# Patient Record
Sex: FEMALE | Race: WHITE | NOT HISPANIC OR LATINO | Employment: OTHER | URBAN - METROPOLITAN AREA
[De-identification: names, ages, dates, MRNs, and addresses within clinical notes are randomized per-mention and may not be internally consistent; named-entity substitution may affect disease eponyms.]

---

## 2017-01-16 ENCOUNTER — HOSPITAL ENCOUNTER (OUTPATIENT)
Facility: AMBULATORY SURGERY CENTER | Age: 67
Discharge: HOME | End: 2017-01-16
Attending: SURGERY | Admitting: SURGERY
Payer: COMMERCIAL

## 2017-01-16 VITALS
TEMPERATURE: 98.7 F | RESPIRATION RATE: 16 BRPM | SYSTOLIC BLOOD PRESSURE: 129 MMHG | HEIGHT: 66 IN | DIASTOLIC BLOOD PRESSURE: 74 MMHG | BODY MASS INDEX: 25.71 KG/M2 | WEIGHT: 160 LBS | OXYGEN SATURATION: 96 %

## 2017-01-16 DIAGNOSIS — M25.562 LEFT KNEE PAIN, UNSPECIFIED CHRONICITY: Primary | ICD-10-CM

## 2017-01-16 LAB — COLONOSCOPY: NORMAL

## 2017-01-16 PROCEDURE — G8907 PT DOC NO EVENTS ON DISCHARG: HCPCS

## 2017-01-16 PROCEDURE — G0121 COLON CA SCRN NOT HI RSK IND: HCPCS | Performed by: SURGERY

## 2017-01-16 PROCEDURE — 45378 DIAGNOSTIC COLONOSCOPY: CPT

## 2017-01-16 PROCEDURE — G8918 PT W/O PREOP ORDER IV AB PRO: HCPCS

## 2017-01-16 RX ORDER — LIDOCAINE 40 MG/G
CREAM TOPICAL
Status: DISCONTINUED | OUTPATIENT
Start: 2017-01-16 | End: 2017-01-16 | Stop reason: HOSPADM

## 2017-01-16 RX ORDER — FENTANYL CITRATE 50 UG/ML
INJECTION, SOLUTION INTRAMUSCULAR; INTRAVENOUS PRN
Status: DISCONTINUED | OUTPATIENT
Start: 2017-01-16 | End: 2017-01-16 | Stop reason: HOSPADM

## 2017-01-16 RX ORDER — DIPHENHYDRAMINE HYDROCHLORIDE 50 MG/ML
INJECTION INTRAMUSCULAR; INTRAVENOUS PRN
Status: DISCONTINUED | OUTPATIENT
Start: 2017-01-16 | End: 2017-01-16 | Stop reason: HOSPADM

## 2017-02-10 ENCOUNTER — RADIANT APPOINTMENT (OUTPATIENT)
Dept: MRI IMAGING | Facility: CLINIC | Age: 67
End: 2017-02-10
Attending: ORTHOPAEDIC SURGERY
Payer: COMMERCIAL

## 2017-02-10 DIAGNOSIS — M25.562 LEFT KNEE PAIN, UNSPECIFIED CHRONICITY: ICD-10-CM

## 2017-02-10 PROCEDURE — 73721 MRI JNT OF LWR EXTRE W/O DYE: CPT | Mod: LT | Performed by: RADIOLOGY

## 2017-02-20 ENCOUNTER — OFFICE VISIT (OUTPATIENT)
Dept: ORTHOPEDICS | Facility: CLINIC | Age: 67
End: 2017-02-20
Payer: COMMERCIAL

## 2017-02-20 ENCOUNTER — RADIANT APPOINTMENT (OUTPATIENT)
Dept: GENERAL RADIOLOGY | Facility: CLINIC | Age: 67
End: 2017-02-20
Attending: ORTHOPAEDIC SURGERY
Payer: COMMERCIAL

## 2017-02-20 VITALS — RESPIRATION RATE: 14 BRPM

## 2017-02-20 DIAGNOSIS — S83.232D COMPLEX TEAR OF MEDIAL MENISCUS OF LEFT KNEE AS CURRENT INJURY, SUBSEQUENT ENCOUNTER: ICD-10-CM

## 2017-02-20 DIAGNOSIS — M17.12 PRIMARY OSTEOARTHRITIS OF LEFT KNEE: ICD-10-CM

## 2017-02-20 DIAGNOSIS — M25.562 LEFT KNEE PAIN, UNSPECIFIED CHRONICITY: Primary | ICD-10-CM

## 2017-02-20 PROCEDURE — 73562 X-RAY EXAM OF KNEE 3: CPT | Mod: LT

## 2017-02-20 PROCEDURE — 20610 DRAIN/INJ JOINT/BURSA W/O US: CPT | Mod: LT | Performed by: ORTHOPAEDIC SURGERY

## 2017-02-20 RX ORDER — METHYLPREDNISOLONE ACETATE 80 MG/ML
80 INJECTION, SUSPENSION INTRA-ARTICULAR; INTRALESIONAL; INTRAMUSCULAR; SOFT TISSUE ONCE
Qty: 1 ML | Refills: 0 | OUTPATIENT
Start: 2017-02-20 | End: 2017-02-20

## 2017-02-20 NOTE — PROGRESS NOTES
HISTORY OF PRESENT ILLNESS:  Tiffany Stovall is a 66-year-old female seen for evaluation of left knee pain.  She has had problems with the knee since 1978 with an open arthrotomy and medial meniscectomy at that time.  She has had progressive wear and tear on the knee since then.  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  She now has sharp, dull, aching, shooting pain rated 5/10, primarily in the anteromedial aspect of the knee.  She does walking and swimming and both seem to bother it.  Pushing off the wall in swimming has hurt.  She has sharp pain when sitting with the knees very bent and with stairs.  Going down stairs has caused troubles.  She had steroid injection in 11/2000 and this did seem to help.  She has had periodic problems.    Repeat injection on 11/14/16 gave great relief until a vacation when she walked extensively.  MRI was done 2/10/17 showing severe Grade 4 chondromalacia of medial joint and complex medial meniscus tear.  MRI images were independently visualized with the patient.  X-ray today shows severe medial joint line narrowing with bone-on-bone.     PHYSICAL EXAMINATION:  Good mobility of the hips without pain.  She has tenderness at the anteromedial aspect of the left knee, negative on the right.  There is a well-healed fairly small arthrotomy incision on the anterior medial aspect of the left knee.  She has mild varus alignment of the left knee.  She has some tenderness at the mid medial joint and mild pain with varus stress.  No pain with valgus stress.  With medial and lateral Rocky both produce some anteromedial pain.  Her main pain with flexion of the knee is she is limited in the amount of bend at 120 degrees.  Her other knee goes to at least 130.  She has mild effusion just a trace of fluid.  No evidence of a popliteal cyst.  No increased warmth or erythema.  Sensation and circulation are intact.      IMPRESSION:  Left knee osteoarthritis.    Left medial meniscus  tear.  The osteoarthritis is primary problem.    Plan:    We discussed options and will inject the left knee with 80 mg Depo-Medrol and lidocaine today at anteromedial site.  We also discussed  brace and possible total knee arthroplasty.  She cannot take NSAID due to allergies.        YAMEL MONCADA MD

## 2017-02-20 NOTE — NURSING NOTE
"Chief Complaint   Patient presents with     RECHECK     Follow-up for Left knee MRI 2/10/17.       Initial Resp 14 Estimated body mass index is 25.82 kg/(m^2) as calculated from the following:    Height as of 1/12/17: 1.676 m (5' 6\").    Weight as of 1/12/17: 72.6 kg (160 lb).  Medication Reconciliation: complete     GIULIA GrayC  Supervising physician: Tom Thomas MD  Dept. of Orthopedics  [facility]          "

## 2017-02-20 NOTE — PROGRESS NOTES
The patient's left knee was prepped with betadine solution after verification of allergies. Area approximately 10 cm x 10 cm prepped in a sterile fashion. After injection, betadine removed with soap and water and band-aids applied.    1ml depo-medrol with 1% lidocaine plain injected into patient's left knee by Dr. Tom Thomas  LOT# [id number]  Exp. 06/2019

## 2017-02-20 NOTE — MR AVS SNAPSHOT
After Visit Summary   2/20/2017    Tiffany Stovall    MRN: [id number]           Patient Information     Date Of Birth          1950        Visit Information        Provider Department      2/20/2017 8:00 AM Tom Thomas MD [facility]        Today's Diagnoses     Left knee pain, unspecified chronicity    -  1      Care Instructions    You have had a steroid injection today.  For the first 2 hours there will likely be some numbing in the joint from the lidocaine.  This is a good sign, indicating that the injection is in the right place.  In 2 hours the lidocaine will wear off, and the joint will hurt like you had a shot.  Each day the cortisone makes it feel better.  It reaches peak effect in 2 weeks.  We expect it to last for 3 months.  You may resume regular activity when you feel ready.  If you are diabetic, your glucoses will be quite high for several days.    Consider  brace.  Consider total knee arthroplasty.        Follow-ups after your visit        Who to contact     If you have questions or need follow up information about today's clinic visit or your schedule please contact [facility] directly at [phone].  Normal or non-critical lab and imaging results will be communicated to you by MyChart, letter or phone within 4 business days after the clinic has received the results. If you do not hear from us within 7 days, please contact the clinic through Reebonz[employer] or phone. If you have a critical or abnormal lab result, we will notify you by phone as soon as possible.  Submit refill requests through payever or call your pharmacy and they will forward the refill request to us. Please allow 3 business days for your refill to be completed.          Additional Information About Your Visit        MyChart Information     payever gives you secure access to your electronic health record. If you see a primary care provider, you can also send messages to  your care team and make appointments. If you have questions, please call your primary care clinic.  If you do not have a primary care provider, please call [phone] and they will assist you.        Care EveryWhere ID     This is your Care EveryWhere ID. This could be used by other organizations to access your [facility] medical records  [id number]        Your Vitals Were     Respirations                   14            Blood Pressure from Last 3 Encounters:   01/16/17 129/74   12/13/16 142/83   03/02/16 122/75    Weight from Last 3 Encounters:   01/12/17 72.6 kg (160 lb)   03/02/16 74.4 kg (164 lb 1.6 oz)   02/16/15 75.4 kg (166 lb 3.2 oz)              We Performed the Following     XR Knee Left 3 Views        Primary Care Provider Office Phone # Fax #    Darby Williamson [phone]       [facility] [address] [place] [address] [address]  [facility] [address]        Thank you!     Thank you for choosing [facility]  for your care. Our goal is always to provide you with excellent care. Hearing back from our patients is one way we can continue to improve our services. Please take a few minutes to complete the written survey that you may receive in the mail after your visit with us. Thank you!             Your Updated Medication List - Protect others around you: Learn how to safely use, store and throw away your medicines at www.disposemymeds.org.          This list is accurate as of: 2/20/17  8:32 AM.  Always use your most recent med list.                   Brand Name Dispense Instructions for use    acetaminophen-codeine 300-30 MG per tablet    TYLENOL #3    18 tablet    Take 1-2 tablets by mouth every 4 hours as needed for pain maximum 8 tablet(s) per day       * albuterol 108 (90 BASE) MCG/ACT Inhaler    albuterol    1 Inhaler    Inhale 1-2 puffs into the lungs every 4 hours as needed for shortness of breath / dyspnea       * albuterol (2.5 MG/3ML) 0.083% neb solution     25 vial    Take 1  vial (2.5 mg) by nebulization every 6 hours as needed for shortness of breath / dyspnea       atorvastatin 40 MG tablet    LIPITOR    90 tablet    Take 1 tablet (40 mg) by mouth daily       EPINEPHrine 0.3 MG/0.3ML injection     2 each    Inject 0.3 mLs (0.3 mg) into the muscle once as needed       fluticasone-salmeterol 250-50 MCG/DOSE diskus inhaler    ADVAIR DISKUS    3 Inhaler    Inhale 1 puff into the lungs 2 times daily       levothyroxine 112 MCG tablet    SYNTHROID/LEVOTHROID    90 tablet    Take 1 tablet (112 mcg) by mouth every morning Patient needs to be seen prior to further refills.       loratadine 10 MG tablet    CLARITIN     Take 10 mg by mouth daily       losartan-hydrochlorothiazide 100-25 MG per tablet    HYZAAR    90 tablet    Take 1 tablet by mouth every morning       Multi-vitamin Tabs tablet   Generic drug:  multivitamin, therapeutic with minerals      ONE TABLET DAILY       NEXIUM PO      Take 40 mg by mouth daily       order for DME     1 each    Equipment being ordered: post-op /hard soled shoe       potassium chloride SA 20 MEQ CR tablet    K-DUR/KLOR-CON M    90 tablet    TAKE ONE TABLET BY MOUTH EVERY DAY       TYLENOL EXTRA STRENGTH PO          ZANTAC PO          * Notice:  This list has 2 medication(s) that are the same as other medications prescribed for you. Read the directions carefully, and ask your doctor or other care provider to review them with you.

## 2017-02-20 NOTE — PATIENT INSTRUCTIONS
You have had a steroid injection today.  For the first 2 hours there will likely be some numbing in the joint from the lidocaine.  This is a good sign, indicating that the injection is in the right place.  In 2 hours the lidocaine will wear off, and the joint will hurt like you had a shot.  Each day the cortisone makes it feel better.  It reaches peak effect in 2 weeks.  We expect it to last for 3 months.  You may resume regular activity when you feel ready.  If you are diabetic, your glucoses will be quite high for several days.    Consider  brace.  Consider total knee arthroplasty.

## 2017-02-23 ENCOUNTER — MYC MEDICAL ADVICE (OUTPATIENT)
Dept: FAMILY MEDICINE | Facility: CLINIC | Age: 67
End: 2017-02-23

## 2017-02-23 DIAGNOSIS — I10 HYPERTENSION GOAL BP (BLOOD PRESSURE) < 140/90: ICD-10-CM

## 2017-02-23 RX ORDER — LOSARTAN POTASSIUM AND HYDROCHLOROTHIAZIDE 25; 100 MG/1; MG/1
TABLET ORAL
Qty: 90 TABLET | Refills: 3 | Status: CANCELLED | OUTPATIENT
Start: 2017-02-23

## 2017-02-24 RX ORDER — LOSARTAN POTASSIUM AND HYDROCHLOROTHIAZIDE 25; 100 MG/1; MG/1
1 TABLET ORAL EVERY MORNING
Qty: 90 TABLET | Refills: 0 | Status: SHIPPED | OUTPATIENT
Start: 2017-02-24 | End: 2017-03-21

## 2017-03-17 ENCOUNTER — OFFICE VISIT (OUTPATIENT)
Dept: FAMILY MEDICINE | Facility: CLINIC | Age: 67
End: 2017-03-17
Payer: COMMERCIAL

## 2017-03-17 DIAGNOSIS — Z80.8 FAMILY HISTORY OF NONMELANOMA SKIN CANCER: ICD-10-CM

## 2017-03-17 DIAGNOSIS — Z85.828 HISTORY OF BASAL CELL CARCINOMA: ICD-10-CM

## 2017-03-17 DIAGNOSIS — L81.4 SOLAR LENTIGO: ICD-10-CM

## 2017-03-17 DIAGNOSIS — D22.9 MULTIPLE BENIGN MELANOCYTIC NEVI: ICD-10-CM

## 2017-03-17 DIAGNOSIS — L82.1 SEBORRHEIC KERATOSES: ICD-10-CM

## 2017-03-17 DIAGNOSIS — Z12.83 SKIN CANCER SCREENING: Primary | ICD-10-CM

## 2017-03-17 DIAGNOSIS — Z86.007 HISTORY OF SQUAMOUS CELL CARCINOMA IN SITU OF SKIN: ICD-10-CM

## 2017-03-17 PROCEDURE — 99213 OFFICE O/P EST LOW 20 MIN: CPT | Performed by: FAMILY MEDICINE

## 2017-03-17 NOTE — PROGRESS NOTES
[facility] - PRIMARY CARE SKIN    CC : skin cancer screening (full-body)  SUBJECTIVE:                                                    Tiffany Stovall is a 66 year old female who presents to clinic today for a full-body skin exam because of her history of basal cell carcinoma on the dorsum of the right foot.    Bothersome lesions noticed by the patient or other skin concerns :  Issue One : Site of previous Mohs micrographic surgery on the right foot has been slow to heal. She has noticed serous drainage from the site. This is aggravated by footwear, but she tries to wear loose-fitting shoes. She reports reaction to Vicryl sutures. Tenderness noted at times, but it is most aggravated by friction.  Issue Two : She has also felt gritty sensations on the scalp.    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  Family history of skin cancer : YES - non-melanoma.    Sun Exposure History  Sunscreen Use : YES, frequency : daily, SPF : 30.  UV-protective clothing use : YES  Previous history of significant sun exposure:  Blistering sunburns : YES - as a teenager  Tanning beds : NO.    Occupation : nurse (indoor).    Refer to electronic medical record (EMR) for past medical history and medications.    INTEGUMENTARY/SKIN: POSITIVE for changing and non-healing lesions  ROS : 14 point review of systems was negative except the symptoms listed above in the HPI.    This document serves as a record of the services and decisions personally performed and made by Joy Bailon  "MD. It was created on her behalf by James Dukes, a trained medical scribe.  The creation of this document is based on the scribe's personal observations and the provider's statements to the medical scribe.  James Dukes, March 17, 2017 4:13 PM      OBJECTIVE:                                                    GENERAL: healthy, alert and no distress  SKIN: Rodriguez Skin Type - I.  This patient was examined from the top of the head to the bottom of the feet  including scalp, face, neck, back, chest, breasts, buttocks, both arms, both legs, both hands, both feet, all 10 fingers and all 10 toes. The dermatoscope was used to help evaluate pigmented lesions.  Skin Pertinent Findings:  Scalp : 4 mm in size, \"stuck on\" appearing papules, raised, brown, coarse-textured, round lesion(s) most consistent with seborrheic keratoses on the mid-parietal scalp    Arms : Multiple, scattered, brown, macule(s) most consistent with benign solar lentigo. Scattered, 2 mm - 3 mm in size, brown macules most consistent with benign nevi (melanocytic nevi).    Legs : Scattered, brown, macule(s) most consistent with benign solar lentigo. Scattered, multiple, 2 mm - 3 mm in size, brown macules most consistent with benign nevi (melanocytic nevi).    Back :  Scattered, brown macules most consistent with benign nevi (melanocytic nevi).    Significant Findings:  Right mid-dorsal forearm : Well-healed scar    Right dorsal foot, beetween second and third toe : 6 mm in size area of superficial erosion. No suspicious characteristics.    Diagnostic Test Results:  none     Consent for digital photograhy  The patient was advised that digital photos will be taken today of Tiffany Stovall. The patient verbally consented to having these photos taken for purposes of documenting her condition. The patient understands that the images will be stored in her medical record.    MDM : full-body skin cancer screening in 4 months, if still clear then space out " "skin exams.      ASSESSMENT:                                                      Encounter Diagnoses   Name Primary?     Skin cancer screening Yes     History of basal cell carcinoma      History of squamous cell carcinoma in situ of skin      Family history of nonmelanoma skin cancer          PLAN:                                                    Patient Instructions   FUTURE APPOINTMENTS  Follow up in 4 month(s) for a full-body skin cancer screening.    Return to clinic if increasing tenderness, size, bleeding, discharge, infection.    TIPS FOR AVOIDING SKIN CANCER AND PREMATURE SKIN AGING  DOs    Wear a wide-brimmed hat and sunglasses.     Wear sun-protective clothing.    [employer] and other [employer] make sun protective clothing that is stylish, comfortable and cool.    [employer] and other [employer] make UV arm sleeves suitable for golfing, gardening and other activities.      Wear sunscreen on your face every day, even in the winter. (UVA \"aging rays\" penetrates window glass and is just as strong in the winter as in the summer) Sunscreen with SPF > 30 is recommended.    Wear sunscreen on your body and re-apply every 2 hours when exposed to sun. Sunscreen with SPF > 50 is recommended.    You should use about 3 tablespoons of sunscreen to protect your whole body. Thus a typical eight ounce bottle of sunscreen should last 4 applications. Remember, that the SPF rating is compromised if you don t apply enough. Most people only apply 1/2 - 1/3 of the amount they need. Also don t forget areas such as your ears, feet, upper back and harder to reach places. Keep in mind that these amounts should be increased for larger body sizes.    Note that spray sunscreens are only for touch-up application, not as a base layer. Also, use spray sunscreens with caution around small children due to inhalation risk.    Product Recommendations:    Look for broad spectrum sunscreen (blocks both UVA and UVB).    Look for " "titanium dioxide and/or zinc oxide in the active ingredients, which are physical blockers as opposed to chemical blockers. Chemical-free sunscreens should not irritate the skin.    Good examples include: Blue Lizard, EltaMD, Vanicream, Solbar, CeraVe.     For sensitive skin, consider : SkinMedica Essential Defense Mineral Shield Broad Spectrum SPF 35      Avoid combination products that include both sunscreen and insect repellant, as sunscreen should be applied every 2 hours, but insect repellant should not be applied as frequently.    Avoid products that include oxybenzone or retinyl palmitate.    For more information:  http://www.skincancer.org/prevention/sun-protection/sunscreen/sunscreens-safe-and-effective    DON'Ts    All tanning damages the skin. Aim for ivory skin year round and you will have less trouble with your skin in years to come. There is no merit in getting \"a base tan\" before a warm weather vacation, as any tanning indicates your body's response to sun damage.    Never use tanning beds. Tanning beds are associated with much higher risks of skin cancer.    Avoid mid-day sunshine (10 AM to 3 PM), if possible.    Stop smoking. Smokers have higher rates of skin cancer and also have premature skin wrinkling.    SKIN CANCER SELF-EXAM INSTRUCTIONS  Check every month in the mirror or with a household member. Be aware of any changes, especially bleeding or tenderness. Also, make sure to check your nails for color changes after removal of nail polish.    For melanoma, check for:  A - Asymmetry. One half unlike the other half.  B - Border. Irregular, scalloped, ragged, notched, blurred or poorly defined borders.  C - Color. Color variations from one area to another, with shades of tan, brown and/or black present. Sometimes white, red or blue.  D - Diameter. Greater than 6 mm (about the size of a pencil eraser). Any new growth of a mole should be concerning and be evaluated.  E - Evolving. A mole or skin " lesion that looks different from the rest or is changing in size, shape or color.    For basal cell carcinoma and squamous cell carcinoma, check for:    Sores, shiny bumps, nodules, scaly lesions, or wart-like growths that are itchy, tender, crusting, scabbing, eroding, oozing or bleeding.    Open sores/wounds or reddish/irritated areas that do not heal within 2-3 weeks.    Scar-like areas that are white, yellow or waxy in color.    The patient was counseled about sunscreens and sun avoidance. The patient was counseled to check the skin regularly and report any lesion that is new, changing, itching, scabbing, bleeding or otherwise bothersome. The patient was discharged ambulatory and in stable condition.    Recommendations : q4 month skin exams.      PROCEDURES:                                                    None.    TT: 25 minutes.  CT: 15 minutes.      The information in this document, created by the medical scribe for me, accurately reflects the services I personally performed and the decisions made by me. I have reviewed and approved this document for accuracy prior to leaving the patient care area.  Joy Bailon MD March 17, 2017 4:13 PM  [facility] - PRIMARY CARE SKIN

## 2017-03-17 NOTE — PATIENT INSTRUCTIONS
"FUTURE APPOINTMENTS  Follow up in 4 month(s) for a full-body skin cancer screening.    Return to clinic if increasing tenderness, size, bleeding, discharge, infection.    TIPS FOR AVOIDING SKIN CANCER AND PREMATURE SKIN AGING  DOs    Wear a wide-brimmed hat and sunglasses.     Wear sun-protective clothing.    [employer] and other [employer] make sun protective clothing that is stylish, comfortable and cool.    [employer] and other [employer] make UV arm sleeves suitable for golfing, gardening and other activities.      Wear sunscreen on your face every day, even in the winter. (UVA \"aging rays\" penetrates window glass and is just as strong in the winter as in the summer) Sunscreen with SPF > 30 is recommended.    Wear sunscreen on your body and re-apply every 2 hours when exposed to sun. Sunscreen with SPF > 50 is recommended.    You should use about 3 tablespoons of sunscreen to protect your whole body. Thus a typical eight ounce bottle of sunscreen should last 4 applications. Remember, that the SPF rating is compromised if you don t apply enough. Most people only apply 1/2 - 1/3 of the amount they need. Also don t forget areas such as your ears, feet, upper back and harder to reach places. Keep in mind that these amounts should be increased for larger body sizes.    Note that spray sunscreens are only for touch-up application, not as a base layer. Also, use spray sunscreens with caution around small children due to inhalation risk.    Product Recommendations:    Look for broad spectrum sunscreen (blocks both UVA and UVB).    Look for titanium dioxide and/or zinc oxide in the active ingredients, which are physical blockers as opposed to chemical blockers. Chemical-free sunscreens should not irritate the skin.    Good examples include: Blue Lizard, EltaMD, Vanicream, Solbar, CeraVe.     For sensitive skin, consider : SkinMedica Essential Defense Mineral Shield Broad Spectrum SPF 35      Avoid combination " "products that include both sunscreen and insect repellant, as sunscreen should be applied every 2 hours, but insect repellant should not be applied as frequently.    Avoid products that include oxybenzone or retinyl palmitate.    For more information:  http://www.skincancer.org/prevention/sun-protection/sunscreen/sunscreens-safe-and-effective    DON'Ts    All tanning damages the skin. Aim for ivory skin year round and you will have less trouble with your skin in years to come. There is no merit in getting \"a base tan\" before a warm weather vacation, as any tanning indicates your body's response to sun damage.    Never use tanning beds. Tanning beds are associated with much higher risks of skin cancer.    Avoid mid-day sunshine (10 AM to 3 PM), if possible.    Stop smoking. Smokers have higher rates of skin cancer and also have premature skin wrinkling.    SKIN CANCER SELF-EXAM INSTRUCTIONS  Check every month in the mirror or with a household member. Be aware of any changes, especially bleeding or tenderness. Also, make sure to check your nails for color changes after removal of nail polish.    For melanoma, check for:  A - Asymmetry. One half unlike the other half.  B - Border. Irregular, scalloped, ragged, notched, blurred or poorly defined borders.  C - Color. Color variations from one area to another, with shades of tan, brown and/or black present. Sometimes white, red or blue.  D - Diameter. Greater than 6 mm (about the size of a pencil eraser). Any new growth of a mole should be concerning and be evaluated.  E - Evolving. A mole or skin lesion that looks different from the rest or is changing in size, shape or color.    For basal cell carcinoma and squamous cell carcinoma, check for:    Sores, shiny bumps, nodules, scaly lesions, or wart-like growths that are itchy, tender, crusting, scabbing, eroding, oozing or bleeding.    Open sores/wounds or reddish/irritated areas that do not heal within 2-3 " weeks.    Scar-like areas that are white, yellow or waxy in color.

## 2017-03-17 NOTE — MR AVS SNAPSHOT
"              After Visit Summary   3/17/2017    Tiffany Stovall    MRN: [id number]           Patient Information     Date Of Birth          1950        Visit Information        Provider Department      3/17/2017 4:00 PM Nataliya Bailon MD [facility] - Primary Care Skin        Today's Diagnoses     Skin cancer screening    -  1    History of basal cell carcinoma        History of squamous cell carcinoma in situ of skin        Family history of nonmelanoma skin cancer        Multiple benign melanocytic nevi        Solar lentigo        Seborrheic keratoses          Care Instructions    FUTURE APPOINTMENTS  Follow up in 4 month(s) for a full-body skin cancer screening.    Return to clinic if increasing tenderness, size, bleeding, discharge, infection.    TIPS FOR AVOIDING SKIN CANCER AND PREMATURE SKIN AGING  DOs    Wear a wide-brimmed hat and sunglasses.     Wear sun-protective clothing.    sambaash and other [employer] make sun protective clothing that is stylish, comfortable and cool.    [email or web address] and other [employer] make UV arm sleeves suitable for golfing, gardening and other activities.      Wear sunscreen on your face every day, even in the winter. (UVA \"aging rays\" penetrates window glass and is just as strong in the winter as in the summer) Sunscreen with SPF > 30 is recommended.    Wear sunscreen on your body and re-apply every 2 hours when exposed to sun. Sunscreen with SPF > 50 is recommended.    You should use about 3 tablespoons of sunscreen to protect your whole body. Thus a typical eight ounce bottle of sunscreen should last 4 applications. Remember, that the SPF rating is compromised if you don t apply enough. Most people only apply 1/2 - 1/3 of the amount they need. Also don t forget areas such as your ears, feet, upper back and harder to reach places. Keep in mind that these amounts should be increased for larger body sizes.    Note that spray sunscreens are " "only for touch-up application, not as a base layer. Also, use spray sunscreens with caution around small children due to inhalation risk.    Product Recommendations:    Look for broad spectrum sunscreen (blocks both UVA and UVB).    Look for titanium dioxide and/or zinc oxide in the active ingredients, which are physical blockers as opposed to chemical blockers. Chemical-free sunscreens should not irritate the skin.    Good examples include: Blue Lizard, EltaMD, Vanicream, Solbar, CeraVe.     For sensitive skin, consider : SkinMedica Essential Defense Mineral Shield Broad Spectrum SPF 35      Avoid combination products that include both sunscreen and insect repellant, as sunscreen should be applied every 2 hours, but insect repellant should not be applied as frequently.    Avoid products that include oxybenzone or retinyl palmitate.    For more information:  http://www.skincancer.org/prevention/sun-protection/sunscreen/sunscreens-safe-and-effective    DON'Ts    All tanning damages the skin. Aim for ivory skin year round and you will have less trouble with your skin in years to come. There is no merit in getting \"a base tan\" before a warm weather vacation, as any tanning indicates your body's response to sun damage.    Never use tanning beds. Tanning beds are associated with much higher risks of skin cancer.    Avoid mid-day sunshine (10 AM to 3 PM), if possible.    Stop smoking. Smokers have higher rates of skin cancer and also have premature skin wrinkling.    SKIN CANCER SELF-EXAM INSTRUCTIONS  Check every month in the mirror or with a household member. Be aware of any changes, especially bleeding or tenderness. Also, make sure to check your nails for color changes after removal of nail polish.    For melanoma, check for:  A - Asymmetry. One half unlike the other half.  B - Border. Irregular, scalloped, ragged, notched, blurred or poorly defined borders.  C - Color. Color variations from one area to another, with " shades of tan, brown and/or black present. Sometimes white, red or blue.  D - Diameter. Greater than 6 mm (about the size of a pencil eraser). Any new growth of a mole should be concerning and be evaluated.  E - Evolving. A mole or skin lesion that looks different from the rest or is changing in size, shape or color.    For basal cell carcinoma and squamous cell carcinoma, check for:    Sores, shiny bumps, nodules, scaly lesions, or wart-like growths that are itchy, tender, crusting, scabbing, eroding, oozing or bleeding.    Open sores/wounds or reddish/irritated areas that do not heal within 2-3 weeks.    Scar-like areas that are white, yellow or waxy in color.        Follow-ups after your visit        Follow-up notes from your care team     Return in about 4 months (around 7/17/2017).      Your next 10 appointments already scheduled     Mar 21, 2017  7:40 AM CDT   PHYSICAL with Darby Williamson MD   [facility]    [address] [place] [id number] [phone]              Who to contact     If you have questions or need follow up information about today's clinic visit or your schedule please contact [facility] - PRIMARY CARE SKIN directly at [phone].  Normal or non-critical lab and imaging results will be communicated to you by [employer], letter or phone within 4 business days after the clinic has received the results. If you do not hear from us within 7 days, please contact the clinic through MyChart or phone. If you have a critical or abnormal lab result, we will notify you by phone as soon as possible.  Submit refill requests through [employer] or call your pharmacy and they will forward the refill request to us. Please allow 3 business days for your refill to be completed.          Additional Information About Your Visit        [employer] Information     [employer] gives you secure access to your electronic health record. If you see a primary  care provider, you can also send messages to your care team and make appointments. If you have questions, please call your primary care clinic.  If you do not have a primary care provider, please call [phone] and they will assist you.        Care EveryWhere ID     This is your Care EveryWhere ID. This could be used by other organizations to access your [place] medical records  [id number]         Blood Pressure from Last 3 Encounters:   01/16/17 129/74   12/13/16 142/83   03/02/16 122/75    Weight from Last 3 Encounters:   01/12/17 160 lb (72.6 kg)   03/02/16 164 lb 1.6 oz (74.4 kg)   02/16/15 166 lb 3.2 oz (75.4 kg)              Today, you had the following     No orders found for display       Primary Care Provider Office Phone # Fax #    Darby Williamson [phone]       [facility] [address] [place] [address] [place]  [facility] [address]        Thank you!     Thank you for choosing [facility] - PRIMARY CARE SKIN  for your care. Our goal is always to provide you with excellent care. Hearing back from our patients is one way we can continue to improve our services. Please take a few minutes to complete the written survey that you may receive in the mail after your visit with us. Thank you!             Your Updated Medication List - Protect others around you: Learn how to safely use, store and throw away your medicines at www.disposemymeds.org.          This list is accurate as of: 3/17/17  4:16 PM.  Always use your most recent med list.                   Brand Name Dispense Instructions for use    acetaminophen-codeine 300-30 MG per tablet    TYLENOL #3    18 tablet    Take 1-2 tablets by mouth every 4 hours as needed for pain maximum 8 tablet(s) per day       * albuterol 108 (90 BASE) MCG/ACT Inhaler    albuterol    1 Inhaler    Inhale 1-2 puffs into the lungs every 4 hours as needed for shortness of breath / dyspnea       * albuterol (2.5 MG/3ML) 0.083% neb solution     25 vial    Take 1  vial (2.5 mg) by nebulization every 6 hours as needed for shortness of breath / dyspnea       atorvastatin 40 MG tablet    LIPITOR    90 tablet    Take 1 tablet (40 mg) by mouth daily       EPINEPHrine 0.3 MG/0.3ML injection     2 each    Inject 0.3 mLs (0.3 mg) into the muscle once as needed       fluticasone-salmeterol 250-50 MCG/DOSE diskus inhaler    ADVAIR DISKUS    3 Inhaler    Inhale 1 puff into the lungs 2 times daily       levothyroxine 112 MCG tablet    SYNTHROID/LEVOTHROID    90 tablet    Take 1 tablet (112 mcg) by mouth every morning Patient needs to be seen prior to further refills.       loratadine 10 MG tablet    CLARITIN     Take 10 mg by mouth daily       losartan-hydrochlorothiazide 100-25 MG per tablet    HYZAAR    90 tablet    Take 1 tablet by mouth every morning       Multi-vitamin Tabs tablet   Generic drug:  multivitamin, therapeutic with minerals      ONE TABLET DAILY       NEXIUM PO      Take 40 mg by mouth daily       order for DME     1 each    Equipment being ordered: post-op /hard soled shoe       OSTEO BI-FLEX ADV DOUBLE ST PO          potassium chloride SA 20 MEQ CR tablet    K-DUR/KLOR-CON M    90 tablet    TAKE ONE TABLET BY MOUTH EVERY DAY       TYLENOL EXTRA STRENGTH PO          ZANTAC PO          * Notice:  This list has 2 medication(s) that are the same as other medications prescribed for you. Read the directions carefully, and ask your doctor or other care provider to review them with you.

## 2017-03-21 ENCOUNTER — OFFICE VISIT (OUTPATIENT)
Dept: FAMILY MEDICINE | Facility: CLINIC | Age: 67
End: 2017-03-21
Payer: COMMERCIAL

## 2017-03-21 VITALS
SYSTOLIC BLOOD PRESSURE: 134 MMHG | DIASTOLIC BLOOD PRESSURE: 78 MMHG | TEMPERATURE: 98.2 F | WEIGHT: 170.9 LBS | OXYGEN SATURATION: 98 % | HEART RATE: 83 BPM | BODY MASS INDEX: 27.47 KG/M2 | HEIGHT: 66 IN

## 2017-03-21 DIAGNOSIS — Z23 NEED FOR VACCINATION WITH 13-POLYVALENT PNEUMOCOCCAL CONJUGATE VACCINE: ICD-10-CM

## 2017-03-21 DIAGNOSIS — J45.30 MILD PERSISTENT ASTHMA WITHOUT COMPLICATION: ICD-10-CM

## 2017-03-21 DIAGNOSIS — Z00.00 ROUTINE GENERAL MEDICAL EXAMINATION AT A HEALTH CARE FACILITY: Primary | ICD-10-CM

## 2017-03-21 DIAGNOSIS — I10 HYPERTENSION GOAL BP (BLOOD PRESSURE) < 140/90: ICD-10-CM

## 2017-03-21 DIAGNOSIS — M17.12 PRIMARY OSTEOARTHRITIS OF LEFT KNEE: ICD-10-CM

## 2017-03-21 DIAGNOSIS — E78.5 HYPERLIPIDEMIA LDL GOAL <130: ICD-10-CM

## 2017-03-21 DIAGNOSIS — E03.4 HYPOTHYROIDISM DUE TO ACQUIRED ATROPHY OF THYROID: ICD-10-CM

## 2017-03-21 DIAGNOSIS — E78.6 FAMILIAL LIPOPROTEIN DEFICIENCY: ICD-10-CM

## 2017-03-21 DIAGNOSIS — K21.9 GASTROESOPHAGEAL REFLUX DISEASE WITHOUT ESOPHAGITIS: ICD-10-CM

## 2017-03-21 DIAGNOSIS — Z11.59 NEED FOR HEPATITIS C SCREENING TEST: ICD-10-CM

## 2017-03-21 LAB
ALBUMIN SERPL-MCNC: 4.2 G/DL (ref 3.4–5)
ALP SERPL-CCNC: 64 U/L (ref 40–150)
ALT SERPL W P-5'-P-CCNC: 49 U/L (ref 0–50)
ANION GAP SERPL CALCULATED.3IONS-SCNC: 9 MMOL/L (ref 3–14)
AST SERPL W P-5'-P-CCNC: 27 U/L (ref 0–45)
BILIRUB SERPL-MCNC: 0.4 MG/DL (ref 0.2–1.3)
BUN SERPL-MCNC: 14 MG/DL (ref 7–30)
CALCIUM SERPL-MCNC: 9.5 MG/DL (ref 8.5–10.1)
CHLORIDE SERPL-SCNC: 100 MMOL/L (ref 94–109)
CHOLEST SERPL-MCNC: 123 MG/DL
CO2 SERPL-SCNC: 28 MMOL/L (ref 20–32)
CREAT SERPL-MCNC: 0.65 MG/DL (ref 0.52–1.04)
CREAT UR-MCNC: 124 MG/DL
GFR SERPL CREATININE-BSD FRML MDRD: ABNORMAL ML/MIN/1.7M2
GLUCOSE SERPL-MCNC: 111 MG/DL (ref 70–99)
HDLC SERPL-MCNC: 54 MG/DL
HGB BLD-MCNC: 13.3 G/DL (ref 11.7–15.7)
LDLC SERPL CALC-MCNC: 57 MG/DL
MICROALBUMIN UR-MCNC: 5 MG/L
MICROALBUMIN/CREAT UR: 4.38 MG/G CR (ref 0–25)
NONHDLC SERPL-MCNC: 69 MG/DL
POTASSIUM SERPL-SCNC: 3.7 MMOL/L (ref 3.4–5.3)
PROT SERPL-MCNC: 7.2 G/DL (ref 6.8–8.8)
SODIUM SERPL-SCNC: 137 MMOL/L (ref 133–144)
TRIGL SERPL-MCNC: 62 MG/DL
TSH SERPL DL<=0.005 MIU/L-ACNC: 0.63 MU/L (ref 0.4–4)

## 2017-03-21 PROCEDURE — 36415 COLL VENOUS BLD VENIPUNCTURE: CPT | Performed by: FAMILY MEDICINE

## 2017-03-21 PROCEDURE — 84443 ASSAY THYROID STIM HORMONE: CPT | Performed by: FAMILY MEDICINE

## 2017-03-21 PROCEDURE — 86803 HEPATITIS C AB TEST: CPT | Performed by: FAMILY MEDICINE

## 2017-03-21 PROCEDURE — 80053 COMPREHEN METABOLIC PANEL: CPT | Performed by: FAMILY MEDICINE

## 2017-03-21 PROCEDURE — 99396 PREV VISIT EST AGE 40-64: CPT | Mod: 25 | Performed by: FAMILY MEDICINE

## 2017-03-21 PROCEDURE — 82043 UR ALBUMIN QUANTITATIVE: CPT | Performed by: FAMILY MEDICINE

## 2017-03-21 PROCEDURE — 85018 HEMOGLOBIN: CPT | Performed by: FAMILY MEDICINE

## 2017-03-21 PROCEDURE — 90670 PCV13 VACCINE IM: CPT | Performed by: FAMILY MEDICINE

## 2017-03-21 PROCEDURE — 90471 IMMUNIZATION ADMIN: CPT | Performed by: FAMILY MEDICINE

## 2017-03-21 PROCEDURE — 80061 LIPID PANEL: CPT | Performed by: FAMILY MEDICINE

## 2017-03-21 NOTE — MR AVS SNAPSHOT
After Visit Summary   3/21/2017    Tiffany Stovall    MRN: [id number]           Patient Information     Date Of Birth          1950        Visit Information        Provider Department      3/21/2017 7:40 AM Darby Williamson MD [facility]        Today's Diagnoses     Routine general medical examination at a health care facility    -  1    Hyperlipidemia LDL goal <130        Hypertension goal BP (blood pressure) < 140/90        Familial lipoprotein deficiency        Hypothyroidism due to acquired atrophy of thyroid        Mild persistent asthma without complication        Gastroesophageal reflux disease without esophagitis        Primary osteoarthritis of left knee        Need for hepatitis C screening test        Need for vaccination with 13-polyvalent pneumococcal conjugate vaccine          Care Instructions    Fasting labs today.  Refills will be sent when results return.    Prevnar vaccine today.      Preventive Health Recommendations  Female Ages 50 - 64    Yearly exam: See your health care provider every year in order to  o Review health changes.   o Discuss preventive care.    o Review your medicines if your doctor has prescribed any.      Get a Pap test every three years (unless you have an abnormal result and your provider advises testing more often).    If you get Pap tests with HPV test, you only need to test every 5 years, unless you have an abnormal result.     You do not need a Pap test if your uterus was removed (hysterectomy) and you have not had cancer.    You should be tested each year for STDs (sexually transmitted diseases) if you're at risk.     Have a mammogram every 1 to 2 years.    Have a colonoscopy at age 50, or have a yearly FIT test (stool test). These exams screen for colon cancer.      Have a cholesterol test every 5 years, or more often if advised.    Have a diabetes test (fasting glucose) every three years. If you are at risk for diabetes, you  should have this test more often.     If you are at risk for osteoporosis (brittle bone disease), think about having a bone density scan (DEXA).    Shots: Get a flu shot each year. Get a tetanus shot every 10 years.    Nutrition:     Eat at least 5 servings of fruits and vegetables each day.    Eat whole-grain bread, whole-wheat pasta and brown rice instead of white grains and rice.    Talk to your provider about Calcium and Vitamin D.     Lifestyle    Exercise at least 150 minutes a week (30 minutes a day, 5 days a week). This will help you control your weight and prevent disease.    Limit alcohol to one drink per day.    No smoking.     Wear sunscreen to prevent skin cancer.     See your dentist every six months for an exam and cleaning.    See your eye doctor every 1 to 2 years.          Follow-ups after your visit        Who to contact     If you have questions or need follow up information about today's clinic visit or your schedule please contact [facility] directly at [phone].  Normal or non-critical lab and imaging results will be communicated to you by [employer], letter or phone within 4 business days after the clinic has received the results. If you do not hear from us within 7 days, please contact the clinic through [employer] or phone. If you have a critical or abnormal lab result, we will notify you by phone as soon as possible.  Submit refill requests through [employer] or call your pharmacy and they will forward the refill request to us. Please allow 3 business days for your refill to be completed.          Additional Information About Your Visit        [employer] Information     [employer] gives you secure access to your electronic health record. If you see a primary care provider, you can also send messages to your care team and make appointments. If you have questions, please call your primary care clinic.  If you do not have a primary care provider, please call [phone] and they will  "assist you.        Care EveryWhere ID     This is your Care EveryWhere ID. This could be used by other organizations to access your [place] medical records  [id number]        Your Vitals Were     Pulse Temperature Height Pulse Oximetry BMI (Body Mass Index)       83 98.2  F (36.8  C) (Oral) 1.676 m (5' 6\") 98% 27.58 kg/m2        Blood Pressure from Last 3 Encounters:   03/21/17 134/78   01/16/17 129/74   12/13/16 142/83    Weight from Last 3 Encounters:   03/21/17 77.5 kg (170 lb 14.4 oz)   01/12/17 72.6 kg (160 lb)   03/02/16 74.4 kg (164 lb 1.6 oz)              We Performed the Following     Albumin Random Urine Quantitative     Asthma Action Plan (AAP)     Comprehensive metabolic panel (BMP + Alb, Alk Phos, ALT, AST, Total. Bili, TP)     Hemoglobin     Hepatitis C Screen Reflex to HCV RNA Quant and Genotype     LIPID REFLEX TO DIRECT LDL PANEL     PNEUMOCOCCAL CONJ VACCINE 13 VALENT IM (PREVNAR 13)     TSH WITH FREE T4 REFLEX        Primary Care Provider Office Phone # Fax #    Darby Williamson [phone]       [facility] [address] [place] [address] N  [facility] [address]        Thank you!     Thank you for choosing [facility]  for your care. Our goal is always to provide you with excellent care. Hearing back from our patients is one way we can continue to improve our services. Please take a few minutes to complete the written survey that you may receive in the mail after your visit with us. Thank you!             Your Updated Medication List - Protect others around you: Learn how to safely use, store and throw away your medicines at www.disposemymeds.org.          This list is accurate as of: 3/21/17  8:16 AM.  Always use your most recent med list.                   Brand Name Dispense Instructions for use    * albuterol 108 (90 BASE) MCG/ACT Inhaler    albuterol    1 Inhaler    Inhale 1-2 puffs into the lungs every 4 hours as needed for shortness of breath / dyspnea       * " albuterol (2.5 MG/3ML) 0.083% neb solution     25 vial    Take 1 vial (2.5 mg) by nebulization every 6 hours as needed for shortness of breath / dyspnea       atorvastatin 40 MG tablet    LIPITOR    90 tablet    Take 1 tablet (40 mg) by mouth daily       EPINEPHrine 0.3 MG/0.3ML injection     2 each    Inject 0.3 mLs (0.3 mg) into the muscle once as needed       fluticasone-salmeterol 250-50 MCG/DOSE diskus inhaler    ADVAIR DISKUS    3 Inhaler    Inhale 1 puff into the lungs 2 times daily       levothyroxine 112 MCG tablet    SYNTHROID/LEVOTHROID    90 tablet    Take 1 tablet (112 mcg) by mouth every morning Patient needs to be seen prior to further refills.       loratadine 10 MG tablet    CLARITIN     Take 10 mg by mouth daily       losartan-hydrochlorothiazide 100-25 MG per tablet    HYZAAR    90 tablet    Take 1 tablet by mouth every morning       Multi-vitamin Tabs tablet   Generic drug:  multivitamin, therapeutic with minerals      ONE TABLET DAILY       NEXIUM PO      Take 40 mg by mouth daily       OSTEO BI-FLEX ADV DOUBLE ST PO          potassium chloride SA 20 MEQ CR tablet    K-DUR/KLOR-CON M    90 tablet    TAKE ONE TABLET BY MOUTH EVERY DAY       TYLENOL EXTRA STRENGTH PO          ZANTAC PO          * Notice:  This list has 2 medication(s) that are the same as other medications prescribed for you. Read the directions carefully, and ask your doctor or other care provider to review them with you.

## 2017-03-21 NOTE — PATIENT INSTRUCTIONS
Fasting labs today.  Refills will be sent when results return.    Prevnar vaccine today.      Preventive Health Recommendations  Female Ages 50 - 64    Yearly exam: See your health care provider every year in order to  o Review health changes.   o Discuss preventive care.    o Review your medicines if your doctor has prescribed any.      Get a Pap test every three years (unless you have an abnormal result and your provider advises testing more often).    If you get Pap tests with HPV test, you only need to test every 5 years, unless you have an abnormal result.     You do not need a Pap test if your uterus was removed (hysterectomy) and you have not had cancer.    You should be tested each year for STDs (sexually transmitted diseases) if you're at risk.     Have a mammogram every 1 to 2 years.    Have a colonoscopy at age 50, or have a yearly FIT test (stool test). These exams screen for colon cancer.      Have a cholesterol test every 5 years, or more often if advised.    Have a diabetes test (fasting glucose) every three years. If you are at risk for diabetes, you should have this test more often.     If you are at risk for osteoporosis (brittle bone disease), think about having a bone density scan (DEXA).    Shots: Get a flu shot each year. Get a tetanus shot every 10 years.    Nutrition:     Eat at least 5 servings of fruits and vegetables each day.    Eat whole-grain bread, whole-wheat pasta and brown rice instead of white grains and rice.    Talk to your provider about Calcium and Vitamin D.     Lifestyle    Exercise at least 150 minutes a week (30 minutes a day, 5 days a week). This will help you control your weight and prevent disease.    Limit alcohol to one drink per day.    No smoking.     Wear sunscreen to prevent skin cancer.     See your dentist every six months for an exam and cleaning.    See your eye doctor every 1 to 2 years.

## 2017-03-21 NOTE — LETTER
My Asthma Action Plan  Name: Tiffany Stovall   YOB: 1950  Date: 3/21/2017   My doctor: Darby Wliliamson MD   My clinic: [facility]        My Control Medicine: Fluticasone + salmeterol (Advair) -  Diskus 250/50 mcg 1 puff daily to twice daily.  My Rescue Medicine: Albuterol (Proair/Ventolin/Proventil) inhaler 2 puffs every 4-6 hours as needed.   My Asthma Severity: mild persistent  Avoid your asthma triggers: upper respiratory infections               GREEN ZONE     Good Control    I feel good    No cough or wheeze    Can work, sleep and play without asthma symptoms       Take your asthma control medicine every day.     1. If exercise triggers your asthma, take your rescue medication    15 minutes before exercise or sports, and    During exercise if you have asthma symptoms  2. Spacer to use with inhaler: If you have a spacer, make sure to use it with your inhaler             YELLOW ZONE     Getting Worse  I have ANY of these:    I do not feel good    Cough or wheeze    Chest feels tight    Wake up at night   1. Keep taking your Green Zone medications  2. Start taking your rescue medicine:    every 20 minutes for up to 1 hour. Then every 4 hours for 24-48 hours.  3. If you stay in the Yellow Zone for more than 12-24 hours, contact your doctor.  4. If you do not return to the Green Zone in 12-24 hours or you get worse, start taking your oral steroid medicine if prescribed by your provider.           RED ZONE     Medical Alert - Get Help  I have ANY of these:    I feel awful    Medicine is not helping    Breathing getting harder    Trouble walking or talking    Nose opens wide to breathe       1. Take your rescue medicine NOW  2. If your provider has prescribed an oral steroid medicine, start taking it NOW  3. Call your doctor NOW  4. If you are still in the Red Zone after 20 minutes and you have not reached your doctor:    Take your rescue medicine again and    Call 911 or go to  the emergency room right away    See your regular doctor within 2 weeks of an Emergency Room or Urgent Care visit for follow-up treatment.        Electronically signed by: Darby Williamson, March 21, 2017    Annual Reminders:  Meet with Asthma Educator,  Flu Shot in the Fall, consider Pneumonia Vaccination for patients with asthma (aged 19 and older).    Pharmacy:    [facility] - [place], [place] - [address], [id number]  [facility] - [place], [place] - [address] [facility]  [id number] - [place], [place] - [address] AT [address] [facility] [address] [facility]                    Asthma Triggers  How To Control Things That Make Your Asthma Worse    Triggers are things that make your asthma worse.  Look at the list below to help you find your triggers and what you can do about them.  You can help prevent asthma flare-ups by staying away from your triggers.      Trigger                                                          What you can do   Cigarette Smoke  Tobacco smoke can make asthma worse. Do not allow smoking in your home, car or around you.  Be sure no one smokes at a child s day care or school.  If you smoke, ask your health care provider for ways to help you quit.  Ask family members to quit too.  Ask your health care provider for a referral to Quit Plan to help you quit smoking, or call 6-229-099-PLAN.     Colds, Flu, Bronchitis  These are common triggers of asthma. Wash your hands often.  Don t touch your eyes, nose or mouth.  Get a flu shot every year.     Dust Mites  These are tiny bugs that live in cloth or carpet. They are too small to see. Wash sheets and blankets in hot water every week.   Encase pillows and mattress in dust mite proof covers.  Avoid having carpet if you can. If you have carpet, vacuum weekly.   Use a dust mask and HEPA vacuum.   Pollen and Outdoor Mold  Some people are allergic to trees, grass,  or weed pollen, or molds. Try to keep your windows closed.  Limit time out doors when pollen count is high.   Ask you health care provider about taking medicine during allergy season.     Animal Dander  Some people are allergic to skin flakes, urine or saliva from pets with fur or feathers. Keep pets with fur or feathers out of your home.    If you can t keep the pet outdoors, then keep the pet out of your bedroom.  Keep the bedroom door closed.  Keep pets off cloth furniture and away from stuffed toys.     Mice, Rats, and Cockroaches  Some people are allergic to the waste from these pests.   Cover food and garbage.  Clean up spills and food crumbs.  Store grease in the refrigerator.   Keep food out of the bedroom.   Indoor Mold  This can be a trigger if your home has high moisture. Fix leaking faucets, pipes, or other sources of water.   Clean moldy surfaces.  Dehumidify basement if it is damp and smelly.   Smoke, Strong Odors, and Sprays  These can reduce air quality. Stay away from strong odors and sprays, such as perfume, powder, hair spray, paints, smoke incense, paint, cleaning products, candles and new carpet.   Exercise or Sports  Some people with asthma have this trigger. Be active!  Ask your doctor about taking medicine before sports or exercise to prevent symptoms.    Warm up for 5-10 minutes before and after sports or exercise.     Other Triggers of Asthma  Cold air:  Cover your nose and mouth with a scarf.  Sometimes laughing or crying can be a trigger.  Some medicines and food can trigger asthma.

## 2017-03-21 NOTE — PROGRESS NOTES
SUBJECTIVE:     CC: Tiffany Stovall is an 66 year old woman who presents for preventive health visit.     Healthy Habits:  Answers for HPI/ROS submitted by the patient on 3/18/2017   Annual Exam:  Getting at least 3 servings of Calcium per day:: Yes  Bi-annual eye exam:: Yes  Dental care twice a year:: Yes  Sleep apnea or symptoms of sleep apnea:: None  Diet:: Regular (no restrictions)  Frequency of exercise:: 2-3 days/week  Taking medications regularly:: Yes  Medication side effects:: None  Additional concerns today:: No  Q1: Little interest or pleasure in doing things: 0=Not at all  Q2: Feeling down, depressed or hopeless: 0=Not at all  PHQ-2 Score: 0  Duration of exercise:: 45-60 minutes      Concerns: None    Hyperlipidemia Follow-Up      Rate your low fat/cholesterol diet?: good    Taking statin?  Yes, no muscle aches from statin    Other lipid medications/supplements?:  none     Hypertension Follow-up      Outpatient blood pressures are not being checked.    Low Salt Diet: no added salt     Asthma Follow-Up    Was ACT completed today?    Yes    ACT Total Scores 3/21/2017   ACT TOTAL SCORE -   ASTHMA ER VISITS -   ASTHMA HOSPITALIZATIONS -   ACT TOTAL SCORE (Goal Greater than or Equal to 20) 25   In the past 12 months, how many times did you visit the emergency room for your asthma without being admitted to the hospital? 0   In the past 12 months, how many times were you hospitalized overnight because of your asthma? 0       Recent asthma triggers that patient is dealing with: None      Hypothyroidism Follow-up      Since last visit, patient describes the following symptoms: Weight stable, no hair loss, no skin changes, no constipation, no loose stools       Today's PHQ-2 Score:   PHQ-2 ( 1999 Pf[employer]) 3/18/2017 3/2/2016   Q1: Little interest or pleasure in doing things - 0   Q2: Feeling down, depressed or hopeless - 0   PHQ-2 Score - 0   Little interest or pleasure in doing things Not at all -   Feeling  down, depressed or hopeless Not at all -   PHQ-2 Score 0 -       Abuse: Current or Past(Physical, Sexual or Emotional)- No  Do you feel safe in your environment - Yes    Social History   Substance Use Topics     Smoking status: Never Smoker     Smokeless tobacco: Never Used     Alcohol use Yes      Comment: socially     The patient does not drink >3 drinks per day nor >7 drinks per week.    Recent Labs   Lab Test  03/02/16   0807  02/16/15   1124   12/13/13   1039   CHOL  156  160   < >  170   HDL  44*  60   < >  43*   LDL  85  85   < >  108   TRIG  134  74   < >  95   CHOLHDLRATIO   --   2.7   --   3.9   NHDL  112   --    --    --     < > = values in this interval not displayed.       Reviewed orders with patient.  Reviewed health maintenance and updated orders accordingly - Yes    Mammo Decision Support:  Patient over age 50, mutual decision to screen reflected in health maintenance.    Pertinent mammograms are reviewed under the imaging tab.  History of abnormal Pap smear: Status post benign hysterectomy. Health Maintenance and Surgical History updated.    Reviewed and updated as needed this visit by clinical staff  Tobacco  Allergies  Meds  Med Hx  Surg Hx  Fam Hx  Soc Hx        Reviewed and updated as needed this visit by Provider        Past Medical History:   Diagnosis Date     Bronchial spasm      Cancer (H)     basal cell, squamous cell skin ca     Compression fracture of T12 vertebra (H) 03/17/2014     GERD (gastroesophageal reflux disease)      HTN - hypertension      Hyperlipaemia      Postmenopausal 1999     Uncomplicated asthma      Unspecified hypothyroidism     Hypothyroidism      Past Surgical History:   Procedure Laterality Date     ARTHROSCOPY KNEE RT/LT  1978     BIOPSY OF BREAST, NEEDLE CORE  1995    Papilloma     C REMV CATARACT INTRACAP,INSERT LENS  2010     CARPAL TUNNEL RELEASE RT/LT  2010    bilateral     CHOLECYSTECTOMY, LAPOROSCOPIC  2006    Cholecystectomy, Laparoscopic      COLONOSCOPY WITH CO2 INSUFFLATION N/A 2017    Procedure: COLONOSCOPY WITH CO2 INSUFFLATION;  Surgeon: Elkin Lemus MD;  Location: [facility] OR     ENDOSCOPIC STRIPPING VEIN(S)  2013    stab phlebectomies     [facility] COLONOSCOPY THRU STOMA, DIAGNOSTIC  2006    diverticulosis, no polyps found, next one due in      HYSTERECTOMY, PAP NO LONGER INDICATED      bleeding, atypical cells on endobx, endometriosis     HYSTERECTOMY, CAMILLE      fibroids, endometrial hyperplasia     MOHS MICROGRAPHIC PROCEDURE      BCC lip     MOHS MICROGRAPHIC PROCEDURE  2016    squamus lt neck,basal top rt foot/3rd toe     SALPINGO OOPHORECTOMY,R/L/TERI      Salpingo Oophorectomy, RT/LT/TERI     TONSILLECTOMY & ADENOIDECTOMY       TUBAL/ECTOPIC PREGNANCY  ,     Obstetric History       T0      TAB0   SAB0   E2   M0   L1       # Outcome Date GA Lbr Mahin/2nd Weight Sex Delivery Anes PTL Lv   3 Ectopic         ND   2 Ectopic         ND   1      F    Y          ROS:  10 point ROS of systems including Constitutional, Eyes, Respiratory, Cardiovascular, Gastroenterology, Genitourinary, Integumentary, Muscularskeletal, Psychiatric were all negative except for pertinent positives noted in my HPI.      Problem list, Medication list, Allergies, and Medical/Social/Surgical histories reviewed in EPIC and updated as appropriate.  OBJECTIVE:     There were no vitals taken for this visit.  EXAM:  GENERAL APPEARANCE: healthy, alert and no distress  EYES: Eyes grossly normal to inspection, PERRL and conjunctivae and sclerae normal  HENT: ear canals and TM's normal, nose and mouth without ulcers or lesions, oropharynx clear and oral mucous membranes moist  NECK: no adenopathy, no asymmetry, masses, or scars and thyroid normal to palpation  RESP: lungs clear to auscultation - no rales, rhonchi or wheezes  BREAST: normal without masses, tenderness or nipple discharge and no palpable axillary masses  or adenopathy  CV: regular rate and rhythm, normal S1 S2, no S3 or S4, no murmur, click or rub, no peripheral edema and peripheral pulses strong  ABDOMEN: soft, nontender, no hepatosplenomegaly, no masses and bowel sounds normal   (female): normal female external genitalia, normal urethral meatus, vaginal mucosal atrophy noted, normal cervix, adnexae, and uterus without masses or abnormal discharge  MS: no musculoskeletal defects are noted, gait is age appropriate without ataxia, LLE exam reveals limited flexion and extension at knee, no effusion.  Mild valgus deformity  SKIN: right foot with scar and mild erythema between the second and third toes with superficial erosion.  NEURO: Normal strength and tone, sensory exam grossly normal, mentation intact and speech normal  PSYCH: mentation appears normal and affect normal/bright    ASSESSMENT/PLAN:     1. Routine general medical examination at a health care facility  Fasting.  Colonoscopy UTD.  Mammogram planned this month.    - Hemoglobin    2. Hyperlipidemia LDL goal <130  Continue current treatment.  - LIPID REFLEX TO DIRECT LDL PANEL  - atorvastatin (LIPITOR) 40 MG tablet; Take 1 tablet (40 mg) by mouth daily  Dispense: 90 tablet; Refill: 3  - Comprehensive metabolic panel (BMP + Alb, Alk Phos, ALT, AST, Total. Bili, TP)    3. Hypertension goal BP (blood pressure) < 140/90  continue  - losartan-hydrochlorothiazide (HYZAAR) 100-25 MG per tablet; Take 1 tablet by mouth every morning  Dispense: 90 tablet; Refill: 3  - Albumin Random Urine Quantitative  - Comprehensive metabolic panel (BMP + Alb, Alk Phos, ALT, AST, Total. Bili, TP)    4. Familial lipoprotein deficiency  continue  - potassium chloride SA (K-DUR/KLOR-CON M) 20 MEQ CR tablet; Take 1 tablet (20 mEq) by mouth daily  Dispense: 90 tablet; Refill: 3    5. Hypothyroidism due to acquired atrophy of thyroid  continue  - TSH WITH FREE T4 REFLEX  - levothyroxine (SYNTHROID/LEVOTHROID) 112 MCG tablet; Take 1  "tablet (112 mcg) by mouth every morning Patient needs to be seen prior to further refills.  Dispense: 90 tablet; Refill: 3    6. Mild persistent asthma without complication  Will call for refills at pharmacy when needed.    - Asthma Action Plan (AAP)  - fluticasone-salmeterol (ADVAIR DISKUS) 250-50 MCG/DOSE diskus inhaler; Inhale 1 puff into the lungs 2 times daily  Dispense: 3 Inhaler; Refill: 3  - albuterol (ALBUTEROL) 108 (90 BASE) MCG/ACT Inhaler; Inhale 1-2 puffs into the lungs every 4 hours as needed for shortness of breath / dyspnea  Dispense: 1 Inhaler; Refill: 2    7. Gastroesophageal reflux disease without esophagitis  OTC nexium  Has tried to wean off, not successful.      8. Primary osteoarthritis of left knee  Care with Dr. Thomas    9. Need for hepatitis C screening test  pending  - Hepatitis C Screen Reflex to HCV RNA Quant and Genotype    10. Need for vaccination with 13-polyvalent pneumococcal conjugate vaccine  vaccine  - PNEUMOCOCCAL CONJ VACCINE 13 VALENT IM (PREVNAR 13)    COUNSELING:   Reviewed preventive health counseling, as reflected in patient instructions         reports that she has never smoked. She has never used smokeless tobacco.    Estimated body mass index is 27.58 kg/(m^2) as calculated from the following:    Height as of this encounter: 1.676 m (5' 6\").    Weight as of this encounter: 77.5 kg (170 lb 14.4 oz).   Weight management plan: Discussed healthy diet and exercise guidelines and patient will follow up in 6 months in clinic to re-evaluate. knee pain prevents activity.  beginning swimming.      Counseling Resources:  ATP IV Guidelines  Pooled Cohorts Equation Calculator  Breast Cancer Risk Calculator  FRAX Risk Assessment  ICSI Preventive Guidelines  Dietary Guidelines for Americans, 2010  USDA's MyPlate  ASA Prophylaxis  Lung CA Screening    Darby Williamson MD  [facility]      Patient Instructions   Fasting labs today.  Refills will be sent when results " return.    Prevnar vaccine today.

## 2017-03-21 NOTE — NURSING NOTE
"Chief Complaint   Patient presents with     Physical     Pt is fasting       Initial /78 (BP Location: Right arm, Cuff Size: Adult Regular)  Pulse 83  Temp 98.2  F (36.8  C) (Oral)  Ht 1.676 m (5' 6\")  Wt 77.5 kg (170 lb 14.4 oz)  SpO2 98%  BMI 27.58 kg/m2 Estimated body mass index is 27.58 kg/(m^2) as calculated from the following:    Height as of this encounter: 1.676 m (5' 6\").    Weight as of this encounter: 77.5 kg (170 lb 14.4 oz).  Medication Reconciliation: complete       Michelle Ahmadi CMA      "

## 2017-03-22 LAB — HCV AB SERPL QL IA: NORMAL

## 2017-03-22 RX ORDER — LOSARTAN POTASSIUM AND HYDROCHLOROTHIAZIDE 25; 100 MG/1; MG/1
1 TABLET ORAL EVERY MORNING
Qty: 90 TABLET | Refills: 3 | Status: SHIPPED | OUTPATIENT
Start: 2017-03-22 | End: 2018-04-17

## 2017-03-22 RX ORDER — LEVOTHYROXINE SODIUM 112 UG/1
112 TABLET ORAL EVERY MORNING
Qty: 90 TABLET | Refills: 3 | Status: SHIPPED | OUTPATIENT
Start: 2017-03-22 | End: 2018-04-17

## 2017-03-22 RX ORDER — POTASSIUM CHLORIDE 1500 MG/1
20 TABLET, EXTENDED RELEASE ORAL DAILY
Qty: 90 TABLET | Refills: 3 | Status: SHIPPED | OUTPATIENT
Start: 2017-03-22 | End: 2018-04-17

## 2017-03-22 RX ORDER — ATORVASTATIN CALCIUM 40 MG/1
40 TABLET, FILM COATED ORAL DAILY
Qty: 90 TABLET | Refills: 3 | Status: SHIPPED | OUTPATIENT
Start: 2017-03-22 | End: 2018-04-17

## 2017-03-22 RX ORDER — ALBUTEROL SULFATE 90 UG/1
1-2 AEROSOL, METERED RESPIRATORY (INHALATION) EVERY 4 HOURS PRN
Qty: 1 INHALER | Refills: 2 | Status: SHIPPED | OUTPATIENT
Start: 2017-03-22 | End: 2017-06-07

## 2017-03-22 ASSESSMENT — ASTHMA QUESTIONNAIRES: ACT_TOTALSCORE: 25

## 2017-03-22 NOTE — PROGRESS NOTES
"Your urine testing is normal.  There is not elevated protein present.  Your cholesterol is under very good control.  The HDL, good cholesterol,  was previously low and is now normal.    Thyroid testing indicates you are on the correct dosage of medication.  Kidney and liver testing are normal.    Your blood sugar is borderline elevated.  This is in the \"prediabetic\" range.  Exercise and limiting carbohydrate and sugars in diet can be helpful at preventing progression to diabetes.  Your hemoglobin is normal.  Please call or MyChart message me if you have any questions.  Refills are being sent now.   PSK"

## 2017-03-24 NOTE — PROGRESS NOTES
The hepatitis C screening is negative as expected.  It was a pleasure to see you this week.  Enjoy your spring and summer.  Please call or MyChart message me if you have any questions.   [email or web address]

## 2017-04-06 DIAGNOSIS — M17.12 PRIMARY OSTEOARTHRITIS OF LEFT KNEE: Primary | ICD-10-CM

## 2017-06-07 ENCOUNTER — MYC MEDICAL ADVICE (OUTPATIENT)
Dept: FAMILY MEDICINE | Facility: CLINIC | Age: 67
End: 2017-06-07

## 2017-06-07 DIAGNOSIS — J45.30 MILD PERSISTENT ASTHMA WITHOUT COMPLICATION: ICD-10-CM

## 2017-06-09 RX ORDER — ALBUTEROL SULFATE 90 UG/1
1-2 AEROSOL, METERED RESPIRATORY (INHALATION) EVERY 4 HOURS PRN
Qty: 1 INHALER | Refills: 2 | Status: SHIPPED | OUTPATIENT
Start: 2017-06-09 | End: 2020-11-11

## 2017-06-09 NOTE — TELEPHONE ENCOUNTER
albuterol (ALBUTEROL) 108 (90 BASE) MCG/ACT Inhaler       Last Written Prescription Date: 3/22/17  Last Fill Quantity: 1 inhaler, # refills: 2    Last Office Visit with G, P or [facility] prescribing provider:  3/21/17 Dr. Williamson     Future Office Visit:    Next 5 appointments (look out 90 days)     Jun 12, 2017  4:00 PM CDT   Return Visit with Tom Thomas MD   [facility] ([address] [id number] [phone]                   Date of Last Asthma Action Plan Letter:   Asthma Action Plan Q1 Year    Topic Date Due     Asthma Action Plan - yearly  03/21/2018      Asthma Control Test:   ACT Total Scores 3/21/2017   ACT TOTAL SCORE -   ASTHMA ER VISITS -   ASTHMA HOSPITALIZATIONS -   ACT TOTAL SCORE (Goal Greater than or Equal to 20) 25   In the past 12 months, how many times did you visit the emergency room for your asthma without being admitted to the hospital? 0   In the past 12 months, how many times were you hospitalized overnight because of your asthma? 0       Date of Last Spirometry Test:   No results found for this or any previous visit.

## 2017-06-09 NOTE — TELEPHONE ENCOUNTER
Routing refill request to provider for review/approval because:  Frequency of use  Ambar Daugherty RN

## 2017-06-12 ENCOUNTER — OFFICE VISIT (OUTPATIENT)
Dept: ORTHOPEDICS | Facility: CLINIC | Age: 67
End: 2017-06-12
Payer: COMMERCIAL

## 2017-06-12 VITALS — HEIGHT: 66 IN | RESPIRATION RATE: 18 BRPM | TEMPERATURE: 98.3 F | WEIGHT: 170 LBS | BODY MASS INDEX: 27.32 KG/M2

## 2017-06-12 DIAGNOSIS — M17.12 PRIMARY OSTEOARTHRITIS OF LEFT KNEE: Primary | ICD-10-CM

## 2017-06-12 PROCEDURE — 20610 DRAIN/INJ JOINT/BURSA W/O US: CPT | Mod: LT | Performed by: ORTHOPAEDIC SURGERY

## 2017-06-12 RX ORDER — METHYLPREDNISOLONE ACETATE 80 MG/ML
80 INJECTION, SUSPENSION INTRA-ARTICULAR; INTRALESIONAL; INTRAMUSCULAR; SOFT TISSUE ONCE
Qty: 1 ML | Refills: 0 | OUTPATIENT
Start: 2017-06-12 | End: 2017-06-12

## 2017-06-12 NOTE — PROGRESS NOTES
Follow up left knee primary osteoarthritis.  Last injection 2/20/17.  Range of motion 0-120.    She  desires injection today of left knee(s).  Risks, benefits, potential complications and alternatives were discussed.   With the patient's consent, sterile prep was performed of left knee(s).  Left knee was injected with Depo Medrol 80 mg and lidocaine at anteromedial site.  Return to clinic as needed.

## 2017-06-12 NOTE — NURSING NOTE
"Chief Complaint   Patient presents with     RECHECK     Left knee OA last injection 2/20/17.       Initial Temp 98.3  F (36.8  C)  Resp 18  Ht 1.676 m (5' 6\")  Wt 77.1 kg (170 lb)  BMI 27.44 kg/m2 Estimated body mass index is 27.44 kg/(m^2) as calculated from the following:    Height as of this encounter: 1.676 m (5' 6\").    Weight as of this encounter: 77.1 kg (170 lb).  Medication Reconciliation: complete   Sonia Martin MA      "

## 2017-06-12 NOTE — PROGRESS NOTES
The patient's left knee was prepped with betadine solution after verification of allergies. Area approximately 10 cm x 10 cm prepped in a sterile fashion. After injection, betadine removed with soap and water and band-aids applied.    1ml depo medrol with 1% lidocaine plain injected into patient's left knee by Dr. Tom Thomas  LOT# [id number]  Exp. 8/19

## 2017-06-12 NOTE — MR AVS SNAPSHOT
After Visit Summary   6/12/2017    [place] Stovall    MRN: [id number]           Patient Information     Date Of Birth          1950        Visit Information        Provider Department      6/12/2017 4:00 PM Tom Thomas MD [facility]        Today's Diagnoses     Primary osteoarthritis of left knee    -  1      Care Instructions    You have had a steroid injection today.  For the first 2 hours there will likely be some numbing in the joint from the lidocaine.  This is a good sign, indicating that the injection is in the right place.  In 2 hours the lidocaine will wear off, and the joint will hurt like you had a shot.  Each day the cortisone makes it feel better.  It reaches peak effect in 2 weeks.  We expect it to last for 3 months.  You may resume regular activity when you feel ready.  If you are diabetic, your glucoses will be quite high for several days.            Follow-ups after your visit        Who to contact     If you have questions or need follow up information about today's clinic visit or your schedule please contact [facility] directly at [phone].  Normal or non-critical lab and imaging results will be communicated to you by FightMehart, letter or phone within 4 business days after the clinic has received the results. If you do not hear from us within 7 days, please contact the clinic through OneSunt or phone. If you have a critical or abnormal lab result, we will notify you by phone as soon as possible.  Submit refill requests through Sqord or call your pharmacy and they will forward the refill request to us. Please allow 3 business days for your refill to be completed.          Additional Information About Your Visit        MyChart Information     Sqord gives you secure access to your electronic health record. If you see a primary care provider, you can also send messages to your care team and make appointments. If you have questions,  "please call your primary care clinic.  If you do not have a primary care provider, please call [phone] and they will assist you.        Care EveryWhere ID     This is your Care EveryWhere ID. This could be used by other organizations to access your [facility] medical records  [id number]        Your Vitals Were     Temperature Respirations Height BMI (Body Mass Index)          98.3  F (36.8  C) 18 1.676 m (5' 6\") 27.44 kg/m2         Blood Pressure from Last 3 Encounters:   03/21/17 134/78   01/16/17 129/74   12/13/16 142/83    Weight from Last 3 Encounters:   06/12/17 77.1 kg (170 lb)   03/21/17 77.5 kg (170 lb 14.4 oz)   01/12/17 72.6 kg (160 lb)              Today, you had the following     No orders found for display       Primary Care Provider Office Phone # Fax #    Darby Williamson [phone]       [facility] [address] [place] [address] [address]  [facility] [address]        Thank you!     Thank you for choosing [facility] [place]  for your care. Our goal is always to provide you with excellent care. Hearing back from our patients is one way we can continue to improve our services. Please take a few minutes to complete the written survey that you may receive in the mail after your visit with us. Thank you!             Your Updated Medication List - Protect others around you: Learn how to safely use, store and throw away your medicines at www.disposemymeds.org.          This list is accurate as of: 6/12/17  4:12 PM.  Always use your most recent med list.                   Brand Name Dispense Instructions for use    * albuterol (2.5 MG/3ML) 0.083% neb solution     25 vial    Take 1 vial (2.5 mg) by nebulization every 6 hours as needed for shortness of breath / dyspnea       * albuterol 108 (90 BASE) MCG/ACT Inhaler    albuterol    1 Inhaler    Inhale 1-2 puffs into the lungs every 4 hours as needed for shortness of breath / dyspnea       atorvastatin 40 MG tablet    LIPITOR    90 tablet    Take " 1 tablet (40 mg) by mouth daily       EPINEPHrine 0.3 MG/0.3ML injection     2 each    Inject 0.3 mLs (0.3 mg) into the muscle once as needed       fluticasone-salmeterol 250-50 MCG/DOSE diskus inhaler    ADVAIR DISKUS    3 Inhaler    Inhale 1 puff into the lungs 2 times daily       levothyroxine 112 MCG tablet    SYNTHROID/LEVOTHROID    90 tablet    Take 1 tablet (112 mcg) by mouth every morning Patient needs to be seen prior to further refills.       loratadine 10 MG tablet    CLARITIN     Take 10 mg by mouth daily       losartan-hydrochlorothiazide 100-25 MG per tablet    HYZAAR    90 tablet    Take 1 tablet by mouth every morning       Multi-vitamin Tabs tablet   Generic drug:  multivitamin, therapeutic with minerals      ONE TABLET DAILY       NEXIUM PO      Take 40 mg by mouth daily       OSTEO BI-FLEX ADV DOUBLE ST PO          potassium chloride SA 20 MEQ CR tablet    K-DUR/KLOR-CON M    90 tablet    Take 1 tablet (20 mEq) by mouth daily       TYLENOL EXTRA STRENGTH PO          ZANTAC PO          * Notice:  This list has 2 medication(s) that are the same as other medications prescribed for you. Read the directions carefully, and ask your doctor or other care provider to review them with you.

## 2017-06-12 NOTE — LETTER
6/12/2017       RE: Tiffany Stovall  [address]  [place] [address]           Dear Colleague,    Thank you for referring your patient, [place]iffany Stovall, to the [facility]. Please see a copy of my visit note below.    Follow up left knee primary osteoarthritis.  Last injection 2/20/17.  Range of motion 0-120.    She  desires injection today of left knee(s).  Risks, benefits, potential complications and alternatives were discussed.   With the patient's consent, sterile prep was performed of left knee(s).  Left knee was injected with Depo Medrol 80 mg and lidocaine at anteromedial site.  Return to clinic as needed.       The patient's left knee was prepped with betadine solution after verification of allergies. Area approximately 10 cm x 10 cm prepped in a sterile fashion. After injection, betadine removed with soap and water and band-aids applied.    1ml depo medrol with 1% lidocaine plain injected into patient's left knee by Dr. Tom Thomas  LOT# [id number]  Exp. 8/19      Again, thank you for allowing me to participate in the care of your patient.        Sincerely,              Tom Thomas MD

## 2017-07-26 ENCOUNTER — MYC REFILL (OUTPATIENT)
Dept: FAMILY MEDICINE | Facility: CLINIC | Age: 67
End: 2017-07-26

## 2017-07-26 DIAGNOSIS — E78.5 HYPERLIPIDEMIA LDL GOAL <130: ICD-10-CM

## 2017-07-26 DIAGNOSIS — E03.4 HYPOTHYROIDISM DUE TO ACQUIRED ATROPHY OF THYROID: ICD-10-CM

## 2017-07-27 NOTE — TELEPHONE ENCOUNTER
Message from MyChart:  Original authorizing provider: MD Tiffnay Rivera would like a refill of the following medications:  atorvastatin (LIPITOR) 40 MG tablet [Darby Williamson MD]  levothyroxine (SYNTHROID/LEVOTHROID) 112 MCG tablet [Darby Williamson MD]    Preferred pharmacy: [facility] - [place] [address]    Comment:

## 2017-08-01 RX ORDER — ATORVASTATIN CALCIUM 40 MG/1
40 TABLET, FILM COATED ORAL DAILY
Qty: 90 TABLET | Refills: 3 | OUTPATIENT
Start: 2017-08-01

## 2017-08-01 RX ORDER — LEVOTHYROXINE SODIUM 112 UG/1
112 TABLET ORAL EVERY MORNING
Qty: 90 TABLET | Refills: 3 | OUTPATIENT
Start: 2017-08-01

## 2017-08-01 NOTE — TELEPHONE ENCOUNTER
Routing refill request to provider for review/approval because:  filled 3/22/17 #90 refills x 3  Ambar Elias RN.

## 2017-09-08 ENCOUNTER — OFFICE VISIT (OUTPATIENT)
Dept: FAMILY MEDICINE | Facility: CLINIC | Age: 67
End: 2017-09-08
Payer: COMMERCIAL

## 2017-09-08 DIAGNOSIS — D17.1 LIPOMA OF BACK: ICD-10-CM

## 2017-09-08 DIAGNOSIS — D22.9 MULTIPLE BENIGN MELANOCYTIC NEVI: ICD-10-CM

## 2017-09-08 DIAGNOSIS — Z85.828 HISTORY OF BASAL CELL CARCINOMA: ICD-10-CM

## 2017-09-08 DIAGNOSIS — L57.0 AK (ACTINIC KERATOSIS): Primary | ICD-10-CM

## 2017-09-08 DIAGNOSIS — Z86.007 HISTORY OF SQUAMOUS CELL CARCINOMA IN SITU OF SKIN: ICD-10-CM

## 2017-09-08 DIAGNOSIS — Z80.8 FAMILY HISTORY OF NONMELANOMA SKIN CANCER: ICD-10-CM

## 2017-09-08 DIAGNOSIS — L81.4 SOLAR LENTIGO: ICD-10-CM

## 2017-09-08 PROCEDURE — 17000 DESTRUCT PREMALG LESION: CPT | Performed by: FAMILY MEDICINE

## 2017-09-08 PROCEDURE — 99213 OFFICE O/P EST LOW 20 MIN: CPT | Mod: 25 | Performed by: FAMILY MEDICINE

## 2017-09-08 NOTE — MR AVS SNAPSHOT
"              After Visit Summary   9/8/2017    Tiffany Stovall    MRN: [id number]           Patient Information     Date Of Birth          1950        Visit Information        Provider Department      9/8/2017 4:20 PM Nataliya Bailon MD [facility] - Primary Care Skin        Today's Diagnoses     AK (actinic keratosis)    -  1    Lipoma of back        Multiple benign melanocytic nevi        Solar lentigo        History of basal cell carcinoma        History of squamous cell carcinoma in situ of skin        Family history of nonmelanoma skin cancer          Care Instructions    FUTURE APPOINTMENTS  Follow up in 6 months for a full-body skin cancer screening.    Apply a corn pad or moleskin over the site of the Mohs site on the right third toe as a protective measure.    SUN PROTECTION INSTRUCTIONS  Sun damage can lead to skin cancer and premature aging of the skin.      The best way to protect from sun damage to your skin is to avoid the sun during peak hours (10 am - 2 pm) even on overcast days.      Use UPF sun-protective clothing, which while more expensive initially provides longer lasting coverage without having to worry about remembering to re-apply.  1. Wear a wide-brimmed hat and sunglasses.   2. Wear sun-protective clothing.  [employer] and other [employer] make sun protective clothing that are stylish, comfortable and cool. [employer] and other [employer] make UV arm sleeves suitable for golfing, gardening and other activities.      Sunscreen instructions:  1. Use sunscreens with Zinc Oxide, Titanium Dioxide or Avobenzone to protect from UVA rays.  2. Use SPF 30-50+ to protect from UVB rays.  3. Re-apply every 2 hours even if water resistant.  4. Apply on your face every day even when cloudy and even in the winter. UVA \"aging rays\" penetrate window glass and are just as strong in the winter as in the summer.    Product Recommendations:    Good examples include: Blue " "Char Ring, Ayesha    Good daily moisturizers with SPF: Vanicream, CeraVe.    For sensitive skin, consider : SkinMedica Essential Defense Mineral Shield Broad Spectrum SPF 35      Never use tanning beds. Tanning beds are associated with much higher risks of skin cancer.    All tanning damages the skin. Aim for ivory skin year round and you will have less trouble with your skin in years to come. There is no merit in getting \"a base tan\" before a warm weather vacation, as any tanning indicates your body's response to sun damage.    Stop smoking. Smokers have higher rates of skin cancer and also have premature skin wrinkling.    FYI  You should use about 3 tablespoons of sunscreen to protect your whole body. Thus a typical eight ounce bottle of sunscreen should last 4 applications. Remember, that the SPF rating is compromised if you don t apply enough. Most people only apply 1/2 - 1/3 of the amount they need. Also don t forget areas such as your ears, feet, upper back and harder to reach places. Keep in mind that these amounts should be increased for larger body sizes.    Sunscreens with titanium dioxide and/or zinc oxide in the active ingredients are physical blockers as opposed to chemical blockers. Chemical-free sunscreens should not irritate the skin.    Spray-on sunscreens may be used for touch-up application only, not as a base layer. Also, use with caution around small children due to inhalation risk.    Avoid retinyl palmitate products.    Avoid combination products that include both sunscreen and insect repellant, as sunscreen should be applied every 2 hours, but insect repellant should not be applied as frequently.    SPF means sun protection factor, which is just the degree to which the sunscreen can protect against UVB rays. There is no rating system for UVA rays. SPF is calculated as the time skin will burn when sunscreen is applied vs. skin without sunscreen.    Water resistant sunscreens should be " re-applied every 1-2 hours.    For more information:  http://www.skincancer.org/prevention/sun-protection/sunscreen/sunscreens-safe-and-effective    SKIN CANCER SELF-EXAM INSTRUCTIONS  Check every month in the mirror or with a household member. Be aware of any changes, especially bleeding or tenderness. Also, make sure to check your nails for color changes after removal of nail polish.    For melanoma, check for:  A - Asymmetry. One half unlike the other half.  B - Border. Irregular, scalloped, ragged, notched, blurred or poorly defined borders.  C - Color. Color variations from one area to another, with shades of tan, brown and/or black present. Sometimes white, red or blue.  D - Diameter. Greater than 6 mm (about the size of a pencil eraser). Any new growth of a mole should be concerning and be evaluated.  E - Evolving. A mole or skin lesion that looks different from the rest or is changing in size, shape or color.    For basal cell carcinoma and squamous cell carcinoma, check for:    Sores, shiny bumps, nodules, scaly lesions, or wart-like growths that are itchy, tender, crusting, scabbing, eroding, oozing or bleeding.    Open sores/wounds or reddish/irritated areas that do not heal within 2-3 weeks.    Scar-like areas that are white, yellow or waxy in color.    CRYOTHERAPY FOR ACTINIC KERATOSES POST-TREATMENT CARE INSTRUCTIONS  Actinic keratoses are benign, scaly or gritty lesions that appear in sun-exposed areas and may progress to skin cancers. For this reason, it is important to treat them before they become cancerous.  Liquid nitrogen is mildly uncomfortable when applied to the skin, but the discomfort rapidly subsides.    Post-Treatment:  You may experience burning and/or stinging immediately following the procedure. The discomfort from the procedure may persist over the next 12-24 hours. The area treated will look pinker and slightly swollen before the healing process begins. You may also notice redness,  swelling, tenderness, weeping and crusts or scabs. Healing time is approximately 10-14 days.    Blister - You may or may notice blistering from the freezing. If you develop an uncomfortable blister from today's treatment, you may gently puncture this with a needle that has been cleaned with alcohol. However, do not remove the protective skin layer of the blister.    Scab - After a few days, you may notice scaliness or scab formation. Do not pick at the scabs because this may cause slower healing and a permanent scar.    The skin may appear temporarily darker at the treatment site, but this usually fades over a period of months, provided that the area is protected from the sun.    Care of the areas treated:    Wash the area with a mild cleanser.    Gently pat dry.    Do not rub.     Keep protected from the sun during the healing process and for a full year following treatment as the skin continues to remodel during this time.    Apply Vaseline or Aquaphor ointment sparingly to the site for the first 7 days after treatment.    Do not use Neosporin, as many people eventually develop a medication allergy, that can easily be confused with an infection, to Neomycin.    Return if:  There should not be any residual scaling. If there is any concern that the lesion has persisted after 4-6 weeks, make an appointment for a re-check. Healing time does vary depending on your individual healing process and the area of the body treated. Most patients will be healed in one month.    Signs of Infection:  Thankfully this is rare. However if you notice persistent colored drainage, increasing pain, fever or other signs of infection, please call us at: [phone]          Follow-ups after your visit        Who to contact     If you have questions or need follow up information about today's clinic visit or your schedule please contact [facility] - PRIMARY CARE SKIN directly at [phone].  Normal or non-critical lab and imaging  results will be communicated to you by MyChart, letter or phone within 4 business days after the clinic has received the results. If you do not hear from us within 7 days, please contact the clinic through UXFLIP or phone. If you have a critical or abnormal lab result, we will notify you by phone as soon as possible.  Submit refill requests through UXFLIP or call your pharmacy and they will forward the refill request to us. Please allow 3 business days for your refill to be completed.          Additional Information About Your Visit        UXFLIP Information     UXFLIP gives you secure access to your electronic health record. If you see a primary care provider, you can also send messages to your care team and make appointments. If you have questions, please call your primary care clinic.  If you do not have a primary care provider, please call [phone] and they will assist you.        Care EveryWhere ID     This is your Care EveryWhere ID. This could be used by other organizations to access your [facility] medical records  [id number]         Blood Pressure from Last 3 Encounters:   03/21/17 134/78   01/16/17 129/74   12/13/16 142/83    Weight from Last 3 Encounters:   06/12/17 170 lb (77.1 kg)   03/21/17 170 lb 14.4 oz (77.5 kg)   01/12/17 160 lb (72.6 kg)              Today, you had the following     No orders found for display       Primary Care Provider Office Phone # Fax #    Darby Williamson [phone] [address] [facility] [address]        Equal Access to Services     [facility] Medicine[facility]WAIN : Hadii aad ku hadasho Soomaali, waaxda luqadaha, qaybta kaalmada adeegyada, april nunez . So [facility] [phone].    ATENCIÓN: Si habla español, tiene a catalan disposición servicios gratuitos de asistencia lingüística. Llame al [phone].    We comply with applicable federal civil rights laws and [place] laws. We do not discriminate on the basis of race, color,  national origin, age, disability sex, sexual orientation or gender identity.            Thank you!     Thank you for choosing [facility] - PRIMARY CARE [facility]  for your care. Our goal is always to provide you with excellent care. Hearing back from our patients is one way we can continue to improve our services. Please take a few minutes to complete the written survey that you may receive in the mail after your visit with us. Thank you!             Your Updated Medication List - Protect others around you: Learn how to safely use, store and throw away your medicines at www.disposemymeds.org.          This list is accurate as of: 9/8/17  4:50 PM.  Always use your most recent med list.                   Brand Name Dispense Instructions for use Diagnosis    * albuterol (2.5 MG/3ML) 0.083% neb solution     25 vial    Take 1 vial (2.5 mg) by nebulization every 6 hours as needed for shortness of breath / dyspnea    Mild persistent asthma without complication, Mild persistent asthma with exacerbation       * albuterol 108 (90 BASE) MCG/ACT Inhaler    PROAIR HFA    1 Inhaler    Inhale 1-2 puffs into the lungs every 4 hours as needed for shortness of breath / dyspnea    Mild persistent asthma without complication       atorvastatin 40 MG tablet    LIPITOR    90 tablet    Take 1 tablet (40 mg) by mouth daily    Hyperlipidemia LDL goal <130       EPINEPHrine 0.3 MG/0.3ML injection 2-pack    EPIPEN/ADRENACLICK/or ANY BX GENERIC EQUIV    2 each    Inject 0.3 mLs (0.3 mg) into the muscle once as needed    Allergic reaction, initial encounter       fluticasone-salmeterol 250-50 MCG/DOSE diskus inhaler    ADVAIR DISKUS    3 Inhaler    Inhale 1 puff into the lungs 2 times daily    Mild persistent asthma without complication       levothyroxine 112 MCG tablet    SYNTHROID/LEVOTHROID    90 tablet    Take 1 tablet (112 mcg) by mouth every morning Patient needs to be seen prior to further refills.    Hypothyroidism due to acquired  atrophy of thyroid       loratadine 10 MG tablet    CLARITIN     Take 10 mg by mouth daily        losartan-hydrochlorothiazide 100-25 MG per tablet    HYZAAR    90 tablet    Take 1 tablet by mouth every morning    Hypertension goal BP (blood pressure) < 140/90       Multi-vitamin Tabs tablet   Generic drug:  multivitamin, therapeutic with minerals      ONE TABLET DAILY        NEXIUM PO      Take 40 mg by mouth daily        OSTEO BI-FLEX ADV DOUBLE ST PO           potassium chloride SA 20 MEQ CR tablet    K-DUR/KLOR-CON M    90 tablet    Take 1 tablet (20 mEq) by mouth daily    Familial lipoprotein deficiency       TYLENOL EXTRA STRENGTH PO           ZANTAC PO           * Notice:  This list has 2 medication(s) that are the same as other medications prescribed for you. Read the directions carefully, and ask your doctor or other care provider to review them with you.

## 2017-09-08 NOTE — PATIENT INSTRUCTIONS
"FUTURE APPOINTMENTS  Follow up in 6 months for a full-body skin cancer screening.    Apply a corn pad or moleskin over the site of the Mohs site on the right third toe as a protective measure.    SUN PROTECTION INSTRUCTIONS  Sun damage can lead to skin cancer and premature aging of the skin.      The best way to protect from sun damage to your skin is to avoid the sun during peak hours (10 am - 2 pm) even on overcast days.      Use UPF sun-protective clothing, which while more expensive initially provides longer lasting coverage without having to worry about remembering to re-apply.  1. Wear a wide-brimmed hat and sunglasses.   2. Wear sun-protective clothing.  [employer] and other [employer] make sun protective clothing that are stylish, comfortable and cool. [employer] and other [employer] make UV arm sleeves suitable for golfing, gardening and other activities.      Sunscreen instructions:  1. Use sunscreens with Zinc Oxide, Titanium Dioxide or Avobenzone to protect from UVA rays.  2. Use SPF 30-50+ to protect from UVB rays.  3. Re-apply every 2 hours even if water resistant.  4. Apply on your face every day even when cloudy and even in the winter. UVA \"aging rays\" penetrate window glass and are just as strong in the winter as in the summer.    Product Recommendations:    Good examples include: Reinaldo Ring, EltaMD, Solbar    Good daily moisturizers with SPF: Vanicream, CeraVe.    For sensitive skin, consider : SkinMedica Essential Defense Mineral Shield Broad Spectrum SPF 35      Never use tanning beds. Tanning beds are associated with much higher risks of skin cancer.    All tanning damages the skin. Aim for ivory skin year round and you will have less trouble with your skin in years to come. There is no merit in getting \"a base tan\" before a warm weather vacation, as any tanning indicates your body's response to sun damage.    Stop smoking. Smokers have higher rates of skin cancer and also have " premature skin wrinkling.    FYI  You should use about 3 tablespoons of sunscreen to protect your whole body. Thus a typical eight ounce bottle of sunscreen should last 4 applications. Remember, that the SPF rating is compromised if you don t apply enough. Most people only apply 1/2 - 1/3 of the amount they need. Also don t forget areas such as your ears, feet, upper back and harder to reach places. Keep in mind that these amounts should be increased for larger body sizes.    Sunscreens with titanium dioxide and/or zinc oxide in the active ingredients are physical blockers as opposed to chemical blockers. Chemical-free sunscreens should not irritate the skin.    Spray-on sunscreens may be used for touch-up application only, not as a base layer. Also, use with caution around small children due to inhalation risk.    Avoid retinyl palmitate products.    Avoid combination products that include both sunscreen and insect repellant, as sunscreen should be applied every 2 hours, but insect repellant should not be applied as frequently.    SPF means sun protection factor, which is just the degree to which the sunscreen can protect against UVB rays. There is no rating system for UVA rays. SPF is calculated as the time skin will burn when sunscreen is applied vs. skin without sunscreen.    Water resistant sunscreens should be re-applied every 1-2 hours.    For more information:  http://www.skincancer.org/prevention/sun-protection/sunscreen/sunscreens-safe-and-effective    SKIN CANCER SELF-EXAM INSTRUCTIONS  Check every month in the mirror or with a household member. Be aware of any changes, especially bleeding or tenderness. Also, make sure to check your nails for color changes after removal of nail polish.    For melanoma, check for:  A - Asymmetry. One half unlike the other half.  B - Border. Irregular, scalloped, ragged, notched, blurred or poorly defined borders.  C - Color. Color variations from one area to another, with  shades of tan, brown and/or black present. Sometimes white, red or blue.  D - Diameter. Greater than 6 mm (about the size of a pencil eraser). Any new growth of a mole should be concerning and be evaluated.  E - Evolving. A mole or skin lesion that looks different from the rest or is changing in size, shape or color.    For basal cell carcinoma and squamous cell carcinoma, check for:    Sores, shiny bumps, nodules, scaly lesions, or wart-like growths that are itchy, tender, crusting, scabbing, eroding, oozing or bleeding.    Open sores/wounds or reddish/irritated areas that do not heal within 2-3 weeks.    Scar-like areas that are white, yellow or waxy in color.    CRYOTHERAPY FOR ACTINIC KERATOSES POST-TREATMENT CARE INSTRUCTIONS  Actinic keratoses are benign, scaly or gritty lesions that appear in sun-exposed areas and may progress to skin cancers. For this reason, it is important to treat them before they become cancerous.  Liquid nitrogen is mildly uncomfortable when applied to the skin, but the discomfort rapidly subsides.    Post-Treatment:  You may experience burning and/or stinging immediately following the procedure. The discomfort from the procedure may persist over the next 12-24 hours. The area treated will look pinker and slightly swollen before the healing process begins. You may also notice redness, swelling, tenderness, weeping and crusts or scabs. Healing time is approximately 10-14 days.    Blister - You may or may notice blistering from the freezing. If you develop an uncomfortable blister from today's treatment, you may gently puncture this with a needle that has been cleaned with alcohol. However, do not remove the protective skin layer of the blister.    Scab - After a few days, you may notice scaliness or scab formation. Do not pick at the scabs because this may cause slower healing and a permanent scar.    The skin may appear temporarily darker at the treatment site, but this usually fades  over a period of months, provided that the area is protected from the sun.    Care of the areas treated:    Wash the area with a mild cleanser.    Gently pat dry.    Do not rub.     Keep protected from the sun during the healing process and for a full year following treatment as the skin continues to remodel during this time.    Apply Vaseline or Aquaphor ointment sparingly to the site for the first 7 days after treatment.    Do not use Neosporin, as many people eventually develop a medication allergy, that can easily be confused with an infection, to Neomycin.    Return if:  There should not be any residual scaling. If there is any concern that the lesion has persisted after 4-6 weeks, make an appointment for a re-check. Healing time does vary depending on your individual healing process and the area of the body treated. Most patients will be healed in one month.    Signs of Infection:  Thankfully this is rare. However if you notice persistent colored drainage, increasing pain, fever or other signs of infection, please call us at: [phone]

## 2017-09-08 NOTE — PROGRESS NOTES
[facility] - PRIMARY CARE SKIN    CC : skin cancer screening (full-body)  SUBJECTIVE:                                                    Tiffany Stovall is a 67 year old female who presents to clinic today for a full-body skin exam because of her history of multiple basal cell carcinomas.    Bothersome lesions noticed by the patient or other skin concerns :  Issue One : A new spot has been noticed on the chest.  Issue Two : A tingling, burning sensation on the scalp has been noticed in the last 1 month.  Issue Three : Mohs micrographic surgery site on the right foot has continued to be slow to heal. She has been wearing covered toed shoes as little as possible.    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  Family history of skin cancer : YES - non-melanoma.    Sun Exposure History  Sunscreen Use : YES, frequency : daily, SPF : 30.  UV-protective clothing use : YES  Previous history of significant sun exposure:  Blistering sunburns : YES - as a teenager  Tanning beds : NO.    Occupation : nurse (indoor).    Refer to electronic medical record (EMR) for past medical history and medications.    INTEGUMENTARY/SKIN: POSITIVE for changing and non-healing lesion  ROS : 14 point review of systems was negative except the symptoms listed above in the HPI.    This document serves as a record of the services and decisions personally performed and made by Joy Bailon MD. It was created on her behalf by James Dukes, a trained medical scribe.  The creation of this  document is based on the scribe's personal observations and the provider's statements to the medical scribe.  James Dukes, September 8, 2017 4:41 PM      OBJECTIVE:                                                    GENERAL: healthy, alert and no distress  SKIN: Rodriguez Skin Type - I.  This patient was examined from the top of the head to the bottom of the feet  including scalp, face, neck, back, chest, breasts, buttocks, both arms, both legs, both hands, both feet, all 10 fingers and all 10 toes. The dermatoscope was used to help evaluate pigmented lesions.  Skin Pertinent Findings:  Arms : Scattered, brown, macule(s) most consistent with benign solar lentigo. Scattered, 2 mm - 3 mm in size, brown macules most consistent with benign nevi (melanocytic nevi).    Legs : Scattered, multiple, brown, macule(s) most consistent with benign solar lentigo. Scattered, brown macules most consistent with benign nevi (melanocytic nevi)    Back, midline : 2 cm in size, soft subepidermal mass most consistent with lipoma.     Right forearm : Well-healed scar  Right superior shoulder : Well-healed scar    Right dorsal foot, base of third toe : Healed surgical scar    Significant Findings:  Chest, 3 cm right of midline, at T3 : 5 mm in size erythematous lesion. Monitor this lesion.    Mid-parietal scalp, midline : 5 mm x 2 mm in size scaly erythematous lesion.  Name : Liquid Nitrogen Cry-Ac Cryotherapy.  Indication : Pre-malignant lesion.  Location(s) : scalp - x1.  Completed by : Joy Bailon MD  Note : Discussed natural history of lesion and treatment options. Prior to treatment, we discussed inflammation, tenderness post-procedure, the healing process, and the risks of pain, infection, scarring, blistering, and hypo-/hyperpigmentation after healing. Explained that these lesions may grow back and may need additional treatment or re-treatment. The patient expressed a desire to proceed with cryotherapy.    The lesion(s) was  treated with liquid nitrogen Cry-Ac, five second freeze repeated twice with a pause to allow for the area to thaw. If this lesion should recur, then it needs to be re-evaluated.    Patient tolerated the procedure well and left in good condition.  Total number of lesions treated : 1.    Diagnostic Test Results:  none     Consent for digital photograhy  The patient was advised that digital photos will be taken today of Tiffany Nancy Wilman. The patient verbally consented to having these photos taken for purposes of documenting her condition. The patient understands that the images will be stored in her medical record.    MDM : Recheck spot on scalp, right chest and left anterior lower leg at followup      ASSESSMENT:                                                      Encounter Diagnoses   Name Primary?     AK (actinic keratosis) Yes     Lipoma of back      Multiple benign melanocytic nevi      Solar lentigo      History of basal cell carcinoma      History of squamous cell carcinoma in situ of skin      Family history of nonmelanoma skin cancer          PLAN:                                                    Patient Instructions   FUTURE APPOINTMENTS  Follow up in 6 months for a full-body skin cancer screening.    Apply a corn pad or moleskin over the site of the Mohs site on the right third toe as a protective measure.    SUN PROTECTION INSTRUCTIONS  Sun damage can lead to skin cancer and premature aging of the skin.      The best way to protect from sun damage to your skin is to avoid the sun during peak hours (10 am - 2 pm) even on overcast days.      Use UPF sun-protective clothing, which while more expensive initially provides longer lasting coverage without having to worry about remembering to re-apply.  1. Wear a wide-brimmed hat and sunglasses.   2. Wear sun-protective clothing.  [employer] and other companies make sun protective clothing that are stylish, comfortable and cool. [employer] and  "other companies make UV arm sleeves suitable for golfing, gardening and other activities.      Sunscreen instructions:  1. Use sunscreens with Zinc Oxide, Titanium Dioxide or Avobenzone to protect from UVA rays.  2. Use SPF 30-50+ to protect from UVB rays.  3. Re-apply every 2 hours even if water resistant.  4. Apply on your face every day even when cloudy and even in the winter. UVA \"aging rays\" penetrate window glass and are just as strong in the winter as in the summer.    Product Recommendations:    Good examples include: Blue Lizard, EltaMD, Solbar    Good daily moisturizers with SPF: Vanicream, CeraVe.    For sensitive skin, consider : SkinMedica Essential Defense Mineral Shield Broad Spectrum SPF 35      Never use tanning beds. Tanning beds are associated with much higher risks of skin cancer.    All tanning damages the skin. Aim for ivory skin year round and you will have less trouble with your skin in years to come. There is no merit in getting \"a base tan\" before a warm weather vacation, as any tanning indicates your body's response to sun damage.    Stop smoking. Smokers have higher rates of skin cancer and also have premature skin wrinkling.    FYI  You should use about 3 tablespoons of sunscreen to protect your whole body. Thus a typical eight ounce bottle of sunscreen should last 4 applications. Remember, that the SPF rating is compromised if you don t apply enough. Most people only apply 1/2 - 1/3 of the amount they need. Also don t forget areas such as your ears, feet, upper back and harder to reach places. Keep in mind that these amounts should be increased for larger body sizes.    Sunscreens with titanium dioxide and/or zinc oxide in the active ingredients are physical blockers as opposed to chemical blockers. Chemical-free sunscreens should not irritate the skin.    Spray-on sunscreens may be used for touch-up application only, not as a base layer. Also, use with caution around small children due " to inhalation risk.    Avoid retinyl palmitate products.    Avoid combination products that include both sunscreen and insect repellant, as sunscreen should be applied every 2 hours, but insect repellant should not be applied as frequently.    SPF means sun protection factor, which is just the degree to which the sunscreen can protect against UVB rays. There is no rating system for UVA rays. SPF is calculated as the time skin will burn when sunscreen is applied vs. skin without sunscreen.    Water resistant sunscreens should be re-applied every 1-2 hours.    For more information:  http://www.skincancer.org/prevention/sun-protection/sunscreen/sunscreens-safe-and-effective    SKIN CANCER SELF-EXAM INSTRUCTIONS  Check every month in the mirror or with a household member. Be aware of any changes, especially bleeding or tenderness. Also, make sure to check your nails for color changes after removal of nail polish.    For melanoma, check for:  A - Asymmetry. One half unlike the other half.  B - Border. Irregular, scalloped, ragged, notched, blurred or poorly defined borders.  C - Color. Color variations from one area to another, with shades of tan, brown and/or black present. Sometimes white, red or blue.  D - Diameter. Greater than 6 mm (about the size of a pencil eraser). Any new growth of a mole should be concerning and be evaluated.  E - Evolving. A mole or skin lesion that looks different from the rest or is changing in size, shape or color.    For basal cell carcinoma and squamous cell carcinoma, check for:    Sores, shiny bumps, nodules, scaly lesions, or wart-like growths that are itchy, tender, crusting, scabbing, eroding, oozing or bleeding.    Open sores/wounds or reddish/irritated areas that do not heal within 2-3 weeks.    Scar-like areas that are white, yellow or waxy in color.    CRYOTHERAPY FOR ACTINIC KERATOSES POST-TREATMENT CARE INSTRUCTIONS  Actinic keratoses are benign, scaly or gritty lesions that  appear in sun-exposed areas and may progress to skin cancers. For this reason, it is important to treat them before they become cancerous.  Liquid nitrogen is mildly uncomfortable when applied to the skin, but the discomfort rapidly subsides.    Post-Treatment:  You may experience burning and/or stinging immediately following the procedure. The discomfort from the procedure may persist over the next 12-24 hours. The area treated will look pinker and slightly swollen before the healing process begins. You may also notice redness, swelling, tenderness, weeping and crusts or scabs. Healing time is approximately 10-14 days.    Blister - You may or may notice blistering from the freezing. If you develop an uncomfortable blister from today's treatment, you may gently puncture this with a needle that has been cleaned with alcohol. However, do not remove the protective skin layer of the blister.    Scab - After a few days, you may notice scaliness or scab formation. Do not pick at the scabs because this may cause slower healing and a permanent scar.    The skin may appear temporarily darker at the treatment site, but this usually fades over a period of months, provided that the area is protected from the sun.    Care of the areas treated:    Wash the area with a mild cleanser.    Gently pat dry.    Do not rub.     Keep protected from the sun during the healing process and for a full year following treatment as the skin continues to remodel during this time.    Apply Vaseline or Aquaphor ointment sparingly to the site for the first 7 days after treatment.    Do not use Neosporin, as many people eventually develop a medication allergy, that can easily be confused with an infection, to Neomycin.    Return if:  There should not be any residual scaling. If there is any concern that the lesion has persisted after 4-6 weeks, make an appointment for a re-check. Healing time does vary depending on your individual healing process and  the area of the body treated. Most patients will be healed in one month.    Signs of Infection:  Thankfully this is rare. However if you notice persistent colored drainage, increasing pain, fever or other signs of infection, please call us at: [phone]    The patient was counseled about sunscreens and sun avoidance. The patient was counseled to check the skin regularly and report any lesion that is new, changing, itching, scabbing, bleeding or otherwise bothersome. The patient was discharged ambulatory and in stable condition.    Recommendations : q6 month skin exams.      PROCEDURES:                                                    None.    TT: 25 minutes.  CT: 15 minutes.      The information in this document, created by the medical scribe for me, accurately reflects the services I personally performed and the decisions made by me. I have reviewed and approved this document for accuracy prior to leaving the patient care area.  Joy Bailon MD September 8, 2017 4:39 PM  [facility] - PRIMARY CARE SKIN

## 2017-11-29 ENCOUNTER — HOSPITAL ENCOUNTER (OUTPATIENT)
Dept: MAMMOGRAPHY | Facility: CLINIC | Age: 67
Discharge: HOME | End: 2017-11-29
Attending: FAMILY MEDICINE | Admitting: FAMILY MEDICINE
Payer: COMMERCIAL

## 2017-11-29 DIAGNOSIS — Z12.31 VISIT FOR SCREENING MAMMOGRAM: ICD-10-CM

## 2017-11-29 PROCEDURE — 77063 BREAST TOMOSYNTHESIS BI: CPT

## 2017-11-29 PROCEDURE — G0202 SCR MAMMO BI INCL CAD: HCPCS

## 2017-12-11 ENCOUNTER — OFFICE VISIT (OUTPATIENT)
Dept: ORTHOPEDICS | Facility: CLINIC | Age: 67
End: 2017-12-11
Payer: COMMERCIAL

## 2017-12-11 VITALS — BODY MASS INDEX: 27.32 KG/M2 | HEIGHT: 66 IN | WEIGHT: 170 LBS | RESPIRATION RATE: 18 BRPM | TEMPERATURE: 98.7 F

## 2017-12-11 DIAGNOSIS — M17.12 PRIMARY OSTEOARTHRITIS OF LEFT KNEE: Primary | ICD-10-CM

## 2017-12-11 PROCEDURE — 20610 DRAIN/INJ JOINT/BURSA W/O US: CPT | Mod: LT | Performed by: ORTHOPAEDIC SURGERY

## 2017-12-11 RX ORDER — METHYLPREDNISOLONE ACETATE 80 MG/ML
80 INJECTION, SUSPENSION INTRA-ARTICULAR; INTRALESIONAL; INTRAMUSCULAR; SOFT TISSUE ONCE
Qty: 1 ML | Refills: 0 | OUTPATIENT
Start: 2017-12-11 | End: 2017-12-11

## 2017-12-11 NOTE — NURSING NOTE
"Chief Complaint   Patient presents with     RECHECK     Left knee OA last injection 6/12/17.       Initial Temp 98.7  F (37.1  C)  Resp 18  Ht 1.676 m (5' 6\")  Wt 77.1 kg (170 lb)  BMI 27.44 kg/m2 Estimated body mass index is 27.44 kg/(m^2) as calculated from the following:    Height as of this encounter: 1.676 m (5' 6\").    Weight as of this encounter: 77.1 kg (170 lb).  Medication Reconciliation: complete   Sonia Martin MA      "

## 2017-12-11 NOTE — PROGRESS NOTES
The patient's left knee was prepped with betadine solution after verification of allergies. Area approximately 10 cm x 10 cm prepped in a sterile fashion. After injection, betadine removed with soap and water and band-aids applied.    1ml depo medrol with 1% lidocaine plain injected into patient's left knee by Dr. Tom Thomas  LOT# [id number]  Exp. 3/20

## 2017-12-11 NOTE — LETTER
12/11/2017         RE: Tiffany Stovall  [id number]  [place] [facility] [place] [facility] [id number]        Dear Colleague,    Thank you for referring your patient, Tiffany Stovall, to the [facility]. Please see a copy of my visit note below.    Follow up left knee primary osteoarthritis.  Last injection 6/12/17.  Range of motion 0-120.    She  desires injection today of left knee(s).  Risks, benefits, potential complications and alternatives were discussed.   With the patient's consent, sterile prep was performed of left knee(s).  Left knee was injected with Depo Medrol 80 mg and lidocaine at anteromedial site.  She did not get full relief, so I repeated this at anterolateral site.  Return to clinic as needed.       The patient's left knee was prepped with betadine solution after verification of allergies. Area approximately 10 cm x 10 cm prepped in a sterile fashion. After injection, betadine removed with soap and water and band-aids applied.    1ml depo medrol with 1% lidocaine plain injected into patient's left knee by Dr. Tom Thomas  LOT# [id number]  Exp. 3/20    Again, thank you for allowing me to participate in the care of your patient.        Sincerely,        Tom Thomas MD

## 2017-12-11 NOTE — PROGRESS NOTES
Follow up left knee primary osteoarthritis.  Last injection 6/12/17.  Range of motion 0-120.    She  desires injection today of left knee(s).  Risks, benefits, potential complications and alternatives were discussed.   With the patient's consent, sterile prep was performed of left knee(s).  Left knee was injected with Depo Medrol 80 mg and lidocaine at anteromedial site.  She did not get full relief, so I repeated this at anterolateral site.  Return to clinic as needed.

## 2017-12-11 NOTE — MR AVS SNAPSHOT
After Visit Summary   12/11/2017    Tiffany Stovall    MRN: [id number]           Patient Information     Date Of Birth          1950        Visit Information        Provider Department      12/11/2017 4:00 PM Tom Thomas MD [facility]        Today's Diagnoses     Primary osteoarthritis of left knee    -  1      Care Instructions    You have had a steroid injection today.  For the first 2 hours there will likely be some numbing in the joint from the lidocaine.  This is a good sign, indicating that the injection is in the right place.  In 2 hours the lidocaine will wear off, and the joint will hurt like you had a shot.  Each day the cortisone makes it feel better.  It reaches peak effect in 2 weeks.  We expect it to last for 3 months.  You may resume regular activity when you feel ready.  If you are diabetic, your glucoses will be quite high for several days.            Follow-ups after your visit        Your next 10 appointments already scheduled     Dec 11, 2017  4:00 PM CST   Return Visit with Tom Thomas MD   [facility] [id number] [phone]              Who to contact     If you have questions or need follow up information about today's clinic visit or your schedule please contact [facility] directly at [phone].  Normal or non-critical lab and imaging results will be communicated to you by MyChart, letter or phone within 4 business days after the clinic has received the results. If you do not hear from us within 7 days, please contact the clinic through MyChart or phone. If you have a critical or abnormal lab result, we will notify you by phone as soon as possible.  Submit refill requests through [employer] or call your pharmacy and they will forward the refill request to us. Please allow 3 business days for your refill to be completed.          Additional  "Information About Your Visit        MyChart Information     [employer] gives you secure access to your electronic health record. If you see a primary care provider, you can also send messages to your care team and make appointments. If you have questions, please call your primary care clinic.  If you do not have a primary care provider, please call [phone] and they will assist you.        Care EveryWhere ID     This is your Care EveryWhere ID. This could be used by other organizations to access your [facility] medical records  [id number]        Your Vitals Were     Temperature Respirations Height BMI (Body Mass Index)          98.7  F (37.1  C) 18 1.676 m (5' 6\") 27.44 kg/m2         Blood Pressure from Last 3 Encounters:   03/21/17 134/78   01/16/17 129/74   12/13/16 142/83    Weight from Last 3 Encounters:   12/11/17 77.1 kg (170 lb)   06/12/17 77.1 kg (170 lb)   03/21/17 77.5 kg (170 lb 14.4 oz)              Today, you had the following     No orders found for display       Primary Care Provider Office Phone # Fax #    Darby Williamson [phone] [address]  [facility] [address]        Equal Access to Services     RONAN PECK : Hadii aad ku hadasho Soomaali, waaxda luqadaha, qaybta kaalmada adeegyada, waxay idiin hayaan stoney kharaangel la'stormy ah. So [facility] [phone].    ATENCIÓN: Si habla español, tiene a catalan disposición servicios gratuitos de asistencia lingüística. Llame al [phone].    We comply with applicable federal civil rights laws and [place] laws. We do not discriminate on the basis of race, color, national origin, age, disability, sex, sexual orientation, or gender identity.            Thank you!     Thank you for choosing [facility]  for your care. Our goal is always to provide you with excellent care. Hearing back from our patients is one way we can continue to improve our services. Please take a few minutes to complete the written survey that you " may receive in the mail after your visit with us. Thank you!             Your Updated Medication List - Protect others around you: Learn how to safely use, store and throw away your medicines at www.disposemymeds.org.          This list is accurate as of: 12/11/17  3:55 PM.  Always use your most recent med list.                   Brand Name Dispense Instructions for use Diagnosis    * albuterol (2.5 MG/3ML) 0.083% neb solution     25 vial    Take 1 vial (2.5 mg) by nebulization every 6 hours as needed for shortness of breath / dyspnea    Mild persistent asthma without complication, Mild persistent asthma with exacerbation       * albuterol 108 (90 BASE) MCG/ACT Inhaler    PROAIR HFA    1 Inhaler    Inhale 1-2 puffs into the lungs every 4 hours as needed for shortness of breath / dyspnea    Mild persistent asthma without complication       atorvastatin 40 MG tablet    LIPITOR    90 tablet    Take 1 tablet (40 mg) by mouth daily    Hyperlipidemia LDL goal <130       EPINEPHrine 0.3 MG/0.3ML injection 2-pack    EPIPEN/ADRENACLICK/or ANY BX GENERIC EQUIV    0.6 mL    Inject 0.3 mLs (0.3 mg) into the muscle once as needed    Allergic reaction, initial encounter       fluticasone-salmeterol 250-50 MCG/DOSE diskus inhaler    ADVAIR DISKUS    3 Inhaler    Inhale 1 puff into the lungs 2 times daily    Mild persistent asthma without complication       levothyroxine 112 MCG tablet    SYNTHROID/LEVOTHROID    90 tablet    Take 1 tablet (112 mcg) by mouth every morning Patient needs to be seen prior to further refills.    Hypothyroidism due to acquired atrophy of thyroid       loratadine 10 MG tablet    CLARITIN     Take 10 mg by mouth daily        losartan-hydrochlorothiazide 100-25 MG per tablet    HYZAAR    90 tablet    Take 1 tablet by mouth every morning    Hypertension goal BP (blood pressure) < 140/90       Multi-vitamin Tabs tablet   Generic drug:  multivitamin, therapeutic with minerals      ONE TABLET DAILY         NEXIUM PO      Take 40 mg by mouth daily        OSTEO BI-FLEX ADV DOUBLE ST PO           potassium chloride SA 20 MEQ CR tablet    K-DUR/KLOR-CON M    90 tablet    Take 1 tablet (20 mEq) by mouth daily    Familial lipoprotein deficiency       TYLENOL EXTRA STRENGTH PO           ZANTAC PO           * Notice:  This list has 2 medication(s) that are the same as other medications prescribed for you. Read the directions carefully, and ask your doctor or other care provider to review them with you.

## 2017-12-14 ENCOUNTER — MYC MEDICAL ADVICE (OUTPATIENT)
Dept: FAMILY MEDICINE | Facility: CLINIC | Age: 67
End: 2017-12-14

## 2017-12-14 DIAGNOSIS — L28.0 LICHEN SIMPLEX: ICD-10-CM

## 2017-12-15 RX ORDER — TRIAMCINOLONE ACETONIDE 1 MG/G
CREAM TOPICAL 2 TIMES DAILY
Qty: 15 G | Refills: 0 | Status: SHIPPED | OUTPATIENT
Start: 2017-12-15 | End: 2018-10-29

## 2018-02-19 ENCOUNTER — OFFICE VISIT (OUTPATIENT)
Dept: FAMILY MEDICINE | Facility: CLINIC | Age: 68
End: 2018-02-19
Payer: COMMERCIAL

## 2018-02-19 DIAGNOSIS — Z80.8 FAMILY HISTORY OF NONMELANOMA SKIN CANCER: ICD-10-CM

## 2018-02-19 DIAGNOSIS — L81.4 SOLAR LENTIGO: ICD-10-CM

## 2018-02-19 DIAGNOSIS — Z86.007 HISTORY OF SQUAMOUS CELL CARCINOMA IN SITU OF SKIN: ICD-10-CM

## 2018-02-19 DIAGNOSIS — Z85.828 HISTORY OF BASAL CELL CARCINOMA: ICD-10-CM

## 2018-02-19 DIAGNOSIS — D22.9 MULTIPLE BENIGN MELANOCYTIC NEVI: ICD-10-CM

## 2018-02-19 DIAGNOSIS — D48.5 NEOPLASM OF UNCERTAIN BEHAVIOR OF SKIN: Primary | ICD-10-CM

## 2018-02-19 DIAGNOSIS — D18.01 CAPILLARY HEMANGIOMA OF SKIN: ICD-10-CM

## 2018-02-19 DIAGNOSIS — L57.0 AK (ACTINIC KERATOSIS): ICD-10-CM

## 2018-02-19 PROCEDURE — 88305 TISSUE EXAM BY PATHOLOGIST: CPT | Performed by: FAMILY MEDICINE

## 2018-02-19 PROCEDURE — 11301 SHAVE SKIN LESION 0.6-1.0 CM: CPT | Mod: 59 | Performed by: FAMILY MEDICINE

## 2018-02-19 PROCEDURE — 11300 SHAVE SKIN LESION 0.5 CM/<: CPT | Mod: 59 | Performed by: FAMILY MEDICINE

## 2018-02-19 PROCEDURE — 99000 SPECIMEN HANDLING OFFICE-LAB: CPT | Performed by: FAMILY MEDICINE

## 2018-02-19 PROCEDURE — 99213 OFFICE O/P EST LOW 20 MIN: CPT | Mod: 25 | Performed by: FAMILY MEDICINE

## 2018-02-19 PROCEDURE — 17000 DESTRUCT PREMALG LESION: CPT | Mod: 51 | Performed by: FAMILY MEDICINE

## 2018-02-19 NOTE — PATIENT INSTRUCTIONS
"FUTURE APPOINTMENTS  Follow up in 6 month(s) for a full-body skin cancer screening.  Follow up per pathology report.    WOUND CARE INSTRUCTIONS  1. Wash hands before every dressing change.  2. After 24 hours, change dressing daily.  3. Wash the wound area with a mild soap, then rinse.  4. Gently pat dry with a sterile gauze or Q-tip.  5. Using a Q-tip, apply Vaseline or Aquaphor only over entire wound. Do NOT use Neosporin - as many people react to neomycin.  6. Finally, cover with a bandage or sterile non-stick gauze with micropore paper tape.  7. Repeat once daily until wound has healed.    Do not let the wound dry out.  The wound will heal faster and with better cosmetic results if routinely kept moist. It is a false belief that wounds heal better when exposed to air and allowed to dry out.      Soap, water and shampoo will not hurt this area.    Do not go swimming or take baths, but showering is encouraged.    Limit use of the area where the procedure was done for a few days to allow for optimal healing.    If you experience bleeding:  Wash hands and hold firm pressure on the area for 10 minutes without checking to see if the bleeding has stopped. \"Checking\" pulls off the protective wound clot and restarts the bleeding all over again. Re-apply pressure for 10 minutes if necessary to stop bleeding.  Use additional sterile gauze and tape to maintain pressure once bleeding has stopped.  If bleeding continues, then call back to clinic at [phone].    Signs of Infection:  Infection can occur in any area where skin has been disrupted.  If you notice persistent redness, swelling, colored drainage, increasing pain, fever or other signs of infection, please call us at: [phone] and ask to have me or my colleague paged. We will call you back to discuss.    Pathology Results:  You will be notified, generally via letter or MyChart, in approximately 10 days. If there is anything we need to discuss or further " treatment needed, I will call you to discuss it.    Skin tissue can be some of the most challenging tissue for pathologists to examine, therefore we may use a specialist outside the "[employer] system to read certain submitted tissue samples. When submitted to these outside specialists, [employer] will notify you that your tissue sample result has returned. However, this only means that the tissue sample has been sent to the specialist. These results are not available in [employer], so I will contact you when I receive the final report.    PATIENT INFORMATION : WOUNDS  During the healing process you will notice a number of changes. All wounds develop a small halo of redness surrounding the wound.  This means healing is occurring. Severe itching with extensive redness usually indicates sensitivity to the ointment or bandage tape used to dress the wound.  You should call our office if this develops.      Swelling  and/or discoloration around your surgical site is common, particularly when performed around the eye.    All wounds normally drain.  The larger the wound the more drainage there will be.  After 7-10 days, you will notice the wound beginning to shrink and new skin will begin to grow.  The wound is healed when you can see skin has formed over the entire area.  A healed wound has a healthy, shiny look to the surface and is red to dark pink in color to normalize.  Wounds may take approximately 4-6 weeks to heal.  Larger wounds may take 6-8 weeks. After the wound is healed you may discontinue dressing changes.    You may experience a sensation of tightness as your wound heals. This is normal and will gradually subside.    Your healed wound may be sensitive to temperature changes. This sensitivity improves with time, but if you re having a lot of discomfort, try to avoid temperature extremes.    Patients frequently experience itching after their wound appears to have healed because of the continue healing under the skin.   "Plain Vaseline will help relieve the itching.    SUN PROTECTION INSTRUCTIONS  Sun damage can lead to skin cancer and premature aging of the skin.      The best way to protect from sun damage to your skin is to avoid the sun during peak hours (10 am - 2 pm) even on overcast days.      Use UPF sun-protective clothing, which while more expensive initially provides longer lasting coverage without having to worry about remembering to re-apply.  1. Wear a wide-brimmed hat and sunglasses.   2. Wear sun-protective clothing.  [employer] and other [employer] make sun protective clothing that are stylish, comfortable and cool. [employer] and other [employer] make UV arm sleeves suitable for golfing, gardening and other activities.      Sunscreen instructions:  1. Use sunscreens with Zinc Oxide, Titanium Dioxide or Avobenzone to protect from UVA rays.  2. Use SPF 30-50+ to protect from UVB rays.  3. Re-apply every 2 hours even if water resistant.  4. Apply on your face every day even when cloudy and even in the winter. UVA \"aging rays\" penetrate window glass and are just as strong in the winter as in the summer.    Product Recommendations:    Good examples include: Blue Lizard, EltaMD, Solbar    Good daily moisturizers with SPF: Vanicream, CeraVe.    For sensitive skin, consider : SkinMedica Essential Defense Mineral Shield Broad Spectrum SPF 35      Never use tanning beds. Tanning beds are associated with much higher risks of skin cancer.    All tanning damages the skin. Aim for ivory skin year round and you will have less trouble with your skin in years to come. There is no merit in getting \"a base tan\" before a warm weather vacation, as any tanning indicates your body's response to sun damage.    Stop smoking. Smokers have higher rates of skin cancer and also have premature skin wrinkling.    FYI  You should use about 3 tablespoons of sunscreen to protect your whole body. Thus a typical eight ounce bottle of " sunscreen should last 4 applications. Remember, that the SPF rating is compromised if you don t apply enough. Most people only apply 1/2 - 1/3 of the amount they need. Also don t forget areas such as your ears, feet, upper back and harder to reach places. Keep in mind that these amounts should be increased for larger body sizes.    Sunscreens with titanium dioxide and/or zinc oxide in the active ingredients are physical blockers as opposed to chemical blockers. Chemical-free sunscreens should not irritate the skin.    Spray-on sunscreens may be used for touch-up application only, not as a base layer. Also, use with caution around small children due to inhalation risk.    Avoid retinyl palmitate products.    Avoid combination products that include both sunscreen and insect repellant, as sunscreen should be applied every 2 hours, but insect repellant should not be applied as frequently.    SPF means sun protection factor, which is just the degree to which the sunscreen can protect against UVB rays. There is no rating system for UVA rays. SPF is calculated as the time skin will burn when sunscreen is applied vs. skin without sunscreen.    Water resistant sunscreens should be re-applied every 1-2 hours.    For more information:  http://www.skincancer.org/prevention/sun-protection/sunscreen/sunscreens-safe-and-effective    SKIN CANCER SELF-EXAM INSTRUCTIONS  Check every month in the mirror or with a household member. Be aware of any changes, especially bleeding or tenderness. Also, make sure to check your nails for color changes after removal of nail polish.    For melanoma, check for:  A - Asymmetry. One half unlike the other half.  B - Border. Irregular, scalloped, ragged, notched, blurred or poorly defined borders.  C - Color. Color variations from one area to another, with shades of tan, brown and/or black present. Sometimes white, red or blue.  D - Diameter. Greater than 6 mm (about the size of a pencil eraser). Any  new growth of a mole should be concerning and be evaluated.  E - Evolving. A mole or skin lesion that looks different from the rest or is changing in size, shape or color.    For basal cell carcinoma and squamous cell carcinoma, check for:    Sores, shiny bumps, nodules, scaly lesions, or wart-like growths that are itchy, tender, crusting, scabbing, eroding, oozing or bleeding.    Open sores/wounds or reddish/irritated areas that do not heal within 2-3 weeks.    Scar-like areas that are white, yellow or waxy in color.    ACTINIC KERATOSES POST-TREATMENT CARE INSTRUCTIONS  Actinic keratoses are benign, scaly or gritty lesions that appear in sun-exposed areas and may progress to skin cancers. For this reason, it is important to treat them before they become cancerous. Liquid nitrogen is the most commonly used and most effective treatment for actinic keratoses; it is mildly uncomfortable when applied to the skin, but the discomfort rapidly subsides.    Post-Treatment:  You may experience burning and/or stinging immediately following the procedure. The discomfort from the procedure may persist over the next 12-24 hours. The area treated will look pinker and slightly swollen before the healing process begins. You may also notice redness, swelling, tenderness, weeping and crusts or scabs. Healing time is approximately 10-14 days.    Blister - You may or may notice blistering from the freezing. If you develop an uncomfortable blister from today's treatment, you may gently puncture this with a needle that has been cleaned with alcohol. However, do not remove the protective skin layer of the blister.    Scab - After a few days, you may notice scaliness or scab formation. Do not pick at the scabs because this may cause slower healing and a permanent scar.    The skin may appear temporarily darker at the treatment site, but this usually fades over a period of months, provided that the area is protected from the sun.    Care of  the areas treated:    Wash the area with a mild cleanser.    Gently pat dry.    Do not rub.     Keep protected from the sun during the healing process and for a full year following treatment as the skin continues to remodel during this time.    Apply Vaseline or Aquaphor ointment sparingly to the site for the first 7 days after treatment.    Do not use Neosporin, as many people eventually develop a medication allergy, that can easily be confused with an infection, to Neomycin.    Return if:  There should not be any residual scaling. If there is any concern that the lesion has persisted after 4-6 weeks, make an appointment for a re-check. Healing time does vary depending on your individual healing process and the area of the body treated. Most patients will be healed in one month.    Signs of Infection:  Thankfully this is rare. However if you notice persistent colored drainage, increasing pain, fever or other signs of infection, please call us at: [phone]

## 2018-02-19 NOTE — PROGRESS NOTES
[facility] - PRIMARY CARE SKIN    CC : skin cancer screening (full-body)  SUBJECTIVE:                                                    Tiffany Stovall is a 67 year old female who presents to clinic today for a full-body skin exam because of her history of skin cancer.    Bothersome lesions noticed by the patient or other skin concerns :  Issue One : A red spot on chest has waxed and waned.  Onset : 2 months.  Enlarging : NO.  Bleeding : YES  Itchy or irritating : NO.  Pain or tenderness : NO.  Changing color : NO.  Issue Two : Non-healing lesion on left lower leg.  Issue Three : Scaling increased on scalp, more prominent on the right.    She has had three episodes of swelling on the right forearm. She also reports increased warmth with swelling. A previous Mohs site will also become red and inflamed at the same time.    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  Family history of skin cancer : YES - non-melanoma.     Sun Exposure History  Sunscreen Use : YES, frequency : daily, SPF : 30.  UV-protective clothing use : YES  Previous history of significant sun exposure:  Blistering sunburns : YES - as a teenager  Tanning beds : NO.     Occupation : nurse (indoor).    Refer to electronic medical record (EMR) for past medical history and medications.    INTEGUMENTARY/SKIN: POSITIVE for changing lesions  ROS : 14 point review of systems was negative except the symptoms listed above in the HPI.    This document serves as a record of the services and decisions  personally performed and made by Joy Bailon MD. It was created on her behalf by James Dukes, a trained medical scribe.  The creation of this document is based on the scribe's personal observations and the provider's statements to the medical scribe.  James Dukes, February 19, 2018 10:44 AM      OBJECTIVE:                                                    GENERAL: healthy, alert and no distress  SKIN: Rodriguez Skin Type - I.  This patient was examined from the top of the head to the bottom of the feet  including scalp, face, neck, back, chest, breasts, buttocks, both arms, both legs, both hands, both feet, all 10 fingers and all 10 toes. The dermatoscope was used to help evaluate pigmented lesions.  Skin Pertinent Findings:  Right arm(s) and left arm(s) : Multiple brown, macule(s) most consistent with benign solar lentigo. Scattered 2-3 mm in size, brown macules most consistent with (benign) melanocytic nevi.    Back : brown, macule(s) most consistent with benign solar lentigo slightly raised, red lesion(s) consistent with capillary hemangioma.    Posterior legs : Multiple brown, macule(s) most consistent with benign solar lentigo.    Significant Findings:  Chest, 2 cm right of midline, at T4 : 6 x 5 mm in size erythematous scaly indurated lesion ? Basal cell carcinoma ? Squamous cell carcinoma ? Other      Left lower leg, 5 cm inferior to patella : 5 x 4 mm in size erythematous indurated flat lesion ? Basal cell carcinoma  ? Squamous cell carcinoma ? Other       Crown of scalp : clear    Right forearm : Well-healed scar    Chest, midline, at T3 : 5 mm in size, erythematous, scaly, non-indurated lesion(s) most consistent with actinic keratoses.  Name : Liquid Nitrogen Cry-Ac Cryotherapy.  Indication : Pre-malignant lesion.  Completed by : Joy Bailon MD  Note : Discussed natural history of lesion and treatment options. Prior to treatment, we discussed inflammation, tenderness post-procedure, the healing  process, and the risks of pain, infection, scarring, blistering, and hypo-/hyperpigmentation after healing. Explained that these lesions may grow back and may need additional treatment or re-treatment. The patient expressed a desire to proceed with cryotherapy.    The lesion(s) was treated with liquid nitrogen Cry-Ac, five second freeze repeated twice with a pause to allow for the area to thaw. If this lesion should recur, then it needs to be re-evaluated.    Patient tolerated the procedure well and left in good condition.  Total number of lesions treated : 1.    Diagnostic Test Results:  none     MDM : Call cell, and she will call back. Would prefer not to return to [facility]      ASSESSMENT:                                                      Encounter Diagnoses   Name Primary?     Neoplasm of uncertain behavior of skin Yes     History of basal cell carcinoma      History of squamous cell carcinoma in situ of skin      Family history of nonmelanoma skin cancer      Solar lentigo      Multiple benign melanocytic nevi      Capillary hemangioma of skin      AK (actinic keratosis)          PLAN:                                                      Patient Instructions   FUTURE APPOINTMENTS  Follow up in 6 month(s) for a full-body skin cancer screening.  Follow up per pathology report.    WOUND CARE INSTRUCTIONS  1. Wash hands before every dressing change.  2. After 24 hours, change dressing daily.  3. Wash the wound area with a mild soap, then rinse.  4. Gently pat dry with a sterile gauze or Q-tip.  5. Using a Q-tip, apply Vaseline or Aquaphor only over entire wound. Do NOT use Neosporin - as many people react to neomycin.  6. Finally, cover with a bandage or sterile non-stick gauze with micropore paper tape.  7. Repeat once daily until wound has healed.    Do not let the wound dry out.  The wound will heal faster and with better cosmetic results if routinely kept moist. It is a false belief that  "wounds heal better when exposed to air and allowed to dry out.      Soap, water and shampoo will not hurt this area.    Do not go swimming or take baths, but showering is encouraged.    Limit use of the area where the procedure was done for a few days to allow for optimal healing.    If you experience bleeding:  Wash hands and hold firm pressure on the area for 10 minutes without checking to see if the bleeding has stopped. \"Checking\" pulls off the protective wound clot and restarts the bleeding all over again. Re-apply pressure for 10 minutes if necessary to stop bleeding.  Use additional sterile gauze and tape to maintain pressure once bleeding has stopped.  If bleeding continues, then call back to clinic at [phone].    Signs of Infection:  Infection can occur in any area where skin has been disrupted.  If you notice persistent redness, swelling, colored drainage, increasing pain, fever or other signs of infection, please call us at: [phone] and ask to have me or my colleague paged. We will call you back to discuss.    Pathology Results:  You will be notified, generally via letter or MyChart, in approximately 10 days. If there is anything we need to discuss or further treatment needed, I will call you to discuss it.    Skin tissue can be some of the most challenging tissue for pathologists to examine, therefore we may use a specialist outside the [facility] system to read certain submitted tissue samples. When submitted to these outside specialists, [employer] will notify you that your tissue sample result has returned. However, this only means that the tissue sample has been sent to the specialist. These results are not available in [facility], so I will contact you when I receive the final report.    PATIENT INFORMATION : WOUNDS  During the healing process you will notice a number of changes. All wounds develop a small halo of redness surrounding the wound.  This means healing is occurring. Severe itching " with extensive redness usually indicates sensitivity to the ointment or bandage tape used to dress the wound.  You should call our office if this develops.      Swelling  and/or discoloration around your surgical site is common, particularly when performed around the eye.    All wounds normally drain.  The larger the wound the more drainage there will be.  After 7-10 days, you will notice the wound beginning to shrink and new skin will begin to grow.  The wound is healed when you can see skin has formed over the entire area.  A healed wound has a healthy, shiny look to the surface and is red to dark pink in color to normalize.  Wounds may take approximately 4-6 weeks to heal.  Larger wounds may take 6-8 weeks. After the wound is healed you may discontinue dressing changes.    You may experience a sensation of tightness as your wound heals. This is normal and will gradually subside.    Your healed wound may be sensitive to temperature changes. This sensitivity improves with time, but if you re having a lot of discomfort, try to avoid temperature extremes.    Patients frequently experience itching after their wound appears to have healed because of the continue healing under the skin.  Plain Vaseline will help relieve the itching.    SUN PROTECTION INSTRUCTIONS  Sun damage can lead to skin cancer and premature aging of the skin.      The best way to protect from sun damage to your skin is to avoid the sun during peak hours (10 am - 2 pm) even on overcast days.      Use UPF sun-protective clothing, which while more expensive initially provides longer lasting coverage without having to worry about remembering to re-apply.  1. Wear a wide-brimmed hat and sunglasses.   2. Wear sun-protective clothing.  [employer] and other [employer] make sun protective clothing that are stylish, comfortable and cool. [employer] and other [employer] make UV arm sleeves suitable for golfing, gardening and other  "activities.      Sunscreen instructions:  1. Use sunscreens with Zinc Oxide, Titanium Dioxide or Avobenzone to protect from UVA rays.  2. Use SPF 30-50+ to protect from UVB rays.  3. Re-apply every 2 hours even if water resistant.  4. Apply on your face every day even when cloudy and even in the winter. UVA \"aging rays\" penetrate window glass and are just as strong in the winter as in the summer.    Product Recommendations:    Good examples include: Blue Lizard, EltaMD, Solbar    Good daily moisturizers with SPF: Vanicream, CeraVe.    For sensitive skin, consider : SkinMedica Essential Defense Mineral Shield Broad Spectrum SPF 35      Never use tanning beds. Tanning beds are associated with much higher risks of skin cancer.    All tanning damages the skin. Aim for ivory skin year round and you will have less trouble with your skin in years to come. There is no merit in getting \"a base tan\" before a warm weather vacation, as any tanning indicates your body's response to sun damage.    Stop smoking. Smokers have higher rates of skin cancer and also have premature skin wrinkling.    FYI  You should use about 3 tablespoons of sunscreen to protect your whole body. Thus a typical eight ounce bottle of sunscreen should last 4 applications. Remember, that the SPF rating is compromised if you don t apply enough. Most people only apply 1/2 - 1/3 of the amount they need. Also don t forget areas such as your ears, feet, upper back and harder to reach places. Keep in mind that these amounts should be increased for larger body sizes.    Sunscreens with titanium dioxide and/or zinc oxide in the active ingredients are physical blockers as opposed to chemical blockers. Chemical-free sunscreens should not irritate the skin.    Spray-on sunscreens may be used for touch-up application only, not as a base layer. Also, use with caution around small children due to inhalation risk.    Avoid retinyl palmitate products.    Avoid combination " products that include both sunscreen and insect repellant, as sunscreen should be applied every 2 hours, but insect repellant should not be applied as frequently.    SPF means sun protection factor, which is just the degree to which the sunscreen can protect against UVB rays. There is no rating system for UVA rays. SPF is calculated as the time skin will burn when sunscreen is applied vs. skin without sunscreen.    Water resistant sunscreens should be re-applied every 1-2 hours.    For more information:  http://www.skincancer.org/prevention/sun-protection/sunscreen/sunscreens-safe-and-effective    SKIN CANCER SELF-EXAM INSTRUCTIONS  Check every month in the mirror or with a household member. Be aware of any changes, especially bleeding or tenderness. Also, make sure to check your nails for color changes after removal of nail polish.    For melanoma, check for:  A - Asymmetry. One half unlike the other half.  B - Border. Irregular, scalloped, ragged, notched, blurred or poorly defined borders.  C - Color. Color variations from one area to another, with shades of tan, brown and/or black present. Sometimes white, red or blue.  D - Diameter. Greater than 6 mm (about the size of a pencil eraser). Any new growth of a mole should be concerning and be evaluated.  E - Evolving. A mole or skin lesion that looks different from the rest or is changing in size, shape or color.    For basal cell carcinoma and squamous cell carcinoma, check for:    Sores, shiny bumps, nodules, scaly lesions, or wart-like growths that are itchy, tender, crusting, scabbing, eroding, oozing or bleeding.    Open sores/wounds or reddish/irritated areas that do not heal within 2-3 weeks.    Scar-like areas that are white, yellow or waxy in color.    ACTINIC KERATOSES POST-TREATMENT CARE INSTRUCTIONS  Actinic keratoses are benign, scaly or gritty lesions that appear in sun-exposed areas and may progress to skin cancers. For this reason, it is important  to treat them before they become cancerous. Liquid nitrogen is the most commonly used and most effective treatment for actinic keratoses; it is mildly uncomfortable when applied to the skin, but the discomfort rapidly subsides.    Post-Treatment:  You may experience burning and/or stinging immediately following the procedure. The discomfort from the procedure may persist over the next 12-24 hours. The area treated will look pinker and slightly swollen before the healing process begins. You may also notice redness, swelling, tenderness, weeping and crusts or scabs. Healing time is approximately 10-14 days.    Blister - You may or may notice blistering from the freezing. If you develop an uncomfortable blister from today's treatment, you may gently puncture this with a needle that has been cleaned with alcohol. However, do not remove the protective skin layer of the blister.    Scab - After a few days, you may notice scaliness or scab formation. Do not pick at the scabs because this may cause slower healing and a permanent scar.    The skin may appear temporarily darker at the treatment site, but this usually fades over a period of months, provided that the area is protected from the sun.    Care of the areas treated:    Wash the area with a mild cleanser.    Gently pat dry.    Do not rub.     Keep protected from the sun during the healing process and for a full year following treatment as the skin continues to remodel during this time.    Apply Vaseline or Aquaphor ointment sparingly to the site for the first 7 days after treatment.    Do not use Neosporin, as many people eventually develop a medication allergy, that can easily be confused with an infection, to Neomycin.    Return if:  There should not be any residual scaling. If there is any concern that the lesion has persisted after 4-6 weeks, make an appointment for a re-check. Healing time does vary depending on your individual healing process and the area of  the body treated. Most patients will be healed in one month.    Signs of Infection:  Thankfully this is rare. However if you notice persistent colored drainage, increasing pain, fever or other signs of infection, please call us at: [phone]    The patient was counseled about sunscreens and sun avoidance. The patient was counseled to check the skin regularly and report any lesion that is new, changing, itching, scabbing, bleeding or otherwise bothersome. The patient was discharged ambulatory and in stable condition.      PROCEDURES:                                                    Name : Shave Excision  Indication : Excision of tissue for pathology evaluation.  Location(s) : Chest, 2 cm right of midline, at T4 : 6 x 5 mm in size erythematous scaly indurated lesion ? Basal cell carcinoma ? Squamous cell carcinoma ? Other.  Completed by : Joy Bailon MD  Photo Taken : yes.  nesthesia : Patient was anesthetized by infiltrating the area surrounding the lesion with 1% lidocaine.   epinephrine [id number] : Yes.  Buffered with bicarbonate : Yes.  Note : Discussed the risk of pain, infection, scarring, hypo- or hyperpigmentation and recurrence or need for re-treatment. The benefits of treatment and alternative treatments were also discussed.    During this procedure, the universal protocol was utilized. The patient's identity was confirmed by no less than two patient identifiers, correct procedure was verified, correct site was verified and marked as applicable and a final pause was completed.    Sterile technique was used throughout the procedure. The skin was cleaned and prepped with surgical cleanser. Once adequate anesthesia was obtained, the lesion was removed with a deep scallop shave procedure. The specimen was sent to pathology.    Direct pressure and aluminum chloride and monopolar cautery was applied for hemostasis. No bleeding was present upon the completion of the procedure. The wound was coated with  antibacterial ointment. A dry sterile dressing was applied. Patient tolerated the procedure well and left in satisfactory condition.    Primary provider and referring provider will be informed regarding the tissue report when it returns.    Name : Shave Excision  Indication : Excision of tissue for pathology evaluation.  Location(s) : Left lower leg, 5 cm inferior to patella : 5 x 4 mm in size erythematous indurated flat lesion ? Basal cell carcinoma  ? Squamous cell carcinoma ? Other .  Completed by : Joy Bailon MD  Photo Taken : yes.  Anesthesia : Patient was anesthetized by infiltrating the area surrounding the lesion with 1% lidocaine.   epinephrine [id number] : Yes.  Buffered with bicarbonate : Yes.  Note : Discussed the risk of pain, infection, scarring, hypo- or hyperpigmentation and recurrence or need for re-treatment. The benefits of treatment and alternative treatments were also discussed.    During this procedure, the universal protocol was utilized. The patient's identity was confirmed by no less than two patient identifiers, correct procedure was verified, correct site was verified and marked as applicable and a final pause was completed.    Sterile technique was used throughout the procedure. The skin was cleaned and prepped with surgical cleanser. Once adequate anesthesia was obtained, the lesion was removed with a deep scallop shave procedure. The specimen was sent to pathology.    Direct pressure and aluminum chloride and monopolar cautery was applied for hemostasis. No bleeding was present upon the completion of the procedure. The wound was coated with antibacterial ointment. A dry sterile dressing was applied. Patient tolerated the procedure well and left in satisfactory condition.    Primary provider and referring provider will be informed regarding the tissue report when it returns.      The information in this document, created by the medical scribe for me, accurately reflects the services I  personally performed and the decisions made by me. I have reviewed and approved this document for accuracy prior to leaving the patient care area.  Joy Bailon MD February 19, 2018 10:44 AM  [facility]

## 2018-02-19 NOTE — LETTER
2/19/2018         RE: Tiffany Stovall  [id number]  [facility] [address]        Dear Colleague,    Thank you for referring your patient, Tiffany Stovall, to the [facility]. Please see a copy of my visit note below.    [facility] - PRIMARY CARE SKIN    CC : skin cancer screening (full-body)  SUBJECTIVE:                                                    Tiffany Stovall is a 67 year old female who presents to clinic today for a full-body skin exam because of her history of skin cancer.    Bothersome lesions noticed by the patient or other skin concerns :  Issue One : A red spot on chest has waxed and waned.  Onset : 2 months.  Enlarging : NO.  Bleeding : YES  Itchy or irritating : NO.  Pain or tenderness : NO.  Changing color : NO.  Issue Two : Non-healing lesion on left lower leg.  Issue Three : Scaling increased on scalp, more prominent on the right.    She has had three episodes of swelling on the right forearm. She also reports increased warmth with swelling. A previous Mohs site will also become red and inflamed at the same time.    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  Family history of skin cancer : YES - non-melanoma.     Sun Exposure History  Sunscreen Use : YES, frequency : daily, SPF : 30.  UV-protective clothing use : YES  Previous history of significant sun exposure:  Blistering sunburns : YES - as a teenager  Tanning beds : NO.     Occupation : nurse (indoor).    Refer to electronic medical record  (EMR) for past medical history and medications.    INTEGUMENTARY/SKIN: POSITIVE for changing lesions  ROS : 14 point review of systems was negative except the symptoms listed above in the HPI.    This document serves as a record of the services and decisions personally performed and made by Joy Bailon MD. It was created on her behalf by James Dukes, a trained medical scribe.  The creation of this document is based on the scribe's personal observations and the provider's statements to the medical scribe.  James Dukes, February 19, 2018 10:44 AM      OBJECTIVE:                                                    GENERAL: healthy, alert and no distress  SKIN: Rodriguez Skin Type - I.  This patient was examined from the top of the head to the bottom of the feet  including scalp, face, neck, back, chest, breasts, buttocks, both arms, both legs, both hands, both feet, all 10 fingers and all 10 toes. The dermatoscope was used to help evaluate pigmented lesions.  Skin Pertinent Findings:  Right arm(s) and left arm(s) : Multiple brown, macule(s) most consistent with benign solar lentigo. Scattered 2-3 mm in size, brown macules most consistent with (benign) melanocytic nevi.    Back : brown, macule(s) most consistent with benign solar lentigo slightly raised, red lesion(s) consistent with capillary hemangioma.    Posterior legs : Multiple brown, macule(s) most consistent with benign solar lentigo.    Significant Findings:  Chest, 2 cm right of midline, at T4 : 6 x 5 mm in size erythematous scaly indurated lesion ? Basal cell carcinoma ? Squamous cell carcinoma ? Other      Left lower leg, 5 cm inferior to patella : 5 x 4 mm in size erythematous indurated flat lesion ? Basal cell carcinoma  ? Squamous cell carcinoma ? Other       Crown of scalp : clear    Right forearm : Well-healed scar    Chest, midline, at T3 : 5 mm in size, erythematous, scaly, non-indurated lesion(s) most consistent with actinic keratoses.  Name :  Liquid Nitrogen Cry-Ac Cryotherapy.  Indication : Pre-malignant lesion.  Completed by : Joy Bailon MD  Note : Discussed natural history of lesion and treatment options. Prior to treatment, we discussed inflammation, tenderness post-procedure, the healing process, and the risks of pain, infection, scarring, blistering, and hypo-/hyperpigmentation after healing. Explained that these lesions may grow back and may need additional treatment or re-treatment. The patient expressed a desire to proceed with cryotherapy.    The lesion(s) was treated with liquid nitrogen Cry-Ac, five second freeze repeated twice with a pause to allow for the area to thaw. If this lesion should recur, then it needs to be re-evaluated.    Patient tolerated the procedure well and left in good condition.  Total number of lesions treated : 1.    Diagnostic Test Results:  none     MDM : Call cell, and she will call back. Would prefer not to return to [facility]      ASSESSMENT:                                                      Encounter Diagnoses   Name Primary?     Neoplasm of uncertain behavior of skin Yes     History of basal cell carcinoma      History of squamous cell carcinoma in situ of skin      Family history of nonmelanoma skin cancer      Solar lentigo      Multiple benign melanocytic nevi      Capillary hemangioma of skin      AK (actinic keratosis)          PLAN:                                                      Patient Instructions   FUTURE APPOINTMENTS  Follow up in 6 month(s) for a full-body skin cancer screening.  Follow up per pathology report.    WOUND CARE INSTRUCTIONS  1. Wash hands before every dressing change.  2. After 24 hours, change dressing daily.  3. Wash the wound area with a mild soap, then rinse.  4. Gently pat dry with a sterile gauze or Q-tip.  5. Using a Q-tip, apply Vaseline or Aquaphor only over entire wound. Do NOT use Neosporin - as many people react to neomycin.  6. Finally, cover  "with a bandage or sterile non-stick gauze with micropore paper tape.  7. Repeat once daily until wound has healed.    Do not let the wound dry out.  The wound will heal faster and with better cosmetic results if routinely kept moist. It is a false belief that wounds heal better when exposed to air and allowed to dry out.      Soap, water and shampoo will not hurt this area.    Do not go swimming or take baths, but showering is encouraged.    Limit use of the area where the procedure was done for a few days to allow for optimal healing.    If you experience bleeding:  Wash hands and hold firm pressure on the area for 10 minutes without checking to see if the bleeding has stopped. \"Checking\" pulls off the protective wound clot and restarts the bleeding all over again. Re-apply pressure for 10 minutes if necessary to stop bleeding.  Use additional sterile gauze and tape to maintain pressure once bleeding has stopped.  If bleeding continues, then call back to clinic at [phone].    Signs of Infection:  Infection can occur in any area where skin has been disrupted.  If you notice persistent redness, swelling, colored drainage, increasing pain, fever or other signs of infection, please call us at: [phone] and ask to have me or my colleague paged. We will call you back to discuss.    Pathology Results:  You will be notified, generally via letter or [facility], in approximately 10 days. If there is anything we need to discuss or further treatment needed, I will call you to discuss it.    Skin tissue can be some of the most challenging tissue for pathologists to examine, therefore we may use a specialist outside the [facility] system to read certain submitted tissue samples. When submitted to these outside specialists, [email or web address] will notify you that your tissue sample result has returned. However, this only means that the tissue sample has been sent to the specialist. These results are not available in [facility], so I " will contact you when I receive the final report.    PATIENT INFORMATION : WOUNDS  During the healing process you will notice a number of changes. All wounds develop a small halo of redness surrounding the wound.  This means healing is occurring. Severe itching with extensive redness usually indicates sensitivity to the ointment or bandage tape used to dress the wound.  You should call our office if this develops.      Swelling  and/or discoloration around your surgical site is common, particularly when performed around the eye.    All wounds normally drain.  The larger the wound the more drainage there will be.  After 7-10 days, you will notice the wound beginning to shrink and new skin will begin to grow.  The wound is healed when you can see skin has formed over the entire area.  A healed wound has a healthy, shiny look to the surface and is red to dark pink in color to normalize.  Wounds may take approximately 4-6 weeks to heal.  Larger wounds may take 6-8 weeks. After the wound is healed you may discontinue dressing changes.    You may experience a sensation of tightness as your wound heals. This is normal and will gradually subside.    Your healed wound may be sensitive to temperature changes. This sensitivity improves with time, but if you re having a lot of discomfort, try to avoid temperature extremes.    Patients frequently experience itching after their wound appears to have healed because of the continue healing under the skin.  Plain Vaseline will help relieve the itching.    SUN PROTECTION INSTRUCTIONS  Sun damage can lead to skin cancer and premature aging of the skin.      The best way to protect from sun damage to your skin is to avoid the sun during peak hours (10 am - 2 pm) even on overcast days.      Use UPF sun-protective clothing, which while more expensive initially provides longer lasting coverage without having to worry about remembering to re-apply.  1. Wear a wide-brimmed hat and  "sunglasses.   2. Wear sun-protective clothing.  [employer] and other [employer] make sun protective clothing that are stylish, comfortable and cool. [employer] and other companies make UV arm sleeves suitable for golfing, gardening and other activities.      Sunscreen instructions:  1. Use sunscreens with Zinc Oxide, Titanium Dioxide or Avobenzone to protect from UVA rays.  2. Use SPF 30-50+ to protect from UVB rays.  3. Re-apply every 2 hours even if water resistant.  4. Apply on your face every day even when cloudy and even in the winter. UVA \"aging rays\" penetrate window glass and are just as strong in the winter as in the summer.    Product Recommendations:    Good examples include: Blue Lizard, [employer], Solbar    Good daily moisturizers with SPF: Vanicream, CeraVe.    For sensitive skin, consider : [employer] Essential Defense Mineral Shield Broad Spectrum SPF 35      Never use tanning beds. Tanning beds are associated with much higher risks of skin cancer.    All tanning damages the skin. Aim for ivory skin year round and you will have less trouble with your skin in years to come. There is no merit in getting \"a base tan\" before a warm weather vacation, as any tanning indicates your body's response to sun damage.    Stop smoking. Smokers have higher rates of skin cancer and also have premature skin wrinkling.    FYI  You should use about 3 tablespoons of sunscreen to protect your whole body. Thus a typical eight ounce bottle of sunscreen should last 4 applications. Remember, that the SPF rating is compromised if you don t apply enough. Most people only apply 1/2 - 1/3 of the amount they need. Also don t forget areas such as your ears, feet, upper back and harder to reach places. Keep in mind that these amounts should be increased for larger body sizes.    Sunscreens with titanium dioxide and/or zinc oxide in the active ingredients are physical blockers as opposed to chemical blockers. Chemical-free " sunscreens should not irritate the skin.    Spray-on sunscreens may be used for touch-up application only, not as a base layer. Also, use with caution around small children due to inhalation risk.    Avoid retinyl palmitate products.    Avoid combination products that include both sunscreen and insect repellant, as sunscreen should be applied every 2 hours, but insect repellant should not be applied as frequently.    SPF means sun protection factor, which is just the degree to which the sunscreen can protect against UVB rays. There is no rating system for UVA rays. SPF is calculated as the time skin will burn when sunscreen is applied vs. skin without sunscreen.    Water resistant sunscreens should be re-applied every 1-2 hours.    For more information:  http://www.skincancer.org/prevention/sun-protection/sunscreen/sunscreens-safe-and-effective    SKIN CANCER SELF-EXAM INSTRUCTIONS  Check every month in the mirror or with a household member. Be aware of any changes, especially bleeding or tenderness. Also, make sure to check your nails for color changes after removal of nail polish.    For melanoma, check for:  A - Asymmetry. One half unlike the other half.  B - Border. Irregular, scalloped, ragged, notched, blurred or poorly defined borders.  C - Color. Color variations from one area to another, with shades of tan, brown and/or black present. Sometimes white, red or blue.  D - Diameter. Greater than 6 mm (about the size of a pencil eraser). Any new growth of a mole should be concerning and be evaluated.  E - Evolving. A mole or skin lesion that looks different from the rest or is changing in size, shape or color.    For basal cell carcinoma and squamous cell carcinoma, check for:    Sores, shiny bumps, nodules, scaly lesions, or wart-like growths that are itchy, tender, crusting, scabbing, eroding, oozing or bleeding.    Open sores/wounds or reddish/irritated areas that do not heal within 2-3 weeks.    Scar-like  areas that are white, yellow or waxy in color.    ACTINIC KERATOSES POST-TREATMENT CARE INSTRUCTIONS  Actinic keratoses are benign, scaly or gritty lesions that appear in sun-exposed areas and may progress to skin cancers. For this reason, it is important to treat them before they become cancerous. Liquid nitrogen is the most commonly used and most effective treatment for actinic keratoses; it is mildly uncomfortable when applied to the skin, but the discomfort rapidly subsides.    Post-Treatment:  You may experience burning and/or stinging immediately following the procedure. The discomfort from the procedure may persist over the next 12-24 hours. The area treated will look pinker and slightly swollen before the healing process begins. You may also notice redness, swelling, tenderness, weeping and crusts or scabs. Healing time is approximately 10-14 days.    Blister - You may or may notice blistering from the freezing. If you develop an uncomfortable blister from today's treatment, you may gently puncture this with a needle that has been cleaned with alcohol. However, do not remove the protective skin layer of the blister.    Scab - After a few days, you may notice scaliness or scab formation. Do not pick at the scabs because this may cause slower healing and a permanent scar.    The skin may appear temporarily darker at the treatment site, but this usually fades over a period of months, provided that the area is protected from the sun.    Care of the areas treated:    Wash the area with a mild cleanser.    Gently pat dry.    Do not rub.     Keep protected from the sun during the healing process and for a full year following treatment as the skin continues to remodel during this time.    Apply Vaseline or Aquaphor ointment sparingly to the site for the first 7 days after treatment.    Do not use Neosporin, as many people eventually develop a medication allergy, that can easily be confused with an infection, to  Neomycin.    Return if:  There should not be any residual scaling. If there is any concern that the lesion has persisted after 4-6 weeks, make an appointment for a re-check. Healing time does vary depending on your individual healing process and the area of the body treated. Most patients will be healed in one month.    Signs of Infection:  Thankfully this is rare. However if you notice persistent colored drainage, increasing pain, fever or other signs of infection, please call us at: [phone]    The patient was counseled about sunscreens and sun avoidance. The patient was counseled to check the skin regularly and report any lesion that is new, changing, itching, scabbing, bleeding or otherwise bothersome. The patient was discharged ambulatory and in stable condition.      PROCEDURES:                                                    Name : Shave Excision  Indication : Excision of tissue for pathology evaluation.  Location(s) : Chest, 2 cm right of midline, at T4 : 6 x 5 mm in size erythematous scaly indurated lesion ? Basal cell carcinoma ? Squamous cell carcinoma ? Other.  Completed by : Joy Bailon MD  Photo Taken : yes.  nesthesia : Patient was anesthetized by infiltrating the area surrounding the lesion with 1% lidocaine.   epinephrine [id number] : Yes.  Buffered with bicarbonate : Yes.  Note : Discussed the risk of pain, infection, scarring, hypo- or hyperpigmentation and recurrence or need for re-treatment. The benefits of treatment and alternative treatments were also discussed.    During this procedure, the universal protocol was utilized. The patient's identity was confirmed by no less than two patient identifiers, correct procedure was verified, correct site was verified and marked as applicable and a final pause was completed.    Sterile technique was used throughout the procedure. The skin was cleaned and prepped with surgical cleanser. Once adequate anesthesia was obtained, the lesion was  removed with a deep scallop shave procedure. The specimen was sent to pathology.    Direct pressure and aluminum chloride and monopolar cautery was applied for hemostasis. No bleeding was present upon the completion of the procedure. The wound was coated with antibacterial ointment. A dry sterile dressing was applied. Patient tolerated the procedure well and left in satisfactory condition.    Primary provider and referring provider will be informed regarding the tissue report when it returns.    Name : Shave Excision  Indication : Excision of tissue for pathology evaluation.  Location(s) : Left lower leg, 5 cm inferior to patella : 5 x 4 mm in size erythematous indurated flat lesion ? Basal cell carcinoma  ? Squamous cell carcinoma ? Other .  Completed by : Joy Bailon MD  Photo Taken : yes.  Anesthesia : Patient was anesthetized by infiltrating the area surrounding the lesion with 1% lidocaine.   epinephrine [id number] : Yes.  Buffered with bicarbonate : Yes.  Note : Discussed the risk of pain, infection, scarring, hypo- or hyperpigmentation and recurrence or need for re-treatment. The benefits of treatment and alternative treatments were also discussed.    During this procedure, the universal protocol was utilized. The patient's identity was confirmed by no less than two patient identifiers, correct procedure was verified, correct site was verified and marked as applicable and a final pause was completed.    Sterile technique was used throughout the procedure. The skin was cleaned and prepped with surgical cleanser. Once adequate anesthesia was obtained, the lesion was removed with a deep scallop shave procedure. The specimen was sent to pathology.    Direct pressure and aluminum chloride and monopolar cautery was applied for hemostasis. No bleeding was present upon the completion of the procedure. The wound was coated with antibacterial ointment. A dry sterile dressing was applied. Patient tolerated the procedure  well and left in satisfactory condition.    Primary provider and referring provider will be informed regarding the tissue report when it returns.      The information in this document, created by the medical scribe for me, accurately reflects the services I personally performed and the decisions made by me. I have reviewed and approved this document for accuracy prior to leaving the patient care area.  Joy Bailon MD February 19, 2018 10:44 AM  [facility]    Again, thank you for allowing me to participate in the care of your patient.        Sincerely,        Nataliya Bailon MD

## 2018-02-19 NOTE — MR AVS SNAPSHOT
"              After Visit Summary   2/19/2018    Tiffany Stovall    MRN: [id number]           Patient Information     Date Of Birth          1950        Visit Information        Provider Department      2/19/2018 11:00 AM Nataliya Bailon MD [facility] [place]        Today's Diagnoses     Neoplasm of uncertain behavior of skin    -  1    History of basal cell carcinoma        History of squamous cell carcinoma in situ of skin        Family history of nonmelanoma skin cancer        Solar lentigo        Multiple benign melanocytic nevi        Capillary hemangioma of skin        AK (actinic keratosis)          Care Instructions    FUTURE APPOINTMENTS  Follow up in 6 month(s) for a full-body skin cancer screening.  Follow up per pathology report.    WOUND CARE INSTRUCTIONS  1. Wash hands before every dressing change.  2. After 24 hours, change dressing daily.  3. Wash the wound area with a mild soap, then rinse.  4. Gently pat dry with a sterile gauze or Q-tip.  5. Using a Q-tip, apply Vaseline or Aquaphor only over entire wound. Do NOT use Neosporin - as many people react to neomycin.  6. Finally, cover with a bandage or sterile non-stick gauze with micropore paper tape.  7. Repeat once daily until wound has healed.    Do not let the wound dry out.  The wound will heal faster and with better cosmetic results if routinely kept moist. It is a false belief that wounds heal better when exposed to air and allowed to dry out.      Soap, water and shampoo will not hurt this area.    Do not go swimming or take baths, but showering is encouraged.    Limit use of the area where the procedure was done for a few days to allow for optimal healing.    If you experience bleeding:  Wash hands and hold firm pressure on the area for 10 minutes without checking to see if the bleeding has stopped. \"Checking\" pulls off the protective wound clot and restarts the bleeding all over again. Re-apply pressure for " 10 minutes if necessary to stop bleeding.  Use additional sterile gauze and tape to maintain pressure once bleeding has stopped.  If bleeding continues, then call back to clinic at [phone].    Signs of Infection:  Infection can occur in any area where skin has been disrupted.  If you notice persistent redness, swelling, colored drainage, increasing pain, fever or other signs of infection, please call us at: [phone] and ask to have me or my colleague paged. We will call you back to discuss.    Pathology Results:  You will be notified, generally via letter or MyChart, in approximately 10 days. If there is anything we need to discuss or further treatment needed, I will call you to discuss it.    Skin tissue can be some of the most challenging tissue for pathologists to examine, therefore we may use a specialist outside the [facility] system to read certain submitted tissue samples. When submitted to these outside specialists, [facility] will notify you that your tissue sample result has returned. However, this only means that the tissue sample has been sent to the specialist. These results are not available in [facility], so I will contact you when I receive the final report.    PATIENT INFORMATION : WOUNDS  During the healing process you will notice a number of changes. All wounds develop a small halo of redness surrounding the wound.  This means healing is occurring. Severe itching with extensive redness usually indicates sensitivity to the ointment or bandage tape used to dress the wound.  You should call our office if this develops.      Swelling  and/or discoloration around your surgical site is common, particularly when performed around the eye.    All wounds normally drain.  The larger the wound the more drainage there will be.  After 7-10 days, you will notice the wound beginning to shrink and new skin will begin to grow.  The wound is healed when you can see skin has formed over the entire area.  A  "healed wound has a healthy, shiny look to the surface and is red to dark pink in color to normalize.  Wounds may take approximately 4-6 weeks to heal.  Larger wounds may take 6-8 weeks. After the wound is healed you may discontinue dressing changes.    You may experience a sensation of tightness as your wound heals. This is normal and will gradually subside.    Your healed wound may be sensitive to temperature changes. This sensitivity improves with time, but if you re having a lot of discomfort, try to avoid temperature extremes.    Patients frequently experience itching after their wound appears to have healed because of the continue healing under the skin.  Plain Vaseline will help relieve the itching.    SUN PROTECTION INSTRUCTIONS  Sun damage can lead to skin cancer and premature aging of the skin.      The best way to protect from sun damage to your skin is to avoid the sun during peak hours (10 am - 2 pm) even on overcast days.      Use UPF sun-protective clothing, which while more expensive initially provides longer lasting coverage without having to worry about remembering to re-apply.  1. Wear a wide-brimmed hat and sunglasses.   2. Wear sun-protective clothing.  [employer] and other [employer] make sun protective clothing that are stylish, comfortable and cool. [employer]lesight and other [employer] make UV arm sleeves suitable for golfing, gardening and other activities.      Sunscreen instructions:  1. Use sunscreens with Zinc Oxide, Titanium Dioxide or Avobenzone to protect from UVA rays.  2. Use SPF 30-50+ to protect from UVB rays.  3. Re-apply every 2 hours even if water resistant.  4. Apply on your face every day even when cloudy and even in the winter. UVA \"aging rays\" penetrate window glass and are just as strong in the winter as in the summer.    Product Recommendations:    Good examples include: Blue Lizard, EltaMD, Solbar    Good daily moisturizers with SPF: Vanicream, CeraVe.    For " "sensitive skin, consider : SkinMedica Essential Defense Mineral Shield Broad Spectrum SPF 35      Never use tanning beds. Tanning beds are associated with much higher risks of skin cancer.    All tanning damages the skin. Aim for ivory skin year round and you will have less trouble with your skin in years to come. There is no merit in getting \"a base tan\" before a warm weather vacation, as any tanning indicates your body's response to sun damage.    Stop smoking. Smokers have higher rates of skin cancer and also have premature skin wrinkling.    FYI  You should use about 3 tablespoons of sunscreen to protect your whole body. Thus a typical eight ounce bottle of sunscreen should last 4 applications. Remember, that the SPF rating is compromised if you don t apply enough. Most people only apply 1/2 - 1/3 of the amount they need. Also don t forget areas such as your ears, feet, upper back and harder to reach places. Keep in mind that these amounts should be increased for larger body sizes.    Sunscreens with titanium dioxide and/or zinc oxide in the active ingredients are physical blockers as opposed to chemical blockers. Chemical-free sunscreens should not irritate the skin.    Spray-on sunscreens may be used for touch-up application only, not as a base layer. Also, use with caution around small children due to inhalation risk.    Avoid retinyl palmitate products.    Avoid combination products that include both sunscreen and insect repellant, as sunscreen should be applied every 2 hours, but insect repellant should not be applied as frequently.    SPF means sun protection factor, which is just the degree to which the sunscreen can protect against UVB rays. There is no rating system for UVA rays. SPF is calculated as the time skin will burn when sunscreen is applied vs. skin without sunscreen.    Water resistant sunscreens should be re-applied every 1-2 hours.    For more " information:  http://www.skincancer.org/prevention/sun-protection/sunscreen/sunscreens-safe-and-effective    SKIN CANCER SELF-EXAM INSTRUCTIONS  Check every month in the mirror or with a household member. Be aware of any changes, especially bleeding or tenderness. Also, make sure to check your nails for color changes after removal of nail polish.    For melanoma, check for:  A - Asymmetry. One half unlike the other half.  B - Border. Irregular, scalloped, ragged, notched, blurred or poorly defined borders.  C - Color. Color variations from one area to another, with shades of tan, brown and/or black present. Sometimes white, red or blue.  D - Diameter. Greater than 6 mm (about the size of a pencil eraser). Any new growth of a mole should be concerning and be evaluated.  E - Evolving. A mole or skin lesion that looks different from the rest or is changing in size, shape or color.    For basal cell carcinoma and squamous cell carcinoma, check for:    Sores, shiny bumps, nodules, scaly lesions, or wart-like growths that are itchy, tender, crusting, scabbing, eroding, oozing or bleeding.    Open sores/wounds or reddish/irritated areas that do not heal within 2-3 weeks.    Scar-like areas that are white, yellow or waxy in color.    ACTINIC KERATOSES POST-TREATMENT CARE INSTRUCTIONS  Actinic keratoses are benign, scaly or gritty lesions that appear in sun-exposed areas and may progress to skin cancers. For this reason, it is important to treat them before they become cancerous. Liquid nitrogen is the most commonly used and most effective treatment for actinic keratoses; it is mildly uncomfortable when applied to the skin, but the discomfort rapidly subsides.    Post-Treatment:  You may experience burning and/or stinging immediately following the procedure. The discomfort from the procedure may persist over the next 12-24 hours. The area treated will look pinker and slightly swollen before the healing process begins. You  may also notice redness, swelling, tenderness, weeping and crusts or scabs. Healing time is approximately 10-14 days.    Blister - You may or may notice blistering from the freezing. If you develop an uncomfortable blister from today's treatment, you may gently puncture this with a needle that has been cleaned with alcohol. However, do not remove the protective skin layer of the blister.    Scab - After a few days, you may notice scaliness or scab formation. Do not pick at the scabs because this may cause slower healing and a permanent scar.    The skin may appear temporarily darker at the treatment site, but this usually fades over a period of months, provided that the area is protected from the sun.    Care of the areas treated:    Wash the area with a mild cleanser.    Gently pat dry.    Do not rub.     Keep protected from the sun during the healing process and for a full year following treatment as the skin continues to remodel during this time.    Apply Vaseline or Aquaphor ointment sparingly to the site for the first 7 days after treatment.    Do not use Neosporin, as many people eventually develop a medication allergy, that can easily be confused with an infection, to Neomycin.    Return if:  There should not be any residual scaling. If there is any concern that the lesion has persisted after 4-6 weeks, make an appointment for a re-check. Healing time does vary depending on your individual healing process and the area of the body treated. Most patients will be healed in one month.    Signs of Infection:  Thankfully this is rare. However if you notice persistent colored drainage, increasing pain, fever or other signs of infection, please call us at: [phone]          Follow-ups after your visit        Who to contact     If you have questions or need follow up information about today's clinic visit or your schedule please contact [facility] directly at [phone].  Normal or  non-critical lab and imaging results will be communicated to you by MyChart, letter or phone within 4 business days after the clinic has received the results. If you do not hear from us within 7 days, please contact the clinic through [employer] or phone. If you have a critical or abnormal lab result, we will notify you by phone as soon as possible.  Submit refill requests through [email or web address] or call your pharmacy and they will forward the refill request to us. Please allow 3 business days for your refill to be completed.          Additional Information About Your Visit        [email or web address] Information     [email or web address] gives you secure access to your electronic health record. If you see a primary care provider, you can also send messages to your care team and make appointments. If you have questions, please call your primary care clinic.  If you do not have a primary care provider, please call [phone] and they will assist you.        Care EveryWhere ID     This is your Care EveryWhere ID. This could be used by other organizations to access your [facility] medical records  [id number]         Blood Pressure from Last 3 Encounters:   03/21/17 134/78   01/16/17 129/74   12/13/16 142/83    Weight from Last 3 Encounters:   12/11/17 170 lb (77.1 kg)   06/12/17 170 lb (77.1 kg)   03/21/17 170 lb 14.4 oz (77.5 kg)              We Performed the Following     CL SURG PATH [facility] DERM     DESTRUCT PREMALIGNANT LESION, FIRST     SHAV SKIN LESION TRUNK/ARM/LEG 0.6-1.0 CM     SHAV SKIN LESION TRUNK/ARM/LEG <=0.5 CM        Primary Care Provider Office Phone # Fax #    Darby Williamson [phone] [address]  [facility] [address]        Equal Access to Services     [facility]: Hadii nick Lynn, waaxda luqadaha, qaybta kaalmada stoneyyada, april palomares. So [facility] [phone].    ATENCIÓN: Si habla español, tiene a catalan disposición servicios gratuitos de asistencia  lingüísticaYasmani Camarillo al [phone].    We comply with applicable federal civil rights laws and [place] laws. We do not discriminate on the basis of race, color, national origin, age, disability, sex, sexual orientation, or gender identity.            Thank you!     Thank you for choosing [facility]  for your care. Our goal is always to provide you with excellent care. Hearing back from our patients is one way we can continue to improve our services. Please take a few minutes to complete the written survey that you may receive in the mail after your visit with us. Thank you!             Your Updated Medication List - Protect others around you: Learn how to safely use, store and throw away your medicines at www.disposemymeds.org.          This list is accurate as of 2/19/18 11:11 AM.  Always use your most recent med list.                   Brand Name Dispense Instructions for use Diagnosis    * albuterol (2.5 MG/3ML) 0.083% neb solution     25 vial    Take 1 vial (2.5 mg) by nebulization every 6 hours as needed for shortness of breath / dyspnea    Mild persistent asthma without complication, Mild persistent asthma with exacerbation       * albuterol 108 (90 BASE) MCG/ACT Inhaler    PROAIR HFA    1 Inhaler    Inhale 1-2 puffs into the lungs every 4 hours as needed for shortness of breath / dyspnea    Mild persistent asthma without complication       atorvastatin 40 MG tablet    LIPITOR    90 tablet    Take 1 tablet (40 mg) by mouth daily    Hyperlipidemia LDL goal <130       EPINEPHrine 0.3 MG/0.3ML injection 2-pack    EPIPEN/ADRENACLICK/or ANY BX GENERIC EQUIV    0.6 mL    Inject 0.3 mLs (0.3 mg) into the muscle once as needed    Allergic reaction, initial encounter       fluticasone-salmeterol 250-50 MCG/DOSE diskus inhaler    ADVAIR DISKUS    3 Inhaler    Inhale 1 puff into the lungs 2 times daily    Mild persistent asthma without complication       levothyroxine 112 MCG tablet     SYNTHROID/LEVOTHROID    90 tablet    Take 1 tablet (112 mcg) by mouth every morning Patient needs to be seen prior to further refills.    Hypothyroidism due to acquired atrophy of thyroid       loratadine 10 MG tablet    CLARITIN     Take 10 mg by mouth daily        losartan-hydrochlorothiazide 100-25 MG per tablet    HYZAAR    90 tablet    Take 1 tablet by mouth every morning    Hypertension goal BP (blood pressure) < 140/90       Multi-vitamin Tabs tablet   Generic drug:  multivitamin, therapeutic with minerals      ONE TABLET DAILY        NEXIUM PO      Take 40 mg by mouth daily        OSTEO BI-FLEX ADV DOUBLE ST PO           potassium chloride SA 20 MEQ CR tablet    K-DUR/KLOR-CON M    90 tablet    Take 1 tablet (20 mEq) by mouth daily    Familial lipoprotein deficiency       triamcinolone 0.1 % cream    KENALOG    15 g    Apply topically 2 times daily Apply to affected area on left lower leg bid for 10-14 consecutive days, not to be used on face or groin    Lichen simplex       TYLENOL EXTRA STRENGTH PO           ZANTAC PO           * Notice:  This list has 2 medication(s) that are the same as other medications prescribed for you. Read the directions carefully, and ask your doctor or other care provider to review them with you.

## 2018-02-19 NOTE — NURSING NOTE
"Chief Complaint   Patient presents with     Derm Problem     Pt would like [facility]       Initial There were no vitals taken for this visit. Estimated body mass index is 27.44 kg/(m^2) as calculated from the following:    Height as of 12/11/17: 5' 6\" (1.676 m).    Weight as of 12/11/17: 170 lb (77.1 kg).  Medication Reconciliation: complete     Kourtney Baldwin MA     "

## 2018-02-26 ENCOUNTER — TELEPHONE (OUTPATIENT)
Dept: FAMILY MEDICINE | Facility: CLINIC | Age: 68
End: 2018-02-26

## 2018-02-26 NOTE — LETTER
[facility]  [address] [facility] [address]  [phone]  February 26, 2018    Dr. Melva Corley  [facility]  [address] [place] [address], [address]  [place] [address]        Dear Dr. Corley,     I am referring Tiffany Stovall, 1950 , for a nodular basal cell carcinoma  on the left anterior shin, 5 cm inferior to the patella . Because of the location I am referring her for a  consult regarding Mohs procedure.      I have enclosed a photo and copy of the pathology report.    The contact information is work number [phone] , it is ok to leave a voicemail.    I appreciate the excellent care you provide for the patient.       Regards,      Nataliya Bailon M.D.

## 2018-02-26 NOTE — TELEPHONE ENCOUNTER
Encounter : phonecall  Discussed lab results :       Pathology report :         Nodular basal cell carcinoma on the left anterior lower leg, because of location recommended Mohs ( she will check with her insurance to see who is covered ) and get back to me.        Superficial basal cell carcinoma on the chest, right side- schedule for removal with myself.

## 2018-02-26 NOTE — TELEPHONE ENCOUNTER
Faxed letter to Dr. Corley's office- mailed Dr. Mendez to patients Home address.    Kendra CARTER RN  [facility]  [phone]  [facility]   [phone]

## 2018-02-26 NOTE — TELEPHONE ENCOUNTER
Please fax over face letter, photo, copy of office visit, copy of the pathology report.   Thanks ,   Nataliya Bailon M.D.

## 2018-03-12 ENCOUNTER — APPOINTMENT (OUTPATIENT)
Age: 68
Setting detail: DERMATOLOGY
End: 2018-03-25

## 2018-03-12 PROBLEM — C44.719 BASAL CELL CARCINOMA OF SKIN OF LEFT LOWER LIMB, INCLUDING HIP: Status: ACTIVE | Noted: 2018-03-12

## 2018-03-12 PROCEDURE — OTHER MOHS SURGERY PHONE CONSULTATION: OTHER

## 2018-03-12 NOTE — PROCEDURE: MOHS SURGERY PHONE CONSULTATION
Does The Patient Take Blood Thinners?: No
Date Of Mohs Surgery: 03/22/2018
Detail Level: Detailed
Has The Pathology Report Been Received?: Yes
Patient Reported Location: L shin
Date Of Surgical Consultation If Needed: 03/12/2018
Office Location Of Mohs Surgery: [facility]
Referring Provider: Rachell Ramirez MD

## 2018-03-21 ENCOUNTER — OFFICE VISIT (OUTPATIENT)
Dept: FAMILY MEDICINE | Facility: CLINIC | Age: 68
End: 2018-03-21
Payer: COMMERCIAL

## 2018-03-21 DIAGNOSIS — Z80.8 FAMILY HISTORY OF NONMELANOMA SKIN CANCER: ICD-10-CM

## 2018-03-21 DIAGNOSIS — Z86.007 HISTORY OF SQUAMOUS CELL CARCINOMA IN SITU OF SKIN: ICD-10-CM

## 2018-03-21 DIAGNOSIS — Z85.828 HISTORY OF BASAL CELL CARCINOMA: ICD-10-CM

## 2018-03-21 DIAGNOSIS — C44.519 BASAL CELL CARCINOMA OF CHEST: Primary | ICD-10-CM

## 2018-03-21 PROCEDURE — 12032 INTMD RPR S/A/T/EXT 2.6-7.5: CPT | Performed by: FAMILY MEDICINE

## 2018-03-21 PROCEDURE — 88305 TISSUE EXAM BY PATHOLOGIST: CPT | Performed by: FAMILY MEDICINE

## 2018-03-21 PROCEDURE — 11603 EXC TR-EXT MAL+MARG 2.1-3 CM: CPT | Mod: 51 | Performed by: FAMILY MEDICINE

## 2018-03-21 NOTE — PROGRESS NOTES
[facility] - PRIMARY CARE SKIN    CC : Wide excision   SUBJECTIVE:                                                    Tiffany Stovall is a 67 year old female who presents to clinic today for follow-up wide excision of a basal cell carcinoma on the right chest.    Tissue Pathology Report from 2/19/18      Personal history of skin cancer : YES  Superficial basal cell carcinoma on right dorsal foot (s/p MMS 12/13/16)  Squamous cell carcinoma in situ on left lateral neck (s/p MMS 12/13/16)  Superficial, multifocal basal cell carcinoma on right mid-dorsal forearm (s/p excision 12/20/16)  Basal cell carcinoma on right dorsal third toe (excised 12/2015)  Basal cell carcinoma on left upper cutaneous lip (s/p Mohs 10/20/2011 and [facility])  Basal cell carcinoma on clavicle  Basal cell carcinoma on right arm.  Family history of skin cancer : YES - non-melanoma.      Sun Exposure History  Sunscreen Use : YES, frequency : daily, SPF : 30.  UV-protective clothing use : YES  Previous history of significant sun exposure:  Blistering sunburns : YES - as a teenager  Tanning beds : NO.      Occupation : nurse (indoor).    Refer to electronic medical record (EMR) for past medical history and medications.    ROS : 14 point review of systems was negative except the symptoms listed above in the HPI.    This document serves as a record of the services and decisions personally performed and made by Joy Bailon MD. It was created on her behalf by James Dukes, a trained medical scribe.  The creation of this document is based on the scribe's personal observations and the provider's statements to the medical scribe.  Jaems Dukes, March 21, 2018 2:56 PM      OBJECTIVE:                                                    GENERAL: healthy, alert and no distress  SKIN: Rodriguez Skin Type - I.  Face and Trunk were examined. The dermatoscope was used to help evaluate pigmented lesions.  Skin Pertinent Findings:  Right chest, 2 cm right of  "midline, at T4 : 5 mm in size healing biopsy site. Previous pathology indicated superficial basal cell carcinoma.      ASSESSMENT:                                                      Encounter Diagnoses   Name Primary?     Basal cell carcinoma of chest Yes     History of basal cell carcinoma      Family history of nonmelanoma skin cancer      History of squamous cell carcinoma in situ of skin          PLAN:                                                    Patient Instructions   FUTURE APPOINTMENTS  Follow up per pathology report.   Follow up for Mohs micrographic surgery with Dr. Corley for basal cell carcinoma on the left lower leg.  Follow up in 3 month(s) for re-evaluation of basal cell carcinoma excision site.  Follow up in 6 months for a full-body skin cancer screening.    WOUND CARE INSTRUCTIONS  1. Wash hands before every dressing change.  2. After 24 hours, change dressing daily.  3. Wash the wound area with a mild soap, then rinse.  4. Gently pat dry with a sterile gauze or Q-tip.  5. Using a Q-tip, apply Vaseline or Aquaphor only over entire wound. Do NOT use Neosporin - as many people react to neomycin.  6. Finally, cover with a bandage or sterile non-stick gauze with micropore paper tape.  7. Repeat once daily until wound has healed.      Soap, water and shampoo will not hurt this area.    Do not go swimming or take baths, but showering is encouraged.    Limit use of the area where the procedure was done for a few days to allow for optimal healing.    If you experience bleeding:  Wash hands and hold firm pressure on the area for 10 minutes without checking to see if the bleeding has stopped. \"Checking\" pulls off the protective wound clot and restarts the bleeding all over again. Re-apply pressure for 10 minutes if necessary to stop bleeding.  Use additional sterile gauze and tape to maintain pressure once bleeding has stopped.  If bleeding continues, then call back to clinic at [phone].    Signs " of Infection:  Infection can occur in any area where skin has been disrupted.  If you notice persistent redness, swelling, colored drainage, increasing pain, fever or other signs of infection, please call us at: [phone] and ask to have me or my colleague paged. We will call you back to discuss.    Pathology Results:  You will be notified, generally via letter or MyChart, in approximately 10 days. If there is anything we need to discuss or further treatment needed, I will call you to discuss it.    PATIENT INFORMATION : WOUNDS  During the healing process you will notice a number of changes. All wounds develop a small halo of redness surrounding the wound.  This means healing is occurring. Severe itching with extensive redness usually indicates sensitivity to the ointment or bandage tape used to dress the wound.  You should call our office if this develops.      Swelling  and/or discoloration around your surgical site is common, particularly when performed around the eye.    All wounds normally drain.  The larger the wound the more drainage there will be.  After 7-10 days, you will notice the wound beginning to shrink and new skin will begin to grow.  The wound is healed when you can see skin has formed over the entire area.  A healed wound has a healthy, shiny look to the surface and is red to dark pink in color to normalize.  Wounds may take approximately 4-6 weeks to heal.  Larger wounds may take 6-8 weeks. After the wound is healed you may discontinue dressing changes.    You may experience a sensation of tightness as your wound heals. This is normal and will gradually subside.    Your healed wound may be sensitive to temperature changes. This sensitivity improves with time, but if you re having a lot of discomfort, try to avoid temperature extremes.    Patients frequently experience itching after their wound appears to have healed because of the continue healing under the skin.  Plain Vaseline will help  relieve the itching.          PROCEDURES:                                                    Name : Wide Excision  Indication : Wide excision of tissue for pathology evaluation of malignancy.  Location(s) : Right chest, 2 cm right of midline, at T4.  Completed by : Joy Bailon MD  Photo Taken : No.  Anesthesia : Patient was anesthetized by infiltrating the area surrounding the lesion with 1% lidocaine.   epinephrine [id number] : Yes.  Buffered with bicarbonate : Yes.  Note : Prior to procedure we discussed expectations for healing, risk of infection, and scar formation. Discussed other treatment options available. Discussed the risk of pain, infection, scarring, hypo- or hyperpigmentation and recurrence or need for re-treatment. The benefits of treatment and alternative treatments were also discussed.    During this procedure, the universal protocol was utilized. The patient's identity was confirmed by no less than two patient identifiers, correct procedure was verified, correct site was verified and marked as applicable and a final pause was completed.    Sterile technique was used throughout the procedure. The skin was cleaned and prepped with Chloroprep. A 4 mm margin was marked out on each side of the lesion. Sterile drapes were laid out. Once adequate anesthesia was obtained, an elliptical incision was made with a #15 blade through the epidermis and dermis. The tissue specimen was placed in formalin and sent to pathology.    Direct pressure and monopolar cautery was applied for hemostasis. Edges were undermined with a Metzenbaum. Defect was approximated with 3-0 Vicryl and edges were approximated with 3-0 Prolene interrupted simple stitch. Minimal bleeding occurred. Dressing was applied and patient left in satisfactory condition.    Widest diameter : 1.2 cm.  Length : 3 cm.    Total number of stitches in closure of epidermis : 7    Primary provider and referring provider will be informed regarding the wound  culture and tissue sample report when it returns.    Suture removal : 10-14 days.      The information in this document, created by the medical scribe for me, accurately reflects the services I personally performed and the decisions made by me. I have reviewed and approved this document for accuracy prior to leaving the patient care area.  Joy Bailon MD March 21, 2018 2:56 PM  [facility] [place]

## 2018-03-21 NOTE — PATIENT INSTRUCTIONS
"FUTURE APPOINTMENTS  Follow up per pathology report.   Follow up for Mohs micrographic surgery with Dr. Corley for basal cell carcinoma on the left lower leg.  Follow up in 3 month(s) for re-evaluation of basal cell carcinoma excision site.  Follow up in 6 months for a full-body skin cancer screening.    WOUND CARE INSTRUCTIONS  1. Wash hands before every dressing change.  2. After 24 hours, change dressing daily.  3. Wash the wound area with a mild soap, then rinse.  4. Gently pat dry with a sterile gauze or Q-tip.  5. Using a Q-tip, apply Vaseline or Aquaphor only over entire wound. Do NOT use Neosporin - as many people react to neomycin.  6. Finally, cover with a bandage or sterile non-stick gauze with micropore paper tape.  7. Repeat once daily until wound has healed.      Soap, water and shampoo will not hurt this area.    Do not go swimming or take baths, but showering is encouraged.    Limit use of the area where the procedure was done for a few days to allow for optimal healing.    If you experience bleeding:  Wash hands and hold firm pressure on the area for 10 minutes without checking to see if the bleeding has stopped. \"Checking\" pulls off the protective wound clot and restarts the bleeding all over again. Re-apply pressure for 10 minutes if necessary to stop bleeding.  Use additional sterile gauze and tape to maintain pressure once bleeding has stopped.  If bleeding continues, then call back to clinic at [phone].    Signs of Infection:  Infection can occur in any area where skin has been disrupted.  If you notice persistent redness, swelling, colored drainage, increasing pain, fever or other signs of infection, please call us at: [phone] and ask to have me or my colleague paged. We will call you back to discuss.    Pathology Results:  You will be notified, generally via letter or MyChart, in approximately 10 days. If there is anything we need to discuss or further treatment needed, I will " call you to discuss it.    PATIENT INFORMATION : WOUNDS  During the healing process you will notice a number of changes. All wounds develop a small halo of redness surrounding the wound.  This means healing is occurring. Severe itching with extensive redness usually indicates sensitivity to the ointment or bandage tape used to dress the wound.  You should call our office if this develops.      Swelling  and/or discoloration around your surgical site is common, particularly when performed around the eye.    All wounds normally drain.  The larger the wound the more drainage there will be.  After 7-10 days, you will notice the wound beginning to shrink and new skin will begin to grow.  The wound is healed when you can see skin has formed over the entire area.  A healed wound has a healthy, shiny look to the surface and is red to dark pink in color to normalize.  Wounds may take approximately 4-6 weeks to heal.  Larger wounds may take 6-8 weeks. After the wound is healed you may discontinue dressing changes.    You may experience a sensation of tightness as your wound heals. This is normal and will gradually subside.    Your healed wound may be sensitive to temperature changes. This sensitivity improves with time, but if you re having a lot of discomfort, try to avoid temperature extremes.    Patients frequently experience itching after their wound appears to have healed because of the continue healing under the skin.  Plain Vaseline will help relieve the itching.

## 2018-03-21 NOTE — MR AVS SNAPSHOT
"              After Visit Summary   3/21/2018    Tiffany Stovall    MRN: [id number]           Patient Information     Date Of Birth          1950        Visit Information        Provider Department      3/21/2018 3:40 PM Nataliya Bailon [place] [facility] [place]'s Diagnoses     Basal cell carcinoma of chest    -  1    History of basal cell carcinoma        Family history of nonmelanoma skin cancer        History of squamous cell carcinoma in situ of skin          Care Instructions    FUTURE APPOINTMENTS  Follow up per pathology report.   Follow up for Mohs micrographic surgery with Dr. Corley for basal cell carcinoma on the left lower leg.  Follow up in 3 month(s) for re-evaluation of basal cell carcinoma excision site.  Follow up in 6 months for a full-body skin cancer screening.    WOUND CARE INSTRUCTIONS  1. Wash hands before every dressing change.  2. After 24 hours, change dressing daily.  3. Wash the wound area with a mild soap, then rinse.  4. Gently pat dry with a sterile gauze or Q-tip.  5. Using a Q-tip, apply Vaseline or Aquaphor only over entire wound. Do NOT use Neosporin - as many people react to neomycin.  6. Finally, cover with a bandage or sterile non-stick gauze with micropore paper tape.  7. Repeat once daily until wound has healed.      Soap, water and shampoo will not hurt this area.    Do not go swimming or take baths, but showering is encouraged.    Limit use of the area where the procedure was done for a few days to allow for optimal healing.    If you experience bleeding:  Wash hands and hold firm pressure on the area for 10 minutes without checking to see if the bleeding has stopped. \"Checking\" pulls off the protective wound clot and restarts the bleeding all over again. Re-apply pressure for 10 minutes if necessary to stop bleeding.  Use additional sterile gauze and tape to maintain pressure once bleeding has stopped.  If bleeding continues, then call " back to clinic at [phone].    Signs of Infection:  Infection can occur in any area where skin has been disrupted.  If you notice persistent redness, swelling, colored drainage, increasing pain, fever or other signs of infection, please call us at: [phone] and ask to have me or my colleague paged. We will call you back to discuss.    Pathology Results:  You will be notified, generally via letter or MyChart, in approximately 10 days. If there is anything we need to discuss or further treatment needed, I will call you to discuss it.    PATIENT INFORMATION : WOUNDS  During the healing process you will notice a number of changes. All wounds develop a small halo of redness surrounding the wound.  This means healing is occurring. Severe itching with extensive redness usually indicates sensitivity to the ointment or bandage tape used to dress the wound.  You should call our office if this develops.      Swelling  and/or discoloration around your surgical site is common, particularly when performed around the eye.    All wounds normally drain.  The larger the wound the more drainage there will be.  After 7-10 days, you will notice the wound beginning to shrink and new skin will begin to grow.  The wound is healed when you can see skin has formed over the entire area.  A healed wound has a healthy, shiny look to the surface and is red to dark pink in color to normalize.  Wounds may take approximately 4-6 weeks to heal.  Larger wounds may take 6-8 weeks. After the wound is healed you may discontinue dressing changes.    You may experience a sensation of tightness as your wound heals. This is normal and will gradually subside.    Your healed wound may be sensitive to temperature changes. This sensitivity improves with time, but if you re having a lot of discomfort, try to avoid temperature extremes.    Patients frequently experience itching after their wound appears to have healed because of the continue healing  under the skin.  Plain Vaseline will help relieve the itching.            Follow-ups after your visit        Who to contact     If you have questions or need follow up information about today's clinic visit or your schedule please contact [facility] directly at [phone].  Normal or non-critical lab and imaging results will be communicated to you by MyChart, letter or phone within 4 business days after the clinic has received the results. If you do not hear from us within 7 days, please contact the clinic through MyChart or phone. If you have a critical or abnormal lab result, we will notify you by phone as soon as possible.  Submit refill requests through [employer] or call your pharmacy and they will forward the refill request to us. Please allow 3 business days for your refill to be completed.          Additional Information About Your Visit        MyChart Information     [employer] gives you secure access to your electronic health record. If you see a primary care provider, you can also send messages to your care team and make appointments. If you have questions, please call your primary care clinic.  If you do not have a primary care provider, please call [phone] and they will assist you.        Care EveryWhere ID     This is your Care EveryWhere ID. This could be used by other organizations to access your [facility] medical records  [id number]         Blood Pressure from Last 3 Encounters:   03/21/17 134/78   01/16/17 129/74   12/13/16 142/83    Weight from Last 3 Encounters:   12/11/17 170 lb (77.1 kg)   06/12/17 170 lb (77.1 kg)   03/21/17 170 lb 14.4 oz (77.5 kg)              Today, you had the following     No orders found for display       Primary Care Provider Office Phone # Fax #    Darby Williamson [phone]  [facility] [id number]        Equal Access to Services     LAUREN PECK : Shaheed Lynn, tara vela, suzanna  april millsluís nunez ah. [facility] [phone].    ATENCIÓN: Si denise medrano, tiene a catalan disposición servicios gratuitos de asistencia lingüística. Marquise al [phone].    We comply with applicable federal civil rights laws and [place] laws. We do not discriminate on the basis of race, color, national origin, age, disability, sex, sexual orientation, or gender identity.            Thank you!     [facility]. Our goal is always to provide you with excellent care. Hearing back from our patients is one way we can continue to improve our services. Please take a few minutes to complete the written survey that you may receive in the mail after your visit with us. Thank you!             Your Updated Medication List - Protect others around you: Learn how to safely use, store and throw away your medicines at www.disposemymeds.org.          This list is accurate as of 3/21/18  4:03 PM.  Always use your most recent med list.                   Brand Name Dispense Instructions for use Diagnosis    * albuterol (2.5 MG/3ML) 0.083% neb solution     25 vial    Take 1 vial (2.5 mg) by nebulization every 6 hours as needed for shortness of breath / dyspnea    Mild persistent asthma without complication, Mild persistent asthma with exacerbation       * albuterol 108 (90 BASE) MCG/ACT Inhaler    PROAIR HFA    1 Inhaler    Inhale 1-2 puffs into the lungs every 4 hours as needed for shortness of breath / dyspnea    Mild persistent asthma without complication       atorvastatin 40 MG tablet    LIPITOR    90 tablet    Take 1 tablet (40 mg) by mouth daily    Hyperlipidemia LDL goal <130       EPINEPHrine 0.3 MG/0.3ML injection 2-pack    EPIPEN/ADRENACLICK/or ANY BX GENERIC EQUIV    0.6 mL    Inject 0.3 mLs (0.3 mg) into the muscle once as needed    Allergic reaction, initial encounter       fluticasone-salmeterol 250-50 MCG/DOSE diskus inhaler     ADVAIR DISKUS    3 Inhaler    Inhale 1 puff into the lungs 2 times daily    Mild persistent asthma without complication       levothyroxine 112 MCG tablet    SYNTHROID/LEVOTHROID    90 tablet    Take 1 tablet (112 mcg) by mouth every morning Patient needs to be seen prior to further refills.    Hypothyroidism due to acquired atrophy of thyroid       loratadine 10 MG tablet    CLARITIN     Take 10 mg by mouth daily        losartan-hydrochlorothiazide 100-25 MG per tablet    HYZAAR    90 tablet    Take 1 tablet by mouth every morning    Hypertension goal BP (blood pressure) < 140/90       Multi-vitamin Tabs tablet   Generic drug:  multivitamin, therapeutic with minerals      ONE TABLET DAILY        NEXIUM PO      Take 40 mg by mouth daily        OSTEO BI-FLEX ADV DOUBLE ST PO           potassium chloride SA 20 MEQ CR tablet    K-DUR/KLOR-CON M    90 tablet    Take 1 tablet (20 mEq) by mouth daily    Familial lipoprotein deficiency       triamcinolone 0.1 % cream    KENALOG    15 g    Apply topically 2 times daily Apply to affected area on left lower leg bid for 10-14 consecutive days, not to be used on face or groin    Lichen simplex       TYLENOL EXTRA STRENGTH PO           ZANTAC PO           * Notice:  This list has 2 medication(s) that are the same as other medications prescribed for you. Read the directions carefully, and ask your doctor or other care provider to review them with you.

## 2018-03-21 NOTE — LETTER
3/21/2018         RE: Tiffany Stovall  [id number]  [facility] [address]        Dear Colleague,    Thank you for referring your patient, Tiffany Stovall, to the [facility] Please see a copy of my visit note below.    [facility] - PRIMARY CARE SKIN    CC : Wide excision   SUBJECTIVE:                                                    Tiffany Stovall is a 67 year old female who presents to clinic today for follow-up wide excision of a basal cell carcinoma on the right chest.    Tissue Pathology Report from 2/19/18      Personal history of skin cancer : YES  Superficial basal cell carcinoma on right dorsal foot (s/p MMS 12/13/16)  Squamous cell carcinoma in situ on left lateral neck (s/p MMS 12/13/16)  Superficial, multifocal basal cell carcinoma on right mid-dorsal forearm (s/p excision 12/20/16)  Basal cell carcinoma on right dorsal third toe (excised 12/2015)  Basal cell carcinoma on left upper cutaneous lip (s/p Mohs 10/20/2011 and [facility])  Basal cell carcinoma on clavicle  Basal cell carcinoma on right arm.  Family history of skin cancer : YES - non-melanoma.      Sun Exposure History  Sunscreen Use : YES, frequency : daily, SPF : 30.  UV-protective clothing use : YES  Previous history of significant sun exposure:  Blistering sunburns : YES - as a teenager  Tanning beds : NO.      Occupation : nurse (indoor).    Refer to electronic medical record (EMR) for past medical history and medications.    ROS : 14 point review of systems was negative except the symptoms listed above in the HPI.    This document serves as a record of the services and decisions personally performed and made by Joy Bailon MD. It was created on her behalf by James Dukes, a trained medical scribe.  The creation of this document is based on the scribe's personal observations and the provider's statements to the medical scribe.  James Dukes, March 21, 2018 2:56 PM      OBJECTIVE:                   "                                  GENERAL: healthy, alert and no distress  SKIN: Rodriguez Skin Type - I.  Face and Trunk were examined. The dermatoscope was used to help evaluate pigmented lesions.  Skin Pertinent Findings:  Right chest, 2 cm right of midline, at T4 : 5 mm in size healing biopsy site. Previous pathology indicated superficial basal cell carcinoma.      ASSESSMENT:                                                      Encounter Diagnoses   Name Primary?     Basal cell carcinoma of chest Yes     History of basal cell carcinoma      Family history of nonmelanoma skin cancer      History of squamous cell carcinoma in situ of skin          PLAN:                                                    Patient Instructions   FUTURE APPOINTMENTS  Follow up per pathology report.   Follow up for Mohs micrographic surgery with Dr. Corley for basal cell carcinoma on the left lower leg.  Follow up in 3 month(s) for re-evaluation of basal cell carcinoma excision site.  Follow up in 6 months for a full-body skin cancer screening.    WOUND CARE INSTRUCTIONS  1. Wash hands before every dressing change.  2. After 24 hours, change dressing daily.  3. Wash the wound area with a mild soap, then rinse.  4. Gently pat dry with a sterile gauze or Q-tip.  5. Using a Q-tip, apply Vaseline or Aquaphor only over entire wound. Do NOT use Neosporin - as many people react to neomycin.  6. Finally, cover with a bandage or sterile non-stick gauze with micropore paper tape.  7. Repeat once daily until wound has healed.      Soap, water and shampoo will not hurt this area.    Do not go swimming or take baths, but showering is encouraged.    Limit use of the area where the procedure was done for a few days to allow for optimal healing.    If you experience bleeding:  Wash hands and hold firm pressure on the area for 10 minutes without checking to see if the bleeding has stopped. \"Checking\" pulls off the protective wound clot and restarts " the bleeding all over again. Re-apply pressure for 10 minutes if necessary to stop bleeding.  Use additional sterile gauze and tape to maintain pressure once bleeding has stopped.  If bleeding continues, then call back to clinic at [phone].    Signs of Infection:  Infection can occur in any area where skin has been disrupted.  If you notice persistent redness, swelling, colored drainage, increasing pain, fever or other signs of infection, please call us at: [phone] and ask to have me or my colleague paged. We will call you back to discuss.    Pathology Results:  You will be notified, generally via letter or MyChart, in approximately 10 days. If there is anything we need to discuss or further treatment needed, I will call you to discuss it.    PATIENT INFORMATION : WOUNDS  During the healing process you will notice a number of changes. All wounds develop a small halo of redness surrounding the wound.  This means healing is occurring. Severe itching with extensive redness usually indicates sensitivity to the ointment or bandage tape used to dress the wound.  You should call our office if this develops.      Swelling  and/or discoloration around your surgical site is common, particularly when performed around the eye.    All wounds normally drain.  The larger the wound the more drainage there will be.  After 7-10 days, you will notice the wound beginning to shrink and new skin will begin to grow.  The wound is healed when you can see skin has formed over the entire area.  A healed wound has a healthy, shiny look to the surface and is red to dark pink in color to normalize.  Wounds may take approximately 4-6 weeks to heal.  Larger wounds may take 6-8 weeks. After the wound is healed you may discontinue dressing changes.    You may experience a sensation of tightness as your wound heals. This is normal and will gradually subside.    Your healed wound may be sensitive to temperature changes. This sensitivity  improves with time, but if you re having a lot of discomfort, try to avoid temperature extremes.    Patients frequently experience itching after their wound appears to have healed because of the continue healing under the skin.  Plain Vaseline will help relieve the itching.          PROCEDURES:                                                    Name : Wide Excision  Indication : Wide excision of tissue for pathology evaluation of malignancy.  Location(s) : Right chest, 2 cm right of midline, at T4.  Completed by : Joy Bailon MD  Photo Taken : No.  Anesthesia : Patient was anesthetized by infiltrating the area surrounding the lesion with 1% lidocaine.   epinephrine [id number] : Yes.  Buffered with bicarbonate : Yes.  Note : Prior to procedure we discussed expectations for healing, risk of infection, and scar formation. Discussed other treatment options available. Discussed the risk of pain, infection, scarring, hypo- or hyperpigmentation and recurrence or need for re-treatment. The benefits of treatment and alternative treatments were also discussed.    During this procedure, the universal protocol was utilized. The patient's identity was confirmed by no less than two patient identifiers, correct procedure was verified, correct site was verified and marked as applicable and a final pause was completed.    Sterile technique was used throughout the procedure. The skin was cleaned and prepped with Chloroprep. A 4 mm margin was marked out on each side of the lesion. Sterile drapes were laid out. Once adequate anesthesia was obtained, an elliptical incision was made with a #15 blade through the epidermis and dermis. The tissue specimen was placed in formalin and sent to pathology.    Direct pressure and monopolar cautery was applied for hemostasis. Edges were undermined with a Metzenbaum. Defect was approximated with 3-0 Vicryl and edges were approximated with 3-0 Prolene interrupted simple stitch. Minimal bleeding  occurred. Dressing was applied and patient left in satisfactory condition.    Widest diameter : 1.2 cm.  Length : 3 cm.    Total number of stitches in closure of epidermis : 7    Primary provider and referring provider will be informed regarding the wound culture and tissue sample report when it returns.    Suture removal : 10-14 days.      The information in this document, created by the medical scribe for me, accurately reflects the services I personally performed and the decisions made by me. I have reviewed and approved this document for accuracy prior to leaving the patient care area.  Joy Bailon MD March 21, 2018 2:56 PM  [facility]    Again, thank you for allowing me to participate in the care of your patient.        Sincerely,        Nataliya Bailon MD

## 2018-03-22 ENCOUNTER — APPOINTMENT (OUTPATIENT)
Age: 68
Setting detail: DERMATOLOGY
End: 2018-03-25

## 2018-03-22 DIAGNOSIS — Z71.89 OTHER SPECIFIED COUNSELING: ICD-10-CM

## 2018-03-22 PROBLEM — C44.719 BASAL CELL CARCINOMA OF SKIN OF LEFT LOWER LIMB, INCLUDING HIP: Status: ACTIVE | Noted: 2018-03-22

## 2018-03-22 PROCEDURE — OTHER MIPS QUALITY: OTHER

## 2018-03-22 PROCEDURE — 17313 MOHS 1 STAGE T/A/L: CPT

## 2018-03-22 PROCEDURE — OTHER COUNSELING: OTHER

## 2018-03-22 PROCEDURE — 13121 CMPLX RPR S/A/L 2.6-7.5 CM: CPT

## 2018-03-22 PROCEDURE — OTHER CONSULTATION FOR MOHS SURGERY: OTHER

## 2018-03-22 PROCEDURE — OTHER DIAGNOSIS COMMENT: OTHER

## 2018-03-22 PROCEDURE — OTHER MOHS SURGERY: OTHER

## 2018-03-22 NOTE — PROCEDURE: MIPS QUALITY
Detail Level: Detailed
Quality 130: Documentation Of Current Medications In The Medical Record: Current Medications Documented
Quality 431: Preventive Care And Screening: Unhealthy Alcohol Use - Screening: Patient screened for unhealthy alcohol use using a single question and scores less than 2 times per year
Quality 143: Oncology: Medical And Radiation- Pain Intensity Quantified: Pain severity quantified, no pain present
Quality 226: Preventive Care And Screening: Tobacco Use: Screening And Cessation Intervention: Patient screened for tobacco and never smoked
Quality 110: Preventive Care And Screening: Influenza Immunization: Influenza Immunization previously received during influenza season

## 2018-03-22 NOTE — PROCEDURE: MOHS SURGERY
Post-Care Instructions: The patient was provided with detailed verbal and written instructions for daily wound care, including use of H2O2 cleansing, followed by application of plain Vaseline and a bandage.  These instructions including Dr. Yung's contact information should there be any questions or concerns.  The patient is not to engage in any heavy lifting, exercise, or swimming for the next week.  Should the patient develop any fevers, chills, bleeding, or pain not controlled by OTC analgesics, s/he should contact the office immediately.

## 2018-03-22 NOTE — PROCEDURE: CONSULTATION FOR MOHS SURGERY
Name Of The Referring Provider For Procedure: Rachell Ramirez MD
X Size Of Lesion In Cm (Optional): 0.8
Detail Level: Detailed
Size Of Lesion: 0.9
Incorporate Mauc In Note: No
Body Location Override (Optional - Billing Will Still Be Based On Selected Body Map Location If Applicable): Left Proximal Shin

## 2018-03-22 NOTE — PROCEDURE: MOHS SURGERY
Body Location Override (Optional - Billing Will Still Be Based On Selected Body Map Location If Applicable): Left Proximal Shin

## 2018-03-26 ENCOUNTER — TELEPHONE (OUTPATIENT)
Dept: FAMILY MEDICINE | Facility: CLINIC | Age: 68
End: 2018-03-26

## 2018-04-17 ENCOUNTER — OFFICE VISIT (OUTPATIENT)
Dept: FAMILY MEDICINE | Facility: CLINIC | Age: 68
End: 2018-04-17
Payer: COMMERCIAL

## 2018-04-17 VITALS
HEART RATE: 70 BPM | BODY MASS INDEX: 27.97 KG/M2 | TEMPERATURE: 97.9 F | RESPIRATION RATE: 18 BRPM | DIASTOLIC BLOOD PRESSURE: 84 MMHG | HEIGHT: 66 IN | SYSTOLIC BLOOD PRESSURE: 134 MMHG | WEIGHT: 174 LBS | OXYGEN SATURATION: 99 %

## 2018-04-17 DIAGNOSIS — J45.30 MILD PERSISTENT ASTHMA WITHOUT COMPLICATION: ICD-10-CM

## 2018-04-17 DIAGNOSIS — E78.5 HYPERLIPIDEMIA LDL GOAL <130: ICD-10-CM

## 2018-04-17 DIAGNOSIS — Z00.00 ROUTINE GENERAL MEDICAL EXAMINATION AT A HEALTH CARE FACILITY: Primary | ICD-10-CM

## 2018-04-17 DIAGNOSIS — I10 HYPERTENSION GOAL BP (BLOOD PRESSURE) < 140/90: ICD-10-CM

## 2018-04-17 DIAGNOSIS — M17.12 PRIMARY OSTEOARTHRITIS OF LEFT KNEE: ICD-10-CM

## 2018-04-17 DIAGNOSIS — E03.4 HYPOTHYROIDISM DUE TO ACQUIRED ATROPHY OF THYROID: ICD-10-CM

## 2018-04-17 DIAGNOSIS — K21.9 GASTROESOPHAGEAL REFLUX DISEASE WITHOUT ESOPHAGITIS: ICD-10-CM

## 2018-04-17 LAB
ALBUMIN SERPL-MCNC: 4.5 G/DL (ref 3.4–5)
ALP SERPL-CCNC: 53 U/L (ref 40–150)
ALT SERPL W P-5'-P-CCNC: 36 U/L (ref 0–50)
ANION GAP SERPL CALCULATED.3IONS-SCNC: 10 MMOL/L (ref 3–14)
AST SERPL W P-5'-P-CCNC: 28 U/L (ref 0–45)
BILIRUB SERPL-MCNC: 0.4 MG/DL (ref 0.2–1.3)
BUN SERPL-MCNC: 14 MG/DL (ref 7–30)
CALCIUM SERPL-MCNC: 9.8 MG/DL (ref 8.5–10.1)
CHLORIDE SERPL-SCNC: 99 MMOL/L (ref 94–109)
CHOLEST SERPL-MCNC: 141 MG/DL
CO2 SERPL-SCNC: 26 MMOL/L (ref 20–32)
CREAT SERPL-MCNC: 0.7 MG/DL (ref 0.52–1.04)
CREAT UR-MCNC: 106 MG/DL
ERYTHROCYTE [DISTWIDTH] IN BLOOD BY AUTOMATED COUNT: 12 % (ref 10–15)
GFR SERPL CREATININE-BSD FRML MDRD: 84 ML/MIN/1.7M2
GLUCOSE SERPL-MCNC: 104 MG/DL (ref 70–99)
HCT VFR BLD AUTO: 39.9 % (ref 35–47)
HDLC SERPL-MCNC: 56 MG/DL
HGB BLD-MCNC: 13.5 G/DL (ref 11.7–15.7)
LDLC SERPL CALC-MCNC: 66 MG/DL
MCH RBC QN AUTO: 31.5 PG (ref 26.5–33)
MCHC RBC AUTO-ENTMCNC: 33.8 G/DL (ref 31.5–36.5)
MCV RBC AUTO: 93 FL (ref 78–100)
MICROALBUMIN UR-MCNC: 6 MG/L
MICROALBUMIN/CREAT UR: 6.03 MG/G CR (ref 0–25)
NONHDLC SERPL-MCNC: 85 MG/DL
PLATELET # BLD AUTO: 300 10E9/L (ref 150–450)
POTASSIUM SERPL-SCNC: 3.6 MMOL/L (ref 3.4–5.3)
PROT SERPL-MCNC: 7.4 G/DL (ref 6.8–8.8)
RBC # BLD AUTO: 4.28 10E12/L (ref 3.8–5.2)
SODIUM SERPL-SCNC: 135 MMOL/L (ref 133–144)
TRIGL SERPL-MCNC: 94 MG/DL
TSH SERPL DL<=0.005 MIU/L-ACNC: 0.69 MU/L (ref 0.4–4)
WBC # BLD AUTO: 6.6 10E9/L (ref 4–11)

## 2018-04-17 PROCEDURE — 80061 LIPID PANEL: CPT | Performed by: FAMILY MEDICINE

## 2018-04-17 PROCEDURE — 99397 PER PM REEVAL EST PAT 65+ YR: CPT | Performed by: FAMILY MEDICINE

## 2018-04-17 PROCEDURE — 80053 COMPREHEN METABOLIC PANEL: CPT | Performed by: FAMILY MEDICINE

## 2018-04-17 PROCEDURE — 84443 ASSAY THYROID STIM HORMONE: CPT | Performed by: FAMILY MEDICINE

## 2018-04-17 PROCEDURE — 82043 UR ALBUMIN QUANTITATIVE: CPT | Performed by: FAMILY MEDICINE

## 2018-04-17 PROCEDURE — 85027 COMPLETE CBC AUTOMATED: CPT | Performed by: FAMILY MEDICINE

## 2018-04-17 PROCEDURE — 36415 COLL VENOUS BLD VENIPUNCTURE: CPT | Performed by: FAMILY MEDICINE

## 2018-04-17 RX ORDER — LEVOTHYROXINE SODIUM 112 UG/1
112 TABLET ORAL EVERY MORNING
Qty: 90 TABLET | Refills: 3 | Status: SHIPPED | OUTPATIENT
Start: 2018-04-17 | End: 2018-11-20

## 2018-04-17 RX ORDER — LOSARTAN POTASSIUM AND HYDROCHLOROTHIAZIDE 25; 100 MG/1; MG/1
1 TABLET ORAL EVERY MORNING
Qty: 90 TABLET | Refills: 3 | Status: SHIPPED | OUTPATIENT
Start: 2018-04-17 | End: 2019-01-28

## 2018-04-17 RX ORDER — ATORVASTATIN CALCIUM 40 MG/1
40 TABLET, FILM COATED ORAL DAILY
Qty: 90 TABLET | Refills: 3 | Status: SHIPPED | OUTPATIENT
Start: 2018-04-17 | End: 2019-04-09

## 2018-04-17 RX ORDER — POTASSIUM CHLORIDE 1500 MG/1
20 TABLET, EXTENDED RELEASE ORAL DAILY
Qty: 90 TABLET | Refills: 3 | Status: SHIPPED | OUTPATIENT
Start: 2018-04-17 | End: 2019-03-27

## 2018-04-17 ASSESSMENT — PAIN SCALES - GENERAL: PAINLEVEL: NO PAIN (0)

## 2018-04-17 NOTE — PROGRESS NOTES
SUBJECTIVE:   Tiffany Stovall is a 67 year old female who presents for Preventive Visit.  Are you in the first 12 months of your Medicare Part B coverage?  No          Healthy Habits:    Do you get at least three servings of calcium containing foods daily (dairy, green leafy vegetables, etc.)? yes    Amount of exercise or daily activities, outside of work: 3 day(s) per week    Problems taking medications regularly No    Medication side effects: No    Have you had an eye exam in the past two years? yes    Do you see a dentist twice per year? yes    Do you have sleep apnea, excessive snoring or daytime drowsiness?no      Ability to successfully perform activities of daily living: Yes, no assistance needed    Home safety:  none identified     Hearing impairment: No    Fall risk:  Fallen 2 or more times in the past year?: No  Any fall with injury in the past year?: No      COGNITIVE SCREEN  1) Repeat 3 items (Banana, Sunrise, Chair)    2) Clock draw: NORMAL  3) 3 item recall: Recalls 3 objects  Results: 3 items recalled: COGNITIVE IMPAIRMENT LESS LIKELY    Mini-CogTM Copyright S Román. Licensed by the author for use in [facility] [employer]; reprinted with permission (magda@[email or web address]). All rights reserved.      Bone Density   After falling, patient completed an MRI after compression fracture that detected no signs of osteoporosis in 2014. Thus patient has not done Bone Density scan in the past.       GERD  Patient takes nexium in the morning, and occasional zantac in the evening to control GERD. Patient is careful of what she eats. She tried to discontinue taking Nexium, however symptoms of GERD became present after 4 days. Denies bloating or abdominal pain.     Left Knee and Hands  Patient has osteoarthritis in the left knee, and follows with orthopedics for steroid injections.  Patient noted that constant pain in the left knee has hindered her from riding her bike, and walking. Swelling in the knee, is also  noted to come with activity. Denies hindrance of rotation at the hip.Patient is also unable to use the pool for exercise  due to excisions on the skin done. Patient noted that she needs knee surgery but would need to quit working to have time to do rehab . Denies SOB, or waking up with SOB, or breathing issues.     OF NOTE She takes Arthritis Tylenol arthritis three times daily for pain.     Varicose Veins of the leg  Occasional aching at times after Vein treatment on left leg. She noted completing three ablations, and  sclerosis to be re flaring.      Hyperlipidemia Follow-Up      Rate your low fat/cholesterol diet?: good    Taking statin?  Yes, no muscle aches from statin    Other lipid medications/supplements?:  none    Hypertension Follow-up      Outpatient blood pressures are not being checked.    Low Salt Diet: no added salt    Asthma Follow-Up    Was ACT completed today?    Yes    ACT Total Scores 4/17/2018   ACT TOTAL SCORE -   ASTHMA ER VISITS -   ASTHMA HOSPITALIZATIONS -   ACT TOTAL SCORE (Goal Greater than or Equal to 20) 25   In the past 12 months, how many times did you visit the emergency room for your asthma without being admitted to the hospital? 0   In the past 12 months, how many times were you hospitalized overnight because of your asthma? 0       Recent asthma triggers that patient is dealing with: None      Hypothyroidism Follow-up      Since last visit, patient describes the following symptoms: Weight stable, no hair loss, no skin changes, no constipation, no loose stools                    Reviewed and updated as needed this visit by clinical staff  Tobacco  Allergies  Meds  Med Hx  Surg Hx  Fam Hx  Soc Hx        Reviewed and updated as needed this visit by Provider        Social History   Substance Use Topics     Smoking status: Never Smoker     Smokeless tobacco: Never Used     Alcohol use Yes      Comment: socially       If you drink alcohol do you typically have >3 drinks per day or  >7 drinks per week? No                        Today's PHQ-2 Score:   PHQ-2 ( 1999 [employer]) 4/17/2018 3/18/2017   Q1: Little interest or pleasure in doing things 0 -   Q2: Feeling down, depressed or hopeless 0 -   PHQ-2 Score 0 -   Q1: Little interest or pleasure in doing things - Not at all   Q2: Feeling down, depressed or hopeless - Not at all   PHQ-2 Score - 0       Do you feel safe in your environment - Yes    Do you have a Health Care Directive?: Yes: Advance Directive has been received and scanned.    Current providers sharing in care for this patient include:   Patient Care Team:  Darby Williamson MD as PCP - General (Family Practice)    The following health maintenance items are reviewed in Epic and correct as of today:  Health Maintenance   Topic Date Due     ADVANCE DIRECTIVE PLANNING Q5 YRS  08/30/2016     FALL RISK ASSESSMENT  08/28/2017     DEXA SCAN SCREENING (SYSTEM ASSIGNED)  08/28/2017     ASTHMA CONTROL TEST Q6 MOS  09/21/2017     TSH Q1 YEAR  03/21/2018     ASTHMA ACTION PLAN Q1 YR  03/21/2018     INFLUENZA VACCINE (SYSTEM ASSIGNED)  09/01/2018     TETANUS IMMUNIZATION (SYSTEM ASSIGNED)  05/29/2019     MAMMO SCREEN Q2 YR (SYSTEM ASSIGNED)  11/29/2019     PNEUMO 5YR BOOST DUE AFTER AGE 65 (NO IB MSG) (2) 03/02/2021     PNEUMOCOCCAL (2 of 2 - PPSV23) 03/02/2021     LIPID SCREEN Q5 YR FEMALE (SYSTEM ASSIGNED)  03/21/2022     COLON CANCER SCREEN (SYSTEM ASSIGNED)  01/16/2027     HEPATITIS C SCREENING  Completed     BP Readings from Last 3 Encounters:   04/17/18 134/84   03/21/17 134/78   01/16/17 129/74    Wt Readings from Last 3 Encounters:   04/17/18 78.9 kg (174 lb)   12/11/17 77.1 kg (170 lb)   06/12/17 77.1 kg (170 lb)                    Pneumonia Vaccine: series completed.   Mammogram Screening: Patient over age 50, mutual decision to screen reflected in health maintenance.  History of abnormal Pap smear: NO - age 67 - see link Cervical Cytology Screening Guidelines    ROS:  Constitutional,  "HEENT, cardiovascular, pulmonary, GI, , musculoskeletal, neuro, skin, endocrine and psych systems are negative, except as otherwise noted.    POS: She wakes occasionally to urinate   POS: Vaginal dryness. But manges it.   POS: Occasional tinitus attributed to all year round allergies.   POS:Lipoma located at midline lower thoracic area - chronic and unchanged  POS:irregular heartbeat- \"PVC's\" symptoms comes with stress.   POS: follows with derm regularly due to multiple skin cancers   Most recently with BCC on the chest and the left proximal leg.        This document serves as a record of the services and decisions personally performed and made by Darby Williamson MD. It was created on her behalf by Jayesh Vidales, a trained medical scribe. The creation of this document is based on the provider's statements to the medical scribe.  Jayesh Vidales 8:08 AM April 17, 2018      OBJECTIVE:   /84 (BP Location: Right arm, Patient Position: Chair, Cuff Size: Adult Large)  Pulse 70  Temp 97.9  F (36.6  C) (Oral)  Resp 18  Ht 1.67 m (5' 5.75\")  Wt 78.9 kg (174 lb)  SpO2 99%  Breastfeeding? No  BMI 28.3 kg/m2 Estimated body mass index is 28.3 kg/(m^2) as calculated from the following:    Height as of this encounter: 1.67 m (5' 5.75\").    Weight as of this encounter: 78.9 kg (174 lb).  EXAM:   GENERAL APPEARANCE: healthy, alert and no distress  EYES: Eyes grossly normal to inspection, PERRL and conjunctivae and sclerae normal  HENT: ear canals and TM's normal, nose and mouth without ulcers or lesions, oropharynx clear and oral mucous membranes moist  NECK: no adenopathy, no asymmetry, masses, or scars and thyroid normal to palpation  RESP: lungs clear to auscultation - no rales, rhonchi or wheezes  BREAST: normal without masses, tenderness or nipple discharge and no palpable axillary masses or adenopathy  CV: regular rate and rhythm, normal S1 S2, no S3 or S4, no murmur, click or rub, no peripheral edema and " peripheral pulses strong  ABDOMEN: soft, nontender, no hepatosplenomegaly, no masses and bowel sounds normal  MS: no musculoskeletal defects are noted, gait is age appropriate without ataxia.  Crepitus with flexion and extension of the left knee with pain.  No current erythema or effusion noted.    SKIN: no suspicious lesions or rashes and excision site on right upper chest wall - well healed and left proximal pre-tibia area with open area noted - good granulation tissue in base.  Lipoma 4 cm located at midline mid thoracic area- non-tender, no overlying skin change.    NEURO: Normal strength and tone, sensory exam grossly normal, mentation intact and speech normal  PSYCH: mentation appears normal and affect normal/bright    ASSESSMENT / PLAN:   1. Routine general medical examination at a health care facility  Labs today. I will notify results to patient when I receive them.   Screenings up to date.  Discussed bone density. Patient may be agreeable in future.    - Comprehensive metabolic panel  - CBC with platelets    2. Hyperlipidemia LDL goal <130  Patient will continue taking as directed. Labs today. I will notify results to patient when I receive them.   - atorvastatin (LIPITOR) 40 MG tablet; Take 1 tablet (40 mg) by mouth daily  Dispense: 90 tablet; Refill: 3  - Lipid panel reflex to direct LDL Fasting  - Comprehensive metabolic panel    3. Hypertension goal BP (blood pressure) < 140/90  - controlled.  Patient will continue taking as directed. Labs today. I will notify results to patient when I receive them.   - losartan-hydrochlorothiazide (HYZAAR) 100-25 MG per tablet; Take 1 tablet by mouth every morning  Dispense: 90 tablet; Refill: 3  - Comprehensive metabolic panel  - Albumin Random Urine Quantitative with Creat Ratio  - potassium chloride SA (K-DUR/KLOR-CON M) 20 MEQ CR tablet; Take 1 tablet (20 mEq) by mouth daily  Dispense: 90 tablet; Refill: 3    5. Hypothyroidism due to acquired atrophy of  "thyroid  Patient will continue taking as directed. Labs today. I will notify results to patient when I receive them.   - TSH with free T4 reflex  - levothyroxine (SYNTHROID/LEVOTHROID) 112 MCG tablet; Take 1 tablet (112 mcg) by mouth every morning Patient needs to be seen prior to further refills.  Dispense: 90 tablet; Refill: 3    6. Gastroesophageal reflux disease without esophagitis  Controlled only with medications. Patient will continue taking as directed. Continue OTC meds.    7. Mild persistent asthma without complication  Controlled.   Continue current management.    8. Primary osteoarthritis of left knee  Constant pain, and hindering her activities. Pool exercise when wound are healed for derm surgery.  Other exercise as tolerated.  Follow up with Ortho if needed for additional injection.        End of Life Planning:  Patient currently has an advanced directive: Yes.  Practitioner is supportive of decision.    COUNSELING:  Reviewed preventive health counseling, as reflected in patient instructions       Regular exercise       Healthy diet/nutrition       Vision screening       Hearing screening       Immunizations reviewed and up to date.              Osteoporosis Prevention/Bone Health       Colon cancer screening        Estimated body mass index is 28.3 kg/(m^2) as calculated from the following:    Height as of this encounter: 1.67 m (5' 5.75\").    Weight as of this encounter: 78.9 kg (174 lb).       reports that she has never smoked. She has never used smokeless tobacco.      Appropriate preventive services were discussed with this patient, including applicable screening as appropriate for cardiovascular disease, diabetes, osteopenia/osteoporosis, and glaucoma.  As appropriate for age/gender, discussed screening for colorectal cancer, prostate cancer, breast cancer, and cervical cancer. Checklist reviewing preventive services available has been given to the patient.    Reviewed patients plan of care and " provided an AVS. The Basic Care Plan (routine screening as documented in Health Maintenance) for Tiffany meets the Care Plan requirement. This Care Plan has been established and reviewed with the Patient.    Counseling Resources:  ATP IV Guidelines  Pooled Cohorts Equation Calculator  Breast Cancer Risk Calculator  FRAX Risk Assessment  ICSI Preventive Guidelines  Dietary Guidelines for Americans, 2010  USDA's MyPlate  ASA Prophylaxis  Lung CA Screening    The information in this document, created by the medical scribe for me, accurately reflects the services I personally performed and the decisions made by me. I have reviewed and approved this document for accuracy prior to leaving the patient care area.  April 17, 2018 1:24 PM      Darby Williamson MD  [facility]      Patient Instructions     New Shingles Vaccine is recommended. Please visit the pharmacy.     Labs today. I will notify you of results when I receive them and send refills to medications.

## 2018-04-17 NOTE — NURSING NOTE
"Chief Complaint   Patient presents with     Physical       Initial /84 (BP Location: Right arm, Patient Position: Chair, Cuff Size: Adult Large)  Pulse 70  Temp 97.9  F (36.6  C) (Oral)  Resp 18  Ht 1.67 m (5' 5.75\")  Wt 78.9 kg (174 lb)  SpO2 99%  Breastfeeding? No  BMI 28.3 kg/m2 Estimated body mass index is 28.3 kg/(m^2) as calculated from the following:    Height as of this encounter: 1.67 m (5' 5.75\").    Weight as of this encounter: 78.9 kg (174 lb).  Medication Reconciliation: complete     Ester Kang MA       "

## 2018-04-17 NOTE — MR AVS SNAPSHOT
After Visit Summary   4/17/2018    Tiffany Stovall    MRN: [id number]           Patient Information     Date Of Birth          1950        Visit Information        Provider Department      4/17/2018 7:40 AM Darby Williamson MD [facility]        Today's Diagnoses     Routine general medical examination at a health care facility    -  1    Hyperlipidemia LDL goal <130        Hypertension goal BP (blood pressure) < 140/90        Familial lipoprotein deficiency        Hypothyroidism due to acquired atrophy of thyroid        Gastroesophageal reflux disease without esophagitis        Mild persistent asthma without complication        Primary osteoarthritis of left knee          Care Instructions    New Shingles Vaccine is recommended. Please visit the pharmacy.     Labs today. I will notify you of results when I receive them and send refills to medications.     At [facility], we strive to deliver an exceptional experience to you, every time we see you.  If you receive a survey in the mail, please send us back your thoughts. We really do value your feedback.    Suggested websites for health information:  Www.[employer] [place] : Up to date and easily searchable information on multiple topics.  Www.medlineplus.gov : medication info, interactive tutorials, watch real surgeries online  Www.familydoctor.org : good info from the Academy of Family Physicians  Www.cdc.gov : public health info, travel advisories, epidemics (H1N1)  Www.aap.org : children's health info, normal development, vaccinations  Www.health.[facility].[place].us : [place] dept of health, public health issues in [place], N1N1    Your care team:     Family Medicine   PAVITHRA Barnes MD Emily Bunt, APRN [facility]   S. MD Darby Johnston MD Angela Wermerskirchen, MD         Clinic hours: Monday - Wednesday 7 am-7 pm   Thursdays and Fridays 7 am-5 pm.     [facility]:  Monday - Friday 11 am-9 pm,   Saturday and Sunday 9 am-5 pm.    [facility]: Monday -Thursday 8 am-8 pm; Friday 8 am-6 pm; Saturday and Sunday 9 am-5 pm.     [facility]: Monday - Thursday 8 am - 7 pm; Friday 8 am - 6 pm    Clinic: [phone]   [facility]: [phone]   [facility]: [phone]            Preventive Health Recommendations  Female Ages 65 +    Yearly exam:     See your health care provider every year in order to  o Review health changes.   o Discuss preventive care.    o Review your medicines if your doctor has prescribed any.      You no longer need a yearly Pap test unless you've had an abnormal Pap test in the past 10 years. If you have vaginal symptoms, such as bleeding or discharge, be sure to talk with your provider about a Pap test.      Every 1 to 2 years, have a mammogram.  If you are over 69, talk with your health care provider about whether or not you want to continue having screening mammograms.      Every 10 years, have a colonoscopy. Or, have a yearly FIT test (stool test). These exams will check for colon cancer.       Have a cholesterol test every 5 years, or more often if your doctor advises it.       Have a diabetes test (fasting glucose) every three years. If you are at risk for diabetes, you should have this test more often.       At age 65, have a bone density scan (DEXA) to check for osteoporosis (brittle bone disease).    Shots:    Get a flu shot each year.    Get a tetanus shot every 10 years.    Talk to your doctor about your pneumonia vaccines. There are now two you should receive - Pneumovax (PPSV 23) and Prevnar (PCV 13).    Talk to your doctor about the shingles vaccine.    Talk to your doctor about the hepatitis B vaccine.    Nutrition:     Eat at least 5 servings of fruits and vegetables each day.      Eat whole-grain bread, whole-wheat pasta and brown rice instead of white grains and rice.      Talk  to your provider about Calcium and Vitamin D.     Lifestyle    Exercise at least 150 minutes a week (30 minutes a day, 5 days a week). This will help you control your weight and prevent disease.      Limit alcohol to one drink per day.      No smoking.       Wear sunscreen to prevent skin cancer.       See your dentist twice a year for an exam and cleaning.      See your eye doctor every 1 to 2 years to screen for conditions such as glaucoma, macular degeneration, cataracts, etc           Follow-ups after your visit        Who to contact     If you have questions or need follow up information about today's clinic visit or your schedule please contact [facility] directly at [phone].  Normal or non-critical lab and imaging results will be communicated to you by [employer], letter or phone within 4 business days after the clinic has received the results. If you do not hear from us within 7 days, please contact the clinic through [employer] or phone. If you have a critical or abnormal lab result, we will notify you by phone as soon as possible.  Submit refill requests through [employer] or call your pharmacy and they will forward the refill request to us. Please allow 3 business days for your refill to be completed.          Additional Information About Your Visit        [employer] Information     [employer] gives you secure access to your electronic health record. If you see a primary care provider, you can also send messages to your care team and make appointments. If you have questions, please call your primary care clinic.  If you do not have a primary care provider, please call [phone] and they will assist you.        Care EveryWhere ID     This is your Care EveryWhere ID. This could be used by other organizations to access your [facility] medical records  [id number]        Your Vitals Were     Pulse Temperature Respirations Height Pulse Oximetry Breastfeeding?    70 97.9  F (36.6  C) (Oral) 18 1.67 m  "(5' 5.75\") 99% No    BMI (Body Mass Index)                   28.3 kg/m2            Blood Pressure from Last 3 Encounters:   04/17/18 134/84   03/21/17 134/78   01/16/17 129/74    Weight from Last 3 Encounters:   04/17/18 78.9 kg (174 lb)   12/11/17 77.1 kg (170 lb)   06/12/17 77.1 kg (170 lb)              We Performed the Following     Albumin Random Urine Quantitative with Creat Ratio     CBC with platelets     Comprehensive metabolic panel     Lipid panel reflex to direct LDL Fasting     TSH with free T4 reflex        Primary Care Provider Office Phone # Fax #    Darby Williamson [phone] [address]  [facility] [address]        Equal Access to Services     LAUREN PECK : Hadii nick bush hadasho Soomaali, waaxda luqadaha, qaybta kaalmada adeegyada, april albrechtin jarrett nunez . So [facility] [phone].    ATENCIÓN: Si habla español, tiene a catalan disposición servicios gratuitos de asistencia lingüística. [facility] [phone].    We comply with applicable federal civil rights laws and [place] laws. We do not discriminate on the basis of race, color, national origin, age, disability, sex, sexual orientation, or gender identity.            Thank you!     Thank you for choosing [facility]  for your care. Our goal is always to provide you with excellent care. Hearing back from our patients is one way we can continue to improve our services. Please take a few minutes to complete the written survey that you may receive in the mail after your visit with us. Thank you!             Your Updated Medication List - Protect others around you: Learn how to safely use, store and throw away your medicines at www.disposemymeds.org.          This list is accurate as of 4/17/18  8:30 AM.  Always use your most recent med list.                   Brand Name Dispense Instructions for use Diagnosis    * albuterol (2.5 MG/3ML) 0.083% neb solution     25 vial    Take 1 vial (2.5 mg) by " nebulization every 6 hours as needed for shortness of breath / dyspnea    Mild persistent asthma without complication, Mild persistent asthma with exacerbation       * albuterol 108 (90 Base) MCG/ACT Inhaler    PROAIR HFA    1 Inhaler    Inhale 1-2 puffs into the lungs every 4 hours as needed for shortness of breath / dyspnea    Mild persistent asthma without complication       atorvastatin 40 MG tablet    LIPITOR    90 tablet    Take 1 tablet (40 mg) by mouth daily    Hyperlipidemia LDL goal <130       EPINEPHrine 0.3 MG/0.3ML injection 2-pack    EPIPEN/ADRENACLICK/or ANY BX GENERIC EQUIV    0.6 mL    Inject 0.3 mLs (0.3 mg) into the muscle once as needed    Allergic reaction, initial encounter       fluticasone-salmeterol 250-50 MCG/DOSE diskus inhaler    ADVAIR DISKUS    3 Inhaler    Inhale 1 puff into the lungs 2 times daily    Mild persistent asthma without complication       levothyroxine 112 MCG tablet    SYNTHROID/LEVOTHROID    90 tablet    Take 1 tablet (112 mcg) by mouth every morning Patient needs to be seen prior to further refills.    Hypothyroidism due to acquired atrophy of thyroid       loratadine 10 MG tablet    CLARITIN     Take 10 mg by mouth daily        losartan-hydrochlorothiazide 100-25 MG per tablet    HYZAAR    90 tablet    Take 1 tablet by mouth every morning    Hypertension goal BP (blood pressure) < 140/90       Multi-vitamin Tabs tablet   Generic drug:  multivitamin, therapeutic with minerals      ONE TABLET DAILY        NEXIUM PO      Take 40 mg by mouth daily        OSTEO BI-FLEX ADV DOUBLE ST PO           potassium chloride SA 20 MEQ CR tablet    K-DUR/KLOR-CON M    90 tablet    Take 1 tablet (20 mEq) by mouth daily    Familial lipoprotein deficiency       triamcinolone 0.1 % cream    KENALOG    15 g    Apply topically 2 times daily Apply to affected area on left lower leg bid for 10-14 consecutive days, not to be used on face or groin    Lichen simplex       TYLENOL EXTRA STRENGTH PO            ZANTAC PO           * Notice:  This list has 2 medication(s) that are the same as other medications prescribed for you. Read the directions carefully, and ask your doctor or other care provider to review them with you.

## 2018-04-17 NOTE — PATIENT INSTRUCTIONS
New Shingles Vaccine is recommended. Please visit the pharmacy.     Labs today. I will notify you of results when I receive them and send refills to medications.     At [facility], we strive to deliver an exceptional experience to you, every time we see you.  If you receive a survey in the mail, please send us back your thoughts. We really do value your feedback.    Suggested websites for health information:  Www.[place].org : Up to date and easily searchable information on multiple topics.  Www.medlineplus.gov : medication info, interactive tutorials, watch real surgeries online  Www.familydoctor.org : good info from the Academy of Family Physicians  Www.cdc.gov : public health info, travel advisories, epidemics (H1N1)  Www.aap.org : children's health info, normal development, vaccinations  Www.health.[facility].[place].us : [place] dept of health, public health issues in [place], N1N1    Your care team:     Family Medicine   PAVITHRA Barnes MD Emily Bunt, APRN [facility]   S. MD Darby Johnston MD Angela Wermerskirchen, MD         Clinic hours: Monday - Wednesday 7 am-7 pm   Thursdays and Fridays 7 am-5 pm.     [facility]: Monday - Friday 11 am-9 pm,   Saturday and Sunday 9 am-5 pm.    [facility]: Monday -Thursday 8 am-8 pm; Friday 8 am-6 pm; Saturday and Sunday 9 am-5 pm.     [facility]: Monday - Thursday 8 am - 7 pm; Friday 8 am - 6 pm    Clinic: [phone]   [facility]: [phone]   [facility]: [phone]            Preventive Health Recommendations  Female Ages 65 +    Yearly exam:     See your health care provider every year in order to  o Review health changes.   o Discuss preventive care.    o Review your medicines if your doctor has prescribed any.      You no longer need a yearly Pap test unless you've had an abnormal Pap test in the past 10 years. If you have vaginal  symptoms, such as bleeding or discharge, be sure to talk with your provider about a Pap test.      Every 1 to 2 years, have a mammogram.  If you are over 69, talk with your health care provider about whether or not you want to continue having screening mammograms.      Every 10 years, have a colonoscopy. Or, have a yearly FIT test (stool test). These exams will check for colon cancer.       Have a cholesterol test every 5 years, or more often if your doctor advises it.       Have a diabetes test (fasting glucose) every three years. If you are at risk for diabetes, you should have this test more often.       At age 65, have a bone density scan (DEXA) to check for osteoporosis (brittle bone disease).    Shots:    Get a flu shot each year.    Get a tetanus shot every 10 years.    Talk to your doctor about your pneumonia vaccines. There are now two you should receive - Pneumovax (PPSV 23) and Prevnar (PCV 13).    Talk to your doctor about the shingles vaccine.    Talk to your doctor about the hepatitis B vaccine.    Nutrition:     Eat at least 5 servings of fruits and vegetables each day.      Eat whole-grain bread, whole-wheat pasta and brown rice instead of white grains and rice.      Talk to your provider about Calcium and Vitamin D.     Lifestyle    Exercise at least 150 minutes a week (30 minutes a day, 5 days a week). This will help you control your weight and prevent disease.      Limit alcohol to one drink per day.      No smoking.       Wear sunscreen to prevent skin cancer.       See your dentist twice a year for an exam and cleaning.      See your eye doctor every 1 to 2 years to screen for conditions such as glaucoma, macular degeneration, cataracts, etc

## 2018-04-17 NOTE — PROGRESS NOTES
"Your urine testing is normal.  There is not elevated protein present.  Your thyroid testing is normal indicating you are on the correct dosage of medication.  Your cholesterol is under good control and similar to previous.  Your blood sugar is borderline elevated but slightly lower than last year.  This is in the \"prediabetic\" range.  Exercise and limiting carbohydrate and sugars in diet can be helpful at preventing progression to diabetes.  Your liver and kidney testing is normal.  Blood cell counts are normal.  Always a pleasure to see you!   Please call or MyChart message me if you have any questions.    PSK  "

## 2018-04-18 ASSESSMENT — ASTHMA QUESTIONNAIRES: ACT_TOTALSCORE: 25

## 2018-06-11 ENCOUNTER — OFFICE VISIT (OUTPATIENT)
Dept: ORTHOPEDICS | Facility: CLINIC | Age: 68
End: 2018-06-11
Payer: COMMERCIAL

## 2018-06-11 VITALS
WEIGHT: 170 LBS | BODY MASS INDEX: 27.32 KG/M2 | SYSTOLIC BLOOD PRESSURE: 160 MMHG | RESPIRATION RATE: 18 BRPM | HEIGHT: 66 IN | DIASTOLIC BLOOD PRESSURE: 90 MMHG

## 2018-06-11 DIAGNOSIS — M17.12 PRIMARY OSTEOARTHRITIS OF LEFT KNEE: Primary | ICD-10-CM

## 2018-06-11 PROCEDURE — 20610 DRAIN/INJ JOINT/BURSA W/O US: CPT | Mod: LT | Performed by: ORTHOPAEDIC SURGERY

## 2018-06-11 RX ORDER — METHYLPREDNISOLONE ACETATE 80 MG/ML
80 INJECTION, SUSPENSION INTRA-ARTICULAR; INTRALESIONAL; INTRAMUSCULAR; SOFT TISSUE ONCE
Qty: 1 ML | Refills: 0 | OUTPATIENT
Start: 2018-06-11 | End: 2018-06-11

## 2018-06-11 NOTE — PROGRESS NOTES
The patient's left knee was prepped with betadine solution after verification of allergies. Area approximately 10 cm x 10 cm prepped in a sterile fashion. After injection, betadine removed with soap and water and band-aids applied.    1ml depo medrol with 1% lidocaine plain injected into patient's left knee by Dr. Tom Thomas  LOT# [id number]  Exp. 6/20

## 2018-06-11 NOTE — MR AVS SNAPSHOT
After Visit Summary   6/11/2018    Tiffany Stovall    MRN: [id number]           Patient Information     Date Of Birth          1950        Visit Information        Provider Department      6/11/2018 4:00 PM Tom Thomas MD [facility]        Today's Diagnoses     Primary osteoarthritis of left knee    -  1      Care Instructions    You have had a steroid injection today.  For the first 2 hours there will likely be some numbing in the joint from the lidocaine.  This is a good sign, indicating that the injection is in the right place.  In 2 hours the lidocaine will wear off, and the joint will hurt like you had a shot.  Each day the cortisone makes it feel better.  It reaches peak effect in 2 weeks.  We expect it to last for 3 months.  You may resume regular activity when you feel ready.  If you are diabetic, your glucoses will be quite high for several days.            Follow-ups after your visit        Who to contact     If you have questions or need follow up information about today's clinic visit or your schedule please contact [facility] directly at [phone].  Normal or non-critical lab and imaging results will be communicated to you by [employer]hart, letter or phone within 4 business days after the clinic has received the results. If you do not hear from us within 7 days, please contact the clinic through [employer] or phone. If you have a critical or abnormal lab result, we will notify you by phone as soon as possible.  Submit refill requests through [email or web address] or call your pharmacy and they will forward the refill request to us. Please allow 3 business days for your refill to be completed.          Additional Information About Your Visit        MyChart Information     [email or web address] gives you secure access to your electronic health record. If you see a primary care provider, you can also send messages to your care team and make appointments. If you have questions,  please call your primary care clinic.  If you do not have a primary care provider, please call [phone] and they will assist you.        Care EveryWhere ID     This is your Care EveryWhere ID. This could be used by other organizations to access your [facility] medical records  [id number]        Your Vitals Were     Respirations                   18            Blood Pressure from Last 3 Encounters:   04/17/18 134/84   03/21/17 134/78   01/16/17 129/74    Weight from Last 3 Encounters:   04/17/18 78.9 kg (174 lb)   12/11/17 77.1 kg (170 lb)   06/12/17 77.1 kg (170 lb)              Today, you had the following     No orders found for display       Primary Care Provider Office Phone # Fax #    Darby Williamson [phone] [address]  [facility] [address]        Equal Access to Services     [facility]: Hadii aad ku hadasho Soomaali, waaxda luqadaha, qaybta kaalmada adeegyada, waxay trenain hayaan stoney nunez . So [facility] [phone].    ATENCIÓN: Si habla español, tiene a catalan disposición servicios gratuitos de asistencia lingüística. Llame al [phone].    We comply with applicable federal civil rights laws and [place] laws. We do not discriminate on the basis of race, color, national origin, age, disability, sex, sexual orientation, or gender identity.            Thank you!     Thank you for choosing [facility]  for your care. Our goal is always to provide you with excellent care. Hearing back from our patients is one way we can continue to improve our services. Please take a few minutes to complete the written survey that you may receive in the mail after your visit with us. Thank you!             Your Updated Medication List - Protect others around you: Learn how to safely use, store and throw away your medicines at www.disposemymeds.org.          This list is accurate as of 6/11/18  4:06 PM.  Always use your most recent med list.                   Brand  Name Dispense Instructions for use Diagnosis    * albuterol (2.5 MG/3ML) 0.083% neb solution     25 vial    Take 1 vial (2.5 mg) by nebulization every 6 hours as needed for shortness of breath / dyspnea    Mild persistent asthma without complication, Mild persistent asthma with exacerbation       * albuterol 108 (90 Base) MCG/ACT Inhaler    PROAIR HFA    1 Inhaler    Inhale 1-2 puffs into the lungs every 4 hours as needed for shortness of breath / dyspnea    Mild persistent asthma without complication       atorvastatin 40 MG tablet    LIPITOR    90 tablet    Take 1 tablet (40 mg) by mouth daily    Hyperlipidemia LDL goal <130       EPINEPHrine 0.3 MG/0.3ML injection 2-pack    EPIPEN/ADRENACLICK/or ANY BX GENERIC EQUIV    0.6 mL    Inject 0.3 mLs (0.3 mg) into the muscle once as needed    Allergic reaction, initial encounter       fluticasone-salmeterol 250-50 MCG/DOSE diskus inhaler    ADVAIR DISKUS    3 Inhaler    Inhale 1 puff into the lungs 2 times daily    Mild persistent asthma without complication       levothyroxine 112 MCG tablet    SYNTHROID/LEVOTHROID    90 tablet    Take 1 tablet (112 mcg) by mouth every morning Patient needs to be seen prior to further refills.    Hypothyroidism due to acquired atrophy of thyroid       loratadine 10 MG tablet    CLARITIN     Take 10 mg by mouth daily        losartan-hydrochlorothiazide 100-25 MG per tablet    HYZAAR    90 tablet    Take 1 tablet by mouth every morning    Hypertension goal BP (blood pressure) < 140/90       Multi-vitamin Tabs tablet   Generic drug:  multivitamin, therapeutic with minerals      ONE TABLET DAILY        NEXIUM PO      Take 40 mg by mouth daily        OSTEO BI-FLEX ADV DOUBLE ST PO           potassium chloride SA 20 MEQ CR tablet    K-DUR/KLOR-CON M    90 tablet    Take 1 tablet (20 mEq) by mouth daily    Hypertension goal BP (blood pressure) < 140/90       triamcinolone 0.1 % cream    KENALOG    15 g    Apply topically 2 times daily Apply  to affected area on left lower leg bid for 10-14 consecutive days, not to be used on face or groin    Lichen simplex       TYLENOL EXTRA STRENGTH PO           ZANTAC PO           * Notice:  This list has 2 medication(s) that are the same as other medications prescribed for you. Read the directions carefully, and ask your doctor or other care provider to review them with you.

## 2018-06-11 NOTE — LETTER
6/11/2018         RE: Tiffany Stovall  [address]  [place] [address]        Dear Colleague,    Thank you for referring your patient, Tiffany Stovall, to the [facility]. Please see a copy of my visit note below.    The patient's left knee was prepped with betadine solution after verification of allergies. Area approximately 10 cm x 10 cm prepped in a sterile fashion. After injection, betadine removed with soap and water and band-aids applied.    1ml depo medrol with 1% lidocaine plain injected into patient's left knee by Dr. Tom Thomas  LOT# [id number]  Exp. 6/20      Follow up left knee primary osteoarthritis.  Last injection 12/11/17.  Range of motion 0-120.    She  desires injection today of left knee(s).  Risks, benefits, potential complications and alternatives were discussed.   With the patient's consent, sterile prep was performed of left knee(s).  Left knee was injected with Depo Medrol 80 mg and lidocaine at anteromedial site.  Return to clinic as needed.         Again, thank you for allowing me to participate in the care of your patient.        Sincerely,        Tom Thomas MD

## 2018-06-11 NOTE — PROGRESS NOTES
Follow up left knee primary osteoarthritis.  Last injection 12/11/17.  Range of motion 0-120.    She  desires injection today of left knee(s).  Risks, benefits, potential complications and alternatives were discussed.   With the patient's consent, sterile prep was performed of left knee(s).  Left knee was injected with Depo Medrol 80 mg and lidocaine at anteromedial site.  Return to clinic as needed.

## 2018-07-24 DIAGNOSIS — J45.30 MILD PERSISTENT ASTHMA WITHOUT COMPLICATION: ICD-10-CM

## 2018-07-24 NOTE — TELEPHONE ENCOUNTER
"Requested Prescriptions   Pending Prescriptions Disp Refills     ADVAIR DISKUS 250-50 MCG/DOSE diskus inhaler [Pharmacy Med Name: ADVAIR DISKUS 250-50MCG/DOSE AEPB]  Last Written Prescription Date:  3/22/17  Last Fill Quantity: 3 inhaler,  # refills: 3   Last office visit: 4/17/2018 with prescribing provider:  Dr. Williamson   Future Office Visit:    3     Sig: INHALE ONE PUFF BY MOUTH TWICE A DAY    Inhaled Steroids Protocol Passed    7/24/2018  8:05 AM       Passed - Patient is age 12 or older       Passed - Asthma control assessment score within normal limits in last 6 months    Please review ACT score.   ACT Total Scores 3/2/2016 3/21/2017 4/17/2018   ACT TOTAL SCORE - - -   ASTHMA ER VISITS - - -   ASTHMA HOSPITALIZATIONS - - -   ACT TOTAL SCORE (Goal Greater than or Equal to 20) 14 25 25   In the past 12 months, how many times did you visit the emergency room for your asthma without being admitted to the hospital? 0 0 0   In the past 12 months, how many times were you hospitalized overnight because of your asthma? 0 0 0          Passed - Recent (6 mo) or future (30 days) visit within the authorizing provider's specialty    Patient had office visit in the last 6 months or has a visit in the next 30 days with authorizing provider or within the authorizing provider's specialty.  See \"Patient Info\" tab in inbasket, or \"Choose Columns\" in Meds & Orders section of the refill encounter.              "

## 2018-07-25 DIAGNOSIS — J45.30 MILD PERSISTENT ASTHMA WITHOUT COMPLICATION: ICD-10-CM

## 2018-07-25 NOTE — TELEPHONE ENCOUNTER
Routing refill request to provider for review/approval because:  No spirometry testing on file.     Melva Manuel RN

## 2018-07-25 NOTE — TELEPHONE ENCOUNTER
Duplicate request  Medication Detail      Disp Refills Start End MILAD   fluticasone-salmeterol (ADVAIR DISKUS) 250-50 MCG/DOSE diskus inhaler 3 Inhaler 3 7/25/2018  No   Sig - Route: Inhale 1 puff into the lungs 2 times daily - Inhalation   Class: E-Prescribe   Order: [id number]   E-Prescribing Status: Receipt confirmed by pharmacy (7/25/2018  3:39 PM CDT)

## 2018-08-09 ENCOUNTER — TRANSFERRED RECORDS (OUTPATIENT)
Dept: HEALTH INFORMATION MANAGEMENT | Facility: CLINIC | Age: 68
End: 2018-08-09

## 2018-09-11 ENCOUNTER — OFFICE VISIT (OUTPATIENT)
Dept: FAMILY MEDICINE | Facility: CLINIC | Age: 68
End: 2018-09-11
Payer: COMMERCIAL

## 2018-09-11 VITALS — HEART RATE: 82 BPM | SYSTOLIC BLOOD PRESSURE: 140 MMHG | DIASTOLIC BLOOD PRESSURE: 77 MMHG

## 2018-09-11 DIAGNOSIS — L81.4 SOLAR LENTIGO: ICD-10-CM

## 2018-09-11 DIAGNOSIS — Z85.828 HISTORY OF BASAL CELL CARCINOMA: ICD-10-CM

## 2018-09-11 DIAGNOSIS — D48.5 NEOPLASM OF UNCERTAIN BEHAVIOR OF SKIN: Primary | ICD-10-CM

## 2018-09-11 DIAGNOSIS — L57.0 AK (ACTINIC KERATOSIS): ICD-10-CM

## 2018-09-11 DIAGNOSIS — D22.9 MULTIPLE BENIGN MELANOCYTIC NEVI: ICD-10-CM

## 2018-09-11 DIAGNOSIS — Z86.007 HISTORY OF SQUAMOUS CELL CARCINOMA IN SITU OF SKIN: ICD-10-CM

## 2018-09-11 DIAGNOSIS — Z80.8 FAMILY HISTORY OF NONMELANOMA SKIN CANCER: ICD-10-CM

## 2018-09-11 DIAGNOSIS — L72.3 SEBACEOUS CYST: ICD-10-CM

## 2018-09-11 PROCEDURE — 11310 SHAVE SKIN LESION 0.5 CM/<: CPT | Mod: 59 | Performed by: FAMILY MEDICINE

## 2018-09-11 PROCEDURE — 17003 DESTRUCT PREMALG LES 2-14: CPT | Performed by: FAMILY MEDICINE

## 2018-09-11 PROCEDURE — 17000 DESTRUCT PREMALG LESION: CPT | Mod: 51 | Performed by: FAMILY MEDICINE

## 2018-09-11 PROCEDURE — 11401 EXC TR-EXT B9+MARG 0.6-1 CM: CPT | Mod: 59 | Performed by: FAMILY MEDICINE

## 2018-09-11 PROCEDURE — 88305 TISSUE EXAM BY PATHOLOGIST: CPT | Mod: TC | Performed by: FAMILY MEDICINE

## 2018-09-11 PROCEDURE — 99213 OFFICE O/P EST LOW 20 MIN: CPT | Mod: 25 | Performed by: FAMILY MEDICINE

## 2018-09-11 NOTE — MR AVS SNAPSHOT
"              After Visit Summary   9/11/2018    Tiffany Stovall    MRN: [id number]           Patient Information     Date Of Birth          1950        Visit Information        Provider Department      9/11/2018 4:00 PM [place] [facility] [place]'s Diagnoses     Neoplasm of uncertain behavior of skin    -  1    History of basal cell carcinoma        History of squamous cell carcinoma in situ of skin        Family history of nonmelanoma skin cancer        AK (actinic keratosis)        Solar lentigo        Multiple benign melanocytic nevi          Care Instructions    FUTURE APPOINTMENTS    Follow up in 6 months for a full-body skin cancer screening.    Follow up per pathology report.    WOUND CARE INSTRUCTIONS  1. Wash hands before every dressing change.  2. After 24 hours, change dressing daily.  3. Wash the wound area with a mild soap, then rinse.  4. Gently pat dry with a sterile gauze or Q-tip.  5. Using a Q-tip, apply Vaseline or Aquaphor only over entire wound. Do NOT use Neosporin - as many people react to neomycin.  6. Finally, cover with a bandage or sterile non-stick gauze with micropore paper tape.  7. Repeat once daily until wound has healed.      Soap, water and shampoo will not hurt this area.    Do not go swimming or take baths, but showering is encouraged.    Limit use of the area where the procedure was done for a few days to allow for optimal healing.    If you experience bleeding:  Wash hands and hold firm pressure on the area for 10 minutes without checking to see if the bleeding has stopped. \"Checking\" pulls off the protective wound clot and restarts the bleeding all over again. Re-apply pressure for 10 minutes if necessary to stop bleeding.  Use additional sterile gauze and tape to maintain pressure once bleeding has stopped.  If bleeding continues, then call back to clinic at [phone].    Signs of Infection:  Infection can occur in " any area where skin has been disrupted.  If you notice persistent redness, swelling, colored drainage, increasing pain, fever or other signs of infection, please call us at: [phone] and ask to have me or my colleague paged. We will call you back to discuss.    Pathology Results:  You will be notified, generally via letter or MyChart, in approximately 10 days. If there is anything we need to discuss or further treatment needed, I will call you to discuss it.    PATIENT INFORMATION : WOUNDS  During the healing process you will notice a number of changes. All wounds develop a small halo of redness surrounding the wound.  This means healing is occurring. Severe itching with extensive redness usually indicates sensitivity to the ointment or bandage tape used to dress the wound.  You should call our office if this develops.      Swelling  and/or discoloration around your surgical site is common, particularly when performed around the eye.    All wounds normally drain.  The larger the wound the more drainage there will be.  After 7-10 days, you will notice the wound beginning to shrink and new skin will begin to grow.  The wound is healed when you can see skin has formed over the entire area.  A healed wound has a healthy, shiny look to the surface and is red to dark pink in color to normalize.  Wounds may take approximately 4-6 weeks to heal.  Larger wounds may take 6-8 weeks. After the wound is healed you may discontinue dressing changes.    You may experience a sensation of tightness as your wound heals. This is normal and will gradually subside.    Your healed wound may be sensitive to temperature changes. This sensitivity improves with time, but if you re having a lot of discomfort, try to avoid temperature extremes.    Patients frequently experience itching after their wound appears to have healed because of the continue healing under the skin.  Plain Vaseline will help relieve the itching.    SUN PROTECTION  "INSTRUCTIONS  Sun damage can lead to skin cancer and premature aging of the skin.      The best way to protect from sun damage to your skin is to avoid the sun during peak hours (10 am - 2 pm) even on overcast days.      Use UPF sun-protective clothing, which while more expensive initially provides longer lasting coverage without having to worry about remembering to re-apply.  1. Wear a wide-brimmed hat and sunglasses.   2. Wear sun-protective clothing.  [employer] and other [employer] make sun protective clothing that are stylish, comfortable and cool. [employer] and other [employer] make UV arm sleeves suitable for golfing, gardening and other activities.      Sunscreen instructions:  1. Use sunscreens with Zinc Oxide, Titanium Dioxide or Avobenzone to protect from UVA rays.  2. Use SPF 30-50+ to protect from UVB rays.  3. Re-apply every 2 hours even if water resistant.  4. Apply on your face every day even when cloudy and even in the winter. UVA \"aging rays\" penetrate window glass and are just as strong in the winter as in the summer.    Product Recommendations:    Good examples include: Blue Lizard, EltaMD, Solbar    Good daily moisturizers with SPF: Vanicream, CeraVe.    For sensitive skin, consider : SkinMedica Essential Defense Mineral Shield Broad Spectrum SPF 35      Never use tanning beds. Tanning beds are associated with much higher risks of skin cancer.    All tanning damages the skin. Aim for ivory skin year round and you will have less trouble with your skin in years to come. There is no merit in getting \"a base tan\" before a warm weather vacation, as any tanning indicates your body's response to sun damage.    Stop smoking. Smokers have higher rates of skin cancer and also have premature skin wrinkling.    FYI  You should use about 3 tablespoons of sunscreen to protect your whole body. Thus a typical eight ounce bottle of sunscreen should last 4 applications. Remember, that the SPF rating is " compromised if you don t apply enough. Most people only apply 1/2 - 1/3 of the amount they need. Also don t forget areas such as your ears, feet, upper back and harder to reach places. Keep in mind that these amounts should be increased for larger body sizes.    Sunscreens with titanium dioxide and/or zinc oxide in the active ingredients are physical blockers as opposed to chemical blockers. Chemical-free sunscreens should not irritate the skin.    Spray-on sunscreens may be used for touch-up application only, not as a base layer. Also, use with caution around small children due to inhalation risk.    Avoid retinyl palmitate products.    Avoid combination products that include both sunscreen and insect repellant, as sunscreen should be applied every 2 hours, but insect repellant should not be applied as frequently.    SPF means sun protection factor, which is just the degree to which the sunscreen can protect against UVB rays. There is no rating system for UVA rays. SPF is calculated as the time skin will burn when sunscreen is applied vs. skin without sunscreen.    Water resistant sunscreens should be re-applied every 1-2 hours.    For more information:  http://www.skincancer.org/prevention/sun-protection/sunscreen/sunscreens-safe-and-effective    SKIN CANCER SELF-EXAM INSTRUCTIONS  Check every month in the mirror or with a household member. Be aware of any changes, especially bleeding or tenderness. Also, make sure to check your nails for color changes after removal of nail polish.    For melanoma, check for:  A - Asymmetry. One half unlike the other half.  B - Border. Irregular, scalloped, ragged, notched, blurred or poorly defined borders.  C - Color. Color variations from one area to another, with shades of tan, brown and/or black present. Sometimes white, red or blue.  D - Diameter. Greater than 6 mm (about the size of a pencil eraser). Any new growth of a mole should be concerning and be evaluated.  E -  Evolving. A mole or skin lesion that looks different from the rest or is changing in size, shape or color.    For basal cell carcinoma and squamous cell carcinoma, check for:    Sores, shiny bumps, nodules, scaly lesions, or wart-like growths that are itchy, tender, crusting, scabbing, eroding, oozing or bleeding.    Open sores/wounds or reddish/irritated areas that do not heal within 2-3 weeks.    Scar-like areas that are white, yellow or waxy in color.          Follow-ups after your visit        Your next 10 appointments already scheduled     Oct 29, 2018  6:20 PM CDT   [facility]    [address] [place] [id number] [phone]            Dec 20, 2018  8:40 AM CST   [facility]    [address] [place] [id number] [phone]              Who to contact     If you have questions or need follow up information about today's clinic visit or your schedule please contact [facility] directly at [phone].  Normal or non-critical lab and imaging results will be communicated to you by Sensorinhart, letter or phone within 4 business days after the clinic has received the results. If you do not hear from us within 7 days, please contact the clinic through Sensorinhart or phone. If you have a critical or abnormal lab result, we will notify you by phone as soon as possible.  Submit refill requests through [employer] or call your pharmacy and they will forward the refill request to us. Please allow 3 business days for your refill to be completed.          Additional Information About Your Visit        [employer] Information     [employer] gives you secure access to your electronic health record. If you see a primary care provider, you can also send messages to your care team and make appointments. If you have  questions, please call your primary care clinic.  If you do not have a primary care provider, please call [phone] and they will assist you.        Care EveryWhere ID     This is your Care EveryWhere ID. This could be used by other organizations to access your [facility] medical records  [id number]        Your Vitals Were     Pulse                   82            Blood Pressure from Last 3 Encounters:   09/11/18 144/77   06/11/18 160/90   04/17/18 134/84    Weight from Last 3 Encounters:   06/11/18 170 lb (77.1 kg)   04/17/18 174 lb (78.9 kg)   12/11/17 170 lb (77.1 kg)              Today, you had the following     No orders found for display       Primary Care Provider Office Phone # Fax #    Darby Williamson [phone] [address]  [facility] [address]        Equal Access to Services     [facility]: Hadii aad ku hadasho Soomaali, waaxda luqadaha, qaybta kaalmada adeegyada, waxay trenain hayaan stoney nunez . So [facility] [phone].    ATENCIÓN: Si habla español, tiene a catalan disposición servicios gratuitos de asistencia lingüística. Marquise al [phone].    We comply with applicable federal civil rights laws and [place] laws. We do not discriminate on the basis of race, color, national origin, age, disability, sex, sexual orientation, or gender identity.            Thank you!     Thank you for choosing [facility]  for your care. Our goal is always to provide you with excellent care. Hearing back from our patients is one way we can continue to improve our services. Please take a few minutes to complete the written survey that you may receive in the mail after your visit with us. Thank you!             Your Updated Medication List - Protect others around you: Learn how to safely use, store and throw away your medicines at www.disposemymeds.org.          This list is accurate as of 9/11/18  4:18 PM.  Always use your most recent med list.                    Brand Name Dispense Instructions for use Diagnosis    * albuterol (2.5 MG/3ML) 0.083% neb solution     25 vial    Take 1 vial (2.5 mg) by nebulization every 6 hours as needed for shortness of breath / dyspnea    Mild persistent asthma without complication, Mild persistent asthma with exacerbation       * albuterol 108 (90 Base) MCG/ACT inhaler    PROAIR HFA    1 Inhaler    Inhale 1-2 puffs into the lungs every 4 hours as needed for shortness of breath / dyspnea    Mild persistent asthma without complication       atorvastatin 40 MG tablet    LIPITOR    90 tablet    Take 1 tablet (40 mg) by mouth daily    Hyperlipidemia LDL goal <130       EPINEPHrine 0.3 MG/0.3ML injection 2-pack    EPIPEN/ADRENACLICK/or ANY BX GENERIC EQUIV    0.6 mL    Inject 0.3 mLs (0.3 mg) into the muscle once as needed    Allergic reaction, initial encounter       fluticasone-salmeterol 250-50 MCG/DOSE diskus inhaler    ADVAIR DISKUS    3 Inhaler    Inhale 1 puff into the lungs 2 times daily    Mild persistent asthma without complication       levothyroxine 112 MCG tablet    SYNTHROID/LEVOTHROID    90 tablet    Take 1 tablet (112 mcg) by mouth every morning Patient needs to be seen prior to further refills.    Hypothyroidism due to acquired atrophy of thyroid       loratadine 10 MG tablet    CLARITIN     Take 10 mg by mouth daily        losartan-hydrochlorothiazide 100-25 MG per tablet    HYZAAR    90 tablet    Take 1 tablet by mouth every morning    Hypertension goal BP (blood pressure) < 140/90       Multi-vitamin Tabs tablet   Generic drug:  multivitamin, therapeutic with minerals      ONE TABLET DAILY        NEXIUM PO      Take 40 mg by mouth daily        potassium chloride SA 20 MEQ CR tablet    K-DUR/KLOR-CON M    90 tablet    Take 1 tablet (20 mEq) by mouth daily    Hypertension goal BP (blood pressure) < 140/90       triamcinolone 0.1 % cream    KENALOG    15 g    Apply topically 2 times daily Apply to affected area on left lower leg  bid for 10-14 consecutive days, not to be used on face or groin    Lichen simplex       TYLENOL EXTRA STRENGTH PO           ZANTAC PO           * Notice:  This list has 2 medication(s) that are the same as other medications prescribed for you. Read the directions carefully, and ask your doctor or other care provider to review them with you.

## 2018-09-11 NOTE — PROGRESS NOTES
[facility] - PRIMARY CARE SKIN    CC : skin cancer screening (full-body)  SUBJECTIVE:                                                    Tiffany Stovall is a 68 year old female who presents to clinic today for a full-body skin exam because of her history of skin cancer.    She has an upcoming hip replacement surgery.    No bothersome lesions noticed by the patient or other skin concerns :  Issue One : A scaly patch on the right cheek is still present.  Issue Two : A persistent area of chapped skin on the lip has not been resolving with use of Aquaphor. This first began last winter.  Issue Three : A suture has recently extruded from a site of basal cell carcinoma excision on the right forearm from Dec 2016.  Issue Four : A lesion on the left extensor forearm appeared like a mosquito bite 1 month ago. However, it has developed a subepidermal fulness. Her  who is a physician has diagnosed it as a cyst. It is tender only when repetitively bumped.  Issue Five : An excision of basal cell carcinoma on the left lower leg has dehisced.    Personal history of skin cancer : YES  Nodular basal cell carcinoma on the left lower leg (s/p MMS with Barney)  Superficial basal cell carcinoma on right dorsal foot (s/p MMS 12/13/16)  Squamous cell carcinoma in situ on left lateral neck (s/p MMS 12/13/16)  Superficial, multifocal basal cell carcinoma on right mid-dorsal forearm (s/p excision 12/20/16)  Basal cell carcinoma on right dorsal third toe (excised 12/2015)  Basal cell carcinoma on left upper cutaneous lip (s/p Mohs 10/20/2011 and Aldara)  Basal cell carcinoma on clavicle  Basal cell carcinoma on right arm.  Family history of skin cancer : YES - non-melanoma.      Sun Exposure History  Sunscreen Use : YES, frequency : daily, SPF : 30.  UV-protective clothing use : YES  Previous history of significant sun exposure:  Blistering sunburns : YES - as a teenager  Tanning beds : NO.      Occupation : nurse  (indoor).    Refer to electronic medical record (EMR) for past medical history and medications.    INTEGUMENTARY/SKIN: POSITIVE for non-healing lesions  ROS : 14 point review of systems was negative except the symptoms listed above in the HPI.    This document serves as a record of the services and decisions personally performed and made by Joy Bailon MD. It was created on her behalf by James Dukes, a trained medical scribe.  The creation of this document is based on the scribe's personal observations and the provider's statements to the medical scribe.  James Dukes, September 11, 2018 3:45 PM      OBJECTIVE:                                                    GENERAL: healthy, alert and no distress  SKIN: Rodriguez Skin Type - I.  This patient was examined from the top of the head to the bottom of the feet  including scalp, face, neck, back, chest, breasts, buttocks, both arms, both legs, both hands, both feet, all 10 fingers and all 10 toes. The dermatoscope was used to help evaluate pigmented lesions.  Skin Pertinent Findings:  Right arm(s) and left arm(s) : brown, macule(s) most consistent with benign solar lentigo. Scattered brown macules of various sizes and shapes most consistent with (benign) melanocytic nevi.    Abdomen : slightly raised, red lesion(s) consistent with capillary hemangioma.    Anterior legs : Multiple brown, macule(s) most consistent with benign solar lentigo.    Back : slightly raised, red lesion(s) consistent with capillary hemangioma. brown, macule(s) most consistent with benign solar lentigo.    Significant Findings:  Right forearm : well-healed scar.  Right upper chest : well-healed scar.  Left knee : well-healed scar inferior to knee.    Right cheek : 2 mm in size scaly erythematous lesion. ? Actinic keratosis ? Squamous cell carcinoma ? Basal cell carcinoma ? Other      Left forearm, 9 cm distal to olecranon : 6 mm in size smooth freely mobile subepidermal mass most consistent with  cyst.    Upper lip midline vermilion border, right forearm : 2 -  mm in size, erythematous, scaly, non-indurated lesion(s) most consistent with actinic keratoses.  Name : Liquid Nitrogen Cry-Ac Cryotherapy.  Indication : Pre-malignant lesion.  Location(s) : upper lip - x1; right radial mid-forearm - x1 .  Completed by : Joy Bailon MD  Note : Discussed natural history of lesion and treatment options. Prior to treatment, we discussed inflammation, tenderness post-procedure, the healing process, and the risks of pain, infection, scarring, blistering, and hypo-/hyperpigmentation after healing. Explained that these lesions may grow back and may need additional treatment or re-treatment. The patient expressed a desire to proceed with cryotherapy.    The lesion(s) was treated with liquid nitrogen Cry-Ac, five second freeze repeated twice with a pause to allow for the area to thaw. If this lesion should recur, then it needs to be re-evaluated.    Patient tolerated the procedure well and left in good condition.  Total number of lesions treated : 2.    Diagnostic Test Results:  none     MDM : . Previous Mohs with Dr. Corley.      ASSESSMENT:                                                      Encounter Diagnoses   Name Primary?     Neoplasm of uncertain behavior of skin Yes     History of basal cell carcinoma      History of squamous cell carcinoma in situ of skin      Family history of nonmelanoma skin cancer      AK (actinic keratosis)      Solar lentigo      Multiple benign melanocytic nevi      Sebaceous cyst          PLAN:                                                    Patient Instructions   FUTURE APPOINTMENTS    Follow up in 6 months for a full-body skin cancer screening.    Follow up per pathology report.    WOUND CARE INSTRUCTIONS  1. Wash hands before every dressing change.  2. After 24 hours, change dressing daily.  3. Wash the wound area with a mild soap, then rinse.  4. Gently pat dry with a sterile gauze  "or Q-tip.  5. Using a Q-tip, apply Vaseline or Aquaphor only over entire wound. Do NOT use Neosporin - as many people react to neomycin.  6. Finally, cover with a bandage or sterile non-stick gauze with micropore paper tape.  7. Repeat once daily until wound has healed.      Soap, water and shampoo will not hurt this area.    Do not go swimming or take baths, but showering is encouraged.    Limit use of the area where the procedure was done for a few days to allow for optimal healing.    If you experience bleeding:  Wash hands and hold firm pressure on the area for 10 minutes without checking to see if the bleeding has stopped. \"Checking\" pulls off the protective wound clot and restarts the bleeding all over again. Re-apply pressure for 10 minutes if necessary to stop bleeding.  Use additional sterile gauze and tape to maintain pressure once bleeding has stopped.  If bleeding continues, then call back to clinic at [phone].    Signs of Infection:  Infection can occur in any area where skin has been disrupted.  If you notice persistent redness, swelling, colored drainage, increasing pain, fever or other signs of infection, please call us at: [phone] and ask to have me or my colleague paged. We will call you back to discuss.    Pathology Results:  You will be notified, generally via letter or MyChart, in approximately 10 days. If there is anything we need to discuss or further treatment needed, I will call you to discuss it.    PATIENT INFORMATION : WOUNDS  During the healing process you will notice a number of changes. All wounds develop a small halo of redness surrounding the wound.  This means healing is occurring. Severe itching with extensive redness usually indicates sensitivity to the ointment or bandage tape used to dress the wound.  You should call our office if this develops.      Swelling  and/or discoloration around your surgical site is common, particularly when performed around the " eye.    All wounds normally drain.  The larger the wound the more drainage there will be.  After 7-10 days, you will notice the wound beginning to shrink and new skin will begin to grow.  The wound is healed when you can see skin has formed over the entire area.  A healed wound has a healthy, shiny look to the surface and is red to dark pink in color to normalize.  Wounds may take approximately 4-6 weeks to heal.  Larger wounds may take 6-8 weeks. After the wound is healed you may discontinue dressing changes.    You may experience a sensation of tightness as your wound heals. This is normal and will gradually subside.    Your healed wound may be sensitive to temperature changes. This sensitivity improves with time, but if you re having a lot of discomfort, try to avoid temperature extremes.    Patients frequently experience itching after their wound appears to have healed because of the continue healing under the skin.  Plain Vaseline will help relieve the itching.    SUN PROTECTION INSTRUCTIONS  Sun damage can lead to skin cancer and premature aging of the skin.      The best way to protect from sun damage to your skin is to avoid the sun during peak hours (10 am - 2 pm) even on overcast days.      Use UPF sun-protective clothing, which while more expensive initially provides longer lasting coverage without having to worry about remembering to re-apply.  1. Wear a wide-brimmed hat and sunglasses.   2. Wear sun-protective clothing.  [employer] and other [employer] make sun protective clothing that are stylish, comfortable and cool. [employer] and other [employer] make UV arm sleeves suitable for golfing, gardening and other activities.      Sunscreen instructions:  1. Use sunscreens with Zinc Oxide, Titanium Dioxide or Avobenzone to protect from UVA rays.  2. Use SPF 30-50+ to protect from UVB rays.  3. Re-apply every 2 hours even if water resistant.  4. Apply on your face every day even when cloudy and  "even in the winter. UVA \"aging rays\" penetrate window glass and are just as strong in the winter as in the summer.    Product Recommendations:    Good examples include: Blue Jhonathan, EltaMD, Solbar    Good daily moisturizers with SPF: Vanicream, CeraVe.    For sensitive skin, consider : SkinMedica Essential Defense Mineral Shield Broad Spectrum SPF 35      Never use tanning beds. Tanning beds are associated with much higher risks of skin cancer.    All tanning damages the skin. Aim for ivory skin year round and you will have less trouble with your skin in years to come. There is no merit in getting \"a base tan\" before a warm weather vacation, as any tanning indicates your body's response to sun damage.    Stop smoking. Smokers have higher rates of skin cancer and also have premature skin wrinkling.    FYI  You should use about 3 tablespoons of sunscreen to protect your whole body. Thus a typical eight ounce bottle of sunscreen should last 4 applications. Remember, that the SPF rating is compromised if you don t apply enough. Most people only apply 1/2 - 1/3 of the amount they need. Also don t forget areas such as your ears, feet, upper back and harder to reach places. Keep in mind that these amounts should be increased for larger body sizes.    Sunscreens with titanium dioxide and/or zinc oxide in the active ingredients are physical blockers as opposed to chemical blockers. Chemical-free sunscreens should not irritate the skin.    Spray-on sunscreens may be used for touch-up application only, not as a base layer. Also, use with caution around small children due to inhalation risk.    Avoid retinyl palmitate products.    Avoid combination products that include both sunscreen and insect repellant, as sunscreen should be applied every 2 hours, but insect repellant should not be applied as frequently.    SPF means sun protection factor, which is just the degree to which the sunscreen can protect against UVB rays. There " is no rating system for UVA rays. SPF is calculated as the time skin will burn when sunscreen is applied vs. skin without sunscreen.    Water resistant sunscreens should be re-applied every 1-2 hours.    For more information:  http://www.skincancer.org/prevention/sun-protection/sunscreen/sunscreens-safe-and-effective    SKIN CANCER SELF-EXAM INSTRUCTIONS  Check every month in the mirror or with a household member. Be aware of any changes, especially bleeding or tenderness. Also, make sure to check your nails for color changes after removal of nail polish.    For melanoma, check for:  A - Asymmetry. One half unlike the other half.  B - Border. Irregular, scalloped, ragged, notched, blurred or poorly defined borders.  C - Color. Color variations from one area to another, with shades of tan, brown and/or black present. Sometimes white, red or blue.  D - Diameter. Greater than 6 mm (about the size of a pencil eraser). Any new growth of a mole should be concerning and be evaluated.  E - Evolving. A mole or skin lesion that looks different from the rest or is changing in size, shape or color.    For basal cell carcinoma and squamous cell carcinoma, check for:    Sores, shiny bumps, nodules, scaly lesions, or wart-like growths that are itchy, tender, crusting, scabbing, eroding, oozing or bleeding.    Open sores/wounds or reddish/irritated areas that do not heal within 2-3 weeks.    Scar-like areas that are white, yellow or waxy in color.    The patient was counseled about sunscreens and sun avoidance. The patient was counseled to check the skin regularly and report any lesion that is new, changing, itching, scabbing, bleeding or otherwise bothersome. The patient was discharged ambulatory and in stable condition.    BP Readings from Last 3 Encounters:   09/11/18 140/77   06/11/18 160/90   04/17/18 134/84     Tiffany Stovall was evaluated in a specialty clinic today at which time her blood pressure was noted to be  elevated. She has been advised to follow up with her primary provider or with a nurse only visit. We are also routing this message to her primary provider as an FYI.    Patient to follow up with Primary Care provider regarding elevated blood pressure.        PROCEDURES:                                                    Name : Shave Excision  Indication : Excision of tissue for pathology evaluation.  Location(s) : Right cheek : 2 mm in size scaly erythematous lesion. ? Actinic keratosis ? Squamous cell carcinoma ? Basal cell carcinoma ? other.  Completed by : Joy Bailon MD  Photo Taken : yes.  Anesthesia : Patient was anesthetized by infiltrating the area surrounding the lesion with 1% lidocaine.   epinephrine [id number] : Yes.  Note : Discussed the risk of pain, infection, scarring, hypo- or hyperpigmentation and recurrence or need for re-treatment. The benefits of treatment and alternative treatments were also discussed.    During this procedure, the universal protocol was utilized. The patient's identity was confirmed by no less than two patient identifiers, correct procedure was verified, correct site was verified and marked as applicable and a final pause was completed.    Sterile technique was used throughout the procedure. The skin was cleaned and prepped with surgical cleanser. Once adequate anesthesia was obtained, the lesion was removed with a deep scallop shave procedure. The specimen was sent to pathology.    Direct pressure and aluminum chloride and monopolar cautery was applied for hemostasis. No bleeding was present upon the completion of the procedure. The wound was coated with antibacterial ointment. A dry sterile dressing was applied. Patient tolerated the procedure well and left in satisfactory condition.    Primary provider and referring provider will be informed regarding the tissue report when it returns.    Name : Excision  Indication : Excision of irritated/enlarging cyst.  Location(s) :  Left forearm, 9 cm distal to olecranon : 6 mm in size smooth freely mobile subepidermal mass most consistent with cyst.  Completed by : Joy Bailon MD  Photo Taken : no.  Anesthesia : Patient was anesthetized by infiltrating the area surrounding the lesion with 1% lidocaine.   epinephrine [id number] : Yes.  Note : Discussed risks of the excision procedure, including pain, infection, scarring, hypopigmentation, hyperpigmentation and potential for recurrence or need for retreatment. Benefits of treatment and alternative treatments were also discussed.    During this procedure, the universal protocol was utilized. The patient's identity was confirmed by no less than two patient identifiers, correct procedure was verified, correct site was verified and marked as applicable and a final pause was completed.    Sterile technique was used throughout the procedure. The skin was cleaned and prepped with surgical cleanser. Once adequate anesthesia was obtained, lesion excision was performed using a #15 blade. Keratin material was expressed. The cyst lining was identified, then dissected out with a Metzenbaum and a curved Kiara. The lining was removed in total with a #2 curette.    Culture was not obtained.    Tissue samples submitted : NO.    Irrigated with sterile saline: No.    Direct pressure was applied for hemostasis.     No bleeding was present upon the completion of the procedure. A dry sterile dressing was applied. Patient tolerated the procedure well and was discharged in satisfactory condition.  Total number of stitches in closure of epidermis : 0      The information in this document, created by the medical scribe for me, accurately reflects the services I personally performed and the decisions made by me. I have reviewed and approved this document for accuracy prior to leaving the patient care area.  Joy Bailon MD September 11, 2018 3:45 PM  [facility] – [place]

## 2018-09-11 NOTE — PATIENT INSTRUCTIONS
"FUTURE APPOINTMENTS    Follow up in 6 months for a full-body skin cancer screening.    Follow up per pathology report.    WOUND CARE INSTRUCTIONS  1. Wash hands before every dressing change.  2. After 24 hours, change dressing daily.  3. Wash the wound area with a mild soap, then rinse.  4. Gently pat dry with a sterile gauze or Q-tip.  5. Using a Q-tip, apply Vaseline or Aquaphor only over entire wound. Do NOT use Neosporin - as many people react to neomycin.  6. Finally, cover with a bandage or sterile non-stick gauze with micropore paper tape.  7. Repeat once daily until wound has healed.      Soap, water and shampoo will not hurt this area.    Do not go swimming or take baths, but showering is encouraged.    Limit use of the area where the procedure was done for a few days to allow for optimal healing.    If you experience bleeding:  Wash hands and hold firm pressure on the area for 10 minutes without checking to see if the bleeding has stopped. \"Checking\" pulls off the protective wound clot and restarts the bleeding all over again. Re-apply pressure for 10 minutes if necessary to stop bleeding.  Use additional sterile gauze and tape to maintain pressure once bleeding has stopped.  If bleeding continues, then call back to clinic at [phone].    Signs of Infection:  Infection can occur in any area where skin has been disrupted.  If you notice persistent redness, swelling, colored drainage, increasing pain, fever or other signs of infection, please call us at: [phone] and ask to have me or my colleague paged. We will call you back to discuss.    Pathology Results:  You will be notified, generally via letter or MyChart, in approximately 10 days. If there is anything we need to discuss or further treatment needed, I will call you to discuss it.    PATIENT INFORMATION : WOUNDS  During the healing process you will notice a number of changes. All wounds develop a small halo of redness surrounding the " wound.  This means healing is occurring. Severe itching with extensive redness usually indicates sensitivity to the ointment or bandage tape used to dress the wound.  You should call our office if this develops.      Swelling  and/or discoloration around your surgical site is common, particularly when performed around the eye.    All wounds normally drain.  The larger the wound the more drainage there will be.  After 7-10 days, you will notice the wound beginning to shrink and new skin will begin to grow.  The wound is healed when you can see skin has formed over the entire area.  A healed wound has a healthy, shiny look to the surface and is red to dark pink in color to normalize.  Wounds may take approximately 4-6 weeks to heal.  Larger wounds may take 6-8 weeks. After the wound is healed you may discontinue dressing changes.    You may experience a sensation of tightness as your wound heals. This is normal and will gradually subside.    Your healed wound may be sensitive to temperature changes. This sensitivity improves with time, but if you re having a lot of discomfort, try to avoid temperature extremes.    Patients frequently experience itching after their wound appears to have healed because of the continue healing under the skin.  Plain Vaseline will help relieve the itching.    SUN PROTECTION INSTRUCTIONS  Sun damage can lead to skin cancer and premature aging of the skin.      The best way to protect from sun damage to your skin is to avoid the sun during peak hours (10 am - 2 pm) even on overcast days.      Use UPF sun-protective clothing, which while more expensive initially provides longer lasting coverage without having to worry about remembering to re-apply.  1. Wear a wide-brimmed hat and sunglasses.   2. Wear sun-protective clothing.  [email or web address] and other [employer] make sun protective clothing that are stylish, comfortable and cool. [employer] and other [employer] make UV arm sleeves  "suitable for golfing, gardening and other activities.      Sunscreen instructions:  1. Use sunscreens with Zinc Oxide, Titanium Dioxide or Avobenzone to protect from UVA rays.  2. Use SPF 30-50+ to protect from UVB rays.  3. Re-apply every 2 hours even if water resistant.  4. Apply on your face every day even when cloudy and even in the winter. UVA \"aging rays\" penetrate window glass and are just as strong in the winter as in the summer.    Product Recommendations:    Good examples include: Blue Lizard, EltaMD, Solbar    Good daily moisturizers with SPF: Vanicream, CeraVe.    For sensitive skin, consider : [employer] Essential Defense Mineral Shield Broad Spectrum SPF 35      Never use tanning beds. Tanning beds are associated with much higher risks of skin cancer.    All tanning damages the skin. Aim for ivory skin year round and you will have less trouble with your skin in years to come. There is no merit in getting \"a base tan\" before a warm weather vacation, as any tanning indicates your body's response to sun damage.    Stop smoking. Smokers have higher rates of skin cancer and also have premature skin wrinkling.    FYI  You should use about 3 tablespoons of sunscreen to protect your whole body. Thus a typical eight ounce bottle of sunscreen should last 4 applications. Remember, that the SPF rating is compromised if you don t apply enough. Most people only apply 1/2 - 1/3 of the amount they need. Also don t forget areas such as your ears, feet, upper back and harder to reach places. Keep in mind that these amounts should be increased for larger body sizes.    Sunscreens with titanium dioxide and/or zinc oxide in the active ingredients are physical blockers as opposed to chemical blockers. Chemical-free sunscreens should not irritate the skin.    Spray-on sunscreens may be used for touch-up application only, not as a base layer. Also, use with caution around small children due to inhalation risk.    Avoid " retinyl palmitate products.    Avoid combination products that include both sunscreen and insect repellant, as sunscreen should be applied every 2 hours, but insect repellant should not be applied as frequently.    SPF means sun protection factor, which is just the degree to which the sunscreen can protect against UVB rays. There is no rating system for UVA rays. SPF is calculated as the time skin will burn when sunscreen is applied vs. skin without sunscreen.    Water resistant sunscreens should be re-applied every 1-2 hours.    For more information:  http://www.skincancer.org/prevention/sun-protection/sunscreen/sunscreens-safe-and-effective    SKIN CANCER SELF-EXAM INSTRUCTIONS  Check every month in the mirror or with a household member. Be aware of any changes, especially bleeding or tenderness. Also, make sure to check your nails for color changes after removal of nail polish.    For melanoma, check for:  A - Asymmetry. One half unlike the other half.  B - Border. Irregular, scalloped, ragged, notched, blurred or poorly defined borders.  C - Color. Color variations from one area to another, with shades of tan, brown and/or black present. Sometimes white, red or blue.  D - Diameter. Greater than 6 mm (about the size of a pencil eraser). Any new growth of a mole should be concerning and be evaluated.  E - Evolving. A mole or skin lesion that looks different from the rest or is changing in size, shape or color.    For basal cell carcinoma and squamous cell carcinoma, check for:    Sores, shiny bumps, nodules, scaly lesions, or wart-like growths that are itchy, tender, crusting, scabbing, eroding, oozing or bleeding.    Open sores/wounds or reddish/irritated areas that do not heal within 2-3 weeks.    Scar-like areas that are white, yellow or waxy in color.

## 2018-09-11 NOTE — LETTER
9/11/2018         RE: Tiffany Stovall  [address]  [facility] [address]        Dear Colleague,    Thank you for referring your patient, Tiffany Stovall, to the [facility]. Please see a copy of my visit note below.    [facility] (formerly [facility]) - PRIMARY CARE SKIN    CC : skin cancer screening (full-body)  SUBJECTIVE:                                                    Tiffany Stovall is a 68 year old female who presents to clinic today for a full-body skin exam because of her history of skin cancer.    She has an upcoming hip replacement surgery.    No bothersome lesions noticed by the patient or other skin concerns :  Issue One : A scaly patch on the right cheek is still present.  Issue Two : A persistent area of chapped skin on the lip has not been resolving with use of Aquaphor. This first began last winter.  Issue Three : A suture has recently extruded from a site of basal cell carcinoma excision on the right forearm from Dec 2016.  Issue Four : A lesion on the left extensor forearm appeared like a mosquito bite 1 month ago. However, it has developed a subepidermal fulness. Her  who is a physician has diagnosed it as a cyst. It is tender only when repetitively bumped.  Issue Five : An excision of basal cell carcinoma on the left lower leg has dehisced.    Personal history of skin cancer : YES  Nodular basal cell carcinoma on the left lower leg (s/p MMS with [facility])  Superficial basal cell carcinoma on right dorsal foot (s/p MMS 12/13/16)  Squamous cell carcinoma in situ on left lateral neck (s/p MMS 12/13/16)  Superficial, multifocal basal cell carcinoma on right mid-dorsal forearm (s/p excision 12/20/16)  Basal cell carcinoma on right dorsal third toe (excised 12/2015)  Basal cell carcinoma on left upper cutaneous lip (s/p Mohs 10/20/2011 and [facility])  Basal cell carcinoma on clavicle  Basal cell carcinoma on right arm.  Family history of skin cancer : YES -  non-melanoma.      Sun Exposure History  Sunscreen Use : YES, frequency : daily, SPF : 30.  UV-protective clothing use : YES  Previous history of significant sun exposure:  Blistering sunburns : YES - as a teenager  Tanning beds : NO.      Occupation : nurse (indoor).    Refer to electronic medical record (EMR) for past medical history and medications.    INTEGUMENTARY/SKIN: POSITIVE for non-healing lesions  ROS : 14 point review of systems was negative except the symptoms listed above in the HPI.    This document serves as a record of the services and decisions personally performed and made by Joy Bailon MD. It was created on her behalf by James Dukes, a trained medical scribe.  The creation of this document is based on the scribe's personal observations and the provider's statements to the medical scribe.  James Dukes, September 11, 2018 3:45 PM      OBJECTIVE:                                                    GENERAL: healthy, alert and no distress  SKIN: Rodriguez Skin Type - I.  This patient was examined from the top of the head to the bottom of the feet  including scalp, face, neck, back, chest, breasts, buttocks, both arms, both legs, both hands, both feet, all 10 fingers and all 10 toes. The dermatoscope was used to help evaluate pigmented lesions.  Skin Pertinent Findings:  Right arm(s) and left arm(s) : brown, macule(s) most consistent with benign solar lentigo. Scattered brown macules of various sizes and shapes most consistent with (benign) melanocytic nevi.    Abdomen : slightly raised, red lesion(s) consistent with capillary hemangioma.    Anterior legs : Multiple brown, macule(s) most consistent with benign solar lentigo.    Back : slightly raised, red lesion(s) consistent with capillary hemangioma. brown, macule(s) most consistent with benign solar lentigo.    Significant Findings:  Right forearm : well-healed scar.  Right upper chest : well-healed scar.  Left knee : well-healed scar inferior to  knee.    Right cheek : 2 mm in size scaly erythematous lesion. ? Actinic keratosis ? Squamous cell carcinoma ? Basal cell carcinoma ? Other      Left forearm, 9 cm distal to olecranon : 6 mm in size smooth freely mobile subepidermal mass most consistent with cyst.    Upper lip midline vermilion border, right forearm : 2 -  mm in size, erythematous, scaly, non-indurated lesion(s) most consistent with actinic keratoses.  Name : Liquid Nitrogen Cry-Ac Cryotherapy.  Indication : Pre-malignant lesion.  Location(s) : upper lip - x1; right radial mid-forearm - x1 .  Completed by : Joy Bailon MD  Note : Discussed natural history of lesion and treatment options. Prior to treatment, we discussed inflammation, tenderness post-procedure, the healing process, and the risks of pain, infection, scarring, blistering, and hypo-/hyperpigmentation after healing. Explained that these lesions may grow back and may need additional treatment or re-treatment. The patient expressed a desire to proceed with cryotherapy.    The lesion(s) was treated with liquid nitrogen Cry-Ac, five second freeze repeated twice with a pause to allow for the area to thaw. If this lesion should recur, then it needs to be re-evaluated.    Patient tolerated the procedure well and left in good condition.  Total number of lesions treated : 2.    Diagnostic Test Results:  none     MDM : . Previous Mohs with Dr. Corley.      ASSESSMENT:                                                      Encounter Diagnoses   Name Primary?     Neoplasm of uncertain behavior of skin Yes     History of basal cell carcinoma      History of squamous cell carcinoma in situ of skin      Family history of nonmelanoma skin cancer      AK (actinic keratosis)      Solar lentigo      Multiple benign melanocytic nevi      Sebaceous cyst          PLAN:                                                    Patient Instructions   FUTURE APPOINTMENTS    Follow up in 6 months for a full-body skin  "cancer screening.    Follow up per pathology report.    WOUND CARE INSTRUCTIONS  1. Wash hands before every dressing change.  2. After 24 hours, change dressing daily.  3. Wash the wound area with a mild soap, then rinse.  4. Gently pat dry with a sterile gauze or Q-tip.  5. Using a Q-tip, apply Vaseline or Aquaphor only over entire wound. Do NOT use Neosporin - as many people react to neomycin.  6. Finally, cover with a bandage or sterile non-stick gauze with micropore paper tape.  7. Repeat once daily until wound has healed.      Soap, water and shampoo will not hurt this area.    Do not go swimming or take baths, but showering is encouraged.    Limit use of the area where the procedure was done for a few days to allow for optimal healing.    If you experience bleeding:  Wash hands and hold firm pressure on the area for 10 minutes without checking to see if the bleeding has stopped. \"Checking\" pulls off the protective wound clot and restarts the bleeding all over again. Re-apply pressure for 10 minutes if necessary to stop bleeding.  Use additional sterile gauze and tape to maintain pressure once bleeding has stopped.  If bleeding continues, then call back to clinic at [phone].    Signs of Infection:  Infection can occur in any area where skin has been disrupted.  If you notice persistent redness, swelling, colored drainage, increasing pain, fever or other signs of infection, please call us at: [phone] and ask to have me or my colleague paged. We will call you back to discuss.    Pathology Results:  You will be notified, generally via letter or MyChart, in approximately 10 days. If there is anything we need to discuss or further treatment needed, I will call you to discuss it.    PATIENT INFORMATION : WOUNDS  During the healing process you will notice a number of changes. All wounds develop a small halo of redness surrounding the wound.  This means healing is occurring. Severe itching with extensive " redness usually indicates sensitivity to the ointment or bandage tape used to dress the wound.  You should call our office if this develops.      Swelling  and/or discoloration around your surgical site is common, particularly when performed around the eye.    All wounds normally drain.  The larger the wound the more drainage there will be.  After 7-10 days, you will notice the wound beginning to shrink and new skin will begin to grow.  The wound is healed when you can see skin has formed over the entire area.  A healed wound has a healthy, shiny look to the surface and is red to dark pink in color to normalize.  Wounds may take approximately 4-6 weeks to heal.  Larger wounds may take 6-8 weeks. After the wound is healed you may discontinue dressing changes.    You may experience a sensation of tightness as your wound heals. This is normal and will gradually subside.    Your healed wound may be sensitive to temperature changes. This sensitivity improves with time, but if you re having a lot of discomfort, try to avoid temperature extremes.    Patients frequently experience itching after their wound appears to have healed because of the continue healing under the skin.  Plain Vaseline will help relieve the itching.    SUN PROTECTION INSTRUCTIONS  Sun damage can lead to skin cancer and premature aging of the skin.      The best way to protect from sun damage to your skin is to avoid the sun during peak hours (10 am - 2 pm) even on overcast days.      Use UPF sun-protective clothing, which while more expensive initially provides longer lasting coverage without having to worry about remembering to re-apply.  1. Wear a wide-brimmed hat and sunglasses.   2. Wear sun-protective clothing.  [employer] and other [employer] make sun protective clothing that are stylish, comfortable and cool. [employer] and other [employer] make UV arm sleeves suitable for golfing, gardening and other activities.      Sunscreen  "instructions:  1. Use sunscreens with Zinc Oxide, Titanium Dioxide or Avobenzone to protect from UVA rays.  2. Use SPF 30-50+ to protect from UVB rays.  3. Re-apply every 2 hours even if water resistant.  4. Apply on your face every day even when cloudy and even in the winter. UVA \"aging rays\" penetrate window glass and are just as strong in the winter as in the summer.    Product Recommendations:    Good examples include: Blue Lizard, EltaMD, Solbar    Good daily moisturizers with SPF: Vanicream, CeraVe.    For sensitive skin, consider : SkinMedica Essential Defense Mineral Shield Broad Spectrum SPF 35      Never use tanning beds. Tanning beds are associated with much higher risks of skin cancer.    All tanning damages the skin. Aim for ivory skin year round and you will have less trouble with your skin in years to come. There is no merit in getting \"a base tan\" before a warm weather vacation, as any tanning indicates your body's response to sun damage.    Stop smoking. Smokers have higher rates of skin cancer and also have premature skin wrinkling.    FYI  You should use about 3 tablespoons of sunscreen to protect your whole body. Thus a typical eight ounce bottle of sunscreen should last 4 applications. Remember, that the SPF rating is compromised if you don t apply enough. Most people only apply 1/2 - 1/3 of the amount they need. Also don t forget areas such as your ears, feet, upper back and harder to reach places. Keep in mind that these amounts should be increased for larger body sizes.    Sunscreens with titanium dioxide and/or zinc oxide in the active ingredients are physical blockers as opposed to chemical blockers. Chemical-free sunscreens should not irritate the skin.    Spray-on sunscreens may be used for touch-up application only, not as a base layer. Also, use with caution around small children due to inhalation risk.    Avoid retinyl palmitate products.    Avoid combination products that include both " sunscreen and insect repellant, as sunscreen should be applied every 2 hours, but insect repellant should not be applied as frequently.    SPF means sun protection factor, which is just the degree to which the sunscreen can protect against UVB rays. There is no rating system for UVA rays. SPF is calculated as the time skin will burn when sunscreen is applied vs. skin without sunscreen.    Water resistant sunscreens should be re-applied every 1-2 hours.    For more information:  http://www.skincancer.org/prevention/sun-protection/sunscreen/sunscreens-safe-and-effective    SKIN CANCER SELF-EXAM INSTRUCTIONS  Check every month in the mirror or with a household member. Be aware of any changes, especially bleeding or tenderness. Also, make sure to check your nails for color changes after removal of nail polish.    For melanoma, check for:  A - Asymmetry. One half unlike the other half.  B - Border. Irregular, scalloped, ragged, notched, blurred or poorly defined borders.  C - Color. Color variations from one area to another, with shades of tan, brown and/or black present. Sometimes white, red or blue.  D - Diameter. Greater than 6 mm (about the size of a pencil eraser). Any new growth of a mole should be concerning and be evaluated.  E - Evolving. A mole or skin lesion that looks different from the rest or is changing in size, shape or color.    For basal cell carcinoma and squamous cell carcinoma, check for:    Sores, shiny bumps, nodules, scaly lesions, or wart-like growths that are itchy, tender, crusting, scabbing, eroding, oozing or bleeding.    Open sores/wounds or reddish/irritated areas that do not heal within 2-3 weeks.    Scar-like areas that are white, yellow or waxy in color.    The patient was counseled about sunscreens and sun avoidance. The patient was counseled to check the skin regularly and report any lesion that is new, changing, itching, scabbing, bleeding or otherwise bothersome. The patient was  discharged ambulatory and in stable condition.    BP Readings from Last 3 Encounters:   09/11/18 140/77   06/11/18 160/90   04/17/18 134/84     Tiffany Stovall was evaluated in a specialty clinic today at which time her blood pressure was noted to be elevated. She has been advised to follow up with her primary provider or with a nurse only visit. We are also routing this message to her primary provider as an FYI.    Patient to follow up with Primary Care provider regarding elevated blood pressure.        PROCEDURES:                                                    Name : Shave Excision  Indication : Excision of tissue for pathology evaluation.  Location(s) : Right cheek : 2 mm in size scaly erythematous lesion. ? Actinic keratosis ? Squamous cell carcinoma ? Basal cell carcinoma ? other.  Completed by : Joy Bailon MD  Photo Taken : yes.  Anesthesia : Patient was anesthetized by infiltrating the area surrounding the lesion with 1% lidocaine.   epinephrine [id number] : Yes.  Note : Discussed the risk of pain, infection, scarring, hypo- or hyperpigmentation and recurrence or need for re-treatment. The benefits of treatment and alternative treatments were also discussed.    During this procedure, the universal protocol was utilized. The patient's identity was confirmed by no less than two patient identifiers, correct procedure was verified, correct site was verified and marked as applicable and a final pause was completed.    Sterile technique was used throughout the procedure. The skin was cleaned and prepped with surgical cleanser. Once adequate anesthesia was obtained, the lesion was removed with a deep scallop shave procedure. The specimen was sent to pathology.    Direct pressure and aluminum chloride and monopolar cautery was applied for hemostasis. No bleeding was present upon the completion of the procedure. The wound was coated with antibacterial ointment. A dry sterile dressing was applied. Patient  tolerated the procedure well and left in satisfactory condition.    Primary provider and referring provider will be informed regarding the tissue report when it returns.    Name : Excision  Indication : Excision of irritated/enlarging cyst.  Location(s) : Left forearm, 9 cm distal to olecranon : 6 mm in size smooth freely mobile subepidermal mass most consistent with cyst.  Completed by : Joy Bailon MD  Photo Taken : no.  Anesthesia : Patient was anesthetized by infiltrating the area surrounding the lesion with 1% lidocaine.   epinephrine [id number] : Yes.  Note : Discussed risks of the excision procedure, including pain, infection, scarring, hypopigmentation, hyperpigmentation and potential for recurrence or need for retreatment. Benefits of treatment and alternative treatments were also discussed.    During this procedure, the universal protocol was utilized. The patient's identity was confirmed by no less than two patient identifiers, correct procedure was verified, correct site was verified and marked as applicable and a final pause was completed.    Sterile technique was used throughout the procedure. The skin was cleaned and prepped with surgical cleanser. Once adequate anesthesia was obtained, lesion excision was performed using a #15 blade. Keratin material was expressed. The cyst lining was identified, then dissected out with a Metzenbaum and a curved Kaira. The lining was removed in total with a #2 curette.    Culture was not obtained.    Tissue samples submitted : NO.    Irrigated with sterile saline: No.    Direct pressure was applied for hemostasis.     No bleeding was present upon the completion of the procedure. A dry sterile dressing was applied. Patient tolerated the procedure well and was discharged in satisfactory condition.  Total number of stitches in closure of epidermis : 0      The information in this document, created by the medical scribe for me, accurately reflects the services I  personally performed and the decisions made by me. I have reviewed and approved this document for accuracy prior to leaving the patient care area.  Joy Bailon MD September 11, 2018 3:45 PM  [facility] [place]    Again, thank you for allowing me to participate in the care of your patient.        Sincerely,        Nataliya Bailon MD

## 2018-09-17 ENCOUNTER — TELEPHONE (OUTPATIENT)
Dept: FAMILY MEDICINE | Facility: CLINIC | Age: 68
End: 2018-09-17

## 2018-09-17 LAB — COPATH REPORT: NORMAL

## 2018-09-17 NOTE — TELEPHONE ENCOUNTER
Encounter : voicemail  Discussed lab results :       Pathology report : Skin, right cheek:   - Actinic keratosis - (see description)     Left voicemail.

## 2018-10-29 ENCOUNTER — OFFICE VISIT (OUTPATIENT)
Dept: OPTOMETRY | Facility: CLINIC | Age: 68
End: 2018-10-29
Payer: COMMERCIAL

## 2018-10-29 DIAGNOSIS — H43.813 POSTERIOR VITREOUS DETACHMENT OF BOTH EYES: ICD-10-CM

## 2018-10-29 DIAGNOSIS — H52.221 REGULAR ASTIGMATISM OF RIGHT EYE: ICD-10-CM

## 2018-10-29 DIAGNOSIS — Z96.1 PSEUDOPHAKIA OF BOTH EYES: ICD-10-CM

## 2018-10-29 DIAGNOSIS — H52.4 PRESBYOPIA: Primary | ICD-10-CM

## 2018-10-29 DIAGNOSIS — H52.12 MYOPIA OF LEFT EYE: ICD-10-CM

## 2018-10-29 PROCEDURE — 92015 DETERMINE REFRACTIVE STATE: CPT | Performed by: OPTOMETRIST

## 2018-10-29 PROCEDURE — 92004 COMPRE OPH EXAM NEW PT 1/>: CPT | Performed by: OPTOMETRIST

## 2018-10-29 ASSESSMENT — CUP TO DISC RATIO
OS_RATIO: 0.2
OD_RATIO: 0.2

## 2018-10-29 ASSESSMENT — SLIT LAMP EXAM - LIDS
COMMENTS: NORMAL
COMMENTS: NORMAL

## 2018-10-29 ASSESSMENT — VISUAL ACUITY
METHOD: SNELLEN - LINEAR
OD_CC: 20/20-2
OS_CC: 20/20
OS_SC: 20/40
OD_SC: 20/40
CORRECTION_TYPE: GLASSES

## 2018-10-29 ASSESSMENT — REFRACTION_MANIFEST
OD_CYLINDER: +1.25
OD_ADD: +2.50
OD_SPHERE: -1.50
OS_SPHERE: -0.75
OS_CYLINDER: SPHERE
OD_AXIS: 045
OS_ADD: +2.50

## 2018-10-29 ASSESSMENT — EXTERNAL EXAM - RIGHT EYE: OD_EXAM: NORMAL

## 2018-10-29 ASSESSMENT — EXTERNAL EXAM - LEFT EYE: OS_EXAM: NORMAL

## 2018-10-29 ASSESSMENT — CONF VISUAL FIELD
OD_NORMAL: 1
OS_NORMAL: 1

## 2018-10-29 ASSESSMENT — REFRACTION_WEARINGRX
OD_SPHERE: +2.50
SPECS_TYPE: OTC READERS
OS_SPHERE: +2.50

## 2018-10-29 ASSESSMENT — TONOMETRY
OD_IOP_MMHG: 14
IOP_METHOD: TONOPEN
OS_IOP_MMHG: 17

## 2018-10-29 NOTE — PROGRESS NOTES
Chief Complaint   Patient presents with     COMPREHENSIVE EYE EXAM         Last Eye Exam: 7-2017  Dilated Previously: Yes    What are you currently using to see? otc readers       Distance Vision Acuity: Satisfied with vision,a little harder driving at night    Near Vision Acuity: Satisfied with vision while reading  with otc readers    Eye Comfort: good  Do you use eye drops? 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          Medical, surgical and family histories reviewed and updated 10/29/2018.       OBJECTIVE: See Ophthalmology exam    ASSESSMENT:    ICD-10-CM    1. Presbyopia H52.4 REFRACTION   2. Regular astigmatism of right eye H52.221 REFRACTION   3. Myopia of left eye H52.12 REFRACTION   4. Pseudophakia of both eyes Z96.1 EYE EXAM (SIMPLE-NONBILLABLE)   5. Posterior vitreous detachment of both eyes H43.813 EYE EXAM (SIMPLE-NONBILLABLE)      PLAN:     Patient Instructions   Eyeglass prescription given.  Give the glasses 1-2 weeks to adjust to the new prescription.  You may get headaches or eyestrain as your eyes get used to the new prescription.  Sometimes the symptoms get worse before it gets better.  If any problems after 1-2 weeks schedule an appointment for a recheck.      Return in 1 year for a complete eye exam or sooner if needed.    Jose Luis Foss, OD

## 2018-10-29 NOTE — LETTER
10/29/2018         RE: Tiffany Stovall  [address]  [facility] [address]        Dear Colleague,    Thank you for referring your patient, Tiffany Stovall, to the [facility]. Please see a copy of my visit note below.    Chief Complaint   Patient presents with     COMPREHENSIVE EYE EXAM         Last Eye Exam: 7-2017  Dilated Previously: Yes    What are you currently using to see? otc readers       Distance Vision Acuity: Satisfied with vision,a little harder driving at night    Near Vision Acuity: Satisfied with vision while reading  with otc readers    Eye Comfort: good  Do you use eye drops? 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          Medical, surgical and family histories reviewed and updated 10/29/2018.       OBJECTIVE: See Ophthalmology exam    ASSESSMENT:    ICD-10-CM    1. Presbyopia H52.4 REFRACTION   2. Regular astigmatism of right eye H52.221 REFRACTION   3. Myopia of left eye H52.12 REFRACTION   4. Pseudophakia of both eyes Z96.1 EYE EXAM (SIMPLE-NONBILLABLE)   5. Posterior vitreous detachment of both eyes H43.813 EYE EXAM (SIMPLE-NONBILLABLE)      PLAN:     Patient Instructions   Eyeglass prescription given.  Give the glasses 1-2 weeks to adjust to the new prescription.  You may get headaches or eyestrain as your eyes get used to the new prescription.  Sometimes the symptoms get worse before it gets better.  If any problems after 1-2 weeks schedule an appointment for a recheck.      Return in 1 year for a complete eye exam or sooner if needed.    Jose Luis Foss, DASIA           Again, thank you for allowing me to participate in the care of your patient.        Sincerely,        Jose Luis Foss, OD

## 2018-10-29 NOTE — MR AVS SNAPSHOT
After Visit Summary   10/29/2018    Tiffany Stovall    MRN: [id number]           Patient Information     Date Of Birth          1950        Visit Information        Provider Department      10/29/2018 6:00 PM Jose Luis Foss OD [facility]        Today's Diagnoses     Presbyopia    -  1    Regular astigmatism of right eye        Myopia of left eye        Pseudophakia of both eyes        Posterior vitreous detachment of both eyes          Care Instructions    Eyeglass prescription given.  Give the glasses 1-2 weeks to adjust to the new prescription.  You may get headaches or eyestrain as your eyes get used to the new prescription.  Sometimes the symptoms get worse before it gets better.  If any problems after 1-2 weeks schedule an appointment for a recheck.      Return in 1 year for a complete eye exam or sooner if needed.    Jose Luis Foss OD    The affects of the dilating drops last for 4- 6 hours.  You will be more sensitive to light and vision will be blurry up close.  Mydriatic sunglasses were given if needed.      Optometry Providers       Clinic Locations                                 Telephone Number   [facility] Jamaica [facility]ilar [facility]   [place] [phone]     [facility] Hours:                [facility] Hours:       [facility] Hours:   [address]   [address]     [address] [place] [address]   [place] [address]    [place] [address]  Phone: [phone]                    Phone: [phone]     Phone: [phone]                      Monday 8:00-7:00                          Monday 8:00-7:00                          Monday 8:00-7:00              Tuesday 8:00-6:00                          Tuesday 8:00-7:00                          Tuesday 8:00-7:00              Wednesday 8:00-6:00                   Wednesday 8:00-7:00                   Wednesday 8:00-7:00      Thursday 8:00-6:00                        Thursday 8:00-7:00                         Thursday 8:00-7:00            Friday 8:00-5:00                              Friday 8:00-5:00                              Friday 8:00-5:00    [facility] Hours:   [address] [place] [address]  [phone]    Monday 8:00-7:00 Tuesday 8:00-7:00 Wednesday 8:00-7:00 Thursday 8:00-7:00  Friday 8:00-5:00  Please log on to [email or web address] to order your contact lenses.  The link is found on the [facility] Care [facility] Vision [facility] page.  As always, Thank you for trusting us with your health care needs!              Follow-ups after your visit        Follow-up notes from your care team     Return in about 1 year (around 10/29/2019) for Annual Visit.      Your next 10 appointments already scheduled     Dec 20, 2018  8:40 AM CST   Pre-Op physical with Darby Williamson MD   [facility] ([facility]    [address] [place] [id number] [phone]              Who to contact     If you have questions or need follow up information about today's clinic visit or your schedule please contact [facility] directly at [phone].  Normal or non-critical lab and imaging results will be communicated to you by MyChart, letter or phone within 4 business days after the clinic has received the results. If you do not hear from us within 7 days, please contact the clinic through [employer] or phone. If you have a critical or abnormal lab result, we will notify you by phone as soon as possible.  Submit refill requests through [employer] or call your pharmacy and they will forward the refill request to us. Please allow 3 business days for your refill to be completed.          Additional Information About Your Visit        [employer] Information     [employer] gives you secure access to your electronic health record. If you see  a primary care provider, you can also send messages to your care team and make appointments. If you have questions, please call your primary care clinic.  If you do not have a primary care provider, please call [phone] and they will assist you.        Care EveryWhere ID     This is your Care EveryWhere ID. This could be used by other organizations to access your [facility] medical records  [id number]         Blood Pressure from Last 3 Encounters:   09/11/18 140/77   06/11/18 160/90   04/17/18 134/84    Weight from Last 3 Encounters:   06/11/18 77.1 kg (170 lb)   04/17/18 78.9 kg (174 lb)   12/11/17 77.1 kg (170 lb)              We Performed the Following     EYE EXAM (SIMPLE-NONBILLABLE)     REFRACTION        Primary Care Provider Office Phone # Fax #    Darby Williamson [phone] [address]  [facility] [address]        Equal Access to Services     RONAN [facility] HospitalDWAIN : Hadii aad ku hadasho Soomaali, waaxda luqadaha, qaybta kaalmada adeegyada, waxay idiin hayaan remyeg kharaangel latawana . So [facility] [phone].    ATENCIÓN: Si habla español, tiene a catalan disposición servicios gratuitos de asistencia lingüística. Llame al [phone].    We comply with applicable federal civil rights laws and [place] laws. We do not discriminate on the basis of race, color, national origin, age, disability, sex, sexual orientation, or gender identity.            Thank you!     Thank you for choosing [facility]  for your care. Our goal is always to provide you with excellent care. Hearing back from our patients is one way we can continue to improve our services. Please take a few minutes to complete the written survey that you may receive in the mail after your visit with us. Thank you!             Your Updated Medication List - Protect others around you: Learn how to safely use, store and throw away your medicines at www.disposemymeds.org.          This list is accurate as of 10/29/18   6:46 PM.  Always use your most recent med list.                   Brand Name Dispense Instructions for use Diagnosis    * albuterol (2.5 MG/3ML) 0.083% neb solution     25 vial    Take 1 vial (2.5 mg) by nebulization every 6 hours as needed for shortness of breath / dyspnea    Mild persistent asthma without complication, Mild persistent asthma with exacerbation       * albuterol 108 (90 Base) MCG/ACT inhaler    PROAIR HFA    1 Inhaler    Inhale 1-2 puffs into the lungs every 4 hours as needed for shortness of breath / dyspnea    Mild persistent asthma without complication       atorvastatin 40 MG tablet    LIPITOR    90 tablet    Take 1 tablet (40 mg) by mouth daily    Hyperlipidemia LDL goal <130       EPINEPHrine 0.3 MG/0.3ML injection 2-pack    EPIPEN/ADRENACLICK/or ANY BX GENERIC EQUIV    0.6 mL    Inject 0.3 mLs (0.3 mg) into the muscle once as needed    Allergic reaction, initial encounter       fluticasone-salmeterol 250-50 MCG/DOSE diskus inhaler    ADVAIR DISKUS    3 Inhaler    Inhale 1 puff into the lungs 2 times daily    Mild persistent asthma without complication       levothyroxine 112 MCG tablet    SYNTHROID/LEVOTHROID    90 tablet    Take 1 tablet (112 mcg) by mouth every morning Patient needs to be seen prior to further refills.    Hypothyroidism due to acquired atrophy of thyroid       loratadine 10 MG tablet    CLARITIN     Take 10 mg by mouth daily        losartan-hydrochlorothiazide 100-25 MG per tablet    HYZAAR    90 tablet    Take 1 tablet by mouth every morning    Hypertension goal BP (blood pressure) < 140/90       Multi-vitamin Tabs tablet   Generic drug:  multivitamin, therapeutic with minerals      ONE TABLET DAILY        NEXIUM PO      Take 40 mg by mouth daily        potassium chloride SA 20 MEQ CR tablet    K-DUR/KLOR-CON M    90 tablet    Take 1 tablet (20 mEq) by mouth daily    Hypertension goal BP (blood pressure) < 140/90       TYLENOL EXTRA STRENGTH PO           ZANTAC PO            * Notice:  This list has 2 medication(s) that are the same as other medications prescribed for you. Read the directions carefully, and ask your doctor or other care provider to review them with you.

## 2018-10-29 NOTE — PATIENT INSTRUCTIONS
Eyeglass prescription given.  Give the glasses 1-2 weeks to adjust to the new prescription.  You may get headaches or eyestrain as your eyes get used to the new prescription.  Sometimes the symptoms get worse before it gets better.  If any problems after 1-2 weeks schedule an appointment for a recheck.      Return in 1 year for a complete eye exam or sooner if needed.    Jose Luis Foss, DASIA    The affects of the dilating drops last for 4- 6 hours.  You will be more sensitive to light and vision will be blurry up close.  Mydriatic sunglasses were given if needed.      Optometry Providers       Clinic Locations                                 Telephone Number   Dr. Jamaica Stevens and [place]   [facility] [phone]     [facility] Hours:                [facility] Hours:       [facility] Hours:   [address]   [address]      [address]  [place] [address]   [place] [address]    [place] [address]  Phone: [phone]                    Phone: [phone]     Phone: [phone]                      Monday 8:00-7:00                          Monday 8:00-7:00                          Monday 8:00-7:00              Tuesday 8:00-6:00                          Tuesday 8:00-7:00                          Tuesday 8:00-7:00              Wednesday 8:00-6:00                  Wednesday 8:00-7:00                   Wednesday 8:00-7:00      Thursday 8:00-6:00                        Thursday 8:00-7:00                         Thursday 8:00-7:00            Friday 8:00-5:00                              Friday 8:00-5:00                              Friday 8:00-5:00    [facility] Hours:   [address] [place] [id number] [phone]    Monday 8:00-7:00  Tuesday 8:00-7:00  Wednesday 8:00-7:00  Thursday 8:00-7:00  Friday 8:00-5:00  Please log on to [email or web address]  to order your contact lenses.  The link is found on the [facility] Care [facility] Vision [facility] page.  As always, Thank you for trusting us with your health care needs!

## 2018-11-20 ENCOUNTER — TELEPHONE (OUTPATIENT)
Dept: FAMILY MEDICINE | Facility: CLINIC | Age: 68
End: 2018-11-20

## 2018-11-20 DIAGNOSIS — E03.4 HYPOTHYROIDISM DUE TO ACQUIRED ATROPHY OF THYROID: ICD-10-CM

## 2018-11-20 RX ORDER — LEVOTHYROXINE SODIUM 112 UG/1
112 TABLET ORAL EVERY MORNING
Qty: 30 TABLET | Refills: 0 | Status: SHIPPED | OUTPATIENT
Start: 2018-11-20 | End: 2019-01-28

## 2018-11-20 NOTE — TELEPHONE ENCOUNTER
Please see message below from patient. Spoke with patient and told her that she has refills and enough medication to last until 4/17/19. Told her she just needs to call the pharmacy she wants the medication to go to and ask them to do a transfer of one of the refills to the pharmacy where she is in [place]. She stated that she is not going to do that. She stated that she wants Dr. Williamson to make it easy for her and have Dr. Williamson call a refill into the pharmacy for her.     RN cannot refill medication since she has enough medication on file to last until 4/17/18.    Mary Crook RN

## 2018-11-20 NOTE — TELEPHONE ENCOUNTER
Reason for Call:  Other prescription    Detailed comments: patient is out of town and left her RX at home for levothyroxine (SYNTHROID/LEVOTHROID) 112 MCG tablet. Patient is requesting a 30 day supply and send to [id number] - [place], [place] - [address]  Patient is out of her Medication and would like script sent today if possible. Thanks.   Phone Number Patient can be reached at: Cell number on file:    Telephone Information:   Mobile [phone]       Best Time: anytime     Can we leave a detailed message on this number? YES    Call taken on 11/20/2018 at 8:28 AM by Chelsea Alegria

## 2018-12-17 ENCOUNTER — HOSPITAL ENCOUNTER (OUTPATIENT)
Dept: LAB | Facility: CLINIC | Age: 68
Discharge: HOME | End: 2018-12-17
Attending: ORTHOPAEDIC SURGERY | Admitting: ORTHOPAEDIC SURGERY
Payer: COMMERCIAL

## 2018-12-17 ENCOUNTER — HOSPITAL ENCOUNTER (OUTPATIENT)
Dept: MAMMOGRAPHY | Facility: CLINIC | Age: 68
End: 2018-12-17
Attending: FAMILY MEDICINE
Payer: COMMERCIAL

## 2018-12-17 DIAGNOSIS — Z12.31 VISIT FOR SCREENING MAMMOGRAM: ICD-10-CM

## 2018-12-17 DIAGNOSIS — Z01.812 PRE-OPERATIVE LABORATORY EXAMINATION: ICD-10-CM

## 2018-12-17 LAB
MRSA DNA SPEC QL NAA+PROBE: NEGATIVE
SPECIMEN SOURCE: NORMAL

## 2018-12-17 PROCEDURE — 77063 BREAST TOMOSYNTHESIS BI: CPT

## 2018-12-17 PROCEDURE — 87640 STAPH A DNA AMP PROBE: CPT | Performed by: ORTHOPAEDIC SURGERY

## 2018-12-17 PROCEDURE — 40000829 ZZHCL STATISTIC STAPH AUREUS SUSCEPT SCREEN PCR: Performed by: ORTHOPAEDIC SURGERY

## 2018-12-17 PROCEDURE — 87641 MR-STAPH DNA AMP PROBE: CPT | Performed by: ORTHOPAEDIC SURGERY

## 2018-12-17 PROCEDURE — 40000830 ZZHCL STATISTIC STAPH AUREUS METH RESIST SCREEN PCR: Performed by: ORTHOPAEDIC SURGERY

## 2018-12-20 ENCOUNTER — OFFICE VISIT (OUTPATIENT)
Dept: FAMILY MEDICINE | Facility: CLINIC | Age: 68
End: 2018-12-20
Payer: COMMERCIAL

## 2018-12-20 VITALS
TEMPERATURE: 98.3 F | BODY MASS INDEX: 25.71 KG/M2 | HEIGHT: 66 IN | OXYGEN SATURATION: 98 % | DIASTOLIC BLOOD PRESSURE: 84 MMHG | WEIGHT: 160 LBS | SYSTOLIC BLOOD PRESSURE: 124 MMHG | RESPIRATION RATE: 14 BRPM | HEART RATE: 84 BPM

## 2018-12-20 DIAGNOSIS — R35.0 URINARY FREQUENCY: ICD-10-CM

## 2018-12-20 DIAGNOSIS — J45.30 MILD PERSISTENT ASTHMA WITHOUT COMPLICATION: ICD-10-CM

## 2018-12-20 DIAGNOSIS — N30.00 ACUTE CYSTITIS WITHOUT HEMATURIA: ICD-10-CM

## 2018-12-20 DIAGNOSIS — Z78.0 ASYMPTOMATIC POSTMENOPAUSAL STATUS: ICD-10-CM

## 2018-12-20 DIAGNOSIS — K21.9 GASTROESOPHAGEAL REFLUX DISEASE WITHOUT ESOPHAGITIS: ICD-10-CM

## 2018-12-20 DIAGNOSIS — I10 HYPERTENSION GOAL BP (BLOOD PRESSURE) < 140/90: ICD-10-CM

## 2018-12-20 DIAGNOSIS — E03.4 HYPOTHYROIDISM DUE TO ACQUIRED ATROPHY OF THYROID: ICD-10-CM

## 2018-12-20 DIAGNOSIS — M17.12 PRIMARY OSTEOARTHRITIS OF LEFT KNEE: ICD-10-CM

## 2018-12-20 DIAGNOSIS — Z01.818 PREOP GENERAL PHYSICAL EXAM: Primary | ICD-10-CM

## 2018-12-20 LAB
ALBUMIN SERPL-MCNC: 4.4 G/DL (ref 3.4–5)
ALBUMIN UR-MCNC: ABNORMAL MG/DL
ALP SERPL-CCNC: 60 U/L (ref 40–150)
ALT SERPL W P-5'-P-CCNC: 42 U/L (ref 0–50)
ANION GAP SERPL CALCULATED.3IONS-SCNC: 8 MMOL/L (ref 3–14)
APPEARANCE UR: CLEAR
AST SERPL W P-5'-P-CCNC: 30 U/L (ref 0–45)
BACTERIA #/AREA URNS HPF: ABNORMAL /HPF
BILIRUB SERPL-MCNC: 0.5 MG/DL (ref 0.2–1.3)
BILIRUB UR QL STRIP: NEGATIVE
BUN SERPL-MCNC: 12 MG/DL (ref 7–30)
CALCIUM SERPL-MCNC: 9.5 MG/DL (ref 8.5–10.1)
CHLORIDE SERPL-SCNC: 103 MMOL/L (ref 94–109)
CO2 SERPL-SCNC: 25 MMOL/L (ref 20–32)
COLOR UR AUTO: YELLOW
CREAT SERPL-MCNC: 0.75 MG/DL (ref 0.52–1.04)
GFR SERPL CREATININE-BSD FRML MDRD: 83 ML/MIN/{1.73_M2}
GLUCOSE SERPL-MCNC: 110 MG/DL (ref 70–99)
GLUCOSE UR STRIP-MCNC: NEGATIVE MG/DL
HGB BLD-MCNC: 12.8 G/DL (ref 11.7–15.7)
HGB UR QL STRIP: ABNORMAL
KETONES UR STRIP-MCNC: NEGATIVE MG/DL
LEUKOCYTE ESTERASE UR QL STRIP: ABNORMAL
NITRATE UR QL: NEGATIVE
NON-SQ EPI CELLS #/AREA URNS LPF: ABNORMAL /LPF
PH UR STRIP: 7 PH (ref 5–7)
POTASSIUM SERPL-SCNC: 3.8 MMOL/L (ref 3.4–5.3)
PROT SERPL-MCNC: 7.6 G/DL (ref 6.8–8.8)
RBC #/AREA URNS AUTO: ABNORMAL /HPF
SODIUM SERPL-SCNC: 136 MMOL/L (ref 133–144)
SOURCE: ABNORMAL
SP GR UR STRIP: 1.01 (ref 1–1.03)
UROBILINOGEN UR STRIP-ACNC: 0.2 EU/DL (ref 0.2–1)
WBC #/AREA URNS AUTO: ABNORMAL /HPF

## 2018-12-20 PROCEDURE — 85018 HEMOGLOBIN: CPT | Performed by: FAMILY MEDICINE

## 2018-12-20 PROCEDURE — 87186 SC STD MICRODIL/AGAR DIL: CPT | Performed by: FAMILY MEDICINE

## 2018-12-20 PROCEDURE — 93000 ELECTROCARDIOGRAM COMPLETE: CPT | Performed by: FAMILY MEDICINE

## 2018-12-20 PROCEDURE — 80053 COMPREHEN METABOLIC PANEL: CPT | Performed by: FAMILY MEDICINE

## 2018-12-20 PROCEDURE — 87086 URINE CULTURE/COLONY COUNT: CPT | Performed by: FAMILY MEDICINE

## 2018-12-20 PROCEDURE — 36415 COLL VENOUS BLD VENIPUNCTURE: CPT | Performed by: FAMILY MEDICINE

## 2018-12-20 PROCEDURE — 99215 OFFICE O/P EST HI 40 MIN: CPT | Performed by: FAMILY MEDICINE

## 2018-12-20 PROCEDURE — 87088 URINE BACTERIA CULTURE: CPT | Performed by: FAMILY MEDICINE

## 2018-12-20 PROCEDURE — 81001 URINALYSIS AUTO W/SCOPE: CPT | Performed by: FAMILY MEDICINE

## 2018-12-20 ASSESSMENT — MIFFLIN-ST. JEOR: SCORE: 1282.51

## 2018-12-20 ASSESSMENT — PAIN SCALES - GENERAL: PAINLEVEL: NO PAIN (0)

## 2018-12-20 NOTE — LETTER
My Asthma Action Plan  Name: Tiffany Stovall   YOB: 1950  Date: 12/23/2018   My doctor: Darby Williamson MD   My clinic: [facility]        My Control Medicine: Fluticasone + salmeterol (Advair) -  Diskus 250/50 mcg 1 puff twice daily  My Rescue Medicine: Albuterol nebulizer solution 1 neb as needed for shortness of breath, wheezing  Albuterol (Proair/Ventolin/Proventil) inhaler 2 puffs every 4-6 hours as needed for shortness of breath/wheezing   My Asthma Severity: mild persistent  Avoid your asthma triggers: upper respiratory infections and pollens  None            GREEN ZONE   Good Control    I feel good    No cough or wheeze    Can work, sleep and play without asthma symptoms       Take your asthma control medicine every day.     1. If exercise triggers your asthma, take your rescue medication    15 minutes before exercise or sports, and    During exercise if you have asthma symptoms  2. Spacer to use with inhaler: If you have a spacer, make sure to use it with your inhaler             YELLOW ZONE Getting Worse  I have ANY of these:    I do not feel good    Cough or wheeze    Chest feels tight    Wake up at night   1. Keep taking your Green Zone medications  2. Start taking your rescue medicine:    every 20 minutes for up to 1 hour. Then every 4 hours for 24-48 hours.  3. If you stay in the Yellow Zone for more than 12-24 hours, contact your doctor.  4. If you do not return to the Green Zone in 12-24 hours or you get worse, start taking your oral steroid medicine if prescribed by your provider.           RED ZONE Medical Alert - Get Help  I have ANY of these:    I feel awful    Medicine is not helping    Breathing getting harder    Trouble walking or talking    Nose opens wide to breathe       1. Take your rescue medicine NOW  2. If your provider has prescribed an oral steroid medicine, start taking it NOW  3. Call your doctor NOW  4. If you are still in the Red Zone after 20  minutes and you have not reached your doctor:    Take your rescue medicine again and    Call 911 or go to the emergency room right away    See your regular doctor within 2 weeks of an Emergency Room or Urgent Care visit for follow-up treatment.          Annual Reminders:  Meet with Asthma Educator,  Flu Shot in the Fall, consider Pneumonia Vaccination for patients with asthma (aged 19 and older).    Pharmacy:    [facility]  [id number] - [place], [place] - [address] [place] [address] N AT [facility]  [facility] - [facility], [place] - [address] [place] [address]                      Asthma Triggers  How To Control Things That Make Your Asthma Worse    Triggers are things that make your asthma worse.  Look at the list below to help you find your triggers and what you can do about them.  You can help prevent asthma flare-ups by staying away from your triggers.      Trigger                                                          What you can do   Cigarette Smoke  Tobacco smoke can make asthma worse. Do not allow smoking in your home, car or around you.  Be sure no one smokes at a child s day care or school.  If you smoke, ask your health care provider for ways to help you quit.  Ask family members to quit too.  Ask your health care provider for a referral to Quit Plan to help you quit smoking, or call 7-201-449-PLAN.     Colds, Flu, Bronchitis  These are common triggers of asthma. Wash your hands often.  Don t touch your eyes, nose or mouth.  Get a flu shot every year.     Dust Mites  These are tiny bugs that live in cloth or carpet. They are too small to see. Wash sheets and blankets in hot water every week.   Encase pillows and mattress in dust mite proof covers.  Avoid having carpet if you can. If you have carpet, vacuum weekly.   Use a dust mask and HEPA vacuum.   Pollen and Outdoor Mold  Some people are allergic to trees, grass, or weed pollen, or molds. Try to keep  your windows closed.  Limit time out doors when pollen count is high.   Ask you health care provider about taking medicine during allergy season.     Animal Dander  Some people are allergic to skin flakes, urine or saliva from pets with fur or feathers. Keep pets with fur or feathers out of your home.    If you can t keep the pet outdoors, then keep the pet out of your bedroom.  Keep the bedroom door closed.  Keep pets off cloth furniture and away from stuffed toys.     Mice, Rats, and Cockroaches  Some people are allergic to the waste from these pests.   Cover food and garbage.  Clean up spills and food crumbs.  Store grease in the refrigerator.   Keep food out of the bedroom.   Indoor Mold  This can be a trigger if your home has high moisture. Fix leaking faucets, pipes, or other sources of water.   Clean moldy surfaces.  Dehumidify basement if it is damp and smelly.   Smoke, Strong Odors, and Sprays  These can reduce air quality. Stay away from strong odors and sprays, such as perfume, powder, hair spray, paints, smoke incense, paint, cleaning products, candles and new carpet.   Exercise or Sports  Some people with asthma have this trigger. Be active!  Ask your doctor about taking medicine before sports or exercise to prevent symptoms.    Warm up for 5-10 minutes before and after sports or exercise.     Other Triggers of Asthma  Cold air:  Cover your nose and mouth with a scarf.  Sometimes laughing or crying can be a trigger.  Some medicines and food can trigger asthma.

## 2018-12-20 NOTE — PROGRESS NOTES
[address] [address]  [phone]  Dept: [phone]    PRE-OP EVALUATION:  Today's date: 2018    Tiffany Stovall (: 1950) presents for pre-operative evaluation assessment as requested by Dr. Terry.  She requires evaluation and anesthesia risk assessment prior to undergoing surgery/procedure for treatment of left Knee arthritis.    Fax number for surgical facility: [phone]  Primary Physician: Darby Williamson  Type of Anesthesia Anticipated: General    Patient has a Health Care Directive or Living Will:  YES patient brought in copy.      Preop Questions 2018   Who is doing your surgery? Tom Terry   What are you having done? Total Knee Arthroplasty (Left)   Date of Surgery/Procedure: 2019   Facility or Hospital where procedure/surgery will be performed: [facility]   1.  Do you have a history of Heart attack, stroke, stent, coronary bypass surgery, or other heart surgery? No   2.  Do you ever have any pain or discomfort in your chest? No   3.  Do you have a history of  Heart Failure? No   4.   Are you troubled by shortness of breath when:  walking on a level surface, or up a slight hill, or at night? No   5.  Do you currently have a cold, bronchitis or other respiratory infection? No   6.  Do you have a cough, shortness of breath, or wheezing? No   7.  Do you sometimes get pains in the calves of your legs when you walk? No   8. Do you or anyone in your family have previous history of blood clots? No   9.  Do you or does anyone in your family have a serious bleeding problem such as prolonged bleeding following surgeries or cuts? No   10. Have you ever had problems with anemia or been told to take iron pills? No   11. Have you had any abnormal blood loss such as black, tarry or bloody stools, or abnormal vaginal bleeding? No   12. Have you ever had a blood transfusion? No   13. Have you or any of your  relatives ever had problems with anesthesia? No   14. Do you have sleep apnea, excessive snoring or daytime drowsiness? No   15. Do you have any prosthetic heart valves? No   16. Do you have prosthetic joints? No   17. Is there any chance that you may be pregnant? No         HPI:     HPI related to upcoming procedure:     Patient with history of primary osteoarthritis of left knee and complex tear of medial meniscus of left knee, is present today for procedure clearance for left total knee arthroplasty that is scheduled for 1/8/19. Endorses of knee swelling.     Patient reports that Dr. Terry recommends Lovenox for two weeks after procedure and followed by aspirin -has side effects of aspirin with bronchospasms.       See problem list for active medical problems.  Problems all longstanding and stable, except as noted/documented.  See ROS for pertinent symptoms related to these conditions.                                                                                                                                                          .    MEDICAL HISTORY:     Patient Active Problem List    Diagnosis Date Noted     History of squamous cell carcinoma in situ of skin 03/17/2017     Priority: Medium     Primary osteoarthritis of left knee 02/20/2017     Priority: Medium     Complex tear of medial meniscus of left knee as current injury, subsequent encounter 02/20/2017     Priority: Medium     Family history of nonmelanoma skin cancer 11/18/2016     Priority: Medium     History of basal cell carcinoma 12/21/2015     Priority: Medium     Allergic reaction 05/11/2012     Priority: Medium     Advanced directives, counseling/discussion 08/30/2011     Priority: Medium     Advance Directive Problem List Overview:   Name Relationship Phone    Primary Health Care Agent            Alternative Health Care Agent          Patient states has Advance Directive and will bring in a copy to clinic. 8/30/2011             Hypothyroidism      Priority: Medium     Hypothyroidism  Problem list name updated by automated process. Provider to review       Varicose veins of legs 12/10/2010     Priority: Medium     Hypertension goal BP (blood pressure) < 140/90 12/09/2010     Priority: Medium     Hyperlipidemia LDL goal <130 12/09/2010     Priority: Medium     [place] 10-year CHD Risk Score: 5% (17 Total Points)   Values used to calculate score:     Age: 61 years -- Points: 8     Total Cholesterol: 216 mg/dL -- Points: 4     HDL Cholesterol: 48 mg/dL -- Points: 1     Systolic BP (treated): 130 mmHg -- Points: 4    The patient is not a smoker. -- Points: 0    The patient has not been diagnosed with diabetes. -- Points: 0    The patient does not have a family history of CHD. -- Points:0          Mild persistent asthma 11/19/2010     Priority: Medium     Elevated liver enzymes 10/01/2010     Priority: Medium     due to simvastatin       GERD (gastroesophageal reflux disease)      Priority: Medium      Past Medical History:   Diagnosis Date     Bronchial spasm      Cancer (H)     basal cell, squamous cell skin ca     Compression fracture of T12 vertebra (H) 03/17/2014     GERD (gastroesophageal reflux disease)      HTN - hypertension      Hyperlipaemia      Postmenopausal 1999     Uncomplicated asthma      Unspecified hypothyroidism     Hypothyroidism     Past Surgical History:   Procedure Laterality Date     ARTHROSCOPY KNEE RT/LT  1978     BIOPSY OF BREAST, NEEDLE CORE  1995    Papilloma     C REMV CATARACT INTRACAP,INSERT LENS  2010     CARPAL TUNNEL RELEASE RT/LT  2010    bilateral     CATARACT IOL, RT/LT       CHOLECYSTECTOMY, LAPOROSCOPIC  2006    Cholecystectomy, Laparoscopic     COLONOSCOPY WITH CO2 INSUFFLATION N/A 1/16/2017    Procedure: COLONOSCOPY WITH CO2 INSUFFLATION;  Surgeon: Elkin Lemus MD;  Location: [facility] OR     ENDOSCOPIC STRIPPING VEIN(S)  Nov 14, 2013    stab phlebectomies     HC COLONOSCOPY THRU STOMA,  DIAGNOSTIC  2006    diverticulosis, no polyps found, next one due in 2016     HYSTERECTOMY, PAP NO LONGER INDICATED      bleeding, atypical cells on endobx, endometriosis     HYSTERECTOMY, CAMILLE      fibroids, endometrial hyperplasia     MOHS MICROGRAPHIC PROCEDURE      BCC lip     MOHS MICROGRAPHIC PROCEDURE  2016    squamus lt neck,basal top rt foot/3rd toe     MOHS MICROGRAPHIC PROCEDURE  2018    Left leg     SALPINGO OOPHORECTOMY,R/L/TERI      Salpingo Oophorectomy, RT/LT/TERI     TONSILLECTOMY & ADENOIDECTOMY       TUBAL/ECTOPIC PREGNANCY  ,     Current Outpatient Medications   Medication Sig Dispense Refill     Acetaminophen (TYLENOL EXTRA STRENGTH PO)        albuterol (2.5 MG/3ML) 0.083% nebulizer solution Take 1 vial (2.5 mg) by nebulization every 6 hours as needed for shortness of breath / dyspnea 25 vial 2     albuterol (ALBUTEROL) 108 (90 BASE) MCG/ACT Inhaler Inhale 1-2 puffs into the lungs every 4 hours as needed for shortness of breath / dyspnea 1 Inhaler 2     atorvastatin (LIPITOR) 40 MG tablet Take 1 tablet (40 mg) by mouth daily 90 tablet 3     EPINEPHrine (EPIPEN/ADRENACLICK/OR ANY BX GENERIC EQUIV) 0.3 MG/0.3ML injection 2-pack Inject 0.3 mLs (0.3 mg) into the muscle once as needed 0.6 mL 1     Esomeprazole Magnesium (NEXIUM PO) Take 40 mg by mouth daily       fluticasone-salmeterol (ADVAIR DISKUS) 250-50 MCG/DOSE diskus inhaler Inhale 1 puff into the lungs 2 times daily 3 Inhaler 3     levothyroxine (SYNTHROID/LEVOTHROID) 112 MCG tablet Take 1 tablet (112 mcg) by mouth every morning 30 tablet 0     losartan-hydrochlorothiazide (HYZAAR) 100-25 MG per tablet Take 1 tablet by mouth every morning 90 tablet 3     potassium chloride SA (K-DUR/KLOR-CON M) 20 MEQ CR tablet Take 1 tablet (20 mEq) by mouth daily 90 tablet 3     Prenatal Multivit-Min-Fe-FA (PRE- PO)        ranitidine (ZANTAC) 150 MG tablet Take 1 tablet (150 mg) by mouth At Bedtime 30 tablet 3      loratadine (CLARITIN) 10 MG tablet Take 10 mg by mouth daily       MULTI-VITAMIN OR TABS ONE TABLET DAILY       [START ON 1/1/2019] mupirocin (BACTROBAN) 2 % nasal ointment Spray 1 each into both nostrils 2 times daily       OTC products: None, except as noted above    Allergies   Allergen Reactions     Mary Shortness Of Breath     Asa [Aspirin]      bronchospasm     Ace Inhibitors Cough     Bees Anaphylaxis     Hornet Venom Anaphylaxis     Lisinopril Cough     New Medication Allergy      roses     Nsaids      bronchospasm     Other [Seasonal Allergies]      Poultry Meal      Wasp Venom Anaphylaxis      Latex Allergy: NO    Social History     Tobacco Use     Smoking status: Never Smoker     Smokeless tobacco: Never Used   Substance Use Topics     Alcohol use: Yes     Comment: socially     History   Drug Use No       REVIEW OF SYSTEMS:   10 point ROS of systems including Constitutional, Eyes, Respiratory, Cardiovascular, Gastroenterology, Genitourinary, Integumentary, Muscularskeletal, Psychiatric were all negative except for pertinent positives noted in my HPI.  She is able to use treadmill and do water aerobics with toleration.     POS: She reports labs detected positive for MSSA colonization and plans to begin Bactroban as directed.     POS: She reports of history of UTI in October with associated symptoms of fever, flank pain, and hematuria that resolved after three days of antibiotics, but would like urine labs for recheck today.   She reports of urinary frequency, endorsing that she drinks a lot of water.      POS: GERD- She takes Nexium in the morning that is usually able to control symptoms, but endorses that she may need to take a few tums during the day. She takes Zantac 150mg at bedtime. No pain after eating certain foods. Had severe GERD symptoms after surgery/hospitalization in past.      She reports of GERD after taking potassium supplement on empty stomach    POS: advair daily controls breathing  "symptoms.     POS: She endorses of allergies to roses of \"respiratory difficulty\".      POS: few episodes of PVC which is triggered by stress. Otherwise no other times with tachycardia.      NEG: glaucoma. Follows optometry     Follows Dermatology.      This document serves as a record of the services and decisions personally performed and made by Darby Williamson MD. It was created on her behalf by Jayesh Vidales, a trained medical scribe. The creation of this document is based on the provider's statements to the medical scribe.  Jayesh Vidales December 20, 2018 8:50 AM     EXAM:   /84   Pulse 84   Temp 98.3  F (36.8  C)   Resp 14   Ht 1.676 m (5' 6\")   Wt 72.6 kg (160 lb)   LMP  (LMP Unknown)   SpO2 98%   BMI 25.82 kg/m      GENERAL APPEARANCE: healthy, alert and no distress     EYES: EOMI, PERRL     HENT: ear canals and TM's normal and nose and mouth without ulcers or lesions     NECK: no adenopathy, no asymmetry, masses, or scars and thyroid normal to palpation     RESP: lungs clear to auscultation - no rales, rhonchi or wheezes     CV: regular rates and rhythm, normal S1 S2, no S3 or S4 and no murmur, click or rub     ABDOMEN:  soft, nontender, no HSM or masses and bowel sounds normal     MS: left knee with limited range of motion. FROM all other extremities.      SKIN: no suspicious lesions or rashes     NEURO: Normal strength and tone, sensory exam grossly normal, mentation intact and speech normal     PSYCH: mentation appears normal. and affect normal/bright     LYMPHATICS: No cervical adenopathy    DIAGNOSTICS:     EKG: Normal Sinus Rhythm, normal axis, normal intervals, no acute ST/T changes c/w ischemia, no LVH by voltage criteria, unchanged from previous tracings, delayed R wave progression noted.  Labs Resulted Today:   Results for orders placed or performed in visit on 12/20/18   Hemoglobin   Result Value Ref Range    Hemoglobin 12.8 11.7 - 15.7 g/dL   Comprehensive metabolic panel (BMP + " Alb, Alk Phos, ALT, AST, Total. Bili, TP)   Result Value Ref Range    Sodium 136 133 - 144 mmol/L    Potassium 3.8 3.4 - 5.3 mmol/L    Chloride 103 94 - 109 mmol/L    Carbon Dioxide 25 20 - 32 mmol/L    Anion Gap 8 3 - 14 mmol/L    Glucose 110 (H) 70 - 99 mg/dL    Urea Nitrogen 12 7 - 30 mg/dL    Creatinine 0.75 0.52 - 1.04 mg/dL    GFR Estimate 83 >60 mL/min/[1.73_m2]    GFR Estimate If Black >90 >60 mL/min/[1.73_m2]    Calcium 9.5 8.5 - 10.1 mg/dL    Bilirubin Total 0.5 0.2 - 1.3 mg/dL    Albumin 4.4 3.4 - 5.0 g/dL    Protein Total 7.6 6.8 - 8.8 g/dL    Alkaline Phosphatase 60 40 - 150 U/L    ALT 42 0 - 50 U/L    AST 30 0 - 45 U/L   *UA reflex to Microscopic   Result Value Ref Range    Color Urine Yellow     Appearance Urine Clear     Glucose Urine Negative NEG^Negative mg/dL    Bilirubin Urine Negative NEG^Negative    Ketones Urine Negative NEG^Negative mg/dL    Specific Gravity Urine 1.015 1.003 - 1.035    Blood Urine Trace (A) NEG^Negative    pH Urine 7.0 5.0 - 7.0 pH    Protein Albumin Urine Trace (A) NEG^Negative mg/dL    Urobilinogen Urine 0.2 0.2 - 1.0 EU/dL    Nitrite Urine Negative NEG^Negative    Leukocyte Esterase Urine Large (A) NEG^Negative    Source Midstream Urine    Urine Microscopic   Result Value Ref Range    WBC Urine  (A) OTO5^0 - 5 /HPF    RBC Urine 2-5 (A) OTO2^O - 2 /HPF    Squamous Epithelial /LPF Urine Few FEW^Few /LPF    Bacteria Urine Many (A) NEG^Negative /HPF   EKG 12-lead complete w/read - Clinics    Impression    Sinus  Rhythm   -Poor R-wave progression -nonspecific -  No acute ST-T wave changes.  Relatively unchanged from previous.  BORDERLINE    Darby Williamson MD   Urine Culture Aerobic Bacterial   Result Value Ref Range    Specimen Description Midstream Urine     Culture Micro >100,000 colonies/mL  Escherichia coli   (A)        Susceptibility    Escherichia coli - JESSY     AMPICILLIN >=32 Resistant ug/mL     CEFAZOLIN* <=4 Sensitive ug/mL      * Cefazolin JESSY breakpoints  are for the treatment of uncomplicated urinary tract infections.  For the treatment of systemic infections, please contact the laboratory for additional testing.     CEFOXITIN <=4 Sensitive ug/mL     CEFTAZIDIME <=1 Sensitive ug/mL     CEFTRIAXONE <=1 Sensitive ug/mL     CIPROFLOXACIN <=0.25 Sensitive ug/mL     GENTAMICIN <=1 Sensitive ug/mL     LEVOFLOXACIN <=0.12 Sensitive ug/mL     NITROFURANTOIN <=16 Sensitive ug/mL     TOBRAMYCIN <=1 Sensitive ug/mL     Trimethoprim/Sulfa >=16/304 Resistant ug/mL     AMPICILLIN/SULBACTAM >=32 Resistant ug/mL     Piperacillin/Tazo <=4 Sensitive ug/mL     CEFEPIME <=1 Sensitive ug/mL       Recent Labs   Lab Test 04/17/18  0831 03/21/17  0821 05/11/16  1755   HGB 13.5 13.3  --      --   --     137  --    POTASSIUM 3.6 3.7 3.6   CR 0.70 0.65  --    A1C  --   --  5.9        IMPRESSION:   Reason for surgery/procedure: primary osteoarthritis of left knee//  Left total knee arthroplasty.   Diagnosis/reason for consult: procedure clearance     The proposed surgical procedure is considered INTERMEDIATE risk.    REVISED CARDIAC RISK INDEX  The patient has the following serious cardiovascular risks for perioperative complications such as (MI, PE, VFib and 3  AV Block):  No serious cardiac risks  INTERPRETATION: 0 risks: Class I (very low risk - 0.4% complication rate)    The patient has the following additional risks for perioperative complications:  No identified additional risks      ICD-10-CM    1. Preop general physical exam Z01.818 EKG 12-lead complete w/read - Clinics     Hemoglobin     JUST IN CASE     *UA reflex to Microscopic     Urine Culture Aerobic Bacterial     Urine Microscopic   2. Primary osteoarthritis of left knee M17.12    3. Asymptomatic postmenopausal status Z78.0 DEXA HIP/PELVIS/SPINE - Future   4. Hypertension goal BP (blood pressure) < 140/90 I10 Comprehensive metabolic panel (BMP + Alb, Alk Phos, ALT, AST, Total. Bili, TP)   5. Hypothyroidism due to  acquired atrophy of thyroid E03.4    6. Mild persistent asthma without complication J45.30    7. Gastroesophageal reflux disease without esophagitis K21.9 ranitidine (ZANTAC) 150 MG tablet   8. Urinary frequency R35.0 *UA reflex to Microscopic     Urine Culture Aerobic Bacterial   9. Acute cystitis without hematuria N30.00 ciprofloxacin (CIPRO) 250 MG tablet       RECOMMENDATIONS:     --Consult hospital rounder / IM to assist post-op medical management    Pulmonary Risk  Albuterol perioperatively as needed.  Continue Advair perioperatively.         --Patient is to take all scheduled medications on the day of surgery EXCEPT for modifications listed below.    ACE Inhibitor or Angiotensin Receptor Blocker (ARB) Use  Ace inhibitor or Angiotensin Receptor Blocker (ARB) and should HOLD this medication for the 24 hours prior to surgery.    GERD:  Take nexium 30-60 min prior to first meal after surgery, ranitidine at night perioperatively.  As needed antacid for heartburn symptoms.    Avoid potassium orally on an empty stomach.      UTI :  Treat with Cipro - repeat urine culture after treatment recommended prior to surgery.       Anticoagulation post-operatively - allergy to aspirin, 2 weeks Lovenox planned - I would recommend Xarelto 10 mg once daily after this if longer anticoagulation needed due to allergy to aspirin.        APPROVAL GIVEN to proceed with proposed procedure, without further diagnostic evaluation     The information in this document, created by the medical scribe for me, accurately reflects the services I personally performed and the decisions made by me. I have reviewed and approved this document for accuracy prior to leaving the patient care area.  December 20, 2018 9:27 AM   Signed Electronically by: Darby Williamson MD    Copy of this evaluation report is provided to requesting physician.    [facility] Preop Guidelines    Revised Cardiac Risk Index

## 2018-12-20 NOTE — H&P (VIEW-ONLY)
[address] [address]  [phone]  Dept: [phone]    PRE-OP EVALUATION:  Today's date: 2018    Tiffany Stovall (: 1950) presents for pre-operative evaluation assessment as requested by Dr. Terry.  She requires evaluation and anesthesia risk assessment prior to undergoing surgery/procedure for treatment of left Knee arthritis.    Fax number for surgical facility: [phone]  Primary Physician: Darby Williamson  Type of Anesthesia Anticipated: General    Patient has a Health Care Directive or Living Will:  YES patient brought in copy.      Preop Questions 2018   Who is doing your surgery? Tom Terry   What are you having done? Total Knee Arthroplasty (Left)   Date of Surgery/Procedure: 2019   Facility or Hospital where procedure/surgery will be performed: [facility]   1.  Do you have a history of Heart attack, stroke, stent, coronary bypass surgery, or other heart surgery? No   2.  Do you ever have any pain or discomfort in your chest? No   3.  Do you have a history of  Heart Failure? No   4.   Are you troubled by shortness of breath when:  walking on a level surface, or up a slight hill, or at night? No   5.  Do you currently have a cold, bronchitis or other respiratory infection? No   6.  Do you have a cough, shortness of breath, or wheezing? No   7.  Do you sometimes get pains in the calves of your legs when you walk? No   8. Do you or anyone in your family have previous history of blood clots? No   9.  Do you or does anyone in your family have a serious bleeding problem such as prolonged bleeding following surgeries or cuts? No   10. Have you ever had problems with anemia or been told to take iron pills? No   11. Have you had any abnormal blood loss such as black, tarry or bloody stools, or abnormal vaginal bleeding? No   12. Have you ever had a blood transfusion? No   13. Have you or any of your  relatives ever had problems with anesthesia? No   14. Do you have sleep apnea, excessive snoring or daytime drowsiness? No   15. Do you have any prosthetic heart valves? No   16. Do you have prosthetic joints? No   17. Is there any chance that you may be pregnant? No         HPI:     HPI related to upcoming procedure:     Patient with history of primary osteoarthritis of left knee and complex tear of medial meniscus of left knee, is present today for procedure clearance for left total knee arthroplasty that is scheduled for 1/8/19. Endorses of knee swelling.     Patient reports that Dr. Terry recommends Lovenox for two weeks after procedure and followed by aspirin -has side effects of aspirin with bronchospasms.       See problem list for active medical problems.  Problems all longstanding and stable, except as noted/documented.  See ROS for pertinent symptoms related to these conditions.                                                                                                                                                          .    MEDICAL HISTORY:     Patient Active Problem List    Diagnosis Date Noted     History of squamous cell carcinoma in situ of skin 03/17/2017     Priority: Medium     Primary osteoarthritis of left knee 02/20/2017     Priority: Medium     Complex tear of medial meniscus of left knee as current injury, subsequent encounter 02/20/2017     Priority: Medium     Family history of nonmelanoma skin cancer 11/18/2016     Priority: Medium     History of basal cell carcinoma 12/21/2015     Priority: Medium     Allergic reaction 05/11/2012     Priority: Medium     Advanced directives, counseling/discussion 08/30/2011     Priority: Medium     Advance Directive Problem List Overview:   Name Relationship Phone    Primary Health Care Agent            Alternative Health Care Agent          Patient states has Advance Directive and will bring in a copy to clinic. 8/30/2011             Hypothyroidism      Priority: Medium     Hypothyroidism  Problem list name updated by automated process. Provider to review       Varicose veins of legs 12/10/2010     Priority: Medium     Hypertension goal BP (blood pressure) < 140/90 12/09/2010     Priority: Medium     Hyperlipidemia LDL goal <130 12/09/2010     Priority: Medium     [place] 10-year CHD Risk Score: 5% (17 Total Points)   Values used to calculate score:     Age: 61 years -- Points: 8     Total Cholesterol: 216 mg/dL -- Points: 4     HDL Cholesterol: 48 mg/dL -- Points: 1     Systolic BP (treated): 130 mmHg -- Points: 4    The patient is not a smoker. -- Points: 0    The patient has not been diagnosed with diabetes. -- Points: 0    The patient does not have a family history of CHD. -- Points:0          Mild persistent asthma 11/19/2010     Priority: Medium     Elevated liver enzymes 10/01/2010     Priority: Medium     due to simvastatin       GERD (gastroesophageal reflux disease)      Priority: Medium      Past Medical History:   Diagnosis Date     Bronchial spasm      Cancer (H)     basal cell, squamous cell skin ca     Compression fracture of T12 vertebra (H) 03/17/2014     GERD (gastroesophageal reflux disease)      HTN - hypertension      Hyperlipaemia      Postmenopausal 1999     Uncomplicated asthma      Unspecified hypothyroidism     Hypothyroidism     Past Surgical History:   Procedure Laterality Date     ARTHROSCOPY KNEE RT/LT  1978     BIOPSY OF BREAST, NEEDLE CORE  1995    Papilloma     C REMV CATARACT INTRACAP,INSERT LENS  2010     CARPAL TUNNEL RELEASE RT/LT  2010    bilateral     CATARACT IOL, RT/LT       CHOLECYSTECTOMY, LAPOROSCOPIC  2006    Cholecystectomy, Laparoscopic     COLONOSCOPY WITH CO2 INSUFFLATION N/A 1/16/2017    Procedure: COLONOSCOPY WITH CO2 INSUFFLATION;  Surgeon: Elkin Lemus MD;  Location: [facility] OR     ENDOSCOPIC STRIPPING VEIN(S)  Nov 14, 2013    stab phlebectomies     HC COLONOSCOPY THRU STOMA,  DIAGNOSTIC  2006    diverticulosis, no polyps found, next one due in 2016     HYSTERECTOMY, PAP NO LONGER INDICATED      bleeding, atypical cells on endobx, endometriosis     HYSTERECTOMY, CAMILEL      fibroids, endometrial hyperplasia     MOHS MICROGRAPHIC PROCEDURE      BCC lip     MOHS MICROGRAPHIC PROCEDURE  2016    squamus lt neck,basal top rt foot/3rd toe     MOHS MICROGRAPHIC PROCEDURE  2018    Left leg     SALPINGO OOPHORECTOMY,R/L/TERI      Salpingo Oophorectomy, RT/LT/TERI     TONSILLECTOMY & ADENOIDECTOMY       TUBAL/ECTOPIC PREGNANCY  ,     Current Outpatient Medications   Medication Sig Dispense Refill     Acetaminophen (TYLENOL EXTRA STRENGTH PO)        albuterol (2.5 MG/3ML) 0.083% nebulizer solution Take 1 vial (2.5 mg) by nebulization every 6 hours as needed for shortness of breath / dyspnea 25 vial 2     albuterol (ALBUTEROL) 108 (90 BASE) MCG/ACT Inhaler Inhale 1-2 puffs into the lungs every 4 hours as needed for shortness of breath / dyspnea 1 Inhaler 2     atorvastatin (LIPITOR) 40 MG tablet Take 1 tablet (40 mg) by mouth daily 90 tablet 3     EPINEPHrine (EPIPEN/ADRENACLICK/OR ANY BX GENERIC EQUIV) 0.3 MG/0.3ML injection 2-pack Inject 0.3 mLs (0.3 mg) into the muscle once as needed 0.6 mL 1     Esomeprazole Magnesium (NEXIUM PO) Take 40 mg by mouth daily       fluticasone-salmeterol (ADVAIR DISKUS) 250-50 MCG/DOSE diskus inhaler Inhale 1 puff into the lungs 2 times daily 3 Inhaler 3     levothyroxine (SYNTHROID/LEVOTHROID) 112 MCG tablet Take 1 tablet (112 mcg) by mouth every morning 30 tablet 0     losartan-hydrochlorothiazide (HYZAAR) 100-25 MG per tablet Take 1 tablet by mouth every morning 90 tablet 3     potassium chloride SA (K-DUR/KLOR-CON M) 20 MEQ CR tablet Take 1 tablet (20 mEq) by mouth daily 90 tablet 3     Prenatal Multivit-Min-Fe-FA (PRE- PO)        ranitidine (ZANTAC) 150 MG tablet Take 1 tablet (150 mg) by mouth At Bedtime 30 tablet 3      loratadine (CLARITIN) 10 MG tablet Take 10 mg by mouth daily       MULTI-VITAMIN OR TABS ONE TABLET DAILY       [START ON 1/1/2019] mupirocin (BACTROBAN) 2 % nasal ointment Spray 1 each into both nostrils 2 times daily       OTC products: None, except as noted above    Allergies   Allergen Reactions     Mary Shortness Of Breath     Asa [Aspirin]      bronchospasm     Ace Inhibitors Cough     Bees Anaphylaxis     Hornet Venom Anaphylaxis     Lisinopril Cough     New Medication Allergy      roses     Nsaids      bronchospasm     Other [Seasonal Allergies]      Poultry Meal      Wasp Venom Anaphylaxis      Latex Allergy: NO    Social History     Tobacco Use     Smoking status: Never Smoker     Smokeless tobacco: Never Used   Substance Use Topics     Alcohol use: Yes     Comment: socially     History   Drug Use No       REVIEW OF SYSTEMS:   10 point ROS of systems including Constitutional, Eyes, Respiratory, Cardiovascular, Gastroenterology, Genitourinary, Integumentary, Muscularskeletal, Psychiatric were all negative except for pertinent positives noted in my HPI.  She is able to use treadmill and do water aerobics with toleration.     POS: She reports labs detected positive for MSSA colonization and plans to begin Bactroban as directed.     POS: She reports of history of UTI in October with associated symptoms of fever, flank pain, and hematuria that resolved after three days of antibiotics, but would like urine labs for recheck today.   She reports of urinary frequency, endorsing that she drinks a lot of water.      POS: GERD- She takes Nexium in the morning that is usually able to control symptoms, but endorses that she may need to take a few tums during the day. She takes Zantac 150mg at bedtime. No pain after eating certain foods. Had severe GERD symptoms after surgery/hospitalization in past.      She reports of GERD after taking potassium supplement on empty stomach    POS: advair daily controls breathing  "symptoms.     POS: She endorses of allergies to roses of \"respiratory difficulty\".      POS: few episodes of PVC which is triggered by stress. Otherwise no other times with tachycardia.      NEG: glaucoma. Follows optometry     Follows Dermatology.      This document serves as a record of the services and decisions personally performed and made by Darby Williamson MD. It was created on her behalf by Jayesh Vidales, a trained medical scribe. The creation of this document is based on the provider's statements to the medical scribe.  aJyesh Vidales December 20, 2018 8:50 AM     EXAM:   /84   Pulse 84   Temp 98.3  F (36.8  C)   Resp 14   Ht 1.676 m (5' 6\")   Wt 72.6 kg (160 lb)   LMP  (LMP Unknown)   SpO2 98%   BMI 25.82 kg/m      GENERAL APPEARANCE: healthy, alert and no distress     EYES: EOMI, PERRL     HENT: ear canals and TM's normal and nose and mouth without ulcers or lesions     NECK: no adenopathy, no asymmetry, masses, or scars and thyroid normal to palpation     RESP: lungs clear to auscultation - no rales, rhonchi or wheezes     CV: regular rates and rhythm, normal S1 S2, no S3 or S4 and no murmur, click or rub     ABDOMEN:  soft, nontender, no HSM or masses and bowel sounds normal     MS: left knee with limited range of motion. FROM all other extremities.      SKIN: no suspicious lesions or rashes     NEURO: Normal strength and tone, sensory exam grossly normal, mentation intact and speech normal     PSYCH: mentation appears normal. and affect normal/bright     LYMPHATICS: No cervical adenopathy    DIAGNOSTICS:     EKG: Normal Sinus Rhythm, normal axis, normal intervals, no acute ST/T changes c/w ischemia, no LVH by voltage criteria, unchanged from previous tracings, delayed R wave progression noted.  Labs Resulted Today:   Results for orders placed or performed in visit on 12/20/18   Hemoglobin   Result Value Ref Range    Hemoglobin 12.8 11.7 - 15.7 g/dL   Comprehensive metabolic panel (BMP + " Alb, Alk Phos, ALT, AST, Total. Bili, TP)   Result Value Ref Range    Sodium 136 133 - 144 mmol/L    Potassium 3.8 3.4 - 5.3 mmol/L    Chloride 103 94 - 109 mmol/L    Carbon Dioxide 25 20 - 32 mmol/L    Anion Gap 8 3 - 14 mmol/L    Glucose 110 (H) 70 - 99 mg/dL    Urea Nitrogen 12 7 - 30 mg/dL    Creatinine 0.75 0.52 - 1.04 mg/dL    GFR Estimate 83 >60 mL/min/[1.73_m2]    GFR Estimate If Black >90 >60 mL/min/[1.73_m2]    Calcium 9.5 8.5 - 10.1 mg/dL    Bilirubin Total 0.5 0.2 - 1.3 mg/dL    Albumin 4.4 3.4 - 5.0 g/dL    Protein Total 7.6 6.8 - 8.8 g/dL    Alkaline Phosphatase 60 40 - 150 U/L    ALT 42 0 - 50 U/L    AST 30 0 - 45 U/L   *UA reflex to Microscopic   Result Value Ref Range    Color Urine Yellow     Appearance Urine Clear     Glucose Urine Negative NEG^Negative mg/dL    Bilirubin Urine Negative NEG^Negative    Ketones Urine Negative NEG^Negative mg/dL    Specific Gravity Urine 1.015 1.003 - 1.035    Blood Urine Trace (A) NEG^Negative    pH Urine 7.0 5.0 - 7.0 pH    Protein Albumin Urine Trace (A) NEG^Negative mg/dL    Urobilinogen Urine 0.2 0.2 - 1.0 EU/dL    Nitrite Urine Negative NEG^Negative    Leukocyte Esterase Urine Large (A) NEG^Negative    Source Midstream Urine    Urine Microscopic   Result Value Ref Range    WBC Urine  (A) OTO5^0 - 5 /HPF    RBC Urine 2-5 (A) OTO2^O - 2 /HPF    Squamous Epithelial /LPF Urine Few FEW^Few /LPF    Bacteria Urine Many (A) NEG^Negative /HPF   EKG 12-lead complete w/read - Clinics    Impression    Sinus  Rhythm   -Poor R-wave progression -nonspecific -  No acute ST-T wave changes.  Relatively unchanged from previous.  BORDERLINE    Darby Williamson MD   Urine Culture Aerobic Bacterial   Result Value Ref Range    Specimen Description Midstream Urine     Culture Micro >100,000 colonies/mL  Escherichia coli   (A)        Susceptibility    Escherichia coli - JESSY     AMPICILLIN >=32 Resistant ug/mL     CEFAZOLIN* <=4 Sensitive ug/mL      * Cefazolin JESSY breakpoints  are for the treatment of uncomplicated urinary tract infections.  For the treatment of systemic infections, please contact the laboratory for additional testing.     CEFOXITIN <=4 Sensitive ug/mL     CEFTAZIDIME <=1 Sensitive ug/mL     CEFTRIAXONE <=1 Sensitive ug/mL     CIPROFLOXACIN <=0.25 Sensitive ug/mL     GENTAMICIN <=1 Sensitive ug/mL     LEVOFLOXACIN <=0.12 Sensitive ug/mL     NITROFURANTOIN <=16 Sensitive ug/mL     TOBRAMYCIN <=1 Sensitive ug/mL     Trimethoprim/Sulfa >=16/304 Resistant ug/mL     AMPICILLIN/SULBACTAM >=32 Resistant ug/mL     Piperacillin/Tazo <=4 Sensitive ug/mL     CEFEPIME <=1 Sensitive ug/mL       Recent Labs   Lab Test 04/17/18  0831 03/21/17  0821 05/11/16  1755   HGB 13.5 13.3  --      --   --     137  --    POTASSIUM 3.6 3.7 3.6   CR 0.70 0.65  --    A1C  --   --  5.9        IMPRESSION:   Reason for surgery/procedure: primary osteoarthritis of left knee//  Left total knee arthroplasty.   Diagnosis/reason for consult: procedure clearance     The proposed surgical procedure is considered INTERMEDIATE risk.    REVISED CARDIAC RISK INDEX  The patient has the following serious cardiovascular risks for perioperative complications such as (MI, PE, VFib and 3  AV Block):  No serious cardiac risks  INTERPRETATION: 0 risks: Class I (very low risk - 0.4% complication rate)    The patient has the following additional risks for perioperative complications:  No identified additional risks      ICD-10-CM    1. Preop general physical exam Z01.818 EKG 12-lead complete w/read - Clinics     Hemoglobin     JUST IN CASE     *UA reflex to Microscopic     Urine Culture Aerobic Bacterial     Urine Microscopic   2. Primary osteoarthritis of left knee M17.12    3. Asymptomatic postmenopausal status Z78.0 DEXA HIP/PELVIS/SPINE - Future   4. Hypertension goal BP (blood pressure) < 140/90 I10 Comprehensive metabolic panel (BMP + Alb, Alk Phos, ALT, AST, Total. Bili, TP)   5. Hypothyroidism due to  acquired atrophy of thyroid E03.4    6. Mild persistent asthma without complication J45.30    7. Gastroesophageal reflux disease without esophagitis K21.9 ranitidine (ZANTAC) 150 MG tablet   8. Urinary frequency R35.0 *UA reflex to Microscopic     Urine Culture Aerobic Bacterial   9. Acute cystitis without hematuria N30.00 ciprofloxacin (CIPRO) 250 MG tablet       RECOMMENDATIONS:     --Consult hospital rounder / IM to assist post-op medical management    Pulmonary Risk  Albuterol perioperatively as needed.  Continue Advair perioperatively.         --Patient is to take all scheduled medications on the day of surgery EXCEPT for modifications listed below.    ACE Inhibitor or Angiotensin Receptor Blocker (ARB) Use  Ace inhibitor or Angiotensin Receptor Blocker (ARB) and should HOLD this medication for the 24 hours prior to surgery.    GERD:  Take nexium 30-60 min prior to first meal after surgery, ranitidine at night perioperatively.  As needed antacid for heartburn symptoms.    Avoid potassium orally on an empty stomach.      UTI :  Treat with Cipro - repeat urine culture after treatment recommended prior to surgery.       Anticoagulation post-operatively - allergy to aspirin, 2 weeks Lovenox planned - I would recommend Xarelto 10 mg once daily after this if longer anticoagulation needed due to allergy to aspirin.        APPROVAL GIVEN to proceed with proposed procedure, without further diagnostic evaluation     The information in this document, created by the medical scribe for me, accurately reflects the services I personally performed and the decisions made by me. I have reviewed and approved this document for accuracy prior to leaving the patient care area.  December 20, 2018 9:27 AM   Signed Electronically by: Darby Williamson MD    Copy of this evaluation report is provided to requesting physician.    [facility] Preop Guidelines    Revised Cardiac Risk Index

## 2018-12-20 NOTE — PATIENT INSTRUCTIONS
Proceed with surgery as planned  Hold Losartan-hydrochlorothiazide the day before and day of procedure.   Take potassium supplement the night before procedure as planned.  Take Nexium with first meal after surgery recommended.   Take levothyroxine day of surgery with small sip of water.   Notify me on MyChart when antibiotics is needed for dental work.   Begin Bactroban as planned for the MSSA colonization.     Labs today. I will notify you of results when I receive them.     I will notify you prior to the procedure if plavix is recommended.    Referral dexascan made today.  You can call [phone] to schedule this at the [facility] in [place] (formerly the [facility]).          Before Your Surgery      Call your surgeon if there is any change in your health. This includes signs of a cold or flu (such as a sore throat, runny nose, cough, rash or fever).    Do not smoke, drink alcohol or take over the counter medicine (unless your surgeon or primary care doctor tells you to) for the 24 hours before and after surgery.    If you take prescribed drugs: Follow your doctor s orders about which medicines to take and which to stop until after surgery.    Eating and drinking prior to surgery: follow the instructions from your surgeon    Take a shower or bath the night before surgery. Use the soap your surgeon gave you to gently clean your skin. If you do not have soap from your surgeon, use your regular soap. Do not shave or scrub the surgery site.  Wear clean pajamas and have clean sheets on your bed.       At [facility], we strive to deliver an exceptional experience to you, every time we see you.  If you receive a survey in the mail, please send us back your thoughts. We really do value your feedback.    Your care team:     Family Medicine   PAVITHRA Barnes MD Emily Bunt, APRN CNP   S. MD Darby Johnston MD    Lachelle Norman MD         Clinic hours: Monday - Wednesday 7 am-7 pm   Thursdays and Fridays 7 am-5 pm.     [facility]: Monday - Friday 11 am-9 pm,   Saturday and Sunday 9 am-5 pm.    [facility]: Monday -Thursday 8 am-8 pm; Friday 8 am-6 pm; Saturday and Sunday 9 am-5 pm.     [facility]: Monday - Thursday 8 am - 7 pm; Friday 8 am - 6 pm    Clinic: [phone]   [facility]: [phone]   [facility]: [phone]

## 2018-12-21 NOTE — RESULT ENCOUNTER NOTE
Your urine culture does look like there is an infection.  I will forward a med when the culture results return.  Notify me if you have started having symptoms since your visit.  If without symptoms, waiting for the culture sensitivities to choose best med is recommended.  Your liver and kidney testing are normal.   Hemoglobin is normal at 12.8  Please call or MyChart message me if you have any questions.    PSK

## 2018-12-22 LAB
BACTERIA SPEC CULT: ABNORMAL
SPECIMEN SOURCE: ABNORMAL

## 2018-12-23 ENCOUNTER — TELEPHONE (OUTPATIENT)
Dept: FAMILY MEDICINE | Facility: CLINIC | Age: 68
End: 2018-12-23

## 2018-12-23 DIAGNOSIS — N30.00 ACUTE CYSTITIS WITHOUT HEMATURIA: ICD-10-CM

## 2018-12-23 RX ORDER — CIPROFLOXACIN 250 MG/1
250 TABLET, FILM COATED ORAL 2 TIMES DAILY
Qty: 14 TABLET | Refills: 0 | Status: ON HOLD | OUTPATIENT
Start: 2018-12-23 | End: 2019-01-08

## 2018-12-23 RX ORDER — CIPROFLOXACIN 250 MG/1
250 TABLET, FILM COATED ORAL 2 TIMES DAILY
Qty: 14 TABLET | Refills: 0 | Status: SHIPPED | OUTPATIENT
Start: 2018-12-23 | End: 2018-12-23

## 2018-12-23 NOTE — TELEPHONE ENCOUNTER
Amita called to report that the Cipro was sent to mail order by mistake, she was dx with UTI and needs to get medication today.  She requested that it be sent to [facility] in [facility] [address] in [place].    Called [facility] and asked that they not fill the Cipro Rx that was sent via escribe this morning.  Spoke to David, he advised that they weren't able to fill due to insurance coverage and noted that it would not be filled.    Re-sent order for Cipro as written by Dr. Williamson to local pharmacy as requested.    Taylor Barber RN  [facility]

## 2018-12-23 NOTE — RESULT ENCOUNTER NOTE
Urine culture results shows resistance to ampicillin and bactrim.  It is sensitive to Cipro.  I am sending in 7 days of treatment for this twice a day. An order for follow up urine culture will be in place for recheck prior to surgery.  Please call or MyChart message me if you have any questions.    JACEKK

## 2018-12-28 ENCOUNTER — DOCUMENTATION ONLY (OUTPATIENT)
Dept: OTHER | Facility: CLINIC | Age: 68
End: 2018-12-28

## 2018-12-30 ENCOUNTER — MYC MEDICAL ADVICE (OUTPATIENT)
Dept: FAMILY MEDICINE | Facility: CLINIC | Age: 68
End: 2018-12-30

## 2018-12-31 ENCOUNTER — MYC MEDICAL ADVICE (OUTPATIENT)
Dept: FAMILY MEDICINE | Facility: CLINIC | Age: 68
End: 2018-12-31

## 2018-12-31 DIAGNOSIS — N30.00 ACUTE CYSTITIS WITHOUT HEMATURIA: ICD-10-CM

## 2018-12-31 LAB
ALBUMIN UR-MCNC: NEGATIVE MG/DL
APPEARANCE UR: CLEAR
BILIRUB UR QL STRIP: NEGATIVE
COLOR UR AUTO: YELLOW
GLUCOSE UR STRIP-MCNC: NEGATIVE MG/DL
HGB UR QL STRIP: NEGATIVE
KETONES UR STRIP-MCNC: NEGATIVE MG/DL
LEUKOCYTE ESTERASE UR QL STRIP: NEGATIVE
NITRATE UR QL: NEGATIVE
PH UR STRIP: 6 PH (ref 5–7)
SOURCE: NORMAL
SP GR UR STRIP: 1.01 (ref 1–1.03)
UROBILINOGEN UR STRIP-ACNC: 0.2 EU/DL (ref 0.2–1)

## 2018-12-31 PROCEDURE — 81003 URINALYSIS AUTO W/O SCOPE: CPT | Performed by: FAMILY MEDICINE

## 2018-12-31 PROCEDURE — 87086 URINE CULTURE/COLONY COUNT: CPT | Performed by: FAMILY MEDICINE

## 2019-01-08 ENCOUNTER — APPOINTMENT (OUTPATIENT)
Dept: PHYSICAL THERAPY | Facility: CLINIC | Age: 69
DRG: 470 | End: 2019-01-08
Attending: ORTHOPAEDIC SURGERY
Payer: COMMERCIAL

## 2019-01-08 ENCOUNTER — HOSPITAL ENCOUNTER (INPATIENT)
Facility: CLINIC | Age: 69
LOS: 2 days | Discharge: HOME | DRG: 470 | End: 2019-01-10
Attending: ORTHOPAEDIC SURGERY | Admitting: ORTHOPAEDIC SURGERY
Payer: COMMERCIAL

## 2019-01-08 ENCOUNTER — ANESTHESIA EVENT (OUTPATIENT)
Dept: SURGERY | Facility: CLINIC | Age: 69
DRG: 470 | End: 2019-01-08
Payer: COMMERCIAL

## 2019-01-08 ENCOUNTER — APPOINTMENT (OUTPATIENT)
Dept: GENERAL RADIOLOGY | Facility: CLINIC | Age: 69
DRG: 470 | End: 2019-01-08
Attending: ORTHOPAEDIC SURGERY
Payer: COMMERCIAL

## 2019-01-08 ENCOUNTER — ANESTHESIA (OUTPATIENT)
Dept: SURGERY | Facility: CLINIC | Age: 69
DRG: 470 | End: 2019-01-08
Payer: COMMERCIAL

## 2019-01-08 DIAGNOSIS — Z96.652 STATUS POST TOTAL LEFT KNEE REPLACEMENT: Primary | ICD-10-CM

## 2019-01-08 LAB
CREAT SERPL-MCNC: 0.68 MG/DL (ref 0.52–1.04)
GFR SERPL CREATININE-BSD FRML MDRD: >90 ML/MIN/{1.73_M2}
PLATELET # BLD AUTO: 253 10E9/L (ref 150–450)
POTASSIUM SERPL-SCNC: 3.6 MMOL/L (ref 3.4–5.3)

## 2019-01-08 PROCEDURE — 36000063 ZZH SURGERY LEVEL 4 EA 15 ADDTL MIN: Performed by: ORTHOPAEDIC SURGERY

## 2019-01-08 PROCEDURE — 37000008 ZZH ANESTHESIA TECHNICAL FEE, 1ST 30 MIN: Performed by: ORTHOPAEDIC SURGERY

## 2019-01-08 PROCEDURE — 84132 ASSAY OF SERUM POTASSIUM: CPT | Performed by: ANESTHESIOLOGY

## 2019-01-08 PROCEDURE — 25000128 H RX IP 250 OP 636: Performed by: ORTHOPAEDIC SURGERY

## 2019-01-08 PROCEDURE — 12000000 ZZH R&B MED SURG/OB

## 2019-01-08 PROCEDURE — 27110028 ZZH OR GENERAL SUPPLY NON-STERILE: Performed by: ORTHOPAEDIC SURGERY

## 2019-01-08 PROCEDURE — 40000986 XR KNEE PORT LT 1/2 VW: Mod: LT

## 2019-01-08 PROCEDURE — 25000128 H RX IP 250 OP 636: Performed by: ANESTHESIOLOGY

## 2019-01-08 PROCEDURE — 25000128 H RX IP 250 OP 636: Performed by: PHYSICIAN ASSISTANT

## 2019-01-08 PROCEDURE — 25000128 H RX IP 250 OP 636: Performed by: NURSE ANESTHETIST, CERTIFIED REGISTERED

## 2019-01-08 PROCEDURE — 97161 PT EVAL LOW COMPLEX 20 MIN: CPT | Mod: GP | Performed by: PHYSICAL THERAPIST

## 2019-01-08 PROCEDURE — 25000125 ZZHC RX 250: Performed by: NURSE ANESTHETIST, CERTIFIED REGISTERED

## 2019-01-08 PROCEDURE — 25800025 ZZH RX 258: Performed by: ORTHOPAEDIC SURGERY

## 2019-01-08 PROCEDURE — 36415 COLL VENOUS BLD VENIPUNCTURE: CPT | Performed by: ORTHOPAEDIC SURGERY

## 2019-01-08 PROCEDURE — 40000193 ZZH STATISTIC PT WARD VISIT: Performed by: PHYSICAL THERAPIST

## 2019-01-08 PROCEDURE — 25000132 ZZH RX MED GY IP 250 OP 250 PS 637: Performed by: PHYSICIAN ASSISTANT

## 2019-01-08 PROCEDURE — 25000125 ZZHC RX 250: Performed by: ANESTHESIOLOGY

## 2019-01-08 PROCEDURE — C1776 JOINT DEVICE (IMPLANTABLE): HCPCS | Performed by: ORTHOPAEDIC SURGERY

## 2019-01-08 PROCEDURE — 99222 1ST HOSP IP/OBS MODERATE 55: CPT | Performed by: PHYSICIAN ASSISTANT

## 2019-01-08 PROCEDURE — 97530 THERAPEUTIC ACTIVITIES: CPT | Mod: GP | Performed by: PHYSICAL THERAPIST

## 2019-01-08 PROCEDURE — 36415 COLL VENOUS BLD VENIPUNCTURE: CPT | Performed by: ANESTHESIOLOGY

## 2019-01-08 PROCEDURE — 25000125 ZZHC RX 250: Performed by: ORTHOPAEDIC SURGERY

## 2019-01-08 PROCEDURE — 27810169 ZZH OR IMPLANT GENERAL: Performed by: ORTHOPAEDIC SURGERY

## 2019-01-08 PROCEDURE — 71000013 ZZH RECOVERY PHASE 1 LEVEL 1 EA ADDTL HR: Performed by: ORTHOPAEDIC SURGERY

## 2019-01-08 PROCEDURE — 99207 ZZC CONSULT E&M CHANGED TO INITIAL LEVEL: CPT | Performed by: PHYSICIAN ASSISTANT

## 2019-01-08 PROCEDURE — 25000125 ZZHC RX 250: Performed by: PHYSICIAN ASSISTANT

## 2019-01-08 PROCEDURE — 71000012 ZZH RECOVERY PHASE 1 LEVEL 1 FIRST HR: Performed by: ORTHOPAEDIC SURGERY

## 2019-01-08 PROCEDURE — 25000132 ZZH RX MED GY IP 250 OP 250 PS 637: Performed by: ORTHOPAEDIC SURGERY

## 2019-01-08 PROCEDURE — 97110 THERAPEUTIC EXERCISES: CPT | Mod: GP | Performed by: PHYSICAL THERAPIST

## 2019-01-08 PROCEDURE — 85049 AUTOMATED PLATELET COUNT: CPT | Performed by: ORTHOPAEDIC SURGERY

## 2019-01-08 PROCEDURE — 97116 GAIT TRAINING THERAPY: CPT | Mod: GP | Performed by: PHYSICAL THERAPIST

## 2019-01-08 PROCEDURE — 40000171 ZZH STATISTIC PRE-PROCEDURE ASSESSMENT III: Performed by: ORTHOPAEDIC SURGERY

## 2019-01-08 PROCEDURE — 36000093 ZZH SURGERY LEVEL 4 1ST 30 MIN: Performed by: ORTHOPAEDIC SURGERY

## 2019-01-08 PROCEDURE — 27210794 ZZH OR GENERAL SUPPLY STERILE: Performed by: ORTHOPAEDIC SURGERY

## 2019-01-08 PROCEDURE — 82565 ASSAY OF CREATININE: CPT | Performed by: ANESTHESIOLOGY

## 2019-01-08 PROCEDURE — 37000009 ZZH ANESTHESIA TECHNICAL FEE, EACH ADDTL 15 MIN: Performed by: ORTHOPAEDIC SURGERY

## 2019-01-08 PROCEDURE — 0SRD0J9 REPLACEMENT OF LEFT KNEE JOINT WITH SYNTHETIC SUBSTITUTE, CEMENTED, OPEN APPROACH: ICD-10-PCS | Performed by: ORTHOPAEDIC SURGERY

## 2019-01-08 DEVICE — IMP BASEPLATE TIBIAL GENESIS II SZ 4 LT TI 71420166: Type: IMPLANTABLE DEVICE | Site: KNEE | Status: FUNCTIONAL

## 2019-01-08 DEVICE — IMP COMP PATELLA GENESIS II 9X35MM 71420578: Type: IMPLANTABLE DEVICE | Site: KNEE | Status: FUNCTIONAL

## 2019-01-08 DEVICE — BONE CEMENT RADIOPAQUE SIMPLEX HV FULL DOSE 6194-1-001: Type: IMPLANTABLE DEVICE | Site: KNEE | Status: FUNCTIONAL

## 2019-01-08 DEVICE — IMP COMP FEMORAL S&N LEGION PS NP NARROW 4 LT 71933649: Type: IMPLANTABLE DEVICE | Site: KNEE | Status: FUNCTIONAL

## 2019-01-08 RX ORDER — PROPOFOL 10 MG/ML
INJECTION, EMULSION INTRAVENOUS CONTINUOUS PRN
Status: DISCONTINUED | OUTPATIENT
Start: 2019-01-08 | End: 2019-01-08

## 2019-01-08 RX ORDER — SODIUM CHLORIDE, SODIUM LACTATE, POTASSIUM CHLORIDE, CALCIUM CHLORIDE 600; 310; 30; 20 MG/100ML; MG/100ML; MG/100ML; MG/100ML
INJECTION, SOLUTION INTRAVENOUS CONTINUOUS
Status: DISCONTINUED | OUTPATIENT
Start: 2019-01-08 | End: 2019-01-08 | Stop reason: HOSPADM

## 2019-01-08 RX ORDER — EPHEDRINE SULFATE 50 MG/ML
INJECTION, SOLUTION INTRAMUSCULAR; INTRAVENOUS; SUBCUTANEOUS PRN
Status: DISCONTINUED | OUTPATIENT
Start: 2019-01-08 | End: 2019-01-08

## 2019-01-08 RX ORDER — SENNOSIDES 8.6 MG
1300 CAPSULE ORAL 2 TIMES DAILY
Status: ON HOLD | COMMUNITY
End: 2019-01-09

## 2019-01-08 RX ORDER — ESOMEPRAZOLE MAGNESIUM 40 MG/1
40 CAPSULE, DELAYED RELEASE ORAL DAILY
Status: DISCONTINUED | OUTPATIENT
Start: 2019-01-09 | End: 2019-01-10 | Stop reason: HOSPADM

## 2019-01-08 RX ORDER — DIPHENHYDRAMINE HYDROCHLORIDE 50 MG/ML
12.5 INJECTION INTRAMUSCULAR; INTRAVENOUS EVERY 6 HOURS PRN
Status: DISCONTINUED | OUTPATIENT
Start: 2019-01-08 | End: 2019-01-10 | Stop reason: HOSPADM

## 2019-01-08 RX ORDER — CEFAZOLIN SODIUM 1 G/3ML
1 INJECTION, POWDER, FOR SOLUTION INTRAMUSCULAR; INTRAVENOUS SEE ADMIN INSTRUCTIONS
Status: DISCONTINUED | OUTPATIENT
Start: 2019-01-08 | End: 2019-01-08 | Stop reason: HOSPADM

## 2019-01-08 RX ORDER — ACETAMINOPHEN 325 MG/1
975 TABLET ORAL EVERY 8 HOURS
Status: DISCONTINUED | OUTPATIENT
Start: 2019-01-08 | End: 2019-01-10 | Stop reason: HOSPADM

## 2019-01-08 RX ORDER — DIPHENHYDRAMINE HCL 12.5MG/5ML
12.5 LIQUID (ML) ORAL EVERY 6 HOURS PRN
Status: DISCONTINUED | OUTPATIENT
Start: 2019-01-08 | End: 2019-01-10 | Stop reason: HOSPADM

## 2019-01-08 RX ORDER — AMOXICILLIN 250 MG
2 CAPSULE ORAL 2 TIMES DAILY
Status: DISCONTINUED | OUTPATIENT
Start: 2019-01-08 | End: 2019-01-10 | Stop reason: HOSPADM

## 2019-01-08 RX ORDER — METOCLOPRAMIDE 5 MG/1
5 TABLET ORAL EVERY 6 HOURS PRN
Status: DISCONTINUED | OUTPATIENT
Start: 2019-01-08 | End: 2019-01-10 | Stop reason: HOSPADM

## 2019-01-08 RX ORDER — CEFAZOLIN SODIUM 2 G/100ML
2 INJECTION, SOLUTION INTRAVENOUS
Status: COMPLETED | OUTPATIENT
Start: 2019-01-08 | End: 2019-01-08

## 2019-01-08 RX ORDER — OXYCODONE HYDROCHLORIDE 5 MG/1
5-10 TABLET ORAL EVERY 4 HOURS PRN
Status: DISCONTINUED | OUTPATIENT
Start: 2019-01-08 | End: 2019-01-09

## 2019-01-08 RX ORDER — HYDRALAZINE HYDROCHLORIDE 20 MG/ML
2.5-5 INJECTION INTRAMUSCULAR; INTRAVENOUS EVERY 10 MIN PRN
Status: DISCONTINUED | OUTPATIENT
Start: 2019-01-08 | End: 2019-01-08 | Stop reason: HOSPADM

## 2019-01-08 RX ORDER — HYDRALAZINE HYDROCHLORIDE 20 MG/ML
10 INJECTION INTRAMUSCULAR; INTRAVENOUS EVERY 4 HOURS PRN
Status: DISCONTINUED | OUTPATIENT
Start: 2019-01-08 | End: 2019-01-10 | Stop reason: HOSPADM

## 2019-01-08 RX ORDER — LOSARTAN POTASSIUM 100 MG/1
100 TABLET ORAL DAILY
Status: DISCONTINUED | OUTPATIENT
Start: 2019-01-08 | End: 2019-01-09

## 2019-01-08 RX ORDER — ONDANSETRON 4 MG/1
4 TABLET, ORALLY DISINTEGRATING ORAL EVERY 6 HOURS PRN
Status: DISCONTINUED | OUTPATIENT
Start: 2019-01-08 | End: 2019-01-10 | Stop reason: HOSPADM

## 2019-01-08 RX ORDER — ALBUTEROL SULFATE 0.83 MG/ML
2.5 SOLUTION RESPIRATORY (INHALATION) EVERY 4 HOURS PRN
Status: DISCONTINUED | OUTPATIENT
Start: 2019-01-08 | End: 2019-01-08 | Stop reason: HOSPADM

## 2019-01-08 RX ORDER — LIDOCAINE HYDROCHLORIDE 20 MG/ML
INJECTION, SOLUTION INFILTRATION; PERINEURAL PRN
Status: DISCONTINUED | OUTPATIENT
Start: 2019-01-08 | End: 2019-01-08

## 2019-01-08 RX ORDER — AMOXICILLIN 250 MG
1 CAPSULE ORAL 2 TIMES DAILY
Status: DISCONTINUED | OUTPATIENT
Start: 2019-01-08 | End: 2019-01-10 | Stop reason: HOSPADM

## 2019-01-08 RX ORDER — LEVOTHYROXINE SODIUM 112 UG/1
112 TABLET ORAL EVERY MORNING
Status: DISCONTINUED | OUTPATIENT
Start: 2019-01-09 | End: 2019-01-10 | Stop reason: HOSPADM

## 2019-01-08 RX ORDER — ONDANSETRON 2 MG/ML
4 INJECTION INTRAMUSCULAR; INTRAVENOUS EVERY 6 HOURS PRN
Status: DISCONTINUED | OUTPATIENT
Start: 2019-01-08 | End: 2019-01-10 | Stop reason: HOSPADM

## 2019-01-08 RX ORDER — TETRAHYDROZOLINE HCL 0.05 %
1 DROPS OPHTHALMIC (EYE) DAILY PRN
COMMUNITY
End: 2019-05-06

## 2019-01-08 RX ORDER — DEXAMETHASONE SODIUM PHOSPHATE 4 MG/ML
INJECTION, SOLUTION INTRA-ARTICULAR; INTRALESIONAL; INTRAMUSCULAR; INTRAVENOUS; SOFT TISSUE PRN
Status: DISCONTINUED | OUTPATIENT
Start: 2019-01-08 | End: 2019-01-08

## 2019-01-08 RX ORDER — FENTANYL CITRATE 50 UG/ML
25-50 INJECTION, SOLUTION INTRAMUSCULAR; INTRAVENOUS
Status: COMPLETED | OUTPATIENT
Start: 2019-01-08 | End: 2019-01-08

## 2019-01-08 RX ORDER — HYDROMORPHONE HYDROCHLORIDE 1 MG/ML
.3-.5 INJECTION, SOLUTION INTRAMUSCULAR; INTRAVENOUS; SUBCUTANEOUS EVERY 5 MIN PRN
Status: DISCONTINUED | OUTPATIENT
Start: 2019-01-08 | End: 2019-01-08 | Stop reason: HOSPADM

## 2019-01-08 RX ORDER — PROCHLORPERAZINE MALEATE 5 MG
5 TABLET ORAL EVERY 6 HOURS PRN
Status: DISCONTINUED | OUTPATIENT
Start: 2019-01-08 | End: 2019-01-10 | Stop reason: HOSPADM

## 2019-01-08 RX ORDER — LIDOCAINE 40 MG/G
CREAM TOPICAL
Status: DISCONTINUED | OUTPATIENT
Start: 2019-01-08 | End: 2019-01-10 | Stop reason: HOSPADM

## 2019-01-08 RX ORDER — FENTANYL CITRATE 50 UG/ML
INJECTION, SOLUTION INTRAMUSCULAR; INTRAVENOUS PRN
Status: DISCONTINUED | OUTPATIENT
Start: 2019-01-08 | End: 2019-01-08

## 2019-01-08 RX ORDER — DEXTROSE MONOHYDRATE, SODIUM CHLORIDE, AND POTASSIUM CHLORIDE 50; 1.49; 4.5 G/1000ML; G/1000ML; G/1000ML
INJECTION, SOLUTION INTRAVENOUS CONTINUOUS
Status: DISCONTINUED | OUTPATIENT
Start: 2019-01-08 | End: 2019-01-09

## 2019-01-08 RX ORDER — METOCLOPRAMIDE HYDROCHLORIDE 5 MG/ML
5 INJECTION INTRAMUSCULAR; INTRAVENOUS EVERY 6 HOURS PRN
Status: DISCONTINUED | OUTPATIENT
Start: 2019-01-08 | End: 2019-01-10 | Stop reason: HOSPADM

## 2019-01-08 RX ORDER — CALCIUM CARBONATE 500 MG/1
500 TABLET, CHEWABLE ORAL DAILY PRN
Status: DISCONTINUED | OUTPATIENT
Start: 2019-01-08 | End: 2019-01-10 | Stop reason: HOSPADM

## 2019-01-08 RX ORDER — MAGNESIUM HYDROXIDE 1200 MG/15ML
LIQUID ORAL PRN
Status: DISCONTINUED | OUTPATIENT
Start: 2019-01-08 | End: 2019-01-08 | Stop reason: HOSPADM

## 2019-01-08 RX ORDER — POLYETHYLENE GLYCOL 3350 17 G/17G
17 POWDER, FOR SOLUTION ORAL DAILY
Status: DISCONTINUED | OUTPATIENT
Start: 2019-01-08 | End: 2019-01-10 | Stop reason: HOSPADM

## 2019-01-08 RX ORDER — FENTANYL CITRATE 50 UG/ML
25-50 INJECTION, SOLUTION INTRAMUSCULAR; INTRAVENOUS
Status: DISCONTINUED | OUTPATIENT
Start: 2019-01-08 | End: 2019-01-08 | Stop reason: HOSPADM

## 2019-01-08 RX ORDER — NALOXONE HYDROCHLORIDE 0.4 MG/ML
.1-.4 INJECTION, SOLUTION INTRAMUSCULAR; INTRAVENOUS; SUBCUTANEOUS
Status: DISCONTINUED | OUTPATIENT
Start: 2019-01-08 | End: 2019-01-10 | Stop reason: HOSPADM

## 2019-01-08 RX ORDER — ONDANSETRON 4 MG/1
4 TABLET, ORALLY DISINTEGRATING ORAL EVERY 30 MIN PRN
Status: DISCONTINUED | OUTPATIENT
Start: 2019-01-08 | End: 2019-01-08 | Stop reason: HOSPADM

## 2019-01-08 RX ORDER — NALOXONE HYDROCHLORIDE 0.4 MG/ML
.1-.4 INJECTION, SOLUTION INTRAMUSCULAR; INTRAVENOUS; SUBCUTANEOUS
Status: DISCONTINUED | OUTPATIENT
Start: 2019-01-08 | End: 2019-01-08

## 2019-01-08 RX ORDER — CEFAZOLIN SODIUM 1 G/3ML
1 INJECTION, POWDER, FOR SOLUTION INTRAMUSCULAR; INTRAVENOUS EVERY 8 HOURS
Status: COMPLETED | OUTPATIENT
Start: 2019-01-08 | End: 2019-01-09

## 2019-01-08 RX ORDER — ONDANSETRON 2 MG/ML
4 INJECTION INTRAMUSCULAR; INTRAVENOUS EVERY 30 MIN PRN
Status: DISCONTINUED | OUTPATIENT
Start: 2019-01-08 | End: 2019-01-08 | Stop reason: HOSPADM

## 2019-01-08 RX ORDER — HYDROMORPHONE HYDROCHLORIDE 1 MG/ML
.3-.5 INJECTION, SOLUTION INTRAMUSCULAR; INTRAVENOUS; SUBCUTANEOUS
Status: DISCONTINUED | OUTPATIENT
Start: 2019-01-08 | End: 2019-01-10 | Stop reason: HOSPADM

## 2019-01-08 RX ORDER — ALBUTEROL SULFATE 0.83 MG/ML
2.5 SOLUTION RESPIRATORY (INHALATION)
Status: DISCONTINUED | OUTPATIENT
Start: 2019-01-08 | End: 2019-01-10 | Stop reason: HOSPADM

## 2019-01-08 RX ORDER — ACETAMINOPHEN 325 MG/1
650 TABLET ORAL EVERY 4 HOURS PRN
Status: DISCONTINUED | OUTPATIENT
Start: 2019-01-11 | End: 2019-01-10 | Stop reason: HOSPADM

## 2019-01-08 RX ORDER — BUPIVACAINE HYDROCHLORIDE 7.5 MG/ML
INJECTION, SOLUTION INTRASPINAL PRN
Status: DISCONTINUED | OUTPATIENT
Start: 2019-01-08 | End: 2019-01-08

## 2019-01-08 RX ORDER — ONDANSETRON 2 MG/ML
INJECTION INTRAMUSCULAR; INTRAVENOUS PRN
Status: DISCONTINUED | OUTPATIENT
Start: 2019-01-08 | End: 2019-01-08

## 2019-01-08 RX ORDER — ACETAMINOPHEN 325 MG/1
975 TABLET ORAL ONCE
Status: COMPLETED | OUTPATIENT
Start: 2019-01-08 | End: 2019-01-08

## 2019-01-08 RX ADMIN — PHENYLEPHRINE HYDROCHLORIDE 100 MCG: 10 INJECTION, SOLUTION INTRAMUSCULAR; INTRAVENOUS; SUBCUTANEOUS at 09:10

## 2019-01-08 RX ADMIN — OXYCODONE HYDROCHLORIDE 5 MG: 5 TABLET ORAL at 17:58

## 2019-01-08 RX ADMIN — LIDOCAINE HYDROCHLORIDE 0.2 ML: 10 INJECTION, SOLUTION EPIDURAL; INFILTRATION; INTRACAUDAL; PERINEURAL at 07:45

## 2019-01-08 RX ADMIN — POTASSIUM CHLORIDE, DEXTROSE MONOHYDRATE AND SODIUM CHLORIDE: 150; 5; 450 INJECTION, SOLUTION INTRAVENOUS at 12:41

## 2019-01-08 RX ADMIN — BUPIVACAINE HYDROCHLORIDE 20 ML GIVEN: 5 INJECTION, SOLUTION EPIDURAL; INTRACAUDAL; PERINEURAL at 07:40

## 2019-01-08 RX ADMIN — LIDOCAINE HYDROCHLORIDE 20 MG: 20 INJECTION, SOLUTION INFILTRATION; PERINEURAL at 08:28

## 2019-01-08 RX ADMIN — HYDROMORPHONE HYDROCHLORIDE 0.5 MG: 1 INJECTION, SOLUTION INTRAMUSCULAR; INTRAVENOUS; SUBCUTANEOUS at 16:44

## 2019-01-08 RX ADMIN — OXYCODONE HYDROCHLORIDE 10 MG: 5 TABLET ORAL at 21:59

## 2019-01-08 RX ADMIN — Medication 5 MG: at 09:05

## 2019-01-08 RX ADMIN — Medication 5 MG: at 08:55

## 2019-01-08 RX ADMIN — DEXAMETHASONE SODIUM PHOSPHATE 4 MG: 4 INJECTION, SOLUTION INTRA-ARTICULAR; INTRALESIONAL; INTRAMUSCULAR; INTRAVENOUS; SOFT TISSUE at 08:20

## 2019-01-08 RX ADMIN — CEFAZOLIN SODIUM 1 G: 1 INJECTION, POWDER, FOR SOLUTION INTRAMUSCULAR; INTRAVENOUS at 16:17

## 2019-01-08 RX ADMIN — POLYETHYLENE GLYCOL 3350 17 G: 17 POWDER, FOR SOLUTION ORAL at 16:46

## 2019-01-08 RX ADMIN — ONDANSETRON 4 MG: 2 INJECTION INTRAMUSCULAR; INTRAVENOUS at 08:20

## 2019-01-08 RX ADMIN — FENTANYL CITRATE 25 MCG: 50 INJECTION, SOLUTION INTRAMUSCULAR; INTRAVENOUS at 09:18

## 2019-01-08 RX ADMIN — ACETAMINOPHEN 975 MG: 325 TABLET, FILM COATED ORAL at 17:58

## 2019-01-08 RX ADMIN — SODIUM CHLORIDE, POTASSIUM CHLORIDE, SODIUM LACTATE AND CALCIUM CHLORIDE: 600; 310; 30; 20 INJECTION, SOLUTION INTRAVENOUS at 09:05

## 2019-01-08 RX ADMIN — FENTANYL CITRATE 50 MCG: 50 INJECTION, SOLUTION INTRAMUSCULAR; INTRAVENOUS at 08:10

## 2019-01-08 RX ADMIN — PROPOFOL 40 MCG/KG/MIN: 10 INJECTION, EMULSION INTRAVENOUS at 08:22

## 2019-01-08 RX ADMIN — PHENYLEPHRINE HYDROCHLORIDE 50 MCG: 10 INJECTION, SOLUTION INTRAMUSCULAR; INTRAVENOUS; SUBCUTANEOUS at 09:34

## 2019-01-08 RX ADMIN — FENTANYL CITRATE 25 MCG: 50 INJECTION, SOLUTION INTRAMUSCULAR; INTRAVENOUS at 09:54

## 2019-01-08 RX ADMIN — FENTANYL CITRATE 50 MCG: 50 INJECTION, SOLUTION INTRAMUSCULAR; INTRAVENOUS at 07:40

## 2019-01-08 RX ADMIN — SODIUM CHLORIDE, POTASSIUM CHLORIDE, SODIUM LACTATE AND CALCIUM CHLORIDE: 600; 310; 30; 20 INJECTION, SOLUTION INTRAVENOUS at 07:18

## 2019-01-08 RX ADMIN — DIPHENHYDRAMINE HYDROCHLORIDE 12.5 MG: 25 SOLUTION ORAL at 13:38

## 2019-01-08 RX ADMIN — ACETAMINOPHEN 975 MG: 325 TABLET, FILM COATED ORAL at 07:16

## 2019-01-08 RX ADMIN — MIDAZOLAM 2 MG: 1 INJECTION INTRAMUSCULAR; INTRAVENOUS at 08:08

## 2019-01-08 RX ADMIN — MIDAZOLAM HYDROCHLORIDE 1 MG: 1 INJECTION, SOLUTION INTRAMUSCULAR; INTRAVENOUS at 07:40

## 2019-01-08 RX ADMIN — Medication 5 MG: at 08:46

## 2019-01-08 RX ADMIN — Medication 5 MG: at 20:41

## 2019-01-08 RX ADMIN — SENNOSIDES AND DOCUSATE SODIUM 1 TABLET: 8.6; 5 TABLET ORAL at 20:27

## 2019-01-08 RX ADMIN — PHENYLEPHRINE HYDROCHLORIDE 50 MCG: 10 INJECTION, SOLUTION INTRAMUSCULAR; INTRAVENOUS; SUBCUTANEOUS at 09:46

## 2019-01-08 RX ADMIN — Medication 5 MG: at 09:37

## 2019-01-08 RX ADMIN — PHENYLEPHRINE HYDROCHLORIDE 50 MCG: 10 INJECTION, SOLUTION INTRAMUSCULAR; INTRAVENOUS; SUBCUTANEOUS at 09:24

## 2019-01-08 RX ADMIN — SODIUM CHLORIDE 1 G: 9 INJECTION, SOLUTION INTRAVENOUS at 08:26

## 2019-01-08 RX ADMIN — HYDROMORPHONE HYDROCHLORIDE 0.5 MG: 1 INJECTION, SOLUTION INTRAMUSCULAR; INTRAVENOUS; SUBCUTANEOUS at 13:37

## 2019-01-08 RX ADMIN — BUPIVACAINE HYDROCHLORIDE IN DEXTROSE 12 MG: 7.5 INJECTION, SOLUTION SUBARACHNOID at 08:16

## 2019-01-08 RX ADMIN — HYDROMORPHONE HYDROCHLORIDE 0.5 MG: 1 INJECTION, SOLUTION INTRAMUSCULAR; INTRAVENOUS; SUBCUTANEOUS at 11:30

## 2019-01-08 RX ADMIN — CEFAZOLIN SODIUM 2 G: 2 INJECTION, SOLUTION INTRAVENOUS at 08:20

## 2019-01-08 ASSESSMENT — ENCOUNTER SYMPTOMS
DYSRHYTHMIAS: 0
SEIZURES: 0

## 2019-01-08 ASSESSMENT — ACTIVITIES OF DAILY LIVING (ADL)
ADLS_ACUITY_SCORE: 11

## 2019-01-08 ASSESSMENT — LIFESTYLE VARIABLES: TOBACCO_USE: 0

## 2019-01-08 ASSESSMENT — MIFFLIN-ST. JEOR: SCORE: 1264.36

## 2019-01-08 NOTE — ANESTHESIA PROCEDURE NOTES
Peripheral nerve/Neuraxial procedure note : intrathecal  Pre-Procedure  Performed by Kayla Yost MD  Location: OR      Pre-Anesthestic Checklist: patient identified, IV checked, site marked, risks and benefits discussed, informed consent, monitors and equipment checked, pre-op evaluation and at physician/surgeon's request    Timeout  Correct Patient: Yes   Correct Procedure: Yes   Correct Site: Yes   Correct Laterality: Yes   Correct Position: Yes   Site Marked: Yes   .   Procedure Documentation    .    Procedure:    Intrathecal.  Insertion Site:L2-3  (midline approach)      Patient Prep;povidone-iodine 7.5% surgical scrub.  .  Needle: Rick tip Spinal Needle (gauge): 25  Spinal/LP Needle Length (inches): 3 # of attempts: 1 and # of redirects:  Introducer used Introducer: 20 G .       Assessment/Narrative  Paresthesias: No.  .  .  clear CSF fluid removed while sitting   . Comments:  Patient sitting on edge of OR bed, lower back cleaned and prepped in sterile fashion with betadine. 1% lido used to numb area. Introducer placed, spinal needle through introducer. Appropriate flow of CSF and confirmed with aspiration via syringe. Spinal dose given, 12 mg 0.75% bupivacaine. No complications.

## 2019-01-08 NOTE — ANESTHESIA PREPROCEDURE EVALUATION
Anesthesia Pre-Procedure Evaluation    Patient: Tiffany Stovall   MRN: [id number] : 1950          Preoperative Diagnosis: DJD    Procedure(s):  LEFT TOTAL KNEE ARTHROPLASTY (SMITH NEPHEW)^    Past Medical History:   Diagnosis Date     Bronchial spasm      Cancer (H)     basal cell, squamous cell skin ca     Compression fracture of T12 vertebra (H) 2014     GERD (gastroesophageal reflux disease)      HTN - hypertension      Hyperlipaemia      Postmenopausal      Uncomplicated asthma      Unspecified hypothyroidism     Hypothyroidism     Past Surgical History:   Procedure Laterality Date     ARTHROSCOPY KNEE RT/LT       BIOPSY OF BREAST, NEEDLE CORE      Papilloma     C REMV CATARACT INTRACAP,INSERT LENS       CARPAL TUNNEL RELEASE RT/LT      bilateral     CATARACT IOL, RT/LT       CHOLECYSTECTOMY, LAPOROSCOPIC      Cholecystectomy, Laparoscopic     COLONOSCOPY WITH CO2 INSUFFLATION N/A 2017    Procedure: COLONOSCOPY WITH CO2 INSUFFLATION;  Surgeon: Elkin Lemus MD;  Location: [facility]     ENDOSCOPIC STRIPPING VEIN(S)  2013    stab phlebectomies     HC COLONOSCOPY THRU STOMA, DIAGNOSTIC  2006    diverticulosis, no polyps found, next one due in      HYSTERECTOMY, PAP NO LONGER INDICATED      bleeding, atypical cells on endobx, endometriosis     HYSTERECTOMY, CAMILLE      fibroids, endometrial hyperplasia     MOHS MICROGRAPHIC PROCEDURE      BCC lip     MOHS MICROGRAPHIC PROCEDURE  2016    squamus lt neck,basal top rt foot/3rd toe     MOHS MICROGRAPHIC PROCEDURE  2018    Left leg     SALPINGO OOPHORECTOMY,R/L/TERI      Salpingo Oophorectomy, RT/LT/TERI     TONSILLECTOMY & ADENOIDECTOMY  1968     TUBAL/ECTOPIC PREGNANCY  ,       Anesthesia Evaluation     .             ROS/MED HX    ENT/Pulmonary:     (+)asthma , . .   (-) tobacco use and sleep apnea   Neurologic:      (-) seizures, CVA and migraines   Cardiovascular:      (+) Dyslipidemia, hypertension----. : . . . :. .      (-) CAD, SALEEM, arrhythmias and valvular problems/murmurs   METS/Exercise Tolerance:  >4 METS   Hematologic:        (-) history of blood clots, anemia and other hematologic disorder   Musculoskeletal:   (+) arthritis, , , -       GI/Hepatic:     (+) GERD Asymptomatic on medication,      (-) liver disease   Renal/Genitourinary:      (-) renal disease and nephrolithiasis   Endo:     (+) thyroid problem hypothyroidism, .   (-) Type I DM and Type II DM   Psychiatric:        (-) psychiatric history   Infectious Disease:        (-) Recent Fever   Malignancy:   (+) Malignancy History of Skin  Skin CA Remission status post,         Other:                          Physical Exam  Normal systems: cardiovascular, pulmonary and dental    Airway   Mallampati: II  TM distance: >3 FB  Neck ROM: full    Dental     Cardiovascular   Rhythm and rate: regular and normal  (-) no murmur    Pulmonary    breath sounds clear to auscultation            Lab Results   Component Value Date    WBC 6.6 04/17/2018    HGB 12.8 12/20/2018    HCT 39.9 04/17/2018     04/17/2018     12/20/2018    POTASSIUM 3.8 12/20/2018    CHLORIDE 103 12/20/2018    CO2 25 12/20/2018    BUN 12 12/20/2018    CR 0.75 12/20/2018     (H) 12/20/2018    ROGER 9.5 12/20/2018    ALBUMIN 4.4 12/20/2018    PROTTOTAL 7.6 12/20/2018    ALT 42 12/20/2018    AST 30 12/20/2018    ALKPHOS 60 12/20/2018    BILITOTAL 0.5 12/20/2018    INR 0.96 03/18/2010    TSH 0.69 04/17/2018    T4 1.36 02/16/2015       Preop Vitals  BP Readings from Last 3 Encounters:   12/20/18 124/84   09/11/18 140/77   06/11/18 160/90    Pulse Readings from Last 3 Encounters:   12/20/18 84   09/11/18 82   04/17/18 70      Resp Readings from Last 3 Encounters:   12/20/18 14   06/11/18 18   04/17/18 18    SpO2 Readings from Last 3 Encounters:   12/20/18 98%   04/17/18 99%   03/21/17 98%      Temp Readings from Last 1 Encounters:   12/20/18 36.8  " C (98.3  F)    Ht Readings from Last 1 Encounters:   12/20/18 1.676 m (5' 6\")      Wt Readings from Last 1 Encounters:   12/20/18 72.6 kg (160 lb)    Estimated body mass index is 25.82 kg/m  as calculated from the following:    Height as of 12/20/18: 1.676 m (5' 6\").    Weight as of 12/20/18: 72.6 kg (160 lb).       Anesthesia Plan      History & Physical Review      ASA Status:  2 .    NPO Status:  > 8 hours    Plan for Spinal and Periph. Nerve Block for postop pain   PONV prophylaxis:  Ondansetron (or other 5HT-3)  Propofol infusion,    No NSAIDs      Postoperative Care  Postoperative pain management:  Multi-modal analgesia.      Consents  Anesthetic plan, risks, benefits and alternatives discussed with:  Patient..                 Kayla Yost MD  "

## 2019-01-08 NOTE — PROGRESS NOTES
Admission medication history interview status for the 1/8/2019  admission is complete. See EPIC admission navigator for prior to admission medications     Medication history source reliability:Good    Medication history interview source(s):Patient    Medication history resources (including written lists, pill bottles, clinic record):Patient mailed in her medication list prior to surgery    Primary pharmacy.[facility] [place]    Additional medication history information not noted on PTA med list :None    Time spent in this activity: 40 minutes    Prior to Admission medications    Medication Sig Last Dose Taking? Auth Provider   acetaminophen (TYLENOL 8 HOUR ARTHRITIS PAIN) 650 MG CR tablet Take 1,300 mg by mouth 2 times daily 1/7/2019 at 1930 Yes Reported, Patient   albuterol (2.5 MG/3ML) 0.083% nebulizer solution Take 1 vial (2.5 mg) by nebulization every 6 hours as needed for shortness of breath / dyspnea More than a Month at PRN Yes Darby Williamson MD   albuterol (ALBUTEROL) 108 (90 BASE) MCG/ACT Inhaler Inhale 1-2 puffs into the lungs every 4 hours as needed for shortness of breath / dyspnea 1/1/2019 at PRN Yes Darby Williamson MD   atorvastatin (LIPITOR) 40 MG tablet Take 1 tablet (40 mg) by mouth daily 1/7/2019 at PM Yes Darby Williamson MD   EPINEPHrine (EPIPEN/ADRENACLICK/OR ANY BX GENERIC EQUIV) 0.3 MG/0.3ML injection 2-pack Inject 0.3 mLs (0.3 mg) into the muscle once as needed More than a Month at PRN Yes Darby Williamson MD   Esomeprazole Magnesium (NEXIUM PO) Take 40 mg by mouth daily 1/8/2019 at 0410 Yes Reported, Patient   fluticasone-salmeterol (ADVAIR DISKUS) 250-50 MCG/DOSE diskus inhaler Inhale 1 puff into the lungs 2 times daily 1/8/2019 at 0410 Yes Darby Williamson MD   levothyroxine (SYNTHROID/LEVOTHROID) 112 MCG tablet Take 1 tablet (112 mcg) by mouth every morning 1/8/2019 at 0410 Yes Darby Williamson MD   loratadine (CLARITIN) 10 MG tablet Take 10 mg by mouth daily as  needed  2019 at PM Yes Reported, Patient   losartan-hydrochlorothiazide (HYZAAR) 100-25 MG per tablet Take 1 tablet by mouth every morning 2019 at AM Yes Darby Williamson MD   mupirocin (BACTROBAN) 2 % nasal ointment Spray 1 each into both nostrils 2 times daily 2019 at AM Yes Reported, Patient   potassium chloride SA (K-DUR/KLOR-CON M) 20 MEQ CR tablet Take 1 tablet (20 mEq) by mouth daily 2019 at Noon Yes Darby Williamson MD   Prenatal Multivit-Min-Fe-FA (PRE- PO) Take 1 tablet by mouth daily (with lunch)  2019 at Noon Yes Reported, Patient   ranitidine (ZANTAC) 150 MG tablet Take 1 tablet (150 mg) by mouth At Bedtime  Patient taking differently: Take 150 mg by mouth every evening as needed  2019 at PM Yes Darby Williamson MD   tetrahydrozoline (OPTI-CLEAR) 0.05 % ophthalmic solution Place 1 drop into both eyes daily as needed (After Swimming) 2019 at PRN Yes Reported, Patient

## 2019-01-08 NOTE — ANESTHESIA CARE TRANSFER NOTE
Patient: Tiffany Stovall    Procedure(s):  LEFT TOTAL KNEE ARTHROPLASTY (NELSON NEPHEW)^    Diagnosis: DJD  Diagnosis Additional Information: No value filed.    Anesthesia Type:   Spinal     Note:  Airway :Room Air  Patient transferred to:PACU  Handoff Report: Identifed the Patient, Identified the Reponsible Provider, Reviewed the pertinent medical history, Discussed the surgical course, Reviewed Intra-OP anesthesia mangement and issues during anesthesia, Set expectations for post-procedure period and Allowed opportunity for questions and acknowledgement of understanding      Vitals: (Last set prior to Anesthesia Care Transfer)    CRNA VITALS  1/8/2019 0934 - 1/8/2019 1015      1/8/2019             Pulse:  78    SpO2:  96 %    Resp Rate (set):  10                Electronically Signed By: DIEGO Broderick CRNA  January 8, 2019  10:15 AM

## 2019-01-08 NOTE — BRIEF OP NOTE
[facility]    Brief Operative Note    Pre-operative diagnosis: DJD Left knee  Post-operative diagnosis DJD Left knee  Procedure: Procedure(s):  LEFT TOTAL KNEE ARTHROPLASTY (NELSON NEPHEW)^  Surgeon: Surgeon(s) and Role:     * Tom Terry MD - Primary     * Sharron Lundberg PA-C - Assisting  Anesthesia: Spinal   Estimated blood loss: 25  Drains: Hemovac  Specimens: * No specimens in log *  Findings:   None.  Complications: None.  Implants: Materials Involved:  See op note  Grafts:  None.

## 2019-01-08 NOTE — CONSULTS
Consult Date:  01/08/2019      PRIMARY CARE PROVIDER:  Darby Williamson MD.      REQUESTING PHYSICIAN:  Tom Terry MD      REASON FOR CONSULTATION:  Assistance with co-medical management of chronic medical conditions following the left total knee arthroplasty.      HISTORY OF PRESENT ILLNESS:  Tiffany Stovall is a 68-year-old female with past medical history significant for osteoarthritis, mild persistent asthma, GERD, hypertension, hyperlipidemia, hypothyroidism, history of varicose veins, history of a BCC, history of squamous cell carcinoma and a history of T12 compression fracture who is being evaluated for co-medical management of chronic medical conditions following a left total knee arthroplasty.      The patient underwent an elective left total knee arthroplasty due to persistent left knee pain.  The patient was found to have bone-on-bone degenerative changes and a complex tear to the medial meniscus.  This procedure was performed under spinal peripheral anesthesia.  Estimated blood loss was 25 mL and there were no complications.      I evaluated the patient in her hospital room where she was lying comfortably in bed upon arrival.  The patient indicates she developed a bruise on her left calf when scrubbing too hard when preparing for surgery.  She indicates she had a recent urinary tract infection, of which she completed a course of ciprofloxacin.  Had a urine culture that was negative on 12/31/2018.  Other than ongoing left knee pain that started all of this she offers no other complaints.      PAST MEDICAL HISTORY:   1.  Osteoarthritis.   2.  Mild persistent asthma.   3.  GERD.   4.  Hypertension.   5.  Hyperlipidemia.   6.  Hypothyroidism.   7.  History of varicose veins.   8.  History of BCC.   9.  History of squamous cell carcinoma.   10.  History of T12 compression fracture.      PAST SURGICAL HISTORY:   1.  Left knee arthrotomy.   2.  Right knee arthroscopy.   3.  Left breast biopsy.   4.  Bilateral  cataract removal and lens replacement.   5.  Bilateral carpal tunnel release.   6.  Laparoscopic cholecystectomy.   7.  Right lower extremity vein stripping.   8.  Tubal ligation bilaterally due to ectopic pregnancies.   9.  Total abdominal hysterectomy with bilateral salpingo-oophorectomy.   10.  Tonsillectomy and adenoidectomy.   11.  Mohs procedure, several of them.      PRIOR TO ADMIT MEDICATIONS:    Prior to Admission Medications   Prescriptions Last Dose Informant Patient Reported? Taking?   EPINEPHrine (EPIPEN/ADRENACLICK/OR ANY BX GENERIC EQUIV) 0.3 MG/0.3ML injection 2-pack More than a Month at PRN Self No Yes   Sig: Inject 0.3 mLs (0.3 mg) into the muscle once as needed   Esomeprazole Magnesium (NEXIUM PO) 2019 at 0410 Self Yes Yes   Sig: Take 40 mg by mouth daily   Prenatal Multivit-Min-Fe-FA (PRE-PETER PO) 2019 at Noon Self Yes Yes   Sig: Take 1 tablet by mouth daily (with lunch)    acetaminophen (TYLENOL 8 HOUR ARTHRITIS PAIN) 650 MG CR tablet 2019 at 1930 Self Yes Yes   Sig: Take 1,300 mg by mouth 2 times daily   albuterol (2.5 MG/3ML) 0.083% nebulizer solution More than a Month at PRN Self No Yes   Sig: Take 1 vial (2.5 mg) by nebulization every 6 hours as needed for shortness of breath / dyspnea   albuterol (ALBUTEROL) 108 (90 BASE) MCG/ACT Inhaler 2019 at PRN Self No Yes   Sig: Inhale 1-2 puffs into the lungs every 4 hours as needed for shortness of breath / dyspnea   atorvastatin (LIPITOR) 40 MG tablet 2019 at PM Self No Yes   Sig: Take 1 tablet (40 mg) by mouth daily   fluticasone-salmeterol (ADVAIR DISKUS) 250-50 MCG/DOSE diskus inhaler 2019 at 0410 Self No Yes   Sig: Inhale 1 puff into the lungs 2 times daily   levothyroxine (SYNTHROID/LEVOTHROID) 112 MCG tablet 2019 at 0410 Self No Yes   Sig: Take 1 tablet (112 mcg) by mouth every morning   loratadine (CLARITIN) 10 MG tablet 2019 at PM Self Yes Yes   Sig: Take 10 mg by mouth daily as needed     losartan-hydrochlorothiazide (HYZAAR) 100-25 MG per tablet 1/6/2019 at AM Self No Yes   Sig: Take 1 tablet by mouth every morning   mupirocin (BACTROBAN) 2 % nasal ointment 1/8/2019 at AM Self Yes Yes   Sig: Spray 1 each into both nostrils 2 times daily   potassium chloride SA (K-DUR/KLOR-CON M) 20 MEQ CR tablet 1/7/2019 at Noon Self No Yes   Sig: Take 1 tablet (20 mEq) by mouth daily   ranitidine (ZANTAC) 150 MG tablet 1/7/2019 at PM Self No Yes   Sig: Take 1 tablet (150 mg) by mouth At Bedtime   Patient taking differently: Take 150 mg by mouth every evening as needed    tetrahydrozoline (OPTI-CLEAR) 0.05 % ophthalmic solution 1/6/2019 at PRN Self Yes Yes   Sig: Place 1 drop into both eyes daily as needed (After Swimming)      Facility-Administered Medications: None        ALLERGIES:    Allergies   Allergen Reactions     Mary Shortness Of Breath     Asa [Aspirin]      bronchospasm     Ace Inhibitors Cough     Bees Anaphylaxis     Hornet Venom Anaphylaxis     Lisinopril Cough     New Medication Allergy      roses     Nsaids      bronchospasm     Other [Seasonal Allergies]      Poultry Meal      Wasp Venom Anaphylaxis       SOCIAL HISTORY:  The patient currently resides in a house in [place] with her .  She is a lifelong nonsmoker and indicates a rare more social alcohol consumption.  Denies street or illicit drug use and does not currently utilize a cane or a walker.      FAMILY HISTORY:   1.  Mother is alive and well, has known hypothyroidism, diabetes and hyperlipidemia.   2.  Father passed away, had asthma, hypothyroidism, basal cell carcinoma and prostate cancer.      REVIEW OF SYSTEMS:  A 10-point review of systems was performed.  All pertinent positives are listed in the history of present illness:  The rest is negative.      PHYSICAL EXAMINATION:   VITAL SIGNS:  Temperature is 97.8 degrees Fahrenheit with a blood pressure of 145/76, heart rate of 79 beats per minute, respiratory rate of 12, O2  saturation of 100% on 1 liter per nasal cannula.  The patient was previously rating her pain of 4/10.   GENERAL:  The patient is awake, alert and cooperative, in no apparent distress, alert and oriented x3.   HEENT:  Normocephalic, atraumatic.  Moist mucous membranes present.  No exudates noted in the posterior pharynx.  Uvula is midline.  Eyes:  Pupils are equal, round, reactive to light.  Extraocular movements are intact.  Normal sclerae.   NECK:  Supple, normal range of motion.  No tracheal deviation.  No cervical lymphadenopathy present.   CARDIOVASCULAR:  Regular rate and rhythm.  No rubs, murmurs or gallops appreciated.   PULMONARY:  Lungs are clear to auscultation bilaterally.  No wheezes, rhonchi or rales appreciated.   GASTROINTESTINAL:  Bowel sounds are present in all 4 quadrants, soft, nontender, nondistended.   NEUROLOGIC:  Cranial nerves II-XII appear grossly intact.  The patient demonstrated equal sensation, coordination and strength in the upper extremities bilaterally.  Deferred lower extremity as the patient is still under the effects of anesthesia.   EXTREMITIES:  No lower extremity edema noted bilaterally.  Calves are nontender to palpation.  Left lower extremity is currently Ace wrapped in immobilizing leg brace.      ASSESSMENT AND PLAN:  Tiffany Stovall is a 68-year-old female with a past medical history significant for osteoarthritis, mild persistent asthma, GERD, hypertension, hyperlipidemia, hypothyroidism, history of varicose veins, history of BCC, history of SCC, history of T12 compression fracture who is being evaluated for co-medical management of chronic medical conditions following a left total knee arthroplasty.   1.  Osteoarthritis with noted complex tear of the medial meniscus of the left knee, status post left total knee arthroplasty:  Orthopedic Service is managing at this point.  The patient is postop day #0.  I will defer analgesic management, DVT prophylaxis and PT and OT  assessments to Orthopedic Service.  Would strongly encourage utilization of incentive spirometer.  Hemoglobin will be checked in the morning to assess for acute blood loss anemia.  Will order for daily Miralax as patient requested.    2.  Gastroesophageal reflux disease:  Patient is compliant with Nexium 40 mg daily and as needed Zantac 150 mg at bedtime.  Will resume both medications at this point.   3.  Mild intermittent asthma:  This appears to be stable.  Will resume prior to admit Advair 1 puff 2 times daily.  The patient has brought her own inhaler with her and can utilize this.  We will order for albuterol neb as needed.   4.  Essential hypertension:  This appears to be stable.  The patient's blood pressure is currently documented at 145/76.  The patient currently takes Hyzaar 100/25 mg tablet daily.  As the patient is on IV fluids will hold hydrochlorothiazide portion of this medication and order for  losartan 100 mg daily, will hold parameters in place.  Will order for as needed IV hydralazine for systolic blood pressure greater than 180.   5.  Hyperlipidemia:  Patient is compliant with atorvastatin 40 mg daily.  Will hold this medication and it can be resumed at time of discharge as patient requested.    6.  Hypothyroidism:  Will resume prior to admit levothyroxine 112 mcg daily.   7.  Deep vein thrombosis prophylaxis:  Per Orthopedic Service the patient has been placed on PCDs and subcutaneous Lovenox.      CODE STATUS:  Discussed with the patient.  She elects to be full code.      DISPOSITION:  Ultimately up to Orthopedic Service.      The patient was discussed with Dr. Lj Silveira who agrees with the assessment and plan as stated above.  Dr. Silveira will evaluate the patient independently.      At this time, I would like to thank Dr. Silveira for consulting Hospitalist Service.  We will continue to follow.         LJ SILVEIRA DO       As dictated by JUANY JOE PA-C            D:  2019   T: 2019   MT: NATALI      Name:     ANTONIO DUFF   MRN:      [id number]        Account:       [id number]   :      1950           Consult Date:  2019      Document: [id number]       cc: Darby Williamson MD

## 2019-01-08 NOTE — ANESTHESIA PROCEDURE NOTES
Peripheral nerve/Neuraxial procedure note : Adductor canal (targeting saphenous nerve)  Pre-Procedure  Performed by Kayla Yost MD  Location: pre-op      Pre-Anesthestic Checklist: patient identified, IV checked, site marked, risks and benefits discussed, informed consent, monitors and equipment checked, pre-op evaluation, at physician/surgeon's request and post-op pain management    Timeout  Correct Patient: Yes   Correct Procedure: Yes   Correct Site: Yes   Correct Laterality: Yes   Correct Position: Yes   Site Marked: Yes   .   Procedure Documentation    .    Procedure:  left  Adductor canal (targeting saphenous nerve).  Local skin infiltrated with 1 mL of 1% lidocaine.     Ultrasound used to identify targeted nerve, plexus, or vascular marker and placed a needle adjacent to it., Ultrasound was used to visualize the spread of the anesthetic in close proximity to the above stated nerve. A permanent image is entered into the patient's record.  Patient Prep;chlorhexidine gluconate and isopropyl alcohol.  .  Needle: insulated Needle Gauge: 21.  Needle Length (millimeters) 100  Insertion Method: Single Shot.       Assessment/Narrative  Paresthesias: No.  .  The placement was negative for: blood aspirated, painful injection and site bleeding.  Bolus given via needle..   Secured via.   Complications: none. Comments:  Bolus via needle, 20 ml of 0.5% Bupivacaine with 1:400,000 epinephrine.  Patient tolerated well, was mildly sedated but communicative throughout the procedure.   The surgeon has given a verbal order transferring care of this patient to me for the performance of regional analgesia block for post op pain control. It is requested of me because I am uniquely trained and qualified to perform this block and the surgeon is neither trained nor qualified to perform this procedure.

## 2019-01-08 NOTE — ANESTHESIA POSTPROCEDURE EVALUATION
Patient: Tiffany Stovall    Procedure(s):  LEFT TOTAL KNEE ARTHROPLASTY (SMITH NEPHEW)^    Diagnosis:DJD  Diagnosis Additional Information: No value filed.    Anesthesia Type:  Spinal    Note:  Anesthesia Post Evaluation    Patient location during evaluation: PACU  Patient participation: Able to fully participate in evaluation  Level of consciousness: awake  Pain management: adequate  Airway patency: patent  Cardiovascular status: acceptable  Respiratory status: acceptable  Hydration status: acceptable  PONV: controlled     Anesthetic complications: None          Last vitals:  Vitals:    01/08/19 1215 01/08/19 1245 01/08/19 1315   BP: 145/76 128/75 128/74   Pulse:      Resp: 12 12 12   Temp: 36.6  C (97.8  F)     SpO2: 100% 98% 98%         Electronically Signed By: Becca Walden MD  January 8, 2019  1:48 PM

## 2019-01-09 ENCOUNTER — APPOINTMENT (OUTPATIENT)
Dept: OCCUPATIONAL THERAPY | Facility: CLINIC | Age: 69
DRG: 470 | End: 2019-01-09
Attending: ORTHOPAEDIC SURGERY
Payer: COMMERCIAL

## 2019-01-09 ENCOUNTER — APPOINTMENT (OUTPATIENT)
Dept: PHYSICAL THERAPY | Facility: CLINIC | Age: 69
DRG: 470 | End: 2019-01-09
Attending: ORTHOPAEDIC SURGERY
Payer: COMMERCIAL

## 2019-01-09 LAB
ANION GAP SERPL CALCULATED.3IONS-SCNC: 7 MMOL/L (ref 3–14)
BUN SERPL-MCNC: 6 MG/DL (ref 7–30)
CALCIUM SERPL-MCNC: 8.6 MG/DL (ref 8.5–10.1)
CHLORIDE SERPL-SCNC: 107 MMOL/L (ref 94–109)
CO2 SERPL-SCNC: 26 MMOL/L (ref 20–32)
CREAT SERPL-MCNC: 0.61 MG/DL (ref 0.52–1.04)
GFR SERPL CREATININE-BSD FRML MDRD: >90 ML/MIN/{1.73_M2}
GLUCOSE SERPL-MCNC: 96 MG/DL (ref 70–99)
HGB BLD-MCNC: 10.3 G/DL (ref 11.7–15.7)
POTASSIUM SERPL-SCNC: 3.7 MMOL/L (ref 3.4–5.3)
SODIUM SERPL-SCNC: 140 MMOL/L (ref 133–144)

## 2019-01-09 PROCEDURE — 36415 COLL VENOUS BLD VENIPUNCTURE: CPT | Performed by: ORTHOPAEDIC SURGERY

## 2019-01-09 PROCEDURE — 40000193 ZZH STATISTIC PT WARD VISIT: Performed by: PHYSICAL THERAPY ASSISTANT

## 2019-01-09 PROCEDURE — 40000133 ZZH STATISTIC OT WARD VISIT

## 2019-01-09 PROCEDURE — 80048 BASIC METABOLIC PNL TOTAL CA: CPT | Performed by: ORTHOPAEDIC SURGERY

## 2019-01-09 PROCEDURE — 99232 SBSQ HOSP IP/OBS MODERATE 35: CPT | Performed by: PHYSICIAN ASSISTANT

## 2019-01-09 PROCEDURE — 97530 THERAPEUTIC ACTIVITIES: CPT | Mod: GP | Performed by: PHYSICAL THERAPY ASSISTANT

## 2019-01-09 PROCEDURE — 97535 SELF CARE MNGMENT TRAINING: CPT | Mod: GO

## 2019-01-09 PROCEDURE — 25000132 ZZH RX MED GY IP 250 OP 250 PS 637: Performed by: PHYSICIAN ASSISTANT

## 2019-01-09 PROCEDURE — 25000131 ZZH RX MED GY IP 250 OP 636 PS 637: Performed by: ORTHOPAEDIC SURGERY

## 2019-01-09 PROCEDURE — 85018 HEMOGLOBIN: CPT | Performed by: ORTHOPAEDIC SURGERY

## 2019-01-09 PROCEDURE — 40000193 ZZH STATISTIC PT WARD VISIT: Performed by: PHYSICAL THERAPIST

## 2019-01-09 PROCEDURE — 25000128 H RX IP 250 OP 636: Performed by: ORTHOPAEDIC SURGERY

## 2019-01-09 PROCEDURE — 97165 OT EVAL LOW COMPLEX 30 MIN: CPT | Mod: GO

## 2019-01-09 PROCEDURE — 12000000 ZZH R&B MED SURG/OB

## 2019-01-09 PROCEDURE — 97110 THERAPEUTIC EXERCISES: CPT | Mod: GP | Performed by: PHYSICAL THERAPIST

## 2019-01-09 PROCEDURE — 97116 GAIT TRAINING THERAPY: CPT | Mod: GP | Performed by: PHYSICAL THERAPIST

## 2019-01-09 PROCEDURE — 99207 ZZC CDG-MDM COMPONENT: MEETS LOW - DOWN CODED: CPT | Performed by: PHYSICIAN ASSISTANT

## 2019-01-09 PROCEDURE — 25000132 ZZH RX MED GY IP 250 OP 250 PS 637: Performed by: ORTHOPAEDIC SURGERY

## 2019-01-09 PROCEDURE — 97110 THERAPEUTIC EXERCISES: CPT | Mod: GP | Performed by: PHYSICAL THERAPY ASSISTANT

## 2019-01-09 RX ORDER — OXYCODONE HYDROCHLORIDE 5 MG/1
5-10 TABLET ORAL EVERY 4 HOURS PRN
Qty: 40 TABLET | Refills: 0 | Status: SHIPPED | OUTPATIENT
Start: 2019-01-09 | End: 2019-05-06

## 2019-01-09 RX ORDER — AMOXICILLIN 250 MG
1 CAPSULE ORAL 2 TIMES DAILY PRN
Qty: 60 TABLET | Refills: 0 | Status: SHIPPED | OUTPATIENT
Start: 2019-01-09 | End: 2019-05-06

## 2019-01-09 RX ORDER — POTASSIUM CHLORIDE 1500 MG/1
20 TABLET, EXTENDED RELEASE ORAL DAILY
Status: DISCONTINUED | OUTPATIENT
Start: 2019-01-09 | End: 2019-01-09

## 2019-01-09 RX ORDER — LOSARTAN POTASSIUM AND HYDROCHLOROTHIAZIDE 25; 100 MG/1; MG/1
1 TABLET ORAL EVERY MORNING
Status: DISCONTINUED | OUTPATIENT
Start: 2019-01-10 | End: 2019-01-10 | Stop reason: HOSPADM

## 2019-01-09 RX ORDER — ATORVASTATIN CALCIUM 20 MG/1
40 TABLET, FILM COATED ORAL DAILY
Status: DISCONTINUED | OUTPATIENT
Start: 2019-01-10 | End: 2019-01-10 | Stop reason: HOSPADM

## 2019-01-09 RX ORDER — OXYCODONE HYDROCHLORIDE 5 MG/1
5-10 TABLET ORAL
Status: DISCONTINUED | OUTPATIENT
Start: 2019-01-09 | End: 2019-01-10 | Stop reason: HOSPADM

## 2019-01-09 RX ORDER — ATORVASTATIN CALCIUM 20 MG/1
40 TABLET, FILM COATED ORAL DAILY
Status: DISCONTINUED | OUTPATIENT
Start: 2019-01-09 | End: 2019-01-09

## 2019-01-09 RX ORDER — POTASSIUM CHLORIDE 1500 MG/1
20 TABLET, EXTENDED RELEASE ORAL DAILY
Status: DISCONTINUED | OUTPATIENT
Start: 2019-01-10 | End: 2019-01-10 | Stop reason: HOSPADM

## 2019-01-09 RX ADMIN — Medication 5 MG: at 11:56

## 2019-01-09 RX ADMIN — DIPHENHYDRAMINE HYDROCHLORIDE 12.5 MG: 25 SOLUTION ORAL at 01:13

## 2019-01-09 RX ADMIN — ENOXAPARIN SODIUM 40 MG: 40 INJECTION SUBCUTANEOUS at 08:14

## 2019-01-09 RX ADMIN — DIPHENHYDRAMINE HYDROCHLORIDE 12.5 MG: 25 SOLUTION ORAL at 21:10

## 2019-01-09 RX ADMIN — HYDROMORPHONE HYDROCHLORIDE 0.5 MG: 1 INJECTION, SOLUTION INTRAMUSCULAR; INTRAVENOUS; SUBCUTANEOUS at 16:56

## 2019-01-09 RX ADMIN — OXYCODONE HYDROCHLORIDE 10 MG: 5 TABLET ORAL at 17:49

## 2019-01-09 RX ADMIN — ESOMEPRAZOLE MAGNESIUM 40 MG: 40 CAPSULE, DELAYED RELEASE PELLETS ORAL at 08:14

## 2019-01-09 RX ADMIN — ONDANSETRON 4 MG: 4 TABLET, ORALLY DISINTEGRATING ORAL at 17:19

## 2019-01-09 RX ADMIN — OXYCODONE HYDROCHLORIDE 5 MG: 5 TABLET ORAL at 08:14

## 2019-01-09 RX ADMIN — OXYCODONE HYDROCHLORIDE 5 MG: 5 TABLET ORAL at 04:40

## 2019-01-09 RX ADMIN — CEFAZOLIN SODIUM 1 G: 1 INJECTION, POWDER, FOR SOLUTION INTRAMUSCULAR; INTRAVENOUS at 01:04

## 2019-01-09 RX ADMIN — POLYETHYLENE GLYCOL 3350 17 G: 17 POWDER, FOR SOLUTION ORAL at 08:14

## 2019-01-09 RX ADMIN — Medication 5 MG: at 17:49

## 2019-01-09 RX ADMIN — OXYCODONE HYDROCHLORIDE 10 MG: 5 TABLET ORAL at 14:59

## 2019-01-09 RX ADMIN — OXYCODONE HYDROCHLORIDE 5 MG: 5 TABLET ORAL at 11:56

## 2019-01-09 RX ADMIN — ACETAMINOPHEN 975 MG: 325 TABLET, FILM COATED ORAL at 10:31

## 2019-01-09 RX ADMIN — ACETAMINOPHEN 975 MG: 325 TABLET, FILM COATED ORAL at 01:21

## 2019-01-09 RX ADMIN — ACETAMINOPHEN 975 MG: 325 TABLET, FILM COATED ORAL at 17:49

## 2019-01-09 RX ADMIN — SENNOSIDES AND DOCUSATE SODIUM 2 TABLET: 8.6; 5 TABLET ORAL at 08:14

## 2019-01-09 RX ADMIN — Medication 5 MG: at 06:09

## 2019-01-09 RX ADMIN — DIPHENHYDRAMINE HYDROCHLORIDE 12.5 MG: 25 SOLUTION ORAL at 14:59

## 2019-01-09 RX ADMIN — LOSARTAN POTASSIUM 100 MG: 100 TABLET, FILM COATED ORAL at 08:13

## 2019-01-09 RX ADMIN — LEVOTHYROXINE SODIUM 112 MCG: 112 TABLET ORAL at 08:13

## 2019-01-09 RX ADMIN — SENNOSIDES AND DOCUSATE SODIUM 2 TABLET: 8.6; 5 TABLET ORAL at 20:21

## 2019-01-09 RX ADMIN — OXYCODONE HYDROCHLORIDE 10 MG: 5 TABLET ORAL at 21:09

## 2019-01-09 ASSESSMENT — ACTIVITIES OF DAILY LIVING (ADL)
IADL_COMMENTS: IND AT BASELINE
ADLS_ACUITY_SCORE: 11
PREVIOUS_RESPONSIBILITIES: MEAL PREP;HOUSEKEEPING;LAUNDRY;SHOPPING;YARDWORK;FINANCES;MEDICATION MANAGEMENT;DRIVING
ADLS_ACUITY_SCORE: 11

## 2019-01-09 NOTE — OP NOTE
Procedure Date: 01/08/2019      PREOPERATIVE DIAGNOSIS:  Left knee advanced osteoarthritis.      POSTOPERATIVE DIAGNOSIS:  Left knee advanced osteoarthritis.      PROCEDURE:  Left total knee arthroplasty.      SURGEON:  Tom Terry MD.      ASSISTANT:  Sharron Lundberg PA-C.      COMPLICATIONS:  None.      ANESTHESIA:  Spinal.      ESTIMATED BLOOD LOSS:  25.      IMPLANTS:  [employer] System:   1.  Size 4 narrow left cobalt chrome PS femoral component.   2.  Size 3 left standard tibial baseplate.   3.  Size 12 high flex 3/4 PS femoral component.   4.  Size 35 concentric polyethylene patella.      INDICATIONS:  Amita was brought to the operating room 1/8/2019 for elective left total knee arthroplasty.  Seen in the preoperative holding area.  Left knee marked as the correct operative site.  Received a dose of IV antibiotic within 30 minutes prior to surgical incision.  Postsurgical antibiotic is indicated and will be prescribed.  Postsurgical DVT prophylaxis is also indicated and will be prescribed.  Adductor canal regional block administered by the anesthesia staff.      DESCRIPTION OF PROCEDURE:  The patient was brought to the operating room and placed under a spinal anesthesia.  Positioned supine.  The left lower extremity was prepped and draped in the standard sterile fashion.  Preoperative timeout was taken.  Thigh tourniquet was inflated to 250 mmHg.  A 14 cm anterior longitudinal surgical incision was made.  Medial parapatellar arthrotomy performed.  Suprapatellar synovectomy performed.  Marginal osteophytes debrided.  Advanced tricompartmental osteoarthritis confirmed.  The patella was cut to a thickness of 13 mm and sized to a 35.  The femur was prepared using an intramedullary guide cristopher and a 4 degree valgus cutting guide.  I took an extra 2 mm of distal femur and then sized the cut distal femur to a 4 narrow.  It was prepared in the appropriate rotational alignment while protecting soft  tissues.  Box cut for the PS component was made.  Tibia was prepared with an extramedullary cutting guide, taking 7 mm of bone and cartilage off of the lateral side.  The cut proximal tibia was sized to a 3.  It was punched in the appropriate rotational alignment while protecting soft tissues.  Posterior osteophytes and menisci debrided.  Posterior capsule injected with a cocktail of ropivacaine and saline.  With trial components in place and a size 12 trial PS polyethylene, the knee was noted to have excellent stability, range of motion and balanced gaps.  Trial components were removed.  Cut surfaces irrigated with double antibiotic jet lavage.  Components listed above were cemented into place using one batch of high viscosity Simplex cement.  Once the cement had hardened and all extruded cement removed, we implanted the size 12 PS polyethylene for the size 3 baseplate.  At this point, tourniquet was deflated and hemostasis achieved.  Betadine wash performed.  Thorough double antibiotic jet lavage performed.  The arthrotomy was closed over a medium Hemovac drain with interrupted Ethibond sutures.  Superficial soft tissues were irrigated and closed in layers.  Sterile dressing was applied.  The patient was brought to recovery in stable condition.  Needle and lap counts were correct at the end of the case.  There were no known complications.         YAMEL SILVEIRA MD             D: 2019   T: 2019   MT: MALCOLM      Name:     ANTONIO DUFF   MRN:      [id number]        Account:        [id number]   :      1950           Procedure Date: 2019      Document: [id number]

## 2019-01-09 NOTE — PLAN OF CARE
Discharge Planner PT   Patient plan for discharge: Return home with OP PT  Current status: Orders received, evaluation completed, and treatment initiated. Patient is a 69 y/o female POD # 0 L TKA. Pt lives with spouse, stairs to enter/exit and access bedroom. Pt independent at baseline without use of an AD. Pt will have access to a 4WW and SEC at time of discharge. Pt performed supine <> sit with SBA. Sit <> Stand and ambulation of 250 feet with FWW and SBA, noted no L knee buckling with no KI donned. L knee ROM: 11-74 degrees.   Barriers to return to prior living situation: Stairs-not yet assessed, Pain   Recommendations for discharge: Return home with assist from spouse for mobility as needed-specifically navigation of stairs and OP PT  Rationale for recommendations: Anticipate pt will continue to progress towards independence in order to safely return home with spouse. Pt will benefit from OP PT in order to improve knee ROM/strength in order to increase independence and safety with functional mobility.        Entered by: Ariella Benitez 01/08/2019 6:16 PM

## 2019-01-09 NOTE — PROGRESS NOTES
POD#1 L TKA    Doing well  Pain well managed  Ambulating well  Denies CP or SOB  Dressing CDI  Calf soft and nontender  XRay looks good  Hgb pending  PT/OT/Lovenox  Plan home Thursday    Tom Terry

## 2019-01-09 NOTE — DISCHARGE INSTRUCTIONS
DISCHARGE INSTRUCTIONS AFTER YOUR TOTAL KNEE REPLACEMENT      YAMEL SILVEIRA MD       Instructions to care for your wound at home:   Change the dressing daily.  Inspect your incision at the time of dressing change for increased redness, tenderness, swelling, or drainage along the incision line.  Some bruising or discoloration is usually present, but call my office for any changes in appearance that concern you.  Also call if you develop a fever above 101 degrees.     You may shower directly over the wound beginning 4 days after your surgery, but do not submerge the wound under water until after your post operative visit when the sutures are removed and the wound is completely sealed without drainage.    Activities:  Physical activity may be resumed gradually according to your comfort level. You may bear weight on your operative leg as tolerated with your crutches or walker as instructed by your therapist.  Follow your home exercise program.  Ice your knee after exercising.        Wear your knee immobilizer at night only until your office visit in 2 weeks to maintain full knee extension.  Do not use the immobilizer during the day unless otherwise instructed.      Wear the anti-embolism stockings day and night until seen in the office for your post operative visit. Remove them twice daily for one hour at a time. Keep the compression stockings flat on your leg.  Do not allow them to roll up or crease your skin.  Call if you develop new pain behind your calf or thigh.     Outpatient Physical Therapy and home exercises:  Outpatient physical therapy visits are required following discharge from the hospital. The referral for these outpatient therapy visits is routinely given to you at the time of your surgical scheduling. You should have already scheduled your therapy sessions in advance.  If you have not done so, please immediately call the therapy site of your choice to schedule the physical therapy regimen that has  been prescribed for you.  You may discontinue the crutches or walker per the therapist's recommendation.      Medications:  New medications for you on discharge will include a pain medication, a stool softener while on the narcotic pain medication, and a blood thinner.  Detailed instructions will come with those medications.  You will also receive instructions on when to resume your home medications.     Antibiotic coverage will be needed before any type of dental procedure for at least the next two years due to the risk of infection.  After two years, antibiotic coverage is optional.  You should notify your dentist of your total knee surgery and call your dentist or our office one week before a dental appointment for antibiotics.        Clinic follow-up appointment:  Your clinic follow-up appointment has been prearranged.  Call [phone] with any questions.    Tom Terry MD

## 2019-01-09 NOTE — PROGRESS NOTES
01/09/19 1514   Living Environment   Lives With spouse   Living Arrangements house   Home Accessibility stairs within home;stairs to enter home   Living Environment Comment 2 stairs from garage; 14 stairs to bedroom - could stay on one level for first few days. walk in shower upstairs, no AD in shower.    Self-Care   Usual Activity Tolerance excellent   Current Activity Tolerance good   Equipment Currently Used at Home none   Activity/Exercise/Self-Care Comment retired. active    Functional Level   Ambulation 0-->independent   Transferring 0-->independent   Toileting 0-->independent   Bathing 0-->independent   Dressing 0-->independent   Eating 0-->independent   Communication 0-->understands/communicates without difficulty   Swallowing 0-->swallows foods/liquids without difficulty   Cognition 0 - no cognition issues reported   Fall history within last six months no   Which of the above functional risks had a recent onset or change? none   Prior Functional Level Comment independent   General Information   Onset of Illness/Injury or Date of Surgery - Date 01/08/19   Referring Physician Tom Terry MD   Patient/Family Goals Statement to get back to bike riding   Additional Occupational Profile Info/Pertinent History of Current Problem Pt ind at baseline; lives with spouse, newly retired and lives an active lifestyle   Precautions/Limitations no known precautions/limitations   Weight-Bearing Status - RLE weight-bearing as tolerated   General Observations pt is safe with mobility, managing pain well, up with FWW with mod I   Cognitive Status Examination   Orientation orientation to person, place and time   Level of Consciousness alert   Visual Perception   Visual Perception No deficits were identified   Pain Assessment   Patient Currently in Pain No   Range of Motion (ROM)   ROM Quick Adds No deficits were identified   Strength   Manual Muscle Testing Quick Adds No deficits were identified   Muscle Tone Assessment    Muscle Tone Quick Adds No deficits were identified   Mobility   Bed Mobility Bed mobility skill: Sit to supine;Bed mobility skill: Supine to sit   Bed Mobility Comments mod I   Bed Mobility Skill: Sit to Supine   Level of [place]: Sit/Supine independent   Bed Mobility Skill: Supine to Sit   Level of [place]: Supine/Sit independent   Transfer Skills   Transfer Transfer Skill: Stand to Sit;Transfer Safety Analysis Sit/Stand   Transfer Comments mod I with Ad   Transfer Skill: Bed to Chair/Chair to Bed   Level of [place]: Bed to Chair independent   Weight-Bearing Restrictions weight-bearing as tolerated   Assistive Device - Transfer Skill Bed to Chair Chair to Bed Rehab Eval standard walker   Transfer Skill: Sit to Stand   Level of [place]: Sit/Stand independent   Transfer Skill: Sit to Stand weight-bearing as tolerated   Assistive Device for Transfer: Sit/Stand standard walker   Toilet Transfer   Toilet Transfer Toilet Transfer Skill;Toilet Transfer Safety Analysis   Transfer Skill: Toilet Transfer   Level of [place]: Toilet independent   Weight-Bearing Restrictions: Toilet weight-bearing as tolerated   Assistive Device grab bars   Toilet Transfer Skill Comments used commode over toilet   Bathing   Level of [place] stand-by assist   Upper Body Dressing   Level of [place]: Dress Upper Body independent   Lower Body Dressing   Level of [place]: Dress Lower Body minimum assist (75% patients effort)   Physical Assist/Nonphysical Assist: Dress Lower Body 1 person assist   Assistive Device sock-aid  (pt refusing sock aid for home -- will have  asst)   Toileting   Level of [place]: Toilet independent   Instrumental Activities of Daily Living (IADL)   Previous Responsibilities meal prep;housekeeping;laundry;shopping;yardwork;finances;medication management;driving   IADL Comments ind at baseline   Activities of Daily Living Analysis   Impairments Contributing to Impaired  "Activities of Daily Living pain   ADL Comments pt ind at baseline -- currently limited by pain and decreased ROM; pt edu on possible AE, declining at this time and will have  asst as needed. Pt aware of how to attain AE if she decides she wants it upon dc home   General Therapy Interventions   Planned Therapy Interventions ADL retraining   Intervention Comments 1x eval/treatment session   Clinical Impression   Criteria for Skilled Therapeutic Interventions Met yes, treatment indicated   OT Diagnosis decreased ability in I/ADLs   Influenced by the following impairments s/p R TKA, pain   Assessment of Occupational Performance 1-3 Performance Deficits   Identified Performance Deficits ADLs, IADLs, functional mobility   Clinical Decision Making (Complexity) Low complexity   Therapy Frequency daily   Predicted Duration of Therapy Intervention (days/wks) 1x eval and treatment   Anticipated Equipment Needs at Discharge shower chair;sock aide  (pt understands recs, will order ind if desires)   Anticipated Discharge Disposition Home with Assist   Risks and Benefits of Treatment have been explained. Yes   Patient, Family & other staff in agreement with plan of care Yes   Clinical Impression Comments Pt is safe with transfers and moiblity with FWW. Demonstrated toilet transfer and discussed safe shower transfer. Pt has no concerns for dc home with asst from , stated she has met all OT goals. Pt demonstrated proper use of LBD equipment, declined purchasing at the moment, will have  asst for lower body dressing as needed. Pt is safe to dc home from OT perspective, no further OT intervention indicated   [facility] AM-[facility]  \"6 Clicks\" Daily Activity Inpatient Short Form   1. Putting on and taking off regular lower body clothing? 3 - A Little   2. Bathing (including washing, rinsing, drying)? 3 - A Little   3. Toileting, which includes using toilet, bedpan or urinal? 3 - A Little   4. Putting on and " taking off regular upper body clothing? 4 - None   5. Taking care of personal grooming such as brushing teeth? 4 - None   6. Eating meals? 4 - None   Daily Activity Raw Score (Score out of 24.Lower scores equate to lower levels of function) 21   Total Evaluation Time   Total Evaluation Time (Minutes) 10

## 2019-01-09 NOTE — PLAN OF CARE
Discharge Planner OT   Patient plan for discharge: home with asst as needed  Current status: Pt is safe with functional mobility and transfers with FWW. Pt complete toilet transfer and bed mobility with mod I. Pt refused AE for lower body dressing, will have  asst as needed. Pt demonstrated understanding of safe transfer for shower and car.   Barriers to return to prior living situation: none  Recommendations for discharge: home with  for asst   Rationale for recommendations: pt safe with mobility but has decreased ind with ADLs 2/2 pain and limited ROM in RLE. Pt stating she will have  asst as needed; stated no further OT needs at this time.        Entered by: Steffanie Zuniga 01/09/2019 4:06 PM       Occupational Therapy Discharge Summary    Reason for therapy discharge:    All goals and outcomes met, no further needs identified.    Progress towards therapy goal(s). See goals on Care Plan in [facility] electronic health record for goal details.  Goals met    Therapy recommendation(s):    No further therapy is recommended.

## 2019-01-09 NOTE — PROGRESS NOTES
[facility]    Medicine Progress Note - Hospitalist Service       Date of Admission:  1/8/2019  Assessment & Plan   Tiffany Stovall is a 68-year-old female with a past medical history significant for osteoarthritis, mild persistent asthma, GERD, hypertension, hyperlipidemia, hypothyroidism, history of varicose veins, history of BCC, history of SCC, history of T12 compression fracture who underwent left TKA on 1/8/19. Surgery performed by Dr. Tom Terry. Hospitalist service was consulted for medical comanagement. Vitals currently WNL. Pt currently stable.     Osteoarthritis with noted complex tear of the medial meniscus of the left knee, status post left total knee arthroplasty:    -- Defer routine post-operative cares, IVF (discontinued 1/9), DVT prophylaxis (Lovenox) and pain control to primary service   -- Pt needs to discuss DVT prophy options with ortho as she is allergic to aspirin (with reaction of bronchospasm). Asked her to talk with Dr. Terry about this. Plan for Lovenox plus transition to ASA per surgery notes, discussed with her PCP who recommended Xarelto as an option. Defer to Ortho.   -- Bowel regimen in place while on narcotics   -- Encourage pulmonary toilet; incentive spirometer at bedside   -- PT and OT in the AM   -- CBC and BMP in AM     Acute blood loss anemia: Pre-operative Hgb 12.8. EBL 25 ccs.   -- Monitor     Recent Labs   Lab 01/09/19  0712   HGB 10.3*     Gastroesophageal reflux disease:  Continue PTA Nexium 40 mg daily and Zantac 150 mg at bedtime PRN.     Mild intermittent asthma:  Continue PTA Advair 1 puff 2 times daily.  The patient has brought her own inhaler with her and can utilize this.  Albuterol neb PRN    Essential hypertension:  This appears to be stable.  The patient's blood pressure is currently documented at 145/76.  The patient currently takes Hyzaar 100/25 mg tablet daily.    -- Resume PTA Hyzaar with parameters in place   -- PRN IV hydralazine for  SBP >180     Hyperlipidemia:  Patient is compliant with atorvastatin 40 mg daily.  Will hold this medication and it can be resumed at time of discharge as patient requested.      Hypothyroidism:  Continue PTA levothyroxine 112 mcg daily.     Diet: Advance Diet as Tolerated: Regular Diet Adult    DVT Prophylaxis: Enoxaparin (Lovenox) SQ  Kirby Catheter: not present  Code Status: Full Code      Disposition Plan   Expected discharge: per Ortho, recommended to per PT recommendations once okayed by Ortho.  Entered: Emily Montano PA-C 01/09/2019, 7:57 AM       The patient's care was discussed with the Attending Physician, Dr. Lainez.    Emily Montano PA-C  Hospitalist Service  [facility]    ______________________________________________________________________    Interval History   No acute events overnight. No N/V. Accompanied by  at bedside. Afebrile. Plan to discharge home 1/10.     Data reviewed today: I reviewed all medications, new labs and imaging results over the last 24 hours. I personally reviewed no images or EKG's today.    Physical Exam   Vital Signs: Temp: 97.7  F (36.5  C) Temp src: Oral BP: 121/69 Pulse: 64 Heart Rate: 67 Resp: 16 SpO2: 98 % O2 Device: None (Room air) Oxygen Delivery: 2 LPM  Weight: 156 lbs 0 oz    CONSTITUTIONAL: Pt laying in bed, dressed in hospital garb. Appears comfortable. Cooperative with interview. Accompanied by  at bedside.   HEENT: Normocephalic, atraumatic.  Negative for conjunctival redness or scleral icterus.  Oral mucosa pink and moist; negative for ulcerations, erythema, or exudates.  Dentition in good repair.   CARDIOVASCULAR: RRR, no murmurs, rubs, or extra heart sounds appreciated. Pulses +2/4 and regular in upper and lower extremities, bilaterally.   RESPIRATORY: No increased work of breathing.   CTA, bilat; no wheezes, rales, or rhonchi appreciated.  GASTROINTESTINAL:  Abdomen soft, non-distended.  BS auscultated in all four quadrants. Negative for tenderness to palpation.  No masses or organomegaly noted.  MUSCULOSKELETAL: Left knee wrapped.   HEMATOLOGIC/LYMPHATIC/IMMUNOLOGIC: Negative for lower extremity edema, bilaterally.  NEUROLOGIC: Alert and oriented to person, place, and time.  strength intact.   SKIN: Warm, dry, intact. No jaundice noted. Negative for suspicious lesions, rashes, bruising, open sores or abrasions.     Data   Recent Labs   Lab 01/09/19  0712 01/08/19  2045 01/08/19  0633   HGB 10.3*  --   --    PLT  --  253  --      --   --    POTASSIUM 3.7  --  3.6   CHLORIDE 107  --   --    CO2 26  --   --    BUN 6*  --   --    CR 0.61  --  0.68   ANIONGAP 7  --   --    ROGER 8.6  --   --    GLC 96  --   --      No results found for this or any previous visit (from the past 24 hour(s)).  Medications     dextrose 5% and 0.45% NaCl + KCl 20 mEq/L 85 mL/hr at 01/08/19 1241       acetaminophen  975 mg Oral Q8H     enoxaparin  40 mg Subcutaneous Q24H     esomeprazole  40 mg Oral Daily     fluticasone-salmeterol  1 puff Inhalation BID     levothyroxine  112 mcg Oral QAM     losartan  100 mg Oral Daily     polyethylene glycol  17 g Oral Daily     senna-docusate  1 tablet Oral BID    Or     senna-docusate  2 tablet Oral BID     sodium chloride (PF)  3 mL Intracatheter Q8H

## 2019-01-09 NOTE — PLAN OF CARE
Pt up and ambulated around desk with PT.  CMS good bilaterally to ft.  Rates pain #4-6.  HVC patent.  Voided 650cc in BR. Knee drsg dry and intact.  Ice to knee.

## 2019-01-09 NOTE — PLAN OF CARE
Pt A&O. VSS on ra. Denies chest pain and SOB. Up with x1, gb & w. Regular diet. CMS intact. Voiding adequately in BR. Hemovac patent. Pain managed with oxycodone and valium. Will continue to monitor.

## 2019-01-09 NOTE — PROGRESS NOTES
"   01/08/19 1610   Signing Clinician's Name / Credentials   Signing clinician's name / credentials Ariella Benitez DPT   Quick Adds   Rehab Discipline PT   ROM: Left Knee   Extension/Flexion - degrees 11-74 AAROM   Gait Training   Minutes of Treatment (Gait Training) 12   Symptoms Noted During/After Treatment (Gait Training) fatigue;increased pain   Treatment Detail PT: Gait training of 250' with FWW and SBA, noted no L knee buckling with no KI donned. Noted partial passing gait pattern with cues for upright posture.    Therapeutic Activity   Minutes of Treatment 11 minutes   Symptoms Noted During/After Treatment Fatigue;Increased pain   Treatment Detail PT: Pt in supine upon arrival of therapist, Educated pt on role and POC of PT. Educated pt on WBAT and use of KI at night. Pt performed supine <> sit, SBA. Sit <> stand with FWW and CGA for safety. Pt performed toilet transfer with FWW and CGA to assist with guiding hips onto toilet. Pt returned to supine at end of session and RN present.    Therapeutic Exercise   Minutes of Treatment 8   Symptoms Noted During/After Treatment fatigue;increased pain   Treatment Detail PT: Pt performed supine TKA Exercises x 10 repetitions with cues for technique: ankle pumps, glut set, quad set, heel slides, SAQ and SLR independently.    Additional Documentation   PT Plan PT: Progress gait with FWW, LE ex/ROM, stairs as able    [facility] TM \"6 Clicks\"   2016, Trustees of [facility], under license to [employer].  All rights reserved.   6 Clicks Short Forms Basic Mobility Inpatient Short Form   [facility] AM[facility]  \"6 Clicks\" V.2 Basic Mobility Inpatient Short Form   1. Turning from your back to your side while in a flat bed without using bedrails? 3 - A Little   2. Moving from lying on your back to sitting on the side of a flat bed without using bedrails? 3 - A Little   3. Moving to and from a bed to a chair (including a wheelchair)? 3 - A Little   4. " Standing up from a chair using your arms (e.g., wheelchair, or bedside chair)? 3 - A Little   5. To walk in hospital room? 3 - A Little   6. Climbing 3-5 steps with a railing? 2 - A Lot   Basic Mobility Raw Score (Score out of 24.Lower scores equate to lower levels of function) 17   Total Session Time   Total Session Time (minutes) 39 minutes

## 2019-01-09 NOTE — PLAN OF CARE
Discharge Planner PT   Patient plan for discharge: Return home with OP PT  Current status: Pt performed bed mobility with very minimal assist and sit to/from stand transfers with SBA.  Pt was trying to be helpful and moved the drain and the tube came detached.  Nurse notified and removed the drain fully.  Gait deferred so nurse could assist with dressing. Participated well with TKA exs.  Knee AAROM is 10-78 degrees.  Barriers to return to prior living situation: Stairs-not yet assessed, Pain   Recommendations for discharge: Return home with assist from spouse for mobility as needed-specifically navigation of stairs and OP PT  Rationale for recommendations: Anticipate pt will continue to progress towards independence in order to safely return home with spouse. Pt will benefit from OP PT in order to improve knee ROM/strength in order to increase independence and safety with functional mobility.            Entered by: Kavitha Hoover 01/09/2019 9:35 AM

## 2019-01-10 ENCOUNTER — APPOINTMENT (OUTPATIENT)
Dept: PHYSICAL THERAPY | Facility: CLINIC | Age: 69
DRG: 470 | End: 2019-01-10
Attending: ORTHOPAEDIC SURGERY
Payer: COMMERCIAL

## 2019-01-10 VITALS
TEMPERATURE: 98.9 F | DIASTOLIC BLOOD PRESSURE: 72 MMHG | RESPIRATION RATE: 16 BRPM | OXYGEN SATURATION: 94 % | WEIGHT: 156 LBS | SYSTOLIC BLOOD PRESSURE: 146 MMHG | BODY MASS INDEX: 25.07 KG/M2 | HEART RATE: 81 BPM | HEIGHT: 66 IN

## 2019-01-10 LAB
ANION GAP SERPL CALCULATED.3IONS-SCNC: 7 MMOL/L (ref 3–14)
BASOPHILS # BLD AUTO: 0 10E9/L (ref 0–0.2)
BASOPHILS NFR BLD AUTO: 0.3 %
BUN SERPL-MCNC: 5 MG/DL (ref 7–30)
CALCIUM SERPL-MCNC: 8.7 MG/DL (ref 8.5–10.1)
CHLORIDE SERPL-SCNC: 105 MMOL/L (ref 94–109)
CO2 SERPL-SCNC: 26 MMOL/L (ref 20–32)
CREAT SERPL-MCNC: 0.66 MG/DL (ref 0.52–1.04)
DIFFERENTIAL METHOD BLD: ABNORMAL
EOSINOPHIL # BLD AUTO: 0.2 10E9/L (ref 0–0.7)
EOSINOPHIL NFR BLD AUTO: 2.4 %
ERYTHROCYTE [DISTWIDTH] IN BLOOD BY AUTOMATED COUNT: 12.9 % (ref 10–15)
GFR SERPL CREATININE-BSD FRML MDRD: >90 ML/MIN/{1.73_M2}
GLUCOSE SERPL-MCNC: 120 MG/DL (ref 70–99)
HCT VFR BLD AUTO: 30.5 % (ref 35–47)
HGB BLD-MCNC: 10.2 G/DL (ref 11.7–15.7)
IMM GRANULOCYTES # BLD: 0 10E9/L (ref 0–0.4)
IMM GRANULOCYTES NFR BLD: 0.3 %
LYMPHOCYTES # BLD AUTO: 1.4 10E9/L (ref 0.8–5.3)
LYMPHOCYTES NFR BLD AUTO: 17.6 %
MCH RBC QN AUTO: 31.8 PG (ref 26.5–33)
MCHC RBC AUTO-ENTMCNC: 33.4 G/DL (ref 31.5–36.5)
MCV RBC AUTO: 95 FL (ref 78–100)
MONOCYTES # BLD AUTO: 0.7 10E9/L (ref 0–1.3)
MONOCYTES NFR BLD AUTO: 9 %
NEUTROPHILS # BLD AUTO: 5.6 10E9/L (ref 1.6–8.3)
NEUTROPHILS NFR BLD AUTO: 70.4 %
NRBC # BLD AUTO: 0 10*3/UL
NRBC BLD AUTO-RTO: 0 /100
PLATELET # BLD AUTO: 222 10E9/L (ref 150–450)
POTASSIUM SERPL-SCNC: 3.6 MMOL/L (ref 3.4–5.3)
RBC # BLD AUTO: 3.21 10E12/L (ref 3.8–5.2)
SODIUM SERPL-SCNC: 138 MMOL/L (ref 133–144)
WBC # BLD AUTO: 7.9 10E9/L (ref 4–11)

## 2019-01-10 PROCEDURE — 25000132 ZZH RX MED GY IP 250 OP 250 PS 637: Performed by: PHYSICIAN ASSISTANT

## 2019-01-10 PROCEDURE — 97110 THERAPEUTIC EXERCISES: CPT | Mod: GP

## 2019-01-10 PROCEDURE — 97110 THERAPEUTIC EXERCISES: CPT | Mod: GP | Performed by: PHYSICAL THERAPY ASSISTANT

## 2019-01-10 PROCEDURE — 40000193 ZZH STATISTIC PT WARD VISIT

## 2019-01-10 PROCEDURE — 25000128 H RX IP 250 OP 636: Performed by: ORTHOPAEDIC SURGERY

## 2019-01-10 PROCEDURE — 99232 SBSQ HOSP IP/OBS MODERATE 35: CPT | Performed by: PHYSICIAN ASSISTANT

## 2019-01-10 PROCEDURE — 40000193 ZZH STATISTIC PT WARD VISIT: Performed by: PHYSICAL THERAPY ASSISTANT

## 2019-01-10 PROCEDURE — 25000132 ZZH RX MED GY IP 250 OP 250 PS 637: Performed by: ORTHOPAEDIC SURGERY

## 2019-01-10 PROCEDURE — 97116 GAIT TRAINING THERAPY: CPT | Mod: GP

## 2019-01-10 PROCEDURE — 97530 THERAPEUTIC ACTIVITIES: CPT | Mod: GP | Performed by: PHYSICAL THERAPY ASSISTANT

## 2019-01-10 PROCEDURE — 97116 GAIT TRAINING THERAPY: CPT | Mod: GP | Performed by: PHYSICAL THERAPY ASSISTANT

## 2019-01-10 PROCEDURE — 85025 COMPLETE CBC W/AUTO DIFF WBC: CPT | Performed by: PHYSICIAN ASSISTANT

## 2019-01-10 PROCEDURE — 36415 COLL VENOUS BLD VENIPUNCTURE: CPT | Performed by: PHYSICIAN ASSISTANT

## 2019-01-10 PROCEDURE — 25000131 ZZH RX MED GY IP 250 OP 636 PS 637: Performed by: ORTHOPAEDIC SURGERY

## 2019-01-10 PROCEDURE — 80048 BASIC METABOLIC PNL TOTAL CA: CPT | Performed by: PHYSICIAN ASSISTANT

## 2019-01-10 RX ADMIN — ESOMEPRAZOLE MAGNESIUM 40 MG: 40 CAPSULE, DELAYED RELEASE PELLETS ORAL at 10:09

## 2019-01-10 RX ADMIN — Medication 5 MG: at 10:36

## 2019-01-10 RX ADMIN — Medication 5 MG: at 00:04

## 2019-01-10 RX ADMIN — ENOXAPARIN SODIUM 40 MG: 40 INJECTION SUBCUTANEOUS at 10:09

## 2019-01-10 RX ADMIN — OXYCODONE HYDROCHLORIDE 10 MG: 5 TABLET ORAL at 03:10

## 2019-01-10 RX ADMIN — ACETAMINOPHEN 975 MG: 325 TABLET, FILM COATED ORAL at 10:36

## 2019-01-10 RX ADMIN — LEVOTHYROXINE SODIUM 112 MCG: 112 TABLET ORAL at 10:09

## 2019-01-10 RX ADMIN — OXYCODONE HYDROCHLORIDE 5 MG: 5 TABLET ORAL at 13:31

## 2019-01-10 RX ADMIN — LOSARTAN POTASSIUM AND HYDROCHLOROTHIAZIDE 1 TABLET: 100; 25 TABLET, FILM COATED ORAL at 10:07

## 2019-01-10 RX ADMIN — POLYETHYLENE GLYCOL 3350 17 G: 17 POWDER, FOR SOLUTION ORAL at 13:32

## 2019-01-10 RX ADMIN — SENNOSIDES AND DOCUSATE SODIUM 2 TABLET: 8.6; 5 TABLET ORAL at 10:09

## 2019-01-10 RX ADMIN — OXYCODONE HYDROCHLORIDE 10 MG: 5 TABLET ORAL at 07:08

## 2019-01-10 RX ADMIN — ONDANSETRON 4 MG: 4 TABLET, ORALLY DISINTEGRATING ORAL at 03:34

## 2019-01-10 RX ADMIN — OXYCODONE HYDROCHLORIDE 10 MG: 5 TABLET ORAL at 00:04

## 2019-01-10 RX ADMIN — ACETAMINOPHEN 975 MG: 325 TABLET, FILM COATED ORAL at 03:09

## 2019-01-10 ASSESSMENT — ACTIVITIES OF DAILY LIVING (ADL)
ADLS_ACUITY_SCORE: 11

## 2019-01-10 NOTE — PLAN OF CARE
Patient is A&O, VSS on RA, CMS intact, regular diet, up with assist of 1, and dressing CDI. Oxycodone for pain control, Benadryl given for itchiness, and IV SL. Will continue to monitor

## 2019-01-10 NOTE — PLAN OF CARE
Discharge Planner PT   Patient plan for discharge: Return home with OP PT  Current status: Patient c/o increased pain, nausea and slight dizziness upon arrival however motivated to participate in therapy. BP in sitting 131/71. Patient performed sit <> stand to FWW with CGA for safety; denies dizziness upon standing. Supine <> sit completed with Min A. Ambulated 10 ft x 1 and 60 ft x 1 with FWW and SBA; step through pattern with slow pace. Went up/down 3 stairs x 2 with right railing and SEC and SBA. Patient tearful throughout session, stating she did so well yesterday and is now unable to perform the same today.   Barriers to return to prior living situation: stairs, pain   Recommendations for discharge: Return home with assist from spouse for mobility as needed-specifically navigation of stairs and OP PT per plan established by the PT.  Rationale for recommendations: Anticipate pt will continue to progress towards independence in order to safely return home with spouse. Pt will benefit from OP PT in order to improve knee ROM/strength in order to increase independence and safety with functional mobility.        Entered by: Luna Harris 01/10/2019 9:02 AM

## 2019-01-10 NOTE — PROGRESS NOTES
[facility]    Medicine Progress Note - Hospitalist Service       Date of Admission:  1/8/2019  Assessment & Plan   Tiffany Stovall is a 68-year-old female with a past medical history significant for osteoarthritis, mild persistent asthma, GERD, hypertension, hyperlipidemia, hypothyroidism, history of varicose veins, history of BCC, history of SCC, history of T12 compression fracture who underwent left TKA on 1/8/19. Surgery performed by Dr. Tom Terry. Hospitalist service was consulted for medical comanagement. Vitals currently WNL. Pt currently stable.     Osteoarthritis with noted complex tear of the medial meniscus of the left knee, status post left total knee arthroplasty:  Surgery performed by Dr. Tom Terry. EBL 25 ccs. Spinal anesthesia used.   -- Defer routine post-operative cares, IVF (discontinued 1/9), DVT prophylaxis (Lovenox) and pain control to primary service  -- Pain regimen currently Tylenol 975 mg po TID, oxycodone 5-10 mg po q3 hrs PRN, IV Dilaudid 0.3-0.5 mg q2 hrs PRN   -- Pt needs to discuss DVT prophy options with ortho as she is allergic to aspirin (with reaction of bronchospasm). Asked her to talk with Dr. Terry about this. Plan for Lovenox plus transition to ASA per surgery notes, discussed with her PCP who recommended Xarelto as an option. Defer to Ortho.   -- Bowel regimen in place while on narcotics   -- Encourage pulmonary toilet; incentive spirometer at bedside   -- PT and OT in the AM   -- CBC and BMP in AM     Acute blood loss anemia: Pre-operative Hgb 12.8. EBL 25 ccs.   -- Monitor     Recent Labs   Lab 01/10/19  0637 01/09/19  0712   HGB 10.2* 10.3*     Gastroesophageal reflux disease:  Continue PTA Nexium 40 mg daily and Zantac 150 mg at bedtime PRN.     Mild intermittent asthma:  Continue PTA Advair 1 puff 2 times daily.  The patient has brought her own inhaler with her and can utilize this.  Albuterol neb PRN    Essential hypertension:  This appears to  be stable.  The patient's blood pressure is currently documented at 145/76.  The patient currently takes Hyzaar 100/25 mg tablet daily.    -- Continue PTA Hyzaar with parameters in place   -- PRN IV hydralazine for SBP >180     Hyperlipidemia:  Continue PTA atorvastatin 40 mg daily.      Hypothyroidism:  Continue PTA levothyroxine 112 mcg daily.     Diet: Advance Diet as Tolerated: Regular Diet Adult    DVT Prophylaxis: Enoxaparin (Lovenox) SQ  Kirby Catheter: not present  Code Status: Full Code      Disposition Plan   Expected discharge: per Ortho, recommended to per PT recommendations once okayed by Ortho.  Entered: Emily Montano PA-C 01/10/2019, 8:32 AM       The patient's care was discussed with the Attending Physician, Dr. Lainez.    Emily Montano PA-C  Hospitalist Service  [facility]  ______________________________________________________________________    Interval History   Some troubles with pain and nausea overnight. Pain currently 5/10. Afebrile. No dizziness, lightheadedness.     Data reviewed today: I reviewed all medications, new labs and imaging results over the last 24 hours. I personally reviewed no images or EKG's today.    Physical Exam   Vital Signs: Temp: 98.9  F (37.2  C) Temp src: Oral BP: 146/72 Pulse: 81 Heart Rate: 80 Resp: 16 SpO2: 94 % O2 Device: None (Room air)    Weight: 156 lbs 0 oz    CONSTITUTIONAL: Pt laying in bed, dressed in hospital garb. Appears comfortable. Cooperative with interview. Accompanied by  at bedside.   HEENT: Normocephalic, atraumatic.  Negative for conjunctival redness or scleral icterus.  Oral mucosa pink and moist; negative for ulcerations, erythema, or exudates.  Dentition in good repair.   CARDIOVASCULAR: RRR, no murmurs, rubs, or extra heart sounds appreciated. Pulses +2/4 and regular in upper and lower extremities, bilaterally.   RESPIRATORY: No increased work of breathing.   CTA, bilat; no  wheezes, rales, or rhonchi appreciated.  GASTROINTESTINAL:  Abdomen soft, non-distended. BS auscultated in all four quadrants. Negative for tenderness to palpation.  No masses or organomegaly noted.  MUSCULOSKELETAL: Left knee wrapped.   HEMATOLOGIC/LYMPHATIC/IMMUNOLOGIC: Negative for lower extremity edema, bilaterally.  NEUROLOGIC: Alert and oriented to person, place, and time.  strength intact.   SKIN: Warm, dry, intact. No jaundice noted. Negative for suspicious lesions, rashes, bruising, open sores or abrasions.     Data   Recent Labs   Lab 01/10/19  0637 01/09/19  0712 01/08/19 2045 01/08/19  0633   WBC 7.9  --   --   --    HGB 10.2* 10.3*  --   --    MCV 95  --   --   --      --  253  --     140  --   --    POTASSIUM 3.6 3.7  --  3.6   CHLORIDE 105 107  --   --    CO2 26 26  --   --    BUN 5* 6*  --   --    CR 0.66 0.61  --  0.68   ANIONGAP 7 7  --   --    ROGER 8.7 8.6  --   --    * 96  --   --      No results found for this or any previous visit (from the past 24 hour(s)).  Medications       acetaminophen  975 mg Oral Q8H     atorvastatin  40 mg Oral Daily     enoxaparin  40 mg Subcutaneous Q24H     esomeprazole  40 mg Oral Daily     fluticasone-salmeterol  1 puff Inhalation BID     levothyroxine  112 mcg Oral QAM     losartan-hydrochlorothiazide  1 tablet Oral QAM     polyethylene glycol  17 g Oral Daily     potassium chloride ER  20 mEq Oral Daily     senna-docusate  1 tablet Oral BID    Or     senna-docusate  2 tablet Oral BID     sodium chloride (PF)  3 mL Intracatheter Q8H

## 2019-01-10 NOTE — PLAN OF CARE
Pt A&O. VSS on ra. CMS intact. Up with x1, gb&w. Regular diet. Dressing CDI. Pain managed with oxycodone. Discharge today.

## 2019-01-10 NOTE — PLAN OF CARE
discharge instruction went over with pt and spouse, verbalized understanding.  discharge prescription given to pt.  Pt asked for valium prescription, ortho will call into pt's pharmacy and pt will pick from there. Pt discharge on WC with transport aide to door 2.

## 2019-01-10 NOTE — PROGRESS NOTES
Previous note regarding calf tenderness was regarding left leg, which was pt's surgical leg, not the right leg.

## 2019-01-10 NOTE — PROGRESS NOTES
Ortho  Late entry - seen at 0930  S/p left TKA, POD#2    Little emotional, weepy  Denies CP, SOB, fevers  NAD, A&O  Dry dressing  Calves soft, NT  CMS intact    Plan:  PT/OT  Lovenox  Discharge to home today vs tomorrow pending therapy and pain control    Sharron Lundberg  2:53 PM

## 2019-01-10 NOTE — PLAN OF CARE
Discharge Planner PT   Patient plan for discharge: Return home with OP PT  Current status: Pt performed bed mobility with min assist and sit to/from stand transfers with SBA and cues for safety.  Gait training x 100 ft using wheeled walker and SBA.  Slow pace.  Good stability without use of KI.  Pt performed 3 stairs x 1 trial using 1 rail and cane with CGA.   present for session and educated on how to assist pt with mobility and exs.  Pt c/o increased pain and feeling a little light-headed during session.  Knee AAROM is 8-76 degrees.   Barriers to return to prior living situation: None  Recommendations for discharge: Return home with assist from spouse for mobility as needed-specifically navigation of stairs and OP PT per plan established by the PT.  Rationale for recommendations: Pt will have assist of  and OP PT to improve knee ROM/strength.         Entered by: Kavitha Hoover 01/10/2019 2:56 PM         Pt is discharging home today with assist and OP PT.  PT goals not met.  Physical Therapy Discharge Summary    Reason for therapy discharge:    Discharged to home with outpatient therapy.    Progress towards therapy goal(s). See goals on Care Plan in [facility] health record for goal details.  Goals not met.  Barriers to achieving goals:   discharge from facility.    Therapy recommendation(s):    Continued therapy is recommended.  Rationale/Recommendations:  OP PT and assist of spouse to maximize return to PLOF.

## 2019-01-10 NOTE — PLAN OF CARE
A&OX4.  Up with 1 and walker.  Voiding using the bathroom.  Hemovac pulled out with morning PT.  Taking oxycodoone, valium and IV dilaudid X1 for pain.  Zofran X1 for nausea.  Progressing per plan of care.

## 2019-01-10 NOTE — PROGRESS NOTES
"Pt c/o right calf tenderness.  Negative homans sign upon assessment.  Pt discharging to home.  Offered to call ortho again regarding this calf tenderness, but pt refused.  She said, \"I know it is just muscle pain.  I will call Dr. 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 PT stated, \"I want to go home.\"   Ortho pa said that it was ok for patient to go home.  Will continue to monitor.    "

## 2019-01-10 NOTE — DISCHARGE SUMMARY
Discharge Summary    Tiffany Stovall MRN# [id number]   YOB: 1950 Age: 68 year old     Date of Admission:  1/8/2019  Date of Discharge:  1/10/19  Admitting Physician:  Tom Terry MD  Discharge Physician:  Tom Terry MD     Primary Provider: Darby Williamson S11          Admission Diagnoses:   Osteoarthritis left knee          Discharge Diagnosis:   Same           Surgical Procedure:   Total knee arthroplasty           Discharge Disposition:   Discharged to home           Medications Prior to Admission:     Medications Prior to Admission   Medication Sig Dispense Refill Last Dose     albuterol (2.5 MG/3ML) 0.083% nebulizer solution Take 1 vial (2.5 mg) by nebulization every 6 hours as needed for shortness of breath / dyspnea 25 vial 2 More than a Month at PRN     albuterol (ALBUTEROL) 108 (90 BASE) MCG/ACT Inhaler Inhale 1-2 puffs into the lungs every 4 hours as needed for shortness of breath / dyspnea 1 Inhaler 2 1/1/2019 at PRN     atorvastatin (LIPITOR) 40 MG tablet Take 1 tablet (40 mg) by mouth daily 90 tablet 3 1/7/2019 at PM     EPINEPHrine (EPIPEN/ADRENACLICK/OR ANY BX GENERIC EQUIV) 0.3 MG/0.3ML injection 2-pack Inject 0.3 mLs (0.3 mg) into the muscle once as needed 0.6 mL 1 More than a Month at PRN     Esomeprazole Magnesium (NEXIUM PO) Take 40 mg by mouth daily   1/8/2019 at 0410     fluticasone-salmeterol (ADVAIR DISKUS) 250-50 MCG/DOSE diskus inhaler Inhale 1 puff into the lungs 2 times daily 3 Inhaler 3 1/8/2019 at 0410     levothyroxine (SYNTHROID/LEVOTHROID) 112 MCG tablet Take 1 tablet (112 mcg) by mouth every morning 30 tablet 0 1/8/2019 at 0410     loratadine (CLARITIN) 10 MG tablet Take 10 mg by mouth daily as needed    1/7/2019 at PM     losartan-hydrochlorothiazide (HYZAAR) 100-25 MG per tablet Take 1 tablet by mouth every morning 90 tablet 3 1/6/2019 at AM     potassium chloride SA (K-DUR/KLOR-CON M) 20 MEQ CR tablet Take 1 tablet (20 mEq) by mouth daily 90 tablet 3  2019 at Noon     Prenatal Multivit-Min-Fe-FA (PRE-PETER PO) Take 1 tablet by mouth daily (with lunch)    2019 at Noon     ranitidine (ZANTAC) 150 MG tablet Take 1 tablet (150 mg) by mouth At Bedtime (Patient taking differently: Take 150 mg by mouth every evening as needed ) 30 tablet 3 2019 at PM     tetrahydrozoline (OPTI-CLEAR) 0.05 % ophthalmic solution Place 1 drop into both eyes daily as needed (After Swimming)   2019 at PRN     [DISCONTINUED] acetaminophen (TYLENOL 8 HOUR ARTHRITIS PAIN) 650 MG CR tablet Take 1,300 mg by mouth 2 times daily   2019 at 1930     [DISCONTINUED] mupirocin (BACTROBAN) 2 % nasal ointment Spray 1 each into both nostrils 2 times daily   2019 at AM             Discharge Medications:     Current Discharge Medication List      START taking these medications    Details   enoxaparin (LOVENOX) 40 MG/0.4ML syringe Inject 0.4 mLs (40 mg) Subcutaneous every 24 hours for 12 days  Qty: 4.8 mL, Refills: 0    Associated Diagnoses: Status post total left knee replacement      !! order for DME Equipment being ordered: Walker Wheels () and Walker ()  Treatment Diagnosis: impaired gait  Qty: 1 each, Refills: 0    Associated Diagnoses: Status post total left knee replacement      !! order for DME Equipment being ordered: Walker Wheels () and Walker ()  Treatment Diagnosis: Impaired gait  Qty: 1 each, Refills: 0    Associated Diagnoses: Status post total left knee replacement      oxyCODONE (ROXICODONE) 5 MG tablet Take 1-2 tablets (5-10 mg) by mouth every 4 hours as needed for moderate to severe pain  Qty: 40 tablet, Refills: 0    Associated Diagnoses: Status post total left knee replacement      senna-docusate (SENOKOT-S/PERICOLACE) 8.6-50 MG tablet Take 1 tablet by mouth 2 times daily as needed for constipation  Qty: 60 tablet, Refills: 0    Associated Diagnoses: Status post total left knee replacement       !! - Potential duplicate medications found. Please  discuss with provider.      CONTINUE these medications which have NOT CHANGED    Details   albuterol (2.5 MG/3ML) 0.083% nebulizer solution Take 1 vial (2.5 mg) by nebulization every 6 hours as needed for shortness of breath / dyspnea  Qty: 25 vial, Refills: 2    Comments: Profile Rx: patient will contact pharmacy when needed  Associated Diagnoses: Mild persistent asthma without complication; Mild persistent asthma with exacerbation      albuterol (ALBUTEROL) 108 (90 BASE) MCG/ACT Inhaler Inhale 1-2 puffs into the lungs every 4 hours as needed for shortness of breath / dyspnea  Qty: 1 Inhaler, Refills: 2    Associated Diagnoses: Mild persistent asthma without complication      atorvastatin (LIPITOR) 40 MG tablet Take 1 tablet (40 mg) by mouth daily  Qty: 90 tablet, Refills: 3    Associated Diagnoses: Hyperlipidemia LDL goal <130      EPINEPHrine (EPIPEN/ADRENACLICK/OR ANY BX GENERIC EQUIV) 0.3 MG/0.3ML injection 2-pack Inject 0.3 mLs (0.3 mg) into the muscle once as needed  Qty: 0.6 mL, Refills: 1    Associated Diagnoses: Allergic reaction, initial encounter      Esomeprazole Magnesium (NEXIUM PO) Take 40 mg by mouth daily      fluticasone-salmeterol (ADVAIR DISKUS) 250-50 MCG/DOSE diskus inhaler Inhale 1 puff into the lungs 2 times daily  Qty: 3 Inhaler, Refills: 3    Associated Diagnoses: Mild persistent asthma without complication      levothyroxine (SYNTHROID/LEVOTHROID) 112 MCG tablet Take 1 tablet (112 mcg) by mouth every morning  Qty: 30 tablet, Refills: 0    Associated Diagnoses: Hypothyroidism due to acquired atrophy of thyroid      loratadine (CLARITIN) 10 MG tablet Take 10 mg by mouth daily as needed       losartan-hydrochlorothiazide (HYZAAR) 100-25 MG per tablet Take 1 tablet by mouth every morning  Qty: 90 tablet, Refills: 3    Associated Diagnoses: Hypertension goal BP (blood pressure) < 140/90      potassium chloride SA (K-DUR/KLOR-CON M) 20 MEQ CR tablet Take 1 tablet (20 mEq) by mouth daily  Qty:  90 tablet, Refills: 3    Associated Diagnoses: Hypertension goal BP (blood pressure) < 140/90      Prenatal Multivit-Min-Fe-FA (PRE-PETER PO) Take 1 tablet by mouth daily (with lunch)       ranitidine (ZANTAC) 150 MG tablet Take 1 tablet (150 mg) by mouth At Bedtime  Qty: 30 tablet, Refills: 3    Associated Diagnoses: Gastroesophageal reflux disease without esophagitis      tetrahydrozoline (OPTI-CLEAR) 0.05 % ophthalmic solution Place 1 drop into both eyes daily as needed (After Swimming)         STOP taking these medications       acetaminophen (TYLENOL 8 HOUR ARTHRITIS PAIN) 650 MG CR tablet Comments:   Reason for Stopping:         mupirocin (BACTROBAN) 2 % nasal ointment Comments:   Reason for Stopping:                     Consultations:   No consultations were requested during this admission           Hospital Course:   The patient was admitted after the surgical procedure.The patient underwent an uneventful total knee arthroplasty. Postoperatively, anticoagulation  with Lovenox was started. Home medications have been reconciled. Oxycodone 5 mg. was prescribed for pain.             Discharge Instructions and Follow-Up:        Discharge activity: WBAT with ambulatory device.   Discharge follow-up: Follow-up with Sharron Lundberg PA-C in ~14 days post-op. [phone]   Outpatient therapy: Should already be arranged by office.  If not, patient should call to schedule 2x/week x 3 weeks.   Home Care agency: None   Supplies and equipment: None        Wound care: Daily dry dressing changes.  May use adaptic/xeroform directly over incision if available.  Staples out 12 days post-op and apply steri-strips.    Other instructions: Knee immobilizer on at night for 1 month.  Please discharge patient with immobilizer.       Sharron Lundberg PA-C

## 2019-01-11 ENCOUNTER — TELEPHONE (OUTPATIENT)
Dept: FAMILY MEDICINE | Facility: CLINIC | Age: 69
End: 2019-01-11

## 2019-01-11 NOTE — TELEPHONE ENCOUNTER
Patient discharged from [facility]  ( inpatient ).    Discharge location: [facility]  Discharge date: 01/10/19  Diagnosis: Djd, Status Post Total Left Knee Replacement    Please follow up as appropriate. If no follow up required, please close encounter.

## 2019-01-23 ENCOUNTER — TRANSFERRED RECORDS (OUTPATIENT)
Dept: HEALTH INFORMATION MANAGEMENT | Facility: CLINIC | Age: 69
End: 2019-01-23

## 2019-01-28 ENCOUNTER — MYC REFILL (OUTPATIENT)
Dept: FAMILY MEDICINE | Facility: CLINIC | Age: 69
End: 2019-01-28

## 2019-01-28 DIAGNOSIS — I10 HYPERTENSION GOAL BP (BLOOD PRESSURE) < 140/90: ICD-10-CM

## 2019-01-28 DIAGNOSIS — E03.4 HYPOTHYROIDISM DUE TO ACQUIRED ATROPHY OF THYROID: ICD-10-CM

## 2019-01-28 NOTE — TELEPHONE ENCOUNTER
"Requested Prescriptions   Pending Prescriptions Disp Refills     losartan-hydrochlorothiazide (HYZAAR) 100-25 MG tablet 90 tablet 3     Sig: Take 1 tablet by mouth every morning    Angiotensin-II Receptors Failed - 1/28/2019  8:34 AM       Failed - Blood pressure under 140/90 in past 12 months    BP Readings from Last 3 Encounters:   01/10/19 146/72   12/20/18 124/84   09/11/18 140/77                Passed - Recent (12 mo) or future (30 days) visit within the authorizing provider's specialty    Patient had office visit in the last 12 months or has a visit in the next 30 days with authorizing provider or within the authorizing provider's specialty.  See \"Patient Info\" tab in inbasket, or \"Choose Columns\" in Meds & Orders section of the refill encounter.             Passed - Medication is active on med list       Passed - Patient is age 18 or older       Passed - No active pregnancy on record       Passed - Normal serum creatinine on file in past 12 months    Recent Labs   Lab Test 01/10/19  0637   CR 0.66            Passed - Normal serum potassium on file in past 12 months    Recent Labs   Lab Test 01/10/19  0637   POTASSIUM 3.6                   Passed - No positive pregnancy test in past 12 months        losartan-hydrochlorothiazide (HYZAAR) 100-25 MG per tablet  Last Written Prescription Date:  4/17/18  Last Fill Quantity: 90,  # refills: 3   Last office visit: 12/20/2018 with prescribing provider:  Dr. Williamson   Future Office Visit:      "

## 2019-01-28 NOTE — TELEPHONE ENCOUNTER
"Requested Prescriptions   Pending Prescriptions Disp Refills     levothyroxine (SYNTHROID/LEVOTHROID) 112 MCG tablet 30 tablet 0     Sig: Take 1 tablet (112 mcg) by mouth every morning    Thyroid Protocol Passed - 1/28/2019  8:35 AM       Passed - Patient is 12 years or older       Passed - Recent (12 mo) or future (30 days) visit within the authorizing provider's specialty    Patient had office visit in the last 12 months or has a visit in the next 30 days with authorizing provider or within the authorizing provider's specialty.  See \"Patient Info\" tab in inbasket, or \"Choose Columns\" in Meds & Orders section of the refill encounter.             Passed - Medication is active on med list       Passed - Normal TSH on file in past 12 months    Recent Labs   Lab Test 04/17/18  0831   TSH 0.69             Passed - No active pregnancy on record    If patient is pregnant or has had a positive pregnancy test, please check TSH.         Passed - No positive pregnancy test in past 12 months    If patient is pregnant or has had a positive pregnancy test, please check TSH.          levothyroxine (SYNTHROID/LEVOTHROID) 112 MCG tablet  Last Written Prescription Date:  11/20/18  Last Fill Quantity: 30,  # refills: 0   Last office visit: 12/20/2018 with prescribing provider:  Dr. Williamson   Future Office Visit:      "

## 2019-01-29 ENCOUNTER — MYC REFILL (OUTPATIENT)
Dept: FAMILY MEDICINE | Facility: CLINIC | Age: 69
End: 2019-01-29

## 2019-01-29 DIAGNOSIS — E03.4 HYPOTHYROIDISM DUE TO ACQUIRED ATROPHY OF THYROID: ICD-10-CM

## 2019-01-29 DIAGNOSIS — I10 HYPERTENSION GOAL BP (BLOOD PRESSURE) < 140/90: ICD-10-CM

## 2019-01-29 NOTE — TELEPHONE ENCOUNTER
"Requested Prescriptions   Pending Prescriptions Disp Refills     levothyroxine (SYNTHROID/LEVOTHROID) 112 MCG tablet  Last Written Prescription Date:  11/20/18  Last Fill Quantity: 30 tablet,  # refills: 0   Last office visit: 12/20/2018 with prescribing provider:  Dr. Williamson   Future Office Visit:     30 tablet 0     Sig: Take 1 tablet (112 mcg) by mouth every morning    Thyroid Protocol Passed - 1/29/2019 11:46 AM       Passed - Patient is 12 years or older       Passed - Recent (12 mo) or future (30 days) visit within the authorizing provider's specialty    Patient had office visit in the last 12 months or has a visit in the next 30 days with authorizing provider or within the authorizing provider's specialty.  See \"Patient Info\" tab in inbasket, or \"Choose Columns\" in Meds & Orders section of the refill encounter.             Passed - Medication is active on med list       Passed - Normal TSH on file in past 12 months    Recent Labs   Lab Test 04/17/18  0831   TSH 0.69             Passed - No active pregnancy on record    If patient is pregnant or has had a positive pregnancy test, please check TSH.         Passed - No positive pregnancy test in past 12 months    If patient is pregnant or has had a positive pregnancy test, please check TSH.            "

## 2019-01-29 NOTE — TELEPHONE ENCOUNTER
"Requested Prescriptions   Pending Prescriptions Disp Refills     losartan-hydrochlorothiazide (HYZAAR) 100-25 MG tablet  Last Written Prescription Date:  4/17/18  Last Fill Quantity: 90 tablet,  # refills: 3   Last office visit: 12/20/2018 with prescribing provider:  Dr. Williamson   Future Office Visit:     90 tablet 3     Sig: Take 1 tablet by mouth every morning    Angiotensin-II Receptors Failed - 1/29/2019 11:46 AM       Failed - Blood pressure under 140/90 in past 12 months    BP Readings from Last 3 Encounters:   01/10/19 146/72   12/20/18 124/84   09/11/18 140/77                Passed - Recent (12 mo) or future (30 days) visit within the authorizing provider's specialty    Patient had office visit in the last 12 months or has a visit in the next 30 days with authorizing provider or within the authorizing provider's specialty.  See \"Patient Info\" tab in inbasket, or \"Choose Columns\" in Meds & Orders section of the refill encounter.             Passed - Medication is active on med list       Passed - Patient is age 18 or older       Passed - No active pregnancy on record       Passed - Normal serum creatinine on file in past 12 months    Recent Labs   Lab Test 01/10/19  0637   CR 0.66            Passed - Normal serum potassium on file in past 12 months    Recent Labs   Lab Test 01/10/19  0637   POTASSIUM 3.6                   Passed - No positive pregnancy test in past 12 months          "

## 2019-01-29 NOTE — TELEPHONE ENCOUNTER
"Requested Prescriptions   Pending Prescriptions Disp Refills     losartan-hydrochlorothiazide (HYZAAR) 100-25 MG tablet  Last Written Prescription Date:  4/17/18  Last Fill Quantity: 90 tablet,  # refills: 3   Last office visit: 12/20/2018 with prescribing provider:  Dr. Williamson   Future Office Visit:     90 tablet 3     Sig: Take 1 tablet by mouth every morning    Angiotensin-II Receptors Failed - 1/29/2019  4:46 PM       Failed - Blood pressure under 140/90 in past 12 months    BP Readings from Last 3 Encounters:   01/10/19 146/72   12/20/18 124/84   09/11/18 140/77                Passed - Recent (12 mo) or future (30 days) visit within the authorizing provider's specialty    Patient had office visit in the last 12 months or has a visit in the next 30 days with authorizing provider or within the authorizing provider's specialty.  See \"Patient Info\" tab in inbasket, or \"Choose Columns\" in Meds & Orders section of the refill encounter.             Passed - Medication is active on med list       Passed - Patient is age 18 or older       Passed - No active pregnancy on record       Passed - Normal serum creatinine on file in past 12 months    Recent Labs   Lab Test 01/10/19  0637   CR 0.66            Passed - Normal serum potassium on file in past 12 months    Recent Labs   Lab Test 01/10/19  0637   POTASSIUM 3.6                   Passed - No positive pregnancy test in past 12 months              levothyroxine (SYNTHROID/LEVOTHROID) 112 MCG tablet  Last Written Prescription Date:  11/20/18  Last Fill Quantity: 30 tablet,  # refills: 0   Last office visit: 12/20/2018 with prescribing provider:  Dr. Williamson   Future Office Visit:     30 tablet 0     Sig: Take 1 tablet (112 mcg) by mouth every morning    Thyroid Protocol Passed - 1/29/2019  4:46 PM       Passed - Patient is 12 years or older       Passed - Recent (12 mo) or future (30 days) visit within the authorizing provider's specialty    Patient had office visit in " "the last 12 months or has a visit in the next 30 days with authorizing provider or within the authorizing provider's specialty.  See \"Patient Info\" tab in inbasket, or \"Choose Columns\" in Meds & Orders section of the refill encounter.             Passed - Medication is active on med list       Passed - Normal TSH on file in past 12 months    Recent Labs   Lab Test 04/17/18  0831   TSH 0.69             Passed - No active pregnancy on record    If patient is pregnant or has had a positive pregnancy test, please check TSH.         Passed - No positive pregnancy test in past 12 months    If patient is pregnant or has had a positive pregnancy test, please check TSH.            "

## 2019-01-30 ENCOUNTER — MYC REFILL (OUTPATIENT)
Dept: FAMILY MEDICINE | Facility: CLINIC | Age: 69
End: 2019-01-30

## 2019-01-30 DIAGNOSIS — E03.4 HYPOTHYROIDISM DUE TO ACQUIRED ATROPHY OF THYROID: ICD-10-CM

## 2019-01-30 DIAGNOSIS — I10 HYPERTENSION GOAL BP (BLOOD PRESSURE) < 140/90: ICD-10-CM

## 2019-01-31 RX ORDER — LOSARTAN POTASSIUM AND HYDROCHLOROTHIAZIDE 25; 100 MG/1; MG/1
1 TABLET ORAL EVERY MORNING
Qty: 90 TABLET | Refills: 3 | OUTPATIENT
Start: 2019-01-31

## 2019-01-31 RX ORDER — LEVOTHYROXINE SODIUM 112 UG/1
112 TABLET ORAL EVERY MORNING
Qty: 90 TABLET | Refills: 0 | Status: SHIPPED | OUTPATIENT
Start: 2019-01-31 | End: 2019-05-06

## 2019-01-31 RX ORDER — LOSARTAN POTASSIUM AND HYDROCHLOROTHIAZIDE 25; 100 MG/1; MG/1
1 TABLET ORAL EVERY MORNING
Qty: 90 TABLET | Refills: 1 | Status: SHIPPED | OUTPATIENT
Start: 2019-01-31 | End: 2019-05-06 | Stop reason: DRUGHIGH

## 2019-01-31 NOTE — TELEPHONE ENCOUNTER
Prescription approved per [facility] Refill Protocol.      Treva Wilkerson RN, BSN

## 2019-01-31 NOTE — TELEPHONE ENCOUNTER
Prescription approved per [facility] Refill Protocol.      Treva Wilkerson RN, BSN

## 2019-02-01 RX ORDER — LEVOTHYROXINE SODIUM 112 UG/1
112 TABLET ORAL EVERY MORNING
Qty: 30 TABLET | Refills: 0 | OUTPATIENT
Start: 2019-02-01

## 2019-02-01 RX ORDER — LOSARTAN POTASSIUM AND HYDROCHLOROTHIAZIDE 25; 100 MG/1; MG/1
1 TABLET ORAL EVERY MORNING
Qty: 90 TABLET | Refills: 3 | OUTPATIENT
Start: 2019-02-01

## 2019-02-03 RX ORDER — LEVOTHYROXINE SODIUM 112 UG/1
112 TABLET ORAL EVERY MORNING
Qty: 30 TABLET | Refills: 0 | OUTPATIENT
Start: 2019-02-03

## 2019-02-03 RX ORDER — LOSARTAN POTASSIUM AND HYDROCHLOROTHIAZIDE 25; 100 MG/1; MG/1
1 TABLET ORAL EVERY MORNING
Qty: 90 TABLET | Refills: 3 | OUTPATIENT
Start: 2019-02-03

## 2019-02-12 NOTE — HPI: MOHS SURGERY CONSULTATION
Year Removed: 1900 H Plasty Text: Given the location of the defect, shape of the defect and the proximity to free margins a H-plasty was deemed most appropriate for repair.  Using a sterile surgical marker, the appropriate advancement arms of the H-plasty were drawn incorporating the defect and placing the expected incisions within the relaxed skin tension lines where possible. The area thus outlined was incised deep to adipose tissue with a #15 scalpel blade. The skin margins were undermined to an appropriate distance in all directions utilizing iris scissors.  The opposing advancement arms were then advanced into place in opposite direction and anchored with interrupted buried subcutaneous sutures.

## 2019-02-20 ENCOUNTER — TRANSFERRED RECORDS (OUTPATIENT)
Dept: HEALTH INFORMATION MANAGEMENT | Facility: CLINIC | Age: 69
End: 2019-02-20

## 2019-03-27 ENCOUNTER — MYC REFILL (OUTPATIENT)
Dept: FAMILY MEDICINE | Facility: CLINIC | Age: 69
End: 2019-03-27

## 2019-03-27 ENCOUNTER — MYC MEDICAL ADVICE (OUTPATIENT)
Dept: FAMILY MEDICINE | Facility: CLINIC | Age: 69
End: 2019-03-27

## 2019-03-27 DIAGNOSIS — I10 HYPERTENSION GOAL BP (BLOOD PRESSURE) < 140/90: ICD-10-CM

## 2019-03-27 RX ORDER — POTASSIUM CHLORIDE 1500 MG/1
20 TABLET, EXTENDED RELEASE ORAL DAILY
Qty: 90 TABLET | Refills: 0 | Status: SHIPPED | OUTPATIENT
Start: 2019-03-27 | End: 2019-05-06

## 2019-03-27 NOTE — TELEPHONE ENCOUNTER
Prescription approved per [facility] Refill Protocol.  Katelynn Veliz RN

## 2019-03-27 NOTE — TELEPHONE ENCOUNTER
"Requested Prescriptions   Pending Prescriptions Disp Refills     potassium chloride ER (K-DUR/KLOR-CON M) 20 MEQ CR tablet  Last Written Prescription Date:  4/17/18  Last Fill Quantity: 90 tablet,  # refills: 3   Last office visit: 12/20/2018 with prescribing provider:  Dr. Williamson   Future Office Visit:     90 tablet 3     Sig: Take 1 tablet (20 mEq) by mouth daily    Potassium Supplements Protocol Passed - 3/27/2019 11:45 AM       Passed - Recent (12 mo) or future (30 days) visit within the authorizing provider's specialty    Patient had office visit in the last 12 months or has a visit in the next 30 days with authorizing provider or within the authorizing provider's specialty.  See \"Patient Info\" tab in inbasket, or \"Choose Columns\" in Meds & Orders section of the refill encounter.             Passed - Medication is active on med list       Passed - Patient is age 18 or older       Passed - Normal serum potassium in past 12 months    Recent Labs   Lab Test 01/10/19  0637   POTASSIUM 3.6                      "

## 2019-04-03 ENCOUNTER — TRANSFERRED RECORDS (OUTPATIENT)
Dept: HEALTH INFORMATION MANAGEMENT | Facility: CLINIC | Age: 69
End: 2019-04-03

## 2019-04-09 ENCOUNTER — MYC REFILL (OUTPATIENT)
Dept: FAMILY MEDICINE | Facility: CLINIC | Age: 69
End: 2019-04-09

## 2019-04-09 DIAGNOSIS — E78.5 HYPERLIPIDEMIA LDL GOAL <130: ICD-10-CM

## 2019-04-09 NOTE — TELEPHONE ENCOUNTER
"Requested Prescriptions   Pending Prescriptions Disp Refills     atorvastatin (LIPITOR) 40 MG tablet  Last Written Prescription Date:  4/17/18  Last Fill Quantity: 90 tablet,  # refills: 3   Last office visit: 12/20/2018 with prescribing provider:  Dr. Williamson   Future Office Visit:     90 tablet 3     Sig: Take 1 tablet (40 mg) by mouth daily       Statins Protocol Passed - 4/9/2019 10:56 AM        Passed - LDL on file in past 12 months     Recent Labs   Lab Test 04/17/18  0831   LDL 66             Passed - No abnormal creatine kinase in past 12 months     No lab results found.             Passed - Recent (12 mo) or future (30 days) visit within the authorizing provider's specialty     Patient had office visit in the last 12 months or has a visit in the next 30 days with authorizing provider or within the authorizing provider's specialty.  See \"Patient Info\" tab in inbasket, or \"Choose Columns\" in Meds & Orders section of the refill encounter.              Passed - Medication is active on med list        Passed - Patient is age 18 or older        Passed - No active pregnancy on record        Passed - No positive pregnancy test in past 12 months          "

## 2019-04-10 RX ORDER — ATORVASTATIN CALCIUM 40 MG/1
40 TABLET, FILM COATED ORAL DAILY
Qty: 90 TABLET | Refills: 0 | Status: SHIPPED | OUTPATIENT
Start: 2019-04-10 | End: 2019-05-06

## 2019-04-10 NOTE — TELEPHONE ENCOUNTER
Medication is being filled for 1 time refill only due to:  Patient needs to be seen because due for physical in April/May. Patient is scheduled for physical.     Next 5 appointments (look out 90 days)    May 06, 2019 10:40 AM CDT  [facility] [address] [place] [id number]  [phone]            Treva Wilkerson RN, BSN

## 2019-05-05 ASSESSMENT — ENCOUNTER SYMPTOMS
DIARRHEA: 0
MYALGIAS: 0
CONSTIPATION: 0
FEVER: 0
HEMATOCHEZIA: 0
NAUSEA: 0
WEAKNESS: 0
EYE PAIN: 0
HEMATURIA: 0
SORE THROAT: 0
HEARTBURN: 0
HEADACHES: 0
PALPITATIONS: 0
DIZZINESS: 1
BREAST MASS: 0
COUGH: 0
JOINT SWELLING: 0
PARESTHESIAS: 0
NERVOUS/ANXIOUS: 0
SHORTNESS OF BREATH: 0
ABDOMINAL PAIN: 0
ARTHRALGIAS: 0
FREQUENCY: 0
CHILLS: 0
DYSURIA: 0

## 2019-05-05 ASSESSMENT — ACTIVITIES OF DAILY LIVING (ADL): CURRENT_FUNCTION: NO ASSISTANCE NEEDED

## 2019-05-06 ENCOUNTER — MYC MEDICAL ADVICE (OUTPATIENT)
Dept: FAMILY MEDICINE | Facility: CLINIC | Age: 69
End: 2019-05-06

## 2019-05-06 ENCOUNTER — OFFICE VISIT (OUTPATIENT)
Dept: FAMILY MEDICINE | Facility: CLINIC | Age: 69
End: 2019-05-06
Payer: COMMERCIAL

## 2019-05-06 VITALS
HEIGHT: 66 IN | BODY MASS INDEX: 23.14 KG/M2 | SYSTOLIC BLOOD PRESSURE: 122 MMHG | TEMPERATURE: 98 F | DIASTOLIC BLOOD PRESSURE: 78 MMHG | HEART RATE: 81 BPM | OXYGEN SATURATION: 100 % | WEIGHT: 144 LBS | RESPIRATION RATE: 16 BRPM

## 2019-05-06 DIAGNOSIS — Z00.00 ENCOUNTER FOR MEDICARE ANNUAL WELLNESS EXAM: Primary | ICD-10-CM

## 2019-05-06 DIAGNOSIS — J45.30 MILD PERSISTENT ASTHMA WITHOUT COMPLICATION: ICD-10-CM

## 2019-05-06 DIAGNOSIS — E03.4 HYPOTHYROIDISM DUE TO ACQUIRED ATROPHY OF THYROID: ICD-10-CM

## 2019-05-06 DIAGNOSIS — I10 HYPERTENSION GOAL BP (BLOOD PRESSURE) < 140/90: ICD-10-CM

## 2019-05-06 DIAGNOSIS — Z78.0 POSTMENOPAUSAL ESTROGEN DEFICIENCY: ICD-10-CM

## 2019-05-06 DIAGNOSIS — E78.5 HYPERLIPIDEMIA LDL GOAL <130: ICD-10-CM

## 2019-05-06 LAB
ALBUMIN SERPL-MCNC: 4.2 G/DL (ref 3.4–5)
ALP SERPL-CCNC: 71 U/L (ref 40–150)
ALT SERPL W P-5'-P-CCNC: 31 U/L (ref 0–50)
ANION GAP SERPL CALCULATED.3IONS-SCNC: 7 MMOL/L (ref 3–14)
AST SERPL W P-5'-P-CCNC: 21 U/L (ref 0–45)
BILIRUB SERPL-MCNC: 0.4 MG/DL (ref 0.2–1.3)
BUN SERPL-MCNC: 14 MG/DL (ref 7–30)
CALCIUM SERPL-MCNC: 9.5 MG/DL (ref 8.5–10.1)
CHLORIDE SERPL-SCNC: 101 MMOL/L (ref 94–109)
CHOLEST SERPL-MCNC: 137 MG/DL
CO2 SERPL-SCNC: 28 MMOL/L (ref 20–32)
CREAT SERPL-MCNC: 0.73 MG/DL (ref 0.52–1.04)
CREAT UR-MCNC: 66 MG/DL
ERYTHROCYTE [DISTWIDTH] IN BLOOD BY AUTOMATED COUNT: 15.4 % (ref 10–15)
GFR SERPL CREATININE-BSD FRML MDRD: 86 ML/MIN/{1.73_M2}
GLUCOSE SERPL-MCNC: 94 MG/DL (ref 70–99)
HCT VFR BLD AUTO: 37.2 % (ref 35–47)
HDLC SERPL-MCNC: 57 MG/DL
HGB BLD-MCNC: 12 G/DL (ref 11.7–15.7)
LDLC SERPL CALC-MCNC: 68 MG/DL
MCH RBC QN AUTO: 29.1 PG (ref 26.5–33)
MCHC RBC AUTO-ENTMCNC: 32.3 G/DL (ref 31.5–36.5)
MCV RBC AUTO: 90 FL (ref 78–100)
MICROALBUMIN UR-MCNC: <5 MG/L
MICROALBUMIN/CREAT UR: NORMAL MG/G CR (ref 0–25)
NONHDLC SERPL-MCNC: 80 MG/DL
PLATELET # BLD AUTO: 276 10E9/L (ref 150–450)
POTASSIUM SERPL-SCNC: 3.6 MMOL/L (ref 3.4–5.3)
PROT SERPL-MCNC: 7.2 G/DL (ref 6.8–8.8)
RBC # BLD AUTO: 4.13 10E12/L (ref 3.8–5.2)
SODIUM SERPL-SCNC: 136 MMOL/L (ref 133–144)
TRIGL SERPL-MCNC: 61 MG/DL
TSH SERPL DL<=0.005 MIU/L-ACNC: 0.63 MU/L (ref 0.4–4)
WBC # BLD AUTO: 6.6 10E9/L (ref 4–11)

## 2019-05-06 PROCEDURE — 80061 LIPID PANEL: CPT | Performed by: FAMILY MEDICINE

## 2019-05-06 PROCEDURE — 36415 COLL VENOUS BLD VENIPUNCTURE: CPT | Performed by: FAMILY MEDICINE

## 2019-05-06 PROCEDURE — 85027 COMPLETE CBC AUTOMATED: CPT | Performed by: FAMILY MEDICINE

## 2019-05-06 PROCEDURE — 80053 COMPREHEN METABOLIC PANEL: CPT | Performed by: FAMILY MEDICINE

## 2019-05-06 PROCEDURE — 82043 UR ALBUMIN QUANTITATIVE: CPT | Performed by: FAMILY MEDICINE

## 2019-05-06 PROCEDURE — 99397 PER PM REEVAL EST PAT 65+ YR: CPT | Performed by: FAMILY MEDICINE

## 2019-05-06 PROCEDURE — 84443 ASSAY THYROID STIM HORMONE: CPT | Performed by: FAMILY MEDICINE

## 2019-05-06 RX ORDER — LOSARTAN POTASSIUM AND HYDROCHLOROTHIAZIDE 12.5; 5 MG/1; MG/1
1 TABLET ORAL DAILY
Qty: 90 TABLET | Refills: 3 | Status: SHIPPED | OUTPATIENT
Start: 2019-05-06 | End: 2019-10-24

## 2019-05-06 RX ORDER — LOSARTAN POTASSIUM AND HYDROCHLOROTHIAZIDE 12.5; 5 MG/1; MG/1
1 TABLET ORAL DAILY
Qty: 90 TABLET | Refills: 1 | Status: SHIPPED | OUTPATIENT
Start: 2019-05-06 | End: 2019-05-06

## 2019-05-06 RX ORDER — LEVOTHYROXINE SODIUM 112 UG/1
112 TABLET ORAL EVERY MORNING
Qty: 90 TABLET | Refills: 0 | Status: CANCELLED | OUTPATIENT
Start: 2019-05-06

## 2019-05-06 RX ORDER — LEVOTHYROXINE SODIUM 112 UG/1
112 TABLET ORAL EVERY MORNING
Qty: 90 TABLET | Refills: 3 | Status: SHIPPED | OUTPATIENT
Start: 2019-05-06 | End: 2020-03-30

## 2019-05-06 RX ORDER — POTASSIUM CHLORIDE 1500 MG/1
20 TABLET, EXTENDED RELEASE ORAL DAILY
Qty: 90 TABLET | Refills: 3 | Status: SHIPPED | OUTPATIENT
Start: 2019-05-06 | End: 2020-08-25

## 2019-05-06 RX ORDER — ATORVASTATIN CALCIUM 40 MG/1
40 TABLET, FILM COATED ORAL DAILY
Qty: 90 TABLET | Refills: 3 | Status: SHIPPED | OUTPATIENT
Start: 2019-05-06 | End: 2020-06-23

## 2019-05-06 ASSESSMENT — ENCOUNTER SYMPTOMS
FEVER: 0
DYSURIA: 0
COUGH: 0
WEAKNESS: 0
HEADACHES: 0
DIZZINESS: 1
SORE THROAT: 0
NERVOUS/ANXIOUS: 0
PARESTHESIAS: 0
CHILLS: 0
BREAST MASS: 0
DIARRHEA: 0
FREQUENCY: 0
HEMATOCHEZIA: 0
CONSTIPATION: 0
NAUSEA: 0
ABDOMINAL PAIN: 0
PALPITATIONS: 0
SHORTNESS OF BREATH: 0
MYALGIAS: 0
HEARTBURN: 0
EYE PAIN: 0
ARTHRALGIAS: 0
JOINT SWELLING: 0
HEMATURIA: 0

## 2019-05-06 ASSESSMENT — MIFFLIN-ST. JEOR: SCORE: 1201.99

## 2019-05-06 ASSESSMENT — ACTIVITIES OF DAILY LIVING (ADL): CURRENT_FUNCTION: NO ASSISTANCE NEEDED

## 2019-05-06 NOTE — PROGRESS NOTES
"SUBJECTIVE:   Tiffany Stovall is a 68 year old female who presents for Preventive Visit.  Are you in the first 12 months of your Medicare coverage?  No    Healthy Habits:     In general, how would you rate your overall health?  Good    Frequency of exercise:  6-7 days/week    Duration of exercise:  Greater than 60 minutes    Do you usually eat at least 4 servings of fruit and vegetables a day, include whole grains    & fiber and avoid regularly eating high fat or \"junk\" foods?  Yes    Taking medications regularly:  Yes    Medication side effects:  None    Ability to successfully perform activities of daily living:  No assistance needed    Home Safety:  No safety concerns identified    Hearing Impairment:  Difficulty following a conversation in a noisy restaurant or crowded room    In the past 6 months, have you been bothered by leaking of urine?  No    In general, how would you rate your overall mental or emotional health?  Excellent      PHQ-2 Total Score: 0    Additional concerns today:  No    Blood Pressure  She has been having some orthostatic hypotension in the mornings. There are times when she feels that she may not make it off the treadmill in the mornings, her systolic under 100, so she will hold it for a few days. Her BP will then creep back up to 130-140 in a few days and she will need to take the losartan again. She thinks that she needs to adjust her medication. She does still think that she needs to keep on her diuretic as she notices more tightness in her fingers and feet if she forgets to take it.     Hyperlipidemia Follow-Up      Rate your low fat/cholesterol diet?: good    Taking statin?  Yes, no muscle aches from statin    Other lipid medications/supplements?:  none    Asthma Follow-Up    Was ACT completed today?    Yes    ACT Total Scores 5/6/2019   ACT TOTAL SCORE -   ASTHMA ER VISITS -   ASTHMA HOSPITALIZATIONS -   ACT TOTAL SCORE (Goal Greater than or Equal to 20) 25   In the past 12 " months, how many times did you visit the emergency room for your asthma without being admitted to the hospital? 0   In the past 12 months, how many times were you hospitalized overnight because of your asthma? 0       Recent asthma triggers that patient is dealing with: None      Hypothyroidism Follow-up      Since last visit, patient describes the following symptoms: Weight stable, no hair loss, no skin changes, no constipation, no loose stools      Do you feel safe in your environment? Yes    Do you have a Health Care Directive? Yes: Advance Directive has been received and scanned.    Fall risk  Fallen 2 or more times in the past year?: No  Any fall with injury in the past year?: No    Cognitive Screening   1) Repeat 3 items (Leader, Season, Table)    2) Clock draw: NORMAL  3) 3 item recall: Recalls 3 objects  Results: 3 items recalled: COGNITIVE IMPAIRMENT LESS LIKELY    Mini-CogTM Copyright S Román. Licensed by the author for use in [facility] [employer]; reprinted with permission (magda@[facility]). All rights reserved.      Do you have sleep apnea, excessive snoring or daytime drowsiness?: no    Reviewed and updated as needed this visit by clinical staff  Tobacco  Allergies  Meds  Med Hx  Surg Hx  Fam Hx  Soc Hx      Reviewed and updated as needed this visit by Provider  Tobacco  Allergies  Meds  Med Hx  Surg Hx  Fam Hx  Soc Hx       Social History     Tobacco Use     Smoking status: Never Smoker     Smokeless tobacco: Never Used   Substance Use Topics     Alcohol use: Yes     Comment: socially       Alcohol Use 5/5/2019   Prescreen: >3 drinks/day or >7 drinks/week? No   Prescreen: >3 drinks/day or >7 drinks/week? -     Current providers sharing in care for this patient include:   Patient Care Team:  Darby Williamson MD as PCP - General (Family Practice)  Darby Williamson MD as Assigned PCP    The following health maintenance items are reviewed in Epic and correct as of today:  Health Maintenance    Topic Date Due     ZOSTER IMMUNIZATION (1 of 2) 08/28/2002     DEXA SCAN SCREENING (SYSTEM ASSIGNED)  08/28/2017     ASTHMA CONTROL TEST Q6 MOS  10/17/2018     TSH Q1 YEAR  04/17/2019     MEDICARE ANNUAL WELLNESS VISIT  04/17/2019     FALL RISK ASSESSMENT  04/17/2019     DTAP/TDAP/TD IMMUNIZATION (2 - Td) 05/29/2019     ASTHMA ACTION PLAN Q1 YR  12/23/2019     MAMMO SCREEN Q2 YR (SYSTEM ASSIGNED)  12/17/2020     LIPID SCREEN Q5 YR FEMALE (SYSTEM ASSIGNED)  04/17/2023     ADVANCE DIRECTIVE PLANNING Q5 YRS  12/28/2023     COLON CANCER SCREEN (SYSTEM ASSIGNED)  01/16/2027     PHQ-2  Completed     INFLUENZA VACCINE  Completed     HEPATITIS C SCREENING  Completed     IPV IMMUNIZATION  Aged Out     MENINGITIS IMMUNIZATION  Aged Out     Labs reviewed in EPIC  Pneumonia Vaccine:Adults age 65+ who received their first dose of Pneumovax (PPSV23) prior to age 65 years: Should be given PCV 13 > 1 year after their most recent PPSV23 AND should be given a another dose of PPSV23 > 5 years after their most recent dose of PPSV23 - due for PS23 in 2021.  Mammogram Screening: Mammogram Screening: Patient over age 50, mutual decision to screen reflected in health maintenance.  History of abnormal Pap smear: NO - age 65 - see link Cervical Cytology Screening Guidelines    Review of Systems   Constitutional: Negative for chills and fever.   HENT: Negative for congestion, ear pain, hearing loss and sore throat.    Eyes: Negative for pain and visual disturbance.   Respiratory: Negative for cough and shortness of breath.    Cardiovascular: Negative for chest pain, palpitations and peripheral edema.   Gastrointestinal: Negative for abdominal pain, constipation, diarrhea, heartburn, hematochezia and nausea.   Breasts:  Negative for tenderness, breast mass and discharge.   Genitourinary: Negative for dysuria, frequency, genital sores, hematuria, pelvic pain, urgency, vaginal bleeding and vaginal discharge.   Musculoskeletal: Negative for  "arthralgias, joint swelling and myalgias.   Skin: Negative for rash.   Neurological: Positive for dizziness. Negative for weakness, headaches and paresthesias.   Psychiatric/Behavioral: Negative for mood changes. The patient is not nervous/anxious.      This document serves as a record of the services and decisions personally performed and made by Darby Williamson MD. It was created on her behalf by Laura Lopez, a trained medical scribe. The creation of this document is based the provider's statements to the medical scribe.  Laura Lopez May 6, 2019 10:59 AM      OBJECTIVE:   /78 (BP Location: Right arm, Patient Position: Sitting)   Pulse 81   Temp 98  F (36.7  C) (Oral)   Resp 16   Ht 1.664 m (5' 5.5\")   Wt 65.3 kg (144 lb)   LMP  (LMP Unknown)   SpO2 100%   BMI 23.60 kg/m   Estimated body mass index is 23.6 kg/m  as calculated from the following:    Height as of this encounter: 1.664 m (5' 5.5\").    Weight as of this encounter: 65.3 kg (144 lb).  Physical Exam  GENERAL APPEARANCE: healthy, alert and no distress  EYES: Eyes grossly normal to inspection, PERRL and conjunctivae and sclerae normal  HENT: ear canals and TM's normal, nose and mouth without ulcers or lesions, oropharynx clear and oral mucous membranes moist  NECK: no adenopathy, no asymmetry, masses, or scars and thyroid normal to palpation  RESP: lungs clear to auscultation - no rales, rhonchi or wheezes  BREAST: normal without masses, tenderness or nipple discharge and no palpable axillary masses or adenopathy  CV: regular rate and rhythm, normal S1 S2, no S3 or S4, no murmur, click or rub, no peripheral edema and peripheral pulses strong  ABDOMEN: soft, nontender, no hepatosplenomegaly, no masses and bowel sounds normal   (female): normal female external genitalia, normal urethral meatus, vaginal mucosal atrophy noted, normal post-hysterectomy without masses or abnormal discharge  MS: no musculoskeletal defects are noted and gait is age " appropriate without ataxia  SKIN: well healed scar on left anterior knee from total knee arthroplasty.  Midline back mid thoracic area with lipomatous area palpable, no change from previous - 2 cm diameter  NEURO: Normal strength and tone, sensory exam grossly normal, mentation intact and speech normal  PSYCH: mentation appears normal and affect normal/bright    Diagnostic Test Results:  Results for orders placed or performed in visit on 05/06/19 (from the past 24 hour(s))   TSH with free T4 reflex   Result Value Ref Range    TSH 0.63 0.40 - 4.00 mU/L   Lipid panel reflex to direct LDL Fasting   Result Value Ref Range    Cholesterol 137 <200 mg/dL    Triglycerides 61 <150 mg/dL    HDL Cholesterol 57 >49 mg/dL    LDL Cholesterol Calculated 68 <100 mg/dL    Non HDL Cholesterol 80 <130 mg/dL   Comprehensive metabolic panel   Result Value Ref Range    Sodium 136 133 - 144 mmol/L    Potassium 3.6 3.4 - 5.3 mmol/L    Chloride 101 94 - 109 mmol/L    Carbon Dioxide 28 20 - 32 mmol/L    Anion Gap 7 3 - 14 mmol/L    Glucose 94 70 - 99 mg/dL    Urea Nitrogen 14 7 - 30 mg/dL    Creatinine 0.73 0.52 - 1.04 mg/dL    GFR Estimate 86 >60 mL/min/[1.73_m2]    GFR Estimate If Black >90 >60 mL/min/[1.73_m2]    Calcium 9.5 8.5 - 10.1 mg/dL    Bilirubin Total 0.4 0.2 - 1.3 mg/dL    Albumin 4.2 3.4 - 5.0 g/dL    Protein Total 7.2 6.8 - 8.8 g/dL    Alkaline Phosphatase 71 40 - 150 U/L    ALT 31 0 - 50 U/L    AST 21 0 - 45 U/L   CBC with platelets   Result Value Ref Range    WBC 6.6 4.0 - 11.0 10e9/L    RBC Count 4.13 3.8 - 5.2 10e12/L    Hemoglobin 12.0 11.7 - 15.7 g/dL    Hematocrit 37.2 35.0 - 47.0 %    MCV 90 78 - 100 fl    MCH 29.1 26.5 - 33.0 pg    MCHC 32.3 31.5 - 36.5 g/dL    RDW 15.4 (H) 10.0 - 15.0 %    Platelet Count 276 150 - 450 10e9/L   Albumin Random Urine Quantitative with Creat Ratio   Result Value Ref Range    Creatinine Urine 66 mg/dL    Albumin Urine mg/L <5 mg/L    Albumin Urine mg/g Cr Unable to calculate due to low  value 0 - 25 mg/g Cr         ASSESSMENT / PLAN:   1. Encounter for Medicare annual wellness exam  Negative screening exam; up-to-date on preventive services. Follow up in 1 year for next annual physical.     2. Hypothyroidism due to acquired atrophy of thyroid  Euthyroid, refills.  - TSH with free T4 reflex  - levothyroxine (SYNTHROID/LEVOTHROID) 112 MCG tablet; Take 1 tablet (112 mcg) by mouth every morning  Dispense: 90 tablet; Refill: 3    3. Hyperlipidemia LDL goal <130  Controlled.  Refills.  - Lipid panel reflex to direct LDL Fasting  - Comprehensive metabolic panel  - atorvastatin (LIPITOR) 40 MG tablet; Take 1 tablet (40 mg) by mouth daily  Dispense: 90 tablet; Refill: 3    4. Hypertension goal BP (blood pressure) < 140/90  Episodes of low BP.  Will decrease the combo losartan- hydrochlorothiazide and follow symptoms.    - Comprehensive metabolic panel  - CBC with platelets  - Albumin Random Urine Quantitative with Creat Ratio  - losartan-hydrochlorothiazide (HYZAAR) 50-12.5 MG tablet; Take 1 tablet by mouth daily  Dispense: 90 tablet; Refill: 3  - potassium chloride ER (K-DUR/KLOR-CON M) 20 MEQ CR tablet; Take 1 tablet (20 mEq) by mouth daily  Dispense: 90 tablet; Refill: 3    5. Mild persistent asthma without complication  Controlled. No recent problems.  Refill when requested.      6. Postmenopausal estrogen deficiency  - DX Hip/Pelvis/Spine; Future        End of Life Planning:  Patient currently has an advanced directive: Yes.  Practitioner is supportive of decision.    COUNSELING:  Reviewed preventive health counseling, as reflected in patient instructions       Regular exercise       Healthy diet/nutrition       Immunizations    Recommended patient ask at pharmacy about Shingrix vaccine due to cost       Osteoporosis Prevention/Bone Health       Advanced Planning     BP Readings from Last 1 Encounters:   05/06/19 122/78     Estimated body mass index is 23.6 kg/m  as calculated from the following:     "Height as of this encounter: 1.664 m (5' 5.5\").    Weight as of this encounter: 65.3 kg (144 lb).     reports that she has never smoked. She has never used smokeless tobacco.    Appropriate preventive services were discussed with this patient, including applicable screening as appropriate for cardiovascular disease, diabetes, osteopenia/osteoporosis, and glaucoma.  As appropriate for age/gender, discussed screening for colorectal cancer, prostate cancer, breast cancer, and cervical cancer. Checklist reviewing preventive services available has been given to the patient.    Reviewed patients plan of care and provided an AVS. The Basic Care Plan (routine screening as documented in Health Maintenance) for Tiffany meets the Care Plan requirement. This Care Plan has been established and reviewed with the Patient.    Counseling Resources:  ATP IV Guidelines  Pooled Cohorts Equation Calculator  Breast Cancer Risk Calculator  FRAX Risk Assessment  ICSI Preventive Guidelines  Dietary Guidelines for Americans, 2010  USDA's MyPlate  ASA Prophylaxis  Lung CA Screening    The information in this document, created by the medical scribe for me, accurately reflects the services I personally performed and the decisions made by me. I have reviewed and approved this document for accuracy.     Darby Williamson MD  [facility]    Patient Instructions   Labs will be checked today. Will send you the results in MyChart.     Change dosage of Losartan by half.     Refills will be sent when results return.    DEXA order in place.  Call to schedule this at [facility].      "

## 2019-05-06 NOTE — PATIENT INSTRUCTIONS
Labs will be checked today. Will send you the results in [employer].     Change dosage of Losartan by half.     Refills will be sent when results return.    DEXA order in place.  Call to schedule this at [facility].

## 2019-05-07 ASSESSMENT — ASTHMA QUESTIONNAIRES: ACT_TOTALSCORE: 25

## 2019-05-07 NOTE — RESULT ENCOUNTER NOTE
It was a pleasure to see you in the office today.  Your urine testing is normal. There is not elevated protein present.  Thyroid testing indicates you are on the correct dosage of medication.  Refills will be updated.  Blood sugar, liver testing, and kidney testing are normal.  The potassium level is in the normal range but on the low end of that range on the 20 meq daily.  I would recommend you continue that dosage at this time.  Hopefully you will tolerate the lower dose of the BP medication and we can recheck the potassium at some point of time in the future to determine if we can decrease that dosage.    Cholesterol is under excellent control on current treatment.  Your blood cell counts are normal.  The anemia present after surgery has resolved.  Please call or MyChart message me if you have any questions.    SORAYA

## 2019-05-29 ENCOUNTER — TRANSFERRED RECORDS (OUTPATIENT)
Dept: HEALTH INFORMATION MANAGEMENT | Facility: CLINIC | Age: 69
End: 2019-05-29

## 2019-05-29 ENCOUNTER — HOSPITAL ENCOUNTER (OUTPATIENT)
Dept: BONE DENSITY | Facility: CLINIC | Age: 69
Discharge: HOME | End: 2019-05-29
Attending: FAMILY MEDICINE | Admitting: FAMILY MEDICINE
Payer: COMMERCIAL

## 2019-05-29 DIAGNOSIS — Z78.0 POSTMENOPAUSAL ESTROGEN DEFICIENCY: ICD-10-CM

## 2019-05-29 PROCEDURE — 77080 DXA BONE DENSITY AXIAL: CPT

## 2019-06-04 ENCOUNTER — MYC MEDICAL ADVICE (OUTPATIENT)
Dept: FAMILY MEDICINE | Facility: CLINIC | Age: 69
End: 2019-06-04

## 2019-06-04 DIAGNOSIS — Z96.652 STATUS POST TOTAL LEFT KNEE REPLACEMENT: Primary | ICD-10-CM

## 2019-06-05 RX ORDER — AMOXICILLIN 500 MG/1
2000 CAPSULE ORAL ONCE
Qty: 4 CAPSULE | Refills: 0 | Status: SHIPPED | OUTPATIENT
Start: 2019-06-05 | End: 2019-07-08

## 2019-07-08 DIAGNOSIS — Z96.652 STATUS POST TOTAL LEFT KNEE REPLACEMENT: ICD-10-CM

## 2019-07-08 NOTE — TELEPHONE ENCOUNTER
"Requested Prescriptions   Pending Prescriptions Disp Refills     amoxicillin (AMOXIL) 500 MG capsule [Pharmacy Med Name: AMOXICILLIN 500 MG CAPSULE] 4 capsule 0     Sig: TAKE 4 CAPSULES (2,000 MG) BY MOUTH ONCE FOR 1 DOSE ONE HOUR BEFORE DENTAL CLEANING APPOINTMENT       Oral Acne/Rosacea Medications Protocol Failed - 7/8/2019  5:45 PM        Failed - Confirmation of diagnosis is required     Please confirm diagnosis is acne or rosacea.     If NOT acne or rosacea; refer request to provider for further evaluation.    If diagnosis IS acne or rosacea, OK to refill BASED ON PREVIOUS REFILL CLINICAL NOTE RECOMMENDATION.          Failed - Medication is active on med list        Passed - Patient is 12 years of age or older        Passed - Recent (12 mo) or future (30 days) visit within the authorizing provider's specialty     Patient had office visit in the last 12 months or has a visit in the next 30 days with authorizing provider or within the authorizing provider's specialty.  See \"Patient Info\" tab in inbasket, or \"Choose Columns\" in Meds & Orders section of the refill encounter.              Passed - No active pregnancy on record        Passed - No positive prenancy test is past 12 months        amoxicillin (AMOXIL) 500 MG capsule  Last Written Prescription Date:  6/5/19  Last Fill Quantity: 4,  # refills: 0   Last office visit: 5/6/2019 with prescribing provider:  Dr. Williamson   Future Office Visit:      "

## 2019-07-10 RX ORDER — AMOXICILLIN 500 MG/1
2000 CAPSULE ORAL ONCE
Qty: 4 CAPSULE | Refills: 3 | Status: SHIPPED | OUTPATIENT
Start: 2019-07-10 | End: 2020-12-09

## 2019-07-10 NOTE — TELEPHONE ENCOUNTER
Routing refill request to provider for review/approval because:  Drug not on the [facility] refill protocol   Drug not active on patient's medication list      Treva Wilkerson RN, BSN, PHN

## 2019-08-21 NOTE — TELEPHONE ENCOUNTER
[address] [facility], Fabián YOUSIF, Stoney Dubose MD Merla Dibble, PA-C Claudell Lindsay, [facility]     Lisbeth Rivers, [facility]   Josie Norris P-JEVON Heard, [facility]       Alana Gong [address]    at [address], [address] [place] [address], [facility] 22.    [phone]    FAX: [address] [place] [address], [address]    [phone]    FAX: [phone]       Patient Care Team:  VANESSA Vang as PCP - General (Nurse Practitioner)  Lytle Buerger, MD (Gastroenterology)      Problem List  Date Reviewed: 1/11/2018          Codes Class Noted    Acquired hypothyroidism ICD-10-CM: E03.9  ICD-9-CM: 244.9  8/21/2019        Elevated liver enzymes ICD-10-CM: R74.8  ICD-9-CM: 790.5  8/21/2019        DM type 2 (diabetes mellitus, type 2) ([facility] ICD-10-CM: E11.9  ICD-9-CM: 250.00  8/21/2019        Bipolar disorder ([facility] ICD-10-CM: F31.9  ICD-9-CM: 296.80  8/21/2019        Asthma ICD-10-CM: J45.909  ICD-9-CM: 493.90  8/21/2019        Gastroparesis ICD-10-CM: K31.84  ICD-9-CM: 536.3  8/21/2019        S/P cholecystectomy ICD-10-CM: Z90.49  ICD-9-CM: V45.79  8/21/2019        Dyslipidemia ICD-10-CM: E78.5  ICD-9-CM: 272.4  8/21/2019        Urge incontinence ICD-10-CM: N39.41  ICD-9-CM: 788.31  12/14/2017              The clinicians listed above have asked me to see Haseeb Gonsalves in consultation regarding elevated liver enzymes and its management. All medical records sent by the referring physicians were reviewed including imaging studies       The patient is a 38 y.o.  female who was found to have elevated liver enzymes in 2015 after hospital admission for possible gallstone pancreatitis.      The patient reports that levothyroxine (SYNTHROID/LEVOTHROID) 112 MCG tablet     Last Written Prescription Date: 3/22/17  Last Quantity: 90, # refills: 3  Last Office Visit with [facility] or [facility] prescribing provider: 3/21/17        TSH   Date Value Ref Range Status   03/21/2017 0.63 0.40 - 4.00 mU/L Final       atorvastatin (LIPITOR) 40 MG tablet     Last Written Prescription Date: 3/22/17  Last Fill Quantity: 90, # refills: 3  Last Office Visit with [facility], [facility] or [facility] prescribing provider: 3/21/17       Lab Results   Component Value Date    CHOL 123 03/21/2017     Lab Results   Component Value Date    HDL 54 03/21/2017     Lab Results   Component Value Date    LDL 57 03/21/2017     Lab Results   Component Value Date    TRIG 62 03/21/2017     Lab Results   Component Value Date    CHOLHDLRATIO 2.7 02/16/2015        her liver enzymes returned to normal after after she underwent cholecystectomy. The patient was again noted to have elevated liver enzymes in 5/2019 during a visit to the ER. Serologic evaluation for markers of chronic liver disease was negative for HBV and HCV    Ultrasound of the liver performed in 5/2019 demonstrated increased hepatic echogenicity which may suggest steatosis. A liver wedge biopsy performed during lap cholecystectomy in 9/2015 demonstrated moderate to severe steatosis and early periportal and bridging fibrosis. The patient notes fatigues    The patient has not experienced the following symptoms: fatigue, pain in the right side over the liver,    yellowing of the eyes or skin, problems concentrating,     The patient completes all daily activities without any functional limitations. ASSESSMENT AND PLAN:  Elevated liver enzymes  Intermittent elevation in liver transaminases   Liver transaminases are now normal.  ALP is normal.  Liver function is normal.  The platelet count is normal.      Based upon laboratory studies and imaging the patient does not appear to have significant liver injury. Serologic testing for causes of chronic liver disease were ordered. All were negative     The most likely causes for the liver chemistry abnormalities were discussed with the patient and include fatty liver disease,     Have performed laboratory testing to monitor liver function and degree of liver injury. This included BMP, hepatic panel, CBC with platelet count,     Will perform imaging of the liver with ultrasound. The need to assess liver fibrosis was discussed. Sheer wave elastography can assess liver fibrosis and determine if a patient has advanced fibrosis or cirrhosis without the need for liver biopsy. This will be scheduled. If the elastography suggests advanced fibrosis then a liver biopsy should be considered.       Screening for Hepatocellular Carcinoma  [facility] screening is not necessary if the patient has no evidence of cirrhosis. Treatment of other medical problems in patients with chronic liver disease  There are no contraindications for the patient to take most medications that are necessary for treatment of other medical issues. Counseling for alcohol in patients with chronic liver disease  The patient was counseled regarding alcohol consumption and the effect of alcohol on chronic liver disease. The patient does not consume any significant amount of alcohol. Vaccinations   The need for vaccination against viral hepatitis A and B will be assessed with serologic and instituted as appropriate. Routine vaccinations against other bacterial and viral agents can be performed as indicated. Annual flu vaccination should be administered if indicated. ALLERGIES  Allergies   Allergen Reactions    Amoxicillin Hives    Tramadol Hives    Bactrim [Sulfamethoprim] Hives    Gluten Nausea and Vomiting       MEDICATIONS  Current Outpatient Medications   Medication Sig    eszopiclone (LUNESTA) 2 mg tablet 2 mg.  prazosin (MINIPRESS) 1 mg capsule 1 mg.  fenofibrate nanocrystallized (TRICOR) 145 mg tablet 145 mg.    albuterol (PROAIR HFA) 90 mcg/actuation inhaler 180 mcg.  levothyroxine (SYNTHROID) 125 mcg tablet 125 mcg.  pantoprazole (PROTONIX) 40 mg tablet 40 mg.    LORazepam (ATIVAN) 0.5 mg tablet 0.5 mg.    lidocaine (XYLOCAINE) 5 % ointment Use 1 Application to affected area Take As Needed.  pregabalin (LYRICA) 50 mg capsule 50 mg.    ondansetron hcl (ZOFRAN) 4 mg tablet Take 4 mg by mouth every eight (8) hours as needed for Nausea.  meclizine (ANTIVERT) 25 mg tablet Take  by mouth three (3) times daily as needed.  OXcarbazepine (TRILEPTAL) 300 mg tablet Take  by mouth.  vortioxetine (TRINTELLIX) 20 mg tablet Take  by mouth daily.  rizatriptan (MAXALT) 5 mg tablet 5 mg.  topiramate (TOPAMAX) 50 mg tablet 50 mg.      No current facility-administered medications for this visit. SYSTEM REVIEW NOT RELATED TO LIVER DISEASE OR REVIEWED ABOVE:  Constitution systems: Negative for fever, chills, weight gain, weight loss. Eyes: Negative for visual changes. ENT: Negative for sore throat, painful swallowing. Respiratory: Negative for cough, hemoptysis, SOB. Cardiology: Negative for chest pain, palpitations. GI:  Negative for constipation or diarrhea. : Negative for urinary frequency, dysuria, hematuria, nocturia. Skin: Negative for rash. Hematology: Negative for easy bruising, blood clots. Musculo-skelatal: Negative for back pain, muscle pain, weakness. Neurologic: Negative for headaches, dizziness, vertigo, memory problems not related to HE. Psychology: Negative for anxiety, depression. FAMILY HISTORY:  The patient has no knowledge of the father's medical condition. The mother is . There is no family history of liver disease. The following family members have immune disorders: Mother had RA. SOCIAL HISTORY:  The patient is . The patient has 1 child. The patient currently smokes 1/2- 1  pack of tobacco daily. The patient very rarely drink alcohol. The patient has previously consumed alcohol in excess. The patient currently works part time in [employer] division of [email or web address]. The patient is currently receiving disability. PHYSICAL EXAMINATION:  Visit Vitals  /82   Pulse 97   Temp 98.4 °F (36.9 °C) (Tympanic)   Ht 5' 4\" (1.626 m)   Wt 200 lb 2 oz (90.8 kg)   SpO2 95%   BMI 34.35 kg/m²     General: No acute distress. Eyes: Sclera anicteric. ENT: No oral lesions. Thyroid normal.  Nodes: No adenopathy. Skin: No spider angiomata. No jaundice. No palmar erythema. Respiratory: Lungs clear to auscultation. Cardiovascular: Regular heart rate. No murmurs. No JVD. Abdomen: Soft non-tender. Liver size normal to percussion/palpation.   Spleen not palpable. No obvious ascites. Extremities: No edema. No muscle wasting. No gross arthritic changes. Neurologic: Alert and oriented. Cranial nerves grossly intact. No asterixis. LABORATORY STUDIES:  From 5/2019  AST/ALT/ALP/T Bili/ALB: 374/244/67/0.9/4.2  WBC/HB/PLT/INR: 4.7/14.7/345/    Liver [place] of [address] [place] [address] Units 8/21/2019   WBC 4.6 - 13.2 K/uL 7.6   ANC 1.8 - 8.0 K/UL 4.4   HGB 12.0 - 16.0 g/dL 14.3    - 420 K/uL 364   AST 10 - 38 U/L 16   ALT 13 - 56 U/L 30   Alk Phos 45 - 117 U/L 64   Bili, Total 0.2 - 1.0 MG/DL 0.3   Bili, Direct 0.0 - 0.2 MG/DL 0.1   Albumin 3.4 - 5.0 g/dL 4.0   BUN 7.0 - 18 MG/DL 15   Creat 0.6 - 1.3 MG/DL 0.75   Na 136 - 145 mmol/L 143   K 3.5 - 5.5 mmol/L 4.3   Cl 100 - 111 mmol/L 106   CO2 21 - 32 mmol/L 27   Glucose 74 - 99 mg/dL 79     SEROLOGIES:  5/2019. HBsAntigen negative, anti-HCV negative,     Serologies Latest Ref Rng & Units 8/21/2019   Hep B Surface Ag <1.00 Index 0.2   Hep B Surface Ag Interp NEG   NEGATIVE   Hep B Core Ab, Total NEGATIVE   NEGATIVE   Ferritin 8 - 388 NG/ML 54   Iron % Saturation % 10   JOURDAN, IFA  NEGATIVE   C-ANCA Neg:<1:20 titer <1:20   P-ANCA Neg:<1:20 titer <1:20   ANCA Neg:<1:20 titer <1:20   ASMCA 0 - 19 Units 8   M2 Ab 0.0 - 20.0 Units <20.0   Ceruloplasmin 19.0 - 39.0 mg/dL 34.2   Alpha-1 antitrypsin level 90 - 200 mg/dL 129     LIVER HISTOLOGY:  Not available or performed    ENDOSCOPIC PROCEDURES:  Not available or performed    RADIOLOGY:  5/2019: Liver US: increased hepatic echogenicity may suggest hepatic steatosis. OTHER TESTING:  Not available or performed    FOLLOW-UP:  All of the issues listed above in the Assessment and Plan were discussed with the patient. All questions were answered. The patient expressed a clear understanding of the above. [address] [address] in 4 weeks for routine monitoring. and to review all data and determine the treatment plan.       Cathe Ormond, MD, MPH  Marisa [address] [place] [address] 22.  [phone]  Colt Damon MD  I have personally interviewed and examined the patient during the encounter. I have explained the key findings related to the liver disease and other pertinent diagnoses as noted above to the patient and answered all questions. I have reviewed and agree with the findings documented above, including all diagnostic interpretations, and plans. I have edited the original note as appropriate for clarity.     MD Marisa Guillermo [address] [place] [address] 22.  [phone]  [address] [place] [address]

## 2019-09-15 ENCOUNTER — MYC MEDICAL ADVICE (OUTPATIENT)
Dept: FAMILY MEDICINE | Facility: CLINIC | Age: 69
End: 2019-09-15

## 2019-09-17 NOTE — PROGRESS NOTES
[facility] - PRIMARY CARE SKIN    CC : skin cancer screening (full-body)  SUBJECTIVE:                                                    Tiffany Stovall is a 66 year old female who presents to clinic today for a full-body skin exam because of her history of basal cell carcinomas.    Bothersome lesions noticed by the patient or other skin concerns : Spot on the right upper back that was noticed by her .  Mohs surgeon did mohs on the left upper lip and noted one small lesion on the right upper lip . Previously treated with cryotherapy , there is another small spot on the right cheek . He recommended whole face treatment  with imiquimod. She is going to do this in the fall.      Personal history of skin cancer : YES  Nodular basal cell carcinoma on the left lower leg  Superficial basal cell carcinoma on the right chest S/P wide excision  Superficial basal cell carcinoma on right dorsal foot (s/p MMS 12/13/16)  Squamous cell carcinoma in situ on left lateral neck (s/p MMS 12/13/16)  Superficial, multifocal basal cell carcinoma on right mid-dorsal forearm (s/p excision 12/20/16)  Basal cell carcinoma on right dorsal third toe (excised 12/2015)  Basal cell carcinoma on left upper cutaneous lip (s/p Mohs 10/20/2011 and [facility])  Basal cell carcinoma on clavicle  Basal cell carcinoma on right arm.  Family history of skin cancer : YES - non-melanoma.    Sun Exposure History  Sunscreen Use : YES, frequency : daily, SPF : 30.  UV-protective clothing use : YES  Previous history of significant sun exposure:  Blistering sunburns : YES - as a teenager  Tanning beds : NO.    Occupation : nurse (indoor).    Refer to electronic medical record (EMR) for past medical history and medications.    INTEGUMENTARY/SKIN: POSITIVE for changing and non-healing lesions  ROS : 14 point review of systems was negative except the symptoms listed above in the HPI.          OBJECTIVE:                                                   "  GENERAL: healthy, alert and no distress  SKIN: Rodriguez Skin Type - I.  This patient was examined from the top of the head to the bottom of the feet  including scalp, face, neck, back, chest, breasts, buttocks, both arms, both legs, both hands, both feet, all 10 fingers and all 10 toes. The dermatoscope was used to help evaluate pigmented lesions.  Skin Pertinent Findings:  Scalp : 4 mm in size, \"stuck on\" appearing papules, raised, brown, coarse-textured, round lesion(s) most consistent with seborrheic keratoses on the mid-parietal scalp    Arms : Multiple, scattered, brown, macule(s) most consistent with benign solar lentigo. Scattered, 2 mm - 3 mm in size, brown macules most consistent with benign nevi (melanocytic nevi).    Legs : Scattered, brown, macule(s) most consistent with benign solar lentigo. Scattered, multiple, 2 mm - 3 mm in size, brown macules most consistent with benign nevi (melanocytic nevi).    Back :  Scattered, brown macules most consistent with benign nevi (melanocytic nevi).    Significant Findings:  Right mid-dorsal forearm : Well-healed scar previous nonmelanoma, had red scaly spot mid scar that has now fallen off. Continue to observe this site.  Right upper back- 6 mm scaly, indurated erythematous lesion  ? Basal cell carcinoma ? Squamous cell carcinoma ? Other            Right arm- scaly, erythematous non indurated lesion         Name : Liquid Nitrogen Cry-Ac Cryotherapy.  Indication : Pre-malignant lesion.  Location(s) : as above  Completed by : Joy Bailon MD  Note : Discussed natural history of lesion and treatment options. Prior to treatment, we discussed inflammation, tenderness post-procedure, the healing process, and the risks of pain, infection, scarring, blistering, and hypo-/hyperpigmentation after healing. Explained that these lesions may grow back and may need additional treatment or re-treatment. The patient expressed a desire to proceed with cryotherapy.      Right " forearm - well healed scar  The lesion(s) was treated with liquid nitrogen Cry-Ac, five second freeze repeated twice with a pause to allow for the area to thaw. If this lesion should recur, then it needs to be re-evaluated.    Patient tolerated the procedure well and left in good condition.  Total number of lesions treated : 1.      Diagnostic Test Results:  none     Consent for digital photograhy  The patient was advised that digital photos will be taken today of Tiffany Stovall. The patient verbally consented to having these photos taken for purposes of documenting her condition. The patient understands that the images will be stored in her medical record.          ASSESSMENT:                                                      Encounter Diagnoses   Name Primary?     Neoplasm of uncertain behavior of skin Yes     AK (actinic keratosis)      Solar lentiginosis      Melanocytic nevi of trunk      History of basal cell carcinoma      History of squamous cell carcinoma in situ of skin          PLAN:                                                    Patient Instructions   FUTURE APPOINTMENTS  Follow up per pathology report.   Vaseline and a bandage every day for 5-7 days.   Recommendation 6 month skin exam     WOUND CARE INSTRUCTIONS  1. Wash hands before every dressing change.  2. After 24 hours, change dressing daily.  3. Wash the wound area with a mild soap, then rinse.  4. Gently pat dry with a sterile gauze or Q-tip.  5. Using a Q-tip, apply Vaseline or Aquaphor only over entire wound. Do NOT use Neosporin - as many people react to neomycin.  6. Finally, cover with a bandage or sterile non-stick gauze with micropore paper tape.  7. Repeat once daily until wound has healed.      Soap, water and shampoo will not hurt this area.    Do not go swimming or take baths, but showering is encouraged.    Limit use of the area where the procedure was done for a few days to allow for optimal healing.    If you  "experience bleeding:  Wash hands and hold firm pressure on the area for 10 minutes without checking to see if the bleeding has stopped. \"Checking\" pulls off the protective wound clot and restarts the bleeding all over again. Re-apply pressure for 10 minutes if necessary to stop bleeding.  Use additional sterile gauze and tape to maintain pressure once bleeding has stopped.  If bleeding continues, then call back to clinic at [phone].    Signs of Infection:  Infection can occur in any area where skin has been disrupted.  If you notice persistent redness, swelling, colored drainage, increasing pain, fever or other signs of infection, please call us at: [phone] and ask to have me or my colleague paged. We will call you back to discuss.    Pathology Results:  You will be notified, generally via letter or MyChart, in approximately 10 days. If there is anything we need to discuss or further treatment needed, I will call you to discuss it.    PATIENT INFORMATION : WOUNDS  During the healing process you will notice a number of changes. All wounds develop a small halo of redness surrounding the wound.  This means healing is occurring. Severe itching with extensive redness usually indicates sensitivity to the ointment or bandage tape used to dress the wound.  You should call our office if this develops.      Swelling  and/or discoloration around your surgical site is common, particularly when performed around the eye.    All wounds normally drain.  The larger the wound the more drainage there will be.  After 7-10 days, you will notice the wound beginning to shrink and new skin will begin to grow.  The wound is healed when you can see skin has formed over the entire area.  A healed wound has a healthy, shiny look to the surface and is red to dark pink in color to normalize.  Wounds may take approximately 4-6 weeks to heal.  Larger wounds may take 6-8 weeks. After the wound is healed you may discontinue dressing " changes.    You may experience a sensation of tightness as your wound heals. This is normal and will gradually subside.    Your healed wound may be sensitive to temperature changes. This sensitivity improves with time, but if you re having a lot of discomfort, try to avoid temperature extremes.    Patients frequently experience itching after their wound appears to have healed because of the continue healing under the skin.  Plain Vaseline will help relieve the itching.      ACTINIC KERATOSES POST-TREATMENT CARE INSTRUCTIONS  Actinic keratoses are benign, scaly or gritty lesions that appear in sun-exposed areas and may progress to skin cancers. For this reason, it is important to treat them before they become cancerous. Liquid nitrogen is the most commonly used and most effective treatment for actinic keratoses; it is mildly uncomfortable when applied to the skin, but the discomfort rapidly subsides.    Post-Treatment:  You may experience burning and/or stinging immediately following the procedure. The discomfort from the procedure may persist over the next 12-24 hours. The area treated will look pinker and slightly swollen before the healing process begins. You may also notice redness, swelling, tenderness, weeping and crusts or scabs. Healing time is approximately 10-14 days.    Blister - You may or may notice blistering from the freezing. If you develop an uncomfortable blister from today's treatment, you may gently puncture this with a needle that has been cleaned with alcohol. However, do not remove the protective skin layer of the blister.    Scab - After a few days, you may notice scaliness or scab formation. Do not pick at the scabs because this may cause slower healing and a permanent scar.    The skin may appear temporarily darker at the treatment site, but this usually fades over a period of months, provided that the area is protected from the sun.    Care of the areas treated:    Wash the area with a mild  cleanser.    Gently pat dry.    Do not rub.     Keep protected from the sun during the healing process and for a full year following treatment as the skin continues to remodel during this time.    Apply Vaseline or Aquaphor ointment sparingly to the site for the first 7 days after treatment.    Do not use Neosporin, as many people eventually develop a medication allergy, that can easily be confused with an infection, to Neomycin.    Return if:  There should not be any residual scaling. If there is any concern that the lesion has persisted after 4-6 weeks, make an appointment for a re-check. Healing time does vary depending on your individual healing process and the area of the body treated. Most patients will be healed in one month.    Signs of Infection:  Thankfully this is rare. However if you notice persistent colored drainage, increasing pain, fever or other signs of infection, please call us at: [phone]      SUNSCREEN RECOMMENDATIONS FOR ATHLETES  Regular sunscreens have a lower percentage of Zinc or Titanium oxide, and they may be runny. Consider these products when outdoors extensively or with intense sun exposure:    Vertra Elemental Resistance SPF 35 Face Stick (Titanium Dioxide 24.15%)    Raw Elements Eco Stick (Non-yancy Zinc Oxide 22.75%)    SUN BUM Face Stick SPF 30 (Zinc Oxide 7%, Titanium Dioxide 4%)    Zinka Nosecoat Sunblock (Zinc Oxide 25%)    Watermans SPF 50+ Aqua Armor (Zinc Oxide 20%)    For lips:    Vanicream SPF 30 Lip Protectant (15% Zinc Oxide, 1.5% Titanium Dioxide)    Raw Elements SPF 30 Lip Protection (Non-yancy Zinc Oxide 22.75%)      SKIN CANCER SELF-EXAM INSTRUCTIONS  Check every month in the mirror or with a household member. Be aware of any changes, especially bleeding or tenderness. Also, make sure to check your nails for color changes after removal of nail polish.    For melanoma, check for:  A - Asymmetry. One half unlike the other half.  B - Border. Irregular, scalloped,  ragged, notched, blurred or poorly defined borders.  C - Color. Color variations from one area to another, with shades of tan, brown and/or black present. Sometimes white, red or blue.  D - Diameter. Greater than 6 mm (about the size of a pencil eraser). Any new growth of a mole should be concerning and be evaluated.  E - Evolving. A mole or skin lesion that looks different from the rest or is changing in size, shape or color.    For basal cell carcinoma and squamous cell carcinoma, check for:    Sores, shiny bumps, nodules, scaly lesions, or wart-like growths that are itchy, tender, crusting, scabbing, eroding, oozing or bleeding.    Open sores/wounds or reddish/irritated areas that do not heal within 2-3 weeks.    Scar-like areas that are white, yellow or waxy in color.    Also, check your lymph nodes for tenderness or swelling every month. Check front of neck, back of neck, over the collarbone, and in the armpits. (Note that if you have a cold or other illness, they may be swollen)            The patient was counseled about sunscreens and sun avoidance. The patient was counseled to check the skin regularly and report any lesion that is new, changing, itching, scabbing, bleeding or otherwise bothersome. The patient was discharged ambulatory and in stable condition.    Recommendations : skin exam in 6 months      PROCEDURES:                                                    None.    TT: 25 minutes.  CT: 15 minutes.

## 2019-09-18 ENCOUNTER — OFFICE VISIT (OUTPATIENT)
Dept: FAMILY MEDICINE | Facility: CLINIC | Age: 69
End: 2019-09-18
Payer: COMMERCIAL

## 2019-09-18 VITALS — SYSTOLIC BLOOD PRESSURE: 132 MMHG | DIASTOLIC BLOOD PRESSURE: 62 MMHG

## 2019-09-18 DIAGNOSIS — Z85.828 HISTORY OF BASAL CELL CARCINOMA: ICD-10-CM

## 2019-09-18 DIAGNOSIS — L81.4 SOLAR LENTIGINOSIS: ICD-10-CM

## 2019-09-18 DIAGNOSIS — Z86.007 HISTORY OF SQUAMOUS CELL CARCINOMA IN SITU OF SKIN: ICD-10-CM

## 2019-09-18 DIAGNOSIS — D22.5 MELANOCYTIC NEVI OF TRUNK: ICD-10-CM

## 2019-09-18 DIAGNOSIS — L57.0 AK (ACTINIC KERATOSIS): ICD-10-CM

## 2019-09-18 DIAGNOSIS — D48.5 NEOPLASM OF UNCERTAIN BEHAVIOR OF SKIN: Primary | ICD-10-CM

## 2019-09-18 PROCEDURE — 88305 TISSUE EXAM BY PATHOLOGIST: CPT | Mod: TC | Performed by: FAMILY MEDICINE

## 2019-09-18 PROCEDURE — 11301 SHAVE SKIN LESION 0.6-1.0 CM: CPT | Performed by: FAMILY MEDICINE

## 2019-09-18 PROCEDURE — 99213 OFFICE O/P EST LOW 20 MIN: CPT | Mod: 25 | Performed by: FAMILY MEDICINE

## 2019-09-18 PROCEDURE — 17000 DESTRUCT PREMALG LESION: CPT | Mod: 59 | Performed by: FAMILY MEDICINE

## 2019-09-18 NOTE — LETTER
9/18/2019         RE: Tiffany Stovall  [address]  [facility] [id number]        Dear Colleague,    Thank you for referring your patient, Tiffany Stovall, to the [facility]. Please see a copy of my visit note below.    [facility] - PRIMARY CARE SKIN    CC : skin cancer screening (full-body)  SUBJECTIVE:                                                    Tiffany Stovall is a 66 year old female who presents to clinic today for a full-body skin exam because of her history of basal cell carcinomas.    Bothersome lesions noticed by the patient or other skin concerns : Spot on the right upper back that was noticed by her .  Mohs surgeon did mohs on the left upper lip and noted one small lesion on the right upper lip . Previously treated with cryotherapy , there is another small spot on the right cheek . He recommended whole face treatment  with imiquimod. She is going to do this in the fall.      Personal history of skin cancer : YES  Nodular basal cell carcinoma on the left lower leg  Superficial basal cell carcinoma on the right chest S/P wide excision  Superficial basal cell carcinoma on right dorsal foot (s/p MMS 12/13/16)  Squamous cell carcinoma in situ on left lateral neck (s/p MMS 12/13/16)  Superficial, multifocal basal cell carcinoma on right mid-dorsal forearm (s/p excision 12/20/16)  Basal cell carcinoma on right dorsal third toe (excised 12/2015)  Basal cell carcinoma on left upper cutaneous lip (s/p Mohs 10/20/2011 and [facility])  Basal cell carcinoma on clavicle  Basal cell carcinoma on right arm.  Family history of skin cancer : YES - non-melanoma.    Sun Exposure History  Sunscreen Use : YES, frequency : daily, SPF : 30.  UV-protective clothing use : YES  Previous history of significant sun exposure:  Blistering sunburns : YES - as a teenager  Tanning beds : NO.    Occupation : nurse (indoor).    Refer to electronic medical record (EMR) for past medical  "history and medications.    INTEGUMENTARY/SKIN: POSITIVE for changing and non-healing lesions  ROS : 14 point review of systems was negative except the symptoms listed above in the HPI.          OBJECTIVE:                                                    GENERAL: healthy, alert and no distress  SKIN: Rodriguez Skin Type - I.  This patient was examined from the top of the head to the bottom of the feet  including scalp, face, neck, back, chest, breasts, buttocks, both arms, both legs, both hands, both feet, all 10 fingers and all 10 toes. The dermatoscope was used to help evaluate pigmented lesions.  Skin Pertinent Findings:  Scalp : 4 mm in size, \"stuck on\" appearing papules, raised, brown, coarse-textured, round lesion(s) most consistent with seborrheic keratoses on the mid-parietal scalp    Arms : Multiple, scattered, brown, macule(s) most consistent with benign solar lentigo. Scattered, 2 mm - 3 mm in size, brown macules most consistent with benign nevi (melanocytic nevi).    Legs : Scattered, brown, macule(s) most consistent with benign solar lentigo. Scattered, multiple, 2 mm - 3 mm in size, brown macules most consistent with benign nevi (melanocytic nevi).    Back :  Scattered, brown macules most consistent with benign nevi (melanocytic nevi).    Significant Findings:  Right mid-dorsal forearm : Well-healed scar previous nonmelanoma, had red scaly spot mid scar that has now fallen off. Continue to observe this site.  Right upper back- 6 mm scaly, indurated erythematous lesion  ? Basal cell carcinoma ? Squamous cell carcinoma ? Other            Right arm- scaly, erythematous non indurated lesion         Name : Liquid Nitrogen Cry-Ac Cryotherapy.  Indication : Pre-malignant lesion.  Location(s) : as above  Completed by : Joy Bailon MD  Note : Discussed natural history of lesion and treatment options. Prior to treatment, we discussed inflammation, tenderness post-procedure, the healing process, and the " risks of pain, infection, scarring, blistering, and hypo-/hyperpigmentation after healing. Explained that these lesions may grow back and may need additional treatment or re-treatment. The patient expressed a desire to proceed with cryotherapy.      Right forearm - well healed scar  The lesion(s) was treated with liquid nitrogen Cry-Ac, five second freeze repeated twice with a pause to allow for the area to thaw. If this lesion should recur, then it needs to be re-evaluated.    Patient tolerated the procedure well and left in good condition.  Total number of lesions treated : 1.      Diagnostic Test Results:  none     Consent for digital photograhy  The patient was advised that digital photos will be taken today of Tiffany Stovall. The patient verbally consented to having these photos taken for purposes of documenting her condition. The patient understands that the images will be stored in her medical record.          ASSESSMENT:                                                      Encounter Diagnoses   Name Primary?     Neoplasm of uncertain behavior of skin Yes     AK (actinic keratosis)      Solar lentiginosis      Melanocytic nevi of trunk      History of basal cell carcinoma      History of squamous cell carcinoma in situ of skin          PLAN:                                                    Patient Instructions   FUTURE APPOINTMENTS  Follow up per pathology report.   Vaseline and a bandage every day for 5-7 days.   Recommendation 6 month skin exam     WOUND CARE INSTRUCTIONS  1. Wash hands before every dressing change.  2. After 24 hours, change dressing daily.  3. Wash the wound area with a mild soap, then rinse.  4. Gently pat dry with a sterile gauze or Q-tip.  5. Using a Q-tip, apply Vaseline or Aquaphor only over entire wound. Do NOT use Neosporin - as many people react to neomycin.  6. Finally, cover with a bandage or sterile non-stick gauze with micropore paper tape.  7. Repeat once daily until  "wound has healed.      Soap, water and shampoo will not hurt this area.    Do not go swimming or take baths, but showering is encouraged.    Limit use of the area where the procedure was done for a few days to allow for optimal healing.    If you experience bleeding:  Wash hands and hold firm pressure on the area for 10 minutes without checking to see if the bleeding has stopped. \"Checking\" pulls off the protective wound clot and restarts the bleeding all over again. Re-apply pressure for 10 minutes if necessary to stop bleeding.  Use additional sterile gauze and tape to maintain pressure once bleeding has stopped.  If bleeding continues, then call back to clinic at [phone].    Signs of Infection:  Infection can occur in any area where skin has been disrupted.  If you notice persistent redness, swelling, colored drainage, increasing pain, fever or other signs of infection, please call us at: [phone] and ask to have me or my colleague paged. We will call you back to discuss.    Pathology Results:  You will be notified, generally via letter or MyChart, in approximately 10 days. If there is anything we need to discuss or further treatment needed, I will call you to discuss it.    PATIENT INFORMATION : WOUNDS  During the healing process you will notice a number of changes. All wounds develop a small halo of redness surrounding the wound.  This means healing is occurring. Severe itching with extensive redness usually indicates sensitivity to the ointment or bandage tape used to dress the wound.  You should call our office if this develops.      Swelling  and/or discoloration around your surgical site is common, particularly when performed around the eye.    All wounds normally drain.  The larger the wound the more drainage there will be.  After 7-10 days, you will notice the wound beginning to shrink and new skin will begin to grow.  The wound is healed when you can see skin has formed over the entire " area.  A healed wound has a healthy, shiny look to the surface and is red to dark pink in color to normalize.  Wounds may take approximately 4-6 weeks to heal.  Larger wounds may take 6-8 weeks. After the wound is healed you may discontinue dressing changes.    You may experience a sensation of tightness as your wound heals. This is normal and will gradually subside.    Your healed wound may be sensitive to temperature changes. This sensitivity improves with time, but if you re having a lot of discomfort, try to avoid temperature extremes.    Patients frequently experience itching after their wound appears to have healed because of the continue healing under the skin.  Plain Vaseline will help relieve the itching.      ACTINIC KERATOSES POST-TREATMENT CARE INSTRUCTIONS  Actinic keratoses are benign, scaly or gritty lesions that appear in sun-exposed areas and may progress to skin cancers. For this reason, it is important to treat them before they become cancerous. Liquid nitrogen is the most commonly used and most effective treatment for actinic keratoses; it is mildly uncomfortable when applied to the skin, but the discomfort rapidly subsides.    Post-Treatment:  You may experience burning and/or stinging immediately following the procedure. The discomfort from the procedure may persist over the next 12-24 hours. The area treated will look pinker and slightly swollen before the healing process begins. You may also notice redness, swelling, tenderness, weeping and crusts or scabs. Healing time is approximately 10-14 days.    Blister - You may or may notice blistering from the freezing. If you develop an uncomfortable blister from today's treatment, you may gently puncture this with a needle that has been cleaned with alcohol. However, do not remove the protective skin layer of the blister.    Scab - After a few days, you may notice scaliness or scab formation. Do not pick at the scabs because this may cause slower  healing and a permanent scar.    The skin may appear temporarily darker at the treatment site, but this usually fades over a period of months, provided that the area is protected from the sun.    Care of the areas treated:    Wash the area with a mild cleanser.    Gently pat dry.    Do not rub.     Keep protected from the sun during the healing process and for a full year following treatment as the skin continues to remodel during this time.    Apply Vaseline or Aquaphor ointment sparingly to the site for the first 7 days after treatment.    Do not use Neosporin, as many people eventually develop a medication allergy, that can easily be confused with an infection, to Neomycin.    Return if:  There should not be any residual scaling. If there is any concern that the lesion has persisted after 4-6 weeks, make an appointment for a re-check. Healing time does vary depending on your individual healing process and the area of the body treated. Most patients will be healed in one month.    Signs of Infection:  Thankfully this is rare. However if you notice persistent colored drainage, increasing pain, fever or other signs of infection, please call us at: [phone]      SUNSCREEN RECOMMENDATIONS FOR ATHLETES  Regular sunscreens have a lower percentage of Zinc or Titanium oxide, and they may be runny. Consider these products when outdoors extensively or with intense sun exposure:    Vertra Elemental Resistance SPF 35 Face Stick (Titanium Dioxide 24.15%)    Raw Elements Eco Stick (Non-yancy Zinc Oxide 22.75%)    SUN BUM Face Stick SPF 30 (Zinc Oxide 7%, Titanium Dioxide 4%)    Zinka Nosecoat Sunblock (Zinc Oxide 25%)    Watermans SPF 50+ Aqua Armor (Zinc Oxide 20%)    For lips:    Vanicream SPF 30 Lip Protectant (15% Zinc Oxide, 1.5% Titanium Dioxide)    Raw Elements SPF 30 Lip Protection (Non-yancy Zinc Oxide 22.75%)      SKIN CANCER SELF-EXAM INSTRUCTIONS  Check every month in the mirror or with a household member. Be  aware of any changes, especially bleeding or tenderness. Also, make sure to check your nails for color changes after removal of nail polish.    For melanoma, check for:  A - Asymmetry. One half unlike the other half.  B - Border. Irregular, scalloped, ragged, notched, blurred or poorly defined borders.  C - Color. Color variations from one area to another, with shades of tan, brown and/or black present. Sometimes white, red or blue.  D - Diameter. Greater than 6 mm (about the size of a pencil eraser). Any new growth of a mole should be concerning and be evaluated.  E - Evolving. A mole or skin lesion that looks different from the rest or is changing in size, shape or color.    For basal cell carcinoma and squamous cell carcinoma, check for:    Sores, shiny bumps, nodules, scaly lesions, or wart-like growths that are itchy, tender, crusting, scabbing, eroding, oozing or bleeding.    Open sores/wounds or reddish/irritated areas that do not heal within 2-3 weeks.    Scar-like areas that are white, yellow or waxy in color.    Also, check your lymph nodes for tenderness or swelling every month. Check front of neck, back of neck, over the collarbone, and in the armpits. (Note that if you have a cold or other illness, they may be swollen)            The patient was counseled about sunscreens and sun avoidance. The patient was counseled to check the skin regularly and report any lesion that is new, changing, itching, scabbing, bleeding or otherwise bothersome. The patient was discharged ambulatory and in stable condition.    Recommendations : skin exam in 6 months      PROCEDURES:                                                    None.    TT: 25 minutes.  CT: 15 minutes.      Again, thank you for allowing me to participate in the care of your patient.        Sincerely,        Nataliya Bailon MD

## 2019-09-18 NOTE — PROCEDURES
Name : Shave Excision  Indication : Excision of tissue for pathology evaluation.  Location(s) : Right upper back- 6 mm scaly, indurated erythematous lesion  ? Basal cell carcinoma ? Squamous cell carcinoma ? other  Completed by : Joy Bailon MD  Photo Taken : yes.  Anesthesia : Patient was anesthetized by infiltrating the area surrounding the lesion with 1% lidocaine.   epinephrine [id number] : Yes.  Note : Discussed the risk of pain, infection, scarring, hypo- or hyperpigmentation and recurrence or need for re-treatment. The benefits of treatment and alternative treatments were also discussed.    During this procedure, the universal protocol was utilized. The patient's identity was confirmed by no less than two patient identifiers, correct procedure was verified, correct site was verified and marked as applicable and a final pause was completed.    Sterile technique was used throughout the procedure. The skin was cleaned and prepped with surgical cleanser. Once adequate anesthesia was obtained, the lesion was removed with a deep scallop shave procedure. The specimen was sent to pathology.    Direct pressure and aluminum chloride and monopolar cautery was applied for hemostasis. No bleeding was present upon the completion of the procedure. The wound was coated with antibacterial ointment. A dry sterile dressing was applied. Patient tolerated the procedure well and left in satisfactory condition.    Primary provider and referring provider will be informed regarding the tissue report when it returns.

## 2019-09-18 NOTE — PATIENT INSTRUCTIONS
"FUTURE APPOINTMENTS  Follow up per pathology report.   Vaseline and a bandage every day for 5-7 days.   Recommendation 6 month skin exam     WOUND CARE INSTRUCTIONS  1. Wash hands before every dressing change.  2. After 24 hours, change dressing daily.  3. Wash the wound area with a mild soap, then rinse.  4. Gently pat dry with a sterile gauze or Q-tip.  5. Using a Q-tip, apply Vaseline or Aquaphor only over entire wound. Do NOT use Neosporin - as many people react to neomycin.  6. Finally, cover with a bandage or sterile non-stick gauze with micropore paper tape.  7. Repeat once daily until wound has healed.      Soap, water and shampoo will not hurt this area.    Do not go swimming or take baths, but showering is encouraged.    Limit use of the area where the procedure was done for a few days to allow for optimal healing.    If you experience bleeding:  Wash hands and hold firm pressure on the area for 10 minutes without checking to see if the bleeding has stopped. \"Checking\" pulls off the protective wound clot and restarts the bleeding all over again. Re-apply pressure for 10 minutes if necessary to stop bleeding.  Use additional sterile gauze and tape to maintain pressure once bleeding has stopped.  If bleeding continues, then call back to clinic at [phone].    Signs of Infection:  Infection can occur in any area where skin has been disrupted.  If you notice persistent redness, swelling, colored drainage, increasing pain, fever or other signs of infection, please call us at: [phone] and ask to have me or my colleague paged. We will call you back to discuss.    Pathology Results:  You will be notified, generally via letter or MyChart, in approximately 10 days. If there is anything we need to discuss or further treatment needed, I will call you to discuss it.    PATIENT INFORMATION : WOUNDS  During the healing process you will notice a number of changes. All wounds develop a small halo of redness " surrounding the wound.  This means healing is occurring. Severe itching with extensive redness usually indicates sensitivity to the ointment or bandage tape used to dress the wound.  You should call our office if this develops.      Swelling  and/or discoloration around your surgical site is common, particularly when performed around the eye.    All wounds normally drain.  The larger the wound the more drainage there will be.  After 7-10 days, you will notice the wound beginning to shrink and new skin will begin to grow.  The wound is healed when you can see skin has formed over the entire area.  A healed wound has a healthy, shiny look to the surface and is red to dark pink in color to normalize.  Wounds may take approximately 4-6 weeks to heal.  Larger wounds may take 6-8 weeks. After the wound is healed you may discontinue dressing changes.    You may experience a sensation of tightness as your wound heals. This is normal and will gradually subside.    Your healed wound may be sensitive to temperature changes. This sensitivity improves with time, but if you re having a lot of discomfort, try to avoid temperature extremes.    Patients frequently experience itching after their wound appears to have healed because of the continue healing under the skin.  Plain Vaseline will help relieve the itching.      ACTINIC KERATOSES POST-TREATMENT CARE INSTRUCTIONS  Actinic keratoses are benign, scaly or gritty lesions that appear in sun-exposed areas and may progress to skin cancers. For this reason, it is important to treat them before they become cancerous. Liquid nitrogen is the most commonly used and most effective treatment for actinic keratoses; it is mildly uncomfortable when applied to the skin, but the discomfort rapidly subsides.    Post-Treatment:  You may experience burning and/or stinging immediately following the procedure. The discomfort from the procedure may persist over the next 12-24 hours. The area treated  will look pinker and slightly swollen before the healing process begins. You may also notice redness, swelling, tenderness, weeping and crusts or scabs. Healing time is approximately 10-14 days.    Blister - You may or may notice blistering from the freezing. If you develop an uncomfortable blister from today's treatment, you may gently puncture this with a needle that has been cleaned with alcohol. However, do not remove the protective skin layer of the blister.    Scab - After a few days, you may notice scaliness or scab formation. Do not pick at the scabs because this may cause slower healing and a permanent scar.    The skin may appear temporarily darker at the treatment site, but this usually fades over a period of months, provided that the area is protected from the sun.    Care of the areas treated:    Wash the area with a mild cleanser.    Gently pat dry.    Do not rub.     Keep protected from the sun during the healing process and for a full year following treatment as the skin continues to remodel during this time.    Apply Vaseline or Aquaphor ointment sparingly to the site for the first 7 days after treatment.    Do not use Neosporin, as many people eventually develop a medication allergy, that can easily be confused with an infection, to Neomycin.    Return if:  There should not be any residual scaling. If there is any concern that the lesion has persisted after 4-6 weeks, make an appointment for a re-check. Healing time does vary depending on your individual healing process and the area of the body treated. Most patients will be healed in one month.    Signs of Infection:  Thankfully this is rare. However if you notice persistent colored drainage, increasing pain, fever or other signs of infection, please call us at: [phone]      SUNSCREEN RECOMMENDATIONS FOR ATHLETES  Regular sunscreens have a lower percentage of Zinc or Titanium oxide, and they may be runny. Consider these products when  outdoors extensively or with intense sun exposure:    Vertra Elemental Resistance SPF 35 Face Stick (Titanium Dioxide 24.15%)    Raw Elements Eco Stick (Non-yancy Zinc Oxide 22.75%)    SUN BUM Face Stick SPF 30 (Zinc Oxide 7%, Titanium Dioxide 4%)    Zinka Nosecoat Sunblock (Zinc Oxide 25%)    Watermans SPF 50+ Aqua Armor (Zinc Oxide 20%)    For lips:    Vanicream SPF 30 Lip Protectant (15% Zinc Oxide, 1.5% Titanium Dioxide)    Raw Elements SPF 30 Lip Protection (Non-yancy Zinc Oxide 22.75%)      SKIN CANCER SELF-EXAM INSTRUCTIONS  Check every month in the mirror or with a household member. Be aware of any changes, especially bleeding or tenderness. Also, make sure to check your nails for color changes after removal of nail polish.    For melanoma, check for:  A - Asymmetry. One half unlike the other half.  B - Border. Irregular, scalloped, ragged, notched, blurred or poorly defined borders.  C - Color. Color variations from one area to another, with shades of tan, brown and/or black present. Sometimes white, red or blue.  D - Diameter. Greater than 6 mm (about the size of a pencil eraser). Any new growth of a mole should be concerning and be evaluated.  E - Evolving. A mole or skin lesion that looks different from the rest or is changing in size, shape or color.    For basal cell carcinoma and squamous cell carcinoma, check for:    Sores, shiny bumps, nodules, scaly lesions, or wart-like growths that are itchy, tender, crusting, scabbing, eroding, oozing or bleeding.    Open sores/wounds or reddish/irritated areas that do not heal within 2-3 weeks.    Scar-like areas that are white, yellow or waxy in color.    Also, check your lymph nodes for tenderness or swelling every month. Check front of neck, back of neck, over the collarbone, and in the armpits. (Note that if you have a cold or other illness, they may be swollen)

## 2019-09-23 LAB — COPATH REPORT: NORMAL

## 2019-09-25 ENCOUNTER — TELEPHONE (OUTPATIENT)
Dept: FAMILY MEDICINE | Facility: CLINIC | Age: 69
End: 2019-09-25

## 2019-09-25 NOTE — TELEPHONE ENCOUNTER
"[facility] call :     Treatment options : ED&C , topical 5FU  Wide excision.    She will call to schedule a 40 \" appointment once she looks at her appointment book.         Shave, right upper back:   - Basal cell carcinoma, superficial type, extending to the lateral margin   - (see description)     Nataliya Bailon M.D.   "

## 2019-09-27 NOTE — TELEPHONE ENCOUNTER
Patient called back. Scheduled future wide excision with Dr. Bailon. Patient voiced understanding.    Akiko RN-BSN-PHN  [facility]  [phone]

## 2019-09-27 NOTE — TELEPHONE ENCOUNTER
Called and LM for patient to call back in regards to treatment options for BCC.    Akiko RN-BSN-N  [facility]  [phone]

## 2019-09-28 ENCOUNTER — HEALTH MAINTENANCE LETTER (OUTPATIENT)
Age: 69
End: 2019-09-28

## 2019-10-02 ENCOUNTER — ALLIED HEALTH/NURSE VISIT (OUTPATIENT)
Dept: NURSING | Facility: CLINIC | Age: 69
End: 2019-10-02
Payer: COMMERCIAL

## 2019-10-02 DIAGNOSIS — Z23 NEED FOR PROPHYLACTIC VACCINATION AND INOCULATION AGAINST INFLUENZA: Primary | ICD-10-CM

## 2019-10-02 PROCEDURE — 90662 IIV NO PRSV INCREASED AG IM: CPT

## 2019-10-02 PROCEDURE — 99207 ZZC NO CHARGE NURSE ONLY: CPT

## 2019-10-02 PROCEDURE — G0008 ADMIN INFLUENZA VIRUS VAC: HCPCS

## 2019-10-07 ENCOUNTER — HOSPITAL ENCOUNTER (EMERGENCY)
Facility: CLINIC | Age: 69
Discharge: HOME | End: 2019-10-08
Attending: EMERGENCY MEDICINE | Admitting: EMERGENCY MEDICINE
Payer: COMMERCIAL

## 2019-10-07 DIAGNOSIS — I49.9 IRREGULAR HEART BEAT: ICD-10-CM

## 2019-10-07 LAB
BASOPHILS # BLD AUTO: 0 10E9/L (ref 0–0.2)
BASOPHILS NFR BLD AUTO: 0.4 %
DIFFERENTIAL METHOD BLD: NORMAL
EOSINOPHIL # BLD AUTO: 0.2 10E9/L (ref 0–0.7)
EOSINOPHIL NFR BLD AUTO: 3.3 %
ERYTHROCYTE [DISTWIDTH] IN BLOOD BY AUTOMATED COUNT: 12.4 % (ref 10–15)
HCT VFR BLD AUTO: 38.6 % (ref 35–47)
HGB BLD-MCNC: 13 G/DL (ref 11.7–15.7)
IMM GRANULOCYTES # BLD: 0 10E9/L (ref 0–0.4)
IMM GRANULOCYTES NFR BLD: 0 %
LYMPHOCYTES # BLD AUTO: 2.2 10E9/L (ref 0.8–5.3)
LYMPHOCYTES NFR BLD AUTO: 40.7 %
MCH RBC QN AUTO: 30.5 PG (ref 26.5–33)
MCHC RBC AUTO-ENTMCNC: 33.7 G/DL (ref 31.5–36.5)
MCV RBC AUTO: 91 FL (ref 78–100)
MONOCYTES # BLD AUTO: 0.5 10E9/L (ref 0–1.3)
MONOCYTES NFR BLD AUTO: 8.3 %
NEUTROPHILS # BLD AUTO: 2.6 10E9/L (ref 1.6–8.3)
NEUTROPHILS NFR BLD AUTO: 47.3 %
NRBC # BLD AUTO: 0 10*3/UL
NRBC BLD AUTO-RTO: 0 /100
PLATELET # BLD AUTO: 251 10E9/L (ref 150–450)
RBC # BLD AUTO: 4.26 10E12/L (ref 3.8–5.2)
WBC # BLD AUTO: 5.4 10E9/L (ref 4–11)

## 2019-10-07 PROCEDURE — 84443 ASSAY THYROID STIM HORMONE: CPT | Performed by: EMERGENCY MEDICINE

## 2019-10-07 PROCEDURE — 85025 COMPLETE CBC W/AUTO DIFF WBC: CPT | Performed by: EMERGENCY MEDICINE

## 2019-10-07 PROCEDURE — 80053 COMPREHEN METABOLIC PANEL: CPT | Performed by: EMERGENCY MEDICINE

## 2019-10-07 PROCEDURE — 99285 EMERGENCY DEPT VISIT HI MDM: CPT | Mod: 25

## 2019-10-07 PROCEDURE — 93005 ELECTROCARDIOGRAM TRACING: CPT

## 2019-10-07 ASSESSMENT — ENCOUNTER SYMPTOMS: PALPITATIONS: 1

## 2019-10-07 NOTE — ED AVS SNAPSHOT
Emergency Department  [address] [place] [address]  Phone:  [phone]  Fax:  [phone]                                    Tiffany Stovall   MRN: [id number]    Department:   Emergency Department   Date of Visit:  10/7/2019           After Visit Summary Signature Page    I have received my discharge instructions, and my questions have been answered. I have discussed any challenges I see with this plan with the nurse or doctor.    ..........................................................................................................................................  Patient/Patient Representative Signature      ..........................................................................................................................................  Patient Representative Print Name and Relationship to Patient    ..................................................               ................................................  Date                                   Time    ..........................................................................................................................................  Reviewed by Signature/Title    ...................................................              ..............................................  Date                                               Time          22EPIC Rev 08/18

## 2019-10-08 ENCOUNTER — APPOINTMENT (OUTPATIENT)
Dept: GENERAL RADIOLOGY | Facility: CLINIC | Age: 69
End: 2019-10-08
Attending: EMERGENCY MEDICINE
Payer: COMMERCIAL

## 2019-10-08 VITALS
BODY MASS INDEX: 23.27 KG/M2 | OXYGEN SATURATION: 99 % | HEART RATE: 75 BPM | TEMPERATURE: 98.5 F | WEIGHT: 142 LBS | DIASTOLIC BLOOD PRESSURE: 73 MMHG | RESPIRATION RATE: 19 BRPM | SYSTOLIC BLOOD PRESSURE: 137 MMHG

## 2019-10-08 LAB
ALBUMIN SERPL-MCNC: 4 G/DL (ref 3.4–5)
ALP SERPL-CCNC: 66 U/L (ref 40–150)
ALT SERPL W P-5'-P-CCNC: 30 U/L (ref 0–50)
ANION GAP SERPL CALCULATED.3IONS-SCNC: 2 MMOL/L (ref 3–14)
AST SERPL W P-5'-P-CCNC: 20 U/L (ref 0–45)
BILIRUB SERPL-MCNC: 0.4 MG/DL (ref 0.2–1.3)
BUN SERPL-MCNC: 20 MG/DL (ref 7–30)
CALCIUM SERPL-MCNC: 9 MG/DL (ref 8.5–10.1)
CHLORIDE SERPL-SCNC: 103 MMOL/L (ref 94–109)
CO2 SERPL-SCNC: 30 MMOL/L (ref 20–32)
CREAT SERPL-MCNC: 0.63 MG/DL (ref 0.52–1.04)
GFR SERPL CREATININE-BSD FRML MDRD: >90 ML/MIN/{1.73_M2}
GLUCOSE SERPL-MCNC: 124 MG/DL (ref 70–99)
INTERPRETATION ECG - MUSE: NORMAL
POTASSIUM SERPL-SCNC: 3.2 MMOL/L (ref 3.4–5.3)
PROT SERPL-MCNC: 6.8 G/DL (ref 6.8–8.8)
SODIUM SERPL-SCNC: 135 MMOL/L (ref 133–144)
TSH SERPL DL<=0.005 MIU/L-ACNC: 0.92 MU/L (ref 0.4–4)

## 2019-10-08 PROCEDURE — 71046 X-RAY EXAM CHEST 2 VIEWS: CPT

## 2019-10-08 PROCEDURE — 25000132 ZZH RX MED GY IP 250 OP 250 PS 637: Performed by: EMERGENCY MEDICINE

## 2019-10-08 RX ORDER — POTASSIUM CHLORIDE 1500 MG/1
40 TABLET, EXTENDED RELEASE ORAL ONCE
Status: COMPLETED | OUTPATIENT
Start: 2019-10-08 | End: 2019-10-08

## 2019-10-08 RX ADMIN — POTASSIUM CHLORIDE 40 MEQ: 1500 TABLET, EXTENDED RELEASE ORAL at 00:21

## 2019-10-08 NOTE — ED PROVIDER NOTES
"  History     Chief Complaint:  Irregular Heart Beat      HPI   Tiffany Stovall is a 69 year old female with a history of skin cancer and hypertension who presents with an irregular heart beat. She reports that she developed a heart rhythm that was fast, irregular, and skipping beats at 2145 while doing laundry. She reports that she has forceful contractions that \"felt like her heart was doing summersaults\". She reports that this has resolved since. She reports that she worked out earlier today for 90 minutes and felt fine. She denies recent medication changes. She reports a past history of premature ventricular contractions but states that these are \"hardly noticeable at times\" and that this is much worse. No shortness of breath.   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     Allergies:  Aspirin  ACE inhibitors  Excedrin  Lisinopril    Medications:    Amoxil  Lipitor  Synthroid  Hyzaar  Nexium  Protonix  Zantac    Past Medical History:    Squamous cell carcinoma  Hypothyroidism  Varicose veins  HTN  HLD  Asthma  GERD  T12 compression fracture  Bilateral cataracts    Past Surgical History:    Left total knee replacement  Cholecystectomy  Hysterectomy  Bilateral carpal tunnel release  Salpingo-oophorectomy    Family History:    Hypothyroidism - mother, father  Diabetes  HLD  Asthma  Basal cell carcinoma - father  Prostate cancer x2    Social History:  Smoking status: Never  Alcohol use: Socially    Marital Status:      Review of Systems   Cardiovascular: Positive for palpitations.   All other systems reviewed and are negative.    Physical Exam     Patient Vitals for the past 24 hrs:   BP Temp Temp src Pulse Heart Rate Resp SpO2 Weight   10/08/19 0157 137/73 -- -- 75 77 -- -- --   10/08/19 0150 -- -- -- -- 79 19 -- --   10/08/19 0130 -- -- -- -- 80 -- -- --   10/08/19 0125 -- -- -- -- 80 -- -- --   10/07/19 2309 (!) 171/86 98.5  F (36.9  C) Temporal 103 103 20 99 % 64.4 kg " (142 lb)       Physical Exam  General: Resting comfortably on the gurney  Eyes:  The pupils are equal and round    Conjunctivae and sclerae are normal  ENT:    Moist mucous membranes  Neck:  Normal range of motion  CV:  Tachycardic rate and regular rhythm    Skin warm and well perfused     Radial pulse equal bilaterally   Resp:  Lungs are clear    Non-labored    No rales    No wheezing   MS:  Normal muscular tone  Skin:  No rash or acute skin lesions noted  Neuro:   Awake, alert.      Speech is normal and fluent.    Face is symmetric.     Moves all extremities equally  Psych: Normal affect.  Appropriate interactions.    Emergency Department Course   ECG:  Indication: palpitations  Time: 2314  Vent. Rate 100 bpm. VA interval 158. QRS duration 76. QT/QTc 344/443. P-R-T axis 27 -21 37. Normal sinus rhythm. Possible left atrial enlargement. Low voltage QRS. Septal infract, age undetermined. Inferior infarct, age undetermined. Abnormal ECG. Read time: 1120    Imaging:  Radiographic findings were communicated with the patient who voiced understanding of the findings.    XR Chest PA and LAT:   No evidence of active cardiopulmonary disease.    Read as per radiology.    Laboratory:  CBC: WBC: 5.4, HGB: 13.0, PLT: 251    CMP: Glucose 124 (H), potassium: 3.2 (L), anion gap: 2 (L), o/w WNL (Creatinine: 0.63)    TSH with free T4 reflex: 0.92    Interventions:  0021: Potassium chloride 40 mEq PO    Emergency Department Course:  Nursing notes and vitals reviewed. ([id number]) I performed an exam of the patient as documented above.     IV inserted. Medicine administered as documented above. Blood drawn. This was sent to the lab for further testing, results above.    The patient was sent for a chest x-ray while in the emergency department, findings above.     0217 I rechecked the patient and discussed the results of her workup thus far.     Findings and plan explained to the Patient. Patient discharged home with instructions regarding  supportive care, and reasons to return. The importance of close follow-up was reviewed.    I personally reviewed the laboratory results with the Patient and answered all related questions prior to discharge.    Impression & Plan    Medical Decision Making:  Tfifany Stovall is a 69 year old female presented with a sensation of palpitations.  The work up in the Emergency Department is negative, monitoring and EKG have not shown any abnormal heart rhythms or concerning morphologies.  I considered a broad differential diagnosis including acute coronary syndrome, myocardial infarction, pulmonary embolism, electrolyte abnormalities, drug reaction, anxiety, amongst others.  Patient has mild hypokalemia and oral potassium given. No EKG changes to suggest ACS and symptoms atypical for this.  Doubt PE. Chest xray clear. Remained in sinus rhythm in ED. Suspect she may have been in atrial fibrillation at home as she feels much better in ED.  Pt will follow-up with PCP for recheck of potassium and will consider zio patch/holter monitor. Declines this right now given that this is first time she has had something like this.     Diagnosis:    ICD-10-CM    1. Irregular heart beat I49.9        Disposition:  discharged to home    Vidal Tse  10/7/2019    EMERGENCY DEPARTMENT  Scribe Disclosure:  I, Vidal Tse, am serving as a scribe on 10/7/2019 at 11:08 PM to personally document services performed by No att. providers found based on my observations and the provider's statements to me.     Oliva Kirkpatrick MD  10/08/19 0538

## 2019-10-08 NOTE — DISCHARGE INSTRUCTIONS
Talk to your doctor about a monitor of your heart in future especially if this keeps happening and potassium recheck    Discharge Instructions  Palpitations    Palpitations are an unusual awareness of your heartbeat. People often describe this as the heart skipping, fluttering, racing, irregular, or pounding. At this time, your provider has found no signs that your palpitations are due to a serious or life-threatening condition. However, sometimes there is a serious problem that does not show up right away.    Palpitations can be caused by caffeine, cigarettes, diet pills, energy drinks or supplements, other stimulants, and medications and street drugs. They can also be caused by anxiety, hormone conditions such as high thyroid, and other medical conditions. Sometimes they are a sign of abnormal rhythm in the heart. At this time, your provider did not find any dangerous cause of your symptoms.    Generally, every Emergency Department visit should have a follow-up clinic visit with either a primary or a specialty clinic/provider. Please follow-up as instructed by your emergency provider today.    Return to the Emergency Department if:  You get chest pain or tightness.  You are short of breath.  You get very weak or tired.  You pass out or faint.  Your heart rate is over 120 beats per minute for more than 10 minutes while you are resting.   You have anything else that worries you.    What can I do to help myself?  Fill any prescriptions the provider gave you and take them right away.   Follow your provider s instructions about the prescription medicines you are on. Sometimes the provider may tell you to stop taking a medicine or change the dose.  If you smoke, this may be a good time to quit! The less you can smoke, the better.  Do not use energy drinks, diet pills, or stimulants. Limit your use of caffeine.  If you were given a prescription for medicine here today, be sure to read all of the information (including  the package insert) that comes with your prescription.  This will include important information about the medicine, its side effects, and any warnings that you need to know about.  The pharmacist who fills the prescription can provide more information and answer questions you may have about the medicine.  If you have questions or concerns that the pharmacist cannot address, please call or return to the Emergency Department.     Remember that you can always come back to the Emergency Department if you are not able to see your regular provider in the amount of time listed above, if you get any new symptoms, or if there is anything that worries you.

## 2019-10-08 NOTE — ED TRIAGE NOTES
Pt states irregular heart beat about 2.5 hours ago while doing household chores. Pt thinks she may be in a-fib

## 2019-10-10 ENCOUNTER — MYC MEDICAL ADVICE (OUTPATIENT)
Dept: FAMILY MEDICINE | Facility: CLINIC | Age: 69
End: 2019-10-10

## 2019-10-24 ENCOUNTER — OFFICE VISIT (OUTPATIENT)
Dept: FAMILY MEDICINE | Facility: CLINIC | Age: 69
End: 2019-10-24
Payer: COMMERCIAL

## 2019-10-24 VITALS
HEIGHT: 66 IN | RESPIRATION RATE: 20 BRPM | TEMPERATURE: 98.1 F | WEIGHT: 143.3 LBS | HEART RATE: 79 BPM | DIASTOLIC BLOOD PRESSURE: 76 MMHG | SYSTOLIC BLOOD PRESSURE: 138 MMHG | OXYGEN SATURATION: 100 % | BODY MASS INDEX: 23.03 KG/M2

## 2019-10-24 DIAGNOSIS — E87.6 HYPOKALEMIA: ICD-10-CM

## 2019-10-24 DIAGNOSIS — R00.2 PALPITATIONS: ICD-10-CM

## 2019-10-24 DIAGNOSIS — I10 HYPERTENSION GOAL BP (BLOOD PRESSURE) < 140/90: Primary | ICD-10-CM

## 2019-10-24 LAB — POTASSIUM SERPL-SCNC: 4.3 MMOL/L (ref 3.4–5.3)

## 2019-10-24 PROCEDURE — 84132 ASSAY OF SERUM POTASSIUM: CPT | Performed by: FAMILY MEDICINE

## 2019-10-24 PROCEDURE — 36415 COLL VENOUS BLD VENIPUNCTURE: CPT | Performed by: FAMILY MEDICINE

## 2019-10-24 PROCEDURE — 99213 OFFICE O/P EST LOW 20 MIN: CPT | Performed by: FAMILY MEDICINE

## 2019-10-24 RX ORDER — HYDROCHLOROTHIAZIDE 12.5 MG/1
25 TABLET ORAL DAILY
Qty: 90 TABLET | Refills: 1 | Status: SHIPPED | OUTPATIENT
Start: 2019-10-24 | End: 2020-03-30

## 2019-10-24 RX ORDER — LOSARTAN POTASSIUM 50 MG/1
50 TABLET ORAL DAILY
Qty: 90 TABLET | Refills: 1 | Status: SHIPPED | OUTPATIENT
Start: 2019-10-24 | End: 2020-03-30

## 2019-10-24 ASSESSMENT — MIFFLIN-ST. JEOR: SCORE: 1193.81

## 2019-10-24 ASSESSMENT — PAIN SCALES - GENERAL: PAINLEVEL: NO PAIN (0)

## 2019-10-24 NOTE — PROGRESS NOTES
Subjective     Tiffany Stovall is a 69 year old female who presents to clinic today for the following health issues:    HPI     Hypertension Follow-up    BP Readings from Last 3 Encounters:   10/24/19 138/76   10/08/19 137/73   09/18/19 132/62       Do you check your blood pressure regularly outside of the clinic? Yes     Are you following a low salt diet? Yes    Are your blood pressures ever more than 140 on the top number (systolic) OR more   than 90 on the bottom number (diastolic), for example 140/90? No    Patient notes on 10/7/19 (aout 2.5 weeks ago) around 8:30-9 PM, her heart beat was irregularly irregular. Her blood pressure at the time 134/80 and pulse was over 100. Denies any lightheadedness or dizziness. Around 10 PM she noticed chest discomfort, but no pain. She went to the ED later that night. She was given IV fluids and oral potassium. Her EKG and chest x-ray were clear.     A similar incident occurred 4 days ago around 11 PM lasting 2.5 hours with symptoms gradually subsiding. She continues with her normal activities including 90 minutes of biking, elliptical, or swimming almost daily.    This morning she notes her systolic pressure was 125, around 96 after her gym workout with accompanying dizziness that resolved with sitting down.  She limits her caffeine intake and reports one diet coke in the morning. She is compliant with potassium 20 mEq daily. She is using advair once daily in the morning. Has not had to use rescue inhaler. She reports an upcoming cardiology follow up.    Both episodes in evening but not related to sleep.        How many servings of fruits and vegetables do you eat daily?  2-3    On average, how many sweetened beverages do you drink each day (soda, juice, sweet tea, etc)?   0    How many days per week do you miss taking your medication? 0      Patient Active Problem List   Diagnosis     GERD (gastroesophageal reflux disease)     Elevated liver enzymes     Mild persistent  asthma     Hypertension goal BP (blood pressure) < 140/90     Hyperlipidemia LDL goal <130     Varicose veins of legs     Hypothyroidism     Allergic reaction     History of basal cell carcinoma     Family history of nonmelanoma skin cancer     Primary osteoarthritis of left knee     Complex tear of medial meniscus of left knee as current injury, subsequent encounter     History of squamous cell carcinoma in situ of skin     Status post total left knee replacement     Past Surgical History:   Procedure Laterality Date     ARTHROPLASTY KNEE Left 1/8/2019    Procedure: LEFT TOTAL KNEE ARTHROPLASTY (NELSON NEPHEW)^;  Surgeon: Tom Terry MD;  Location: [facility] OR     ARTHROSCOPY KNEE RT/LT  1978     BIOPSY OF BREAST, NEEDLE CORE  1995    Papilloma     C REMV CATARACT INTRACAP,INSERT LENS  2010     CARPAL TUNNEL RELEASE RT/LT  2010    bilateral     CATARACT IOL, RT/LT       CHOLECYSTECTOMY, LAPOROSCOPIC  2006    Cholecystectomy, Laparoscopic     COLONOSCOPY WITH CO2 INSUFFLATION N/A 1/16/2017    Procedure: COLONOSCOPY WITH CO2 INSUFFLATION;  Surgeon: Elkin Lemus MD;  Location: [facility] OR     ENDOSCOPIC STRIPPING VEIN(S)  Nov 14, 2013    stab phlebectomies     HC COLONOSCOPY THRU STOMA, DIAGNOSTIC  11/2006    diverticulosis, no polyps found, next one due in 2016     HYSTERECTOMY, PAP NO LONGER INDICATED  1999    bleeding, atypical cells on endobx, endometriosis     HYSTERECTOMY, CAMILLE  1999    fibroids, endometrial hyperplasia     MOHS MICROGRAPHIC PROCEDURE  2010    BCC lip     MOHS MICROGRAPHIC PROCEDURE  12/13/2016    squamus lt neck,basal top rt foot/3rd toe     MOHS MICROGRAPHIC PROCEDURE  03/22/2018    Left leg     SALPINGO OOPHORECTOMY,R/L/TERI  1999    Salpingo Oophorectomy, RT/LT/TERI     TONSILLECTOMY & ADENOIDECTOMY  1968     TUBAL/ECTOPIC PREGNANCY  1983,1984       Social History     Tobacco Use     Smoking status: Never Smoker     Smokeless tobacco: Never Used   Substance Use Topics     Alcohol use: Yes      Comment: socially     Family History   Problem Relation Age of Onset     Thyroid Disease Mother         hypo     Diabetes Mother         A1C   6.1     Lipids Mother      Asthma Father      Thyroid Disease Father         hypo     Cancer Father         basil cell     Prostate Cancer Father      Diabetes Maternal Grandmother      Prostate Cancer Brother      Anesthesia Reaction No family hx of          Current Outpatient Medications   Medication Sig Dispense Refill     albuterol (2.5 MG/3ML) 0.083% nebulizer solution Take 1 vial (2.5 mg) by nebulization every 6 hours as needed for shortness of breath / dyspnea 25 vial 2     albuterol (ALBUTEROL) 108 (90 BASE) MCG/ACT Inhaler Inhale 1-2 puffs into the lungs every 4 hours as needed for shortness of breath / dyspnea 1 Inhaler 2     atorvastatin (LIPITOR) 40 MG tablet Take 1 tablet (40 mg) by mouth daily 90 tablet 3     EPINEPHrine (EPIPEN/ADRENACLICK/OR ANY BX GENERIC EQUIV) 0.3 MG/0.3ML injection 2-pack Inject 0.3 mLs (0.3 mg) into the muscle once as needed 0.6 mL 1     esomeprazole (NEXIUM) 20 MG DR capsule Take 2 capsules (40 mg) by mouth daily       fluticasone-salmeterol (ADVAIR DISKUS) 250-50 MCG/DOSE diskus inhaler Inhale 1 puff into the lungs 2 times daily 3 Inhaler 3     hydrochlorothiazide (HYDRODIURIL) 12.5 MG tablet Take 2 tablets (25 mg) by mouth daily 90 tablet 1     levothyroxine (SYNTHROID/LEVOTHROID) 112 MCG tablet Take 1 tablet (112 mcg) by mouth every morning 90 tablet 3     loratadine (CLARITIN) 10 MG tablet Take 10 mg by mouth daily as needed        losartan (COZAAR) 50 MG tablet Take 1 tablet (50 mg) by mouth daily 90 tablet 1     potassium chloride ER (K-DUR/KLOR-CON M) 20 MEQ CR tablet Take 1 tablet (20 mEq) by mouth daily 90 tablet 3     Allergies   Allergen Reactions     Mary Shortness Of Breath     Asa [Aspirin]      bronchospasm     Ace Inhibitors Cough     Bee Difficulty breathing     Bees Anaphylaxis     Excedrin Extra Strength       "Hornet Venom Anaphylaxis     Lisinopril Cough     New Medication Allergy      roses     Nsaids      bronchospasm     Other [Seasonal Allergies]      Poultry Meal      Wasp Venom Anaphylaxis     Recent Labs   Lab Test 10/07/19  2331 05/06/19  1118  12/20/18  0924 04/17/18  0831 03/21/17  0821 05/11/16  1755   A1C  --   --   --   --   --   --  5.9   LDL  --  68  --   --  66 57  --    HDL  --  57  --   --  56 54  --    TRIG  --  61  --   --  94 62  --    ALT 30 31  --  42 36 49 48   CR 0.63 0.73   < > 0.75 0.70 0.65  --    GFRESTIMATED >90 86   < > 83 84 >90  Non  GFR Calc    --    GFRESTBLACK >90 >90   < > >90 >90 >90  African American GFR Calc    --    POTASSIUM 3.2* 3.6   < > 3.8 3.6 3.7 3.6   TSH 0.92 0.63  --   --  0.69 0.63  --     < > = values in this interval not displayed.      BP Readings from Last 3 Encounters:   10/24/19 138/76   10/08/19 137/73   09/18/19 132/62    Wt Readings from Last 3 Encounters:   10/24/19 65 kg (143 lb 4.8 oz)   10/07/19 64.4 kg (142 lb)   05/06/19 65.3 kg (144 lb)            Reviewed and updated as needed this visit by Provider  Tobacco  Allergies  Meds  Med Hx  Surg Hx  Fam Hx  Soc Hx        Review of Systems   ROS COMP: Constitutional, HEENT, cardiovascular, pulmonary, GI, , musculoskeletal, neuro, skin, endocrine and psych systems are negative, except as otherwise noted.    This document serves as a record of the services and decisions personally performed and made by Darby Williamson MD. It was created on her behalf by Ariella Armenta, a trained medical scribe. The creation of this document is based on the provider's statements to the medical scribe.  Ariella Armenta 11:48 AM 10/24/2019        Objective    /76   Pulse 79   Temp 98.1  F (36.7  C) (Oral)   Resp 20   Ht 1.664 m (5' 5.5\")   Wt 65 kg (143 lb 4.8 oz)   LMP  (Exact Date)   SpO2 100%   Breastfeeding? No   BMI 23.48 kg/m    Body mass index is 23.48 kg/m .  Physical Exam   GENERAL: " healthy, alert and no distress  NECK: no adenopathy, no asymmetry, masses, or scars and thyroid normal to palpation  RESP: lungs clear to auscultation - no rales, rhonchi or wheezes  CV: regular rate and rhythm, normal S1 S2, no S3 or S4, no murmur, click or rub, no peripheral edema and peripheral pulses strong   MS: no gross musculoskeletal defects noted, no edema    Diagnostic Test Results:  Labs reviewed in Epic  Results for orders placed or performed in visit on 10/24/19   Potassium   Result Value Ref Range    Potassium 4.3 3.4 - 5.3 mmol/L           Assessment & Plan     1. Hypertension goal BP (blood pressure) < 140/90  Patient reports multiple incidents of irregularly irregularly heart rate with chest discomfort, but no pain. Tolerates exercise well. Patient to take losartan 50 mg and hydrochlorothiazide 12.5 mg daily. Follow up with cardiology as planned.   - losartan (COZAAR) 50 MG tablet; Take 1 tablet (50 mg) by mouth daily  Dispense: 90 tablet; Refill: 1  - hydrochlorothiazide (HYDRODIURIL) 12.5 MG tablet; Take 2 tablets (25 mg) by mouth daily  Dispense: 90 tablet; Refill: 1  - Potassium      2. Hypokalemia  Labs drawn. Patient taking potassium supplements. Will adjust medication treatment pending lab results as needed.  - Potassium   normal potassium now.  Continue current treatment    3. Palpitations  No current symptoms.  Has an appointment with 's cardiologist.  Discussed possible ziopatch prior to the cardiology appointment to see if we can catch episode.  Patient would prefer to wait for the cardiology appointment for any further evaluation.     Patient Instructions   Labs today to recheck Potassium.    I have sent Losartan 50 mg and hydrochlorothiazide 12.5 mg separately.  Send me a message if difficulty filling these.    Follow up with Cardiology as planned.  IF recurrent symptoms occur, attempt to come in for EKG during symptoms if able.          Return in about 7 months (around  5/24/2020) for Physical Exam.    The information in this document, created by the medical scribe, Ariella Armenta, for me, accurately reflects the services I personally performed and the decisions made by me. I have reviewed and approved this document for accuracy prior to leaving the patient care area.    Darby Williamson MD  [facility]

## 2019-10-24 NOTE — PATIENT INSTRUCTIONS
Labs today to recheck Potassium.    I have sent Losartan 50 mg and hydrochlorothiazide 12.5 mg separately.  Send me a message if difficulty filling these.    Follow up with Cardiology as planned.  IF recurrent symptoms occur, attempt to come in for EKG during symptoms if able.

## 2019-10-25 PROBLEM — R00.2 PALPITATIONS: Status: ACTIVE | Noted: 2019-10-25

## 2019-10-25 NOTE — RESULT ENCOUNTER NOTE
Your potassium level is now normal.  Continuing current treatment is recommended.  Please call or MyChart message me if you have any questions.    Have a good weekend,  PSK

## 2019-10-30 NOTE — PROGRESS NOTES
[facility] - PRIMARY CARE SKIN    SUBJECTIVE:   Tiffany Stovall is a(n) 69 year old female who presents to clinic today for follow-up wide excision of basal cell carcinoma superficial type on the right upper back.    Tissue Pathology Report from 9/2019  Shave, right upper back:   - Basal cell carcinoma, superficial type, extending to the lateral margin   - (see description)   Personal history of skin cancer : YES  Nodular basal cell carcinoma on the left lower leg  Superficial basal cell carcinoma on the right chest S/P wide excision  Superficial basal cell carcinoma on right dorsal foot (s/p MMS 12/13/16)  Squamous cell carcinoma in situ on left lateral neck (s/p MMS 12/13/16)  Superficial, multifocal basal cell carcinoma on right mid-dorsal forearm (s/p excision 12/20/16)  Basal cell carcinoma on right dorsal third toe (excised 12/2015)  Basal cell carcinoma on left upper cutaneous lip (s/p Mohs 10/20/2011 and Aldara)  Basal cell carcinoma on clavicle  Basal cell carcinoma on right arm.  Family history of skin cancer : YES - non-melanoma.    Sun Exposure History  Sunscreen Use : YES, frequency : daily, SPF : 30.  UV-protective clothing use : YES  Previous history of significant sun exposure:  Blistering sunburns : YES - as a teenager  Tanning beds : NO.    Occupation : nurse (indoor).    Refer to electronic medical record (EMR) for past medical history and medications.      ROS: 14 point review of systems was negative except the symptoms listed above in the HPI.    This document serves as a record of the services and decisions personally performed and made by Nataliya Bailon MD and was created by Nataliya Bailon MD, a trained medical scribe, based on personal observations and provider statements to the medical scribe.  October 30, 2019 4:52 PM   Nataliya Bailon MD    OBJECTIVE:   GENERAL: healthy, alert and no distress.  SKIN: Rodriguez Skin Type - II.  Trunk examined. The dermatoscope was  "used to help evaluate pigmented lesions.  Skin Pertinent Findings:  Left posterior shoulder- 5 mm well healed shave excision site- basal cell carcinoma     ASSESSMENT:     Encounter Diagnosis   Name Primary?     Basal cell carcinoma (BCC) of back Yes       PLAN:   Patient Instructions   FUTURE APPOINTMENTS  Follow up in 7 days for bandage change with the nurse.  Dr. Bailon cell phone #: [phone] \" For emergency only\" For other questions please contact [facility] at [phone].     BANDAGE CARE INSTRUCTIONS    Keep dressing completely dry.    Make sure to avoid soaking the procedure area.    Limit use of the area where the procedure was done for a few days to allow for optimal healing.    If you experience bleeding:  Wash hands and hold firm pressure on the area for 10 minutes without checking to see if the bleeding has stopped. \"Checking\" pulls off the protective wound clot and restarts the bleeding all over again. Re-apply pressure for 10 minutes if necessary to stop bleeding.  Use additional sterile gauze and tape to maintain pressure once bleeding has stopped.  If bleeding continues, then call back to clinic at [phone].    Signs of Infection:  Infection can occur in any area where skin has been disrupted.  If you notice persistent redness, swelling, colored drainage, increasing pain, fever or other signs of infection, please call us at: [phone] and ask to have me or my colleague paged. We will call you back to discuss.    Pathology Results:  You will be notified, generally via letter or MyChart, in approximately 10 days. If there is anything we need to discuss or further treatment needed, I will call you to discuss it.            Signs of infection (spreading erythema, increasing pain, purulent discharge) were discussed. Limit physical activity for the next several days involving this area. No swimming, keep lesions clean and dry except for showering until the sutures are removed or " absorbed. The patient will be notified of the pathology results at the next office visit or via MyChart or phone. The patient was given written discharge instructions and was discharged in stable condition.    TT: 40 minutes.  CT: 10 minutes.    The information in this document, created by the medical scribe for me, accurately reflects the services I personally performed and the decisions made by me. I have reviewed and approved this document for accuracy prior to leaving the patient care area.  October 30, 2019 4:52 PM  Nataliya Bailon MD  [facility]

## 2019-10-31 ENCOUNTER — OFFICE VISIT (OUTPATIENT)
Dept: FAMILY MEDICINE | Facility: CLINIC | Age: 69
End: 2019-10-31
Payer: COMMERCIAL

## 2019-10-31 VITALS — SYSTOLIC BLOOD PRESSURE: 124 MMHG | DIASTOLIC BLOOD PRESSURE: 58 MMHG

## 2019-10-31 DIAGNOSIS — C44.519 BASAL CELL CARCINOMA (BCC) OF BACK: Primary | ICD-10-CM

## 2019-10-31 PROCEDURE — 11602 EXC TR-EXT MAL+MARG 1.1-2 CM: CPT | Mod: 51 | Performed by: FAMILY MEDICINE

## 2019-10-31 PROCEDURE — 12032 INTMD RPR S/A/T/EXT 2.6-7.5: CPT | Performed by: FAMILY MEDICINE

## 2019-10-31 PROCEDURE — 88305 TISSUE EXAM BY PATHOLOGIST: CPT | Mod: TC | Performed by: FAMILY MEDICINE

## 2019-10-31 NOTE — PATIENT INSTRUCTIONS
"FUTURE APPOINTMENTS  Follow up in 7 days for bandage change with the nurse.  Dr. Bailon cell phone #: [phone] \" For emergency only\" For other questions please contact [facility] at [phone].     BANDAGE CARE INSTRUCTIONS    Keep dressing completely dry.    Make sure to avoid soaking the procedure area.    Limit use of the area where the procedure was done for a few days to allow for optimal healing.    If you experience bleeding:  Wash hands and hold firm pressure on the area for 10 minutes without checking to see if the bleeding has stopped. \"Checking\" pulls off the protective wound clot and restarts the bleeding all over again. Re-apply pressure for 10 minutes if necessary to stop bleeding.  Use additional sterile gauze and tape to maintain pressure once bleeding has stopped.  If bleeding continues, then call back to clinic at [phone].    Signs of Infection:  Infection can occur in any area where skin has been disrupted.  If you notice persistent redness, swelling, colored drainage, increasing pain, fever or other signs of infection, please call us at: [phone] and ask to have me or my colleague paged. We will call you back to discuss.    Pathology Results:  You will be notified, generally via letter or MyChart, in approximately 10 days. If there is anything we need to discuss or further treatment needed, I will call you to discuss it.      "

## 2019-10-31 NOTE — LETTER
10/31/2019         RE: Tiffany Stovall  [address]  [place] [address]        Dear Colleague,    Thank you for referring your patient, Tiffany Stovall, to the [facility]. Please see a copy of my visit note below.    [facility] - PRIMARY CARE SKIN    SUBJECTIVE:   Tiffany Stovall is a(n) 69 year old female who presents to clinic today for follow-up wide excision of basal cell carcinoma superficial type on the right upper back.    Tissue Pathology Report from 9/2019  Shave, right upper back:   - Basal cell carcinoma, superficial type, extending to the lateral margin   - (see description)   Personal history of skin cancer : YES  Nodular basal cell carcinoma on the left lower leg  Superficial basal cell carcinoma on the right chest S/P wide excision  Superficial basal cell carcinoma on right dorsal foot (s/p MMS 12/13/16)  Squamous cell carcinoma in situ on left lateral neck (s/p MMS 12/13/16)  Superficial, multifocal basal cell carcinoma on right mid-dorsal forearm (s/p excision 12/20/16)  Basal cell carcinoma on right dorsal third toe (excised 12/2015)  Basal cell carcinoma on left upper cutaneous lip (s/p Mohs 10/20/2011 and Aldara)  Basal cell carcinoma on clavicle  Basal cell carcinoma on right arm.  Family history of skin cancer : YES - non-melanoma.    Sun Exposure History  Sunscreen Use : YES, frequency : daily, SPF : 30.  UV-protective clothing use : YES  Previous history of significant sun exposure:  Blistering sunburns : YES - as a teenager  Tanning beds : NO.    Occupation : nurse (indoor).    Refer to electronic medical record (EMR) for past medical history and medications.      ROS: 14 point review of systems was negative except the symptoms listed above in the HPI.    This document serves as a record of the services and decisions personally performed and made by Nataliya Bailon MD and was created by Nataliya Bailon MD, a trained medical scribe,  "based on personal observations and provider statements to the medical scribe.  October 30, 2019 4:52 PM   Nataliya Bailon MD    OBJECTIVE:   GENERAL: healthy, alert and no distress.  SKIN: Rodriguez Skin Type - II.  Trunk examined. The dermatoscope was used to help evaluate pigmented lesions.  Skin Pertinent Findings:  Left posterior shoulder- 5 mm well healed shave excision site- basal cell carcinoma     ASSESSMENT:     Encounter Diagnosis   Name Primary?     Basal cell carcinoma (BCC) of back Yes       PLAN:   Patient Instructions   FUTURE APPOINTMENTS  Follow up in 7 days for bandage change with the nurse.  Dr. Bailon cell phone #: [phone] \" For emergency only\" For other questions please contact [facility] at [phone].     BANDAGE CARE INSTRUCTIONS    Keep dressing completely dry.    Make sure to avoid soaking the procedure area.    Limit use of the area where the procedure was done for a few days to allow for optimal healing.    If you experience bleeding:  Wash hands and hold firm pressure on the area for 10 minutes without checking to see if the bleeding has stopped. \"Checking\" pulls off the protective wound clot and restarts the bleeding all over again. Re-apply pressure for 10 minutes if necessary to stop bleeding.  Use additional sterile gauze and tape to maintain pressure once bleeding has stopped.  If bleeding continues, then call back to clinic at [phone].    Signs of Infection:  Infection can occur in any area where skin has been disrupted.  If you notice persistent redness, swelling, colored drainage, increasing pain, fever or other signs of infection, please call us at: [phone] and ask to have me or my colleague paged. We will call you back to discuss.    Pathology Results:  You will be notified, generally via letter or MyChart, in approximately 10 days. If there is anything we need to discuss or further treatment needed, I will call you to discuss " it.            Signs of infection (spreading erythema, increasing pain, purulent discharge) were discussed. Limit physical activity for the next several days involving this area. No swimming, keep lesions clean and dry except for showering until the sutures are removed or absorbed. The patient will be notified of the pathology results at the next office visit or via MyChart or phone. The patient was given written discharge instructions and was discharged in stable condition.    TT: 40 minutes.  CT: 10 minutes.    The information in this document, created by the medical scribe for me, accurately reflects the services I personally performed and the decisions made by me. I have reviewed and approved this document for accuracy prior to leaving the patient care area.  October 30, 2019 4:52 PM  Nataliya Bailon MD  [facility]      Again, thank you for allowing me to participate in the care of your patient.        Sincerely,        Nataliya Bailon MD

## 2019-10-31 NOTE — PROCEDURES
Name: Wide Excision  Indication: Wide excision of tissue for pathology evaluation of Basal cell carcinoma .  Location(s): Left posterior shoulder- 5 mm well healed shave excision site- basal cell carcinoma   Completed by: Joy Bailon MD.  Photo Taken: NO.  Anesthesia: Patient was anesthetized by infiltrating the area surrounding the lesion with 1% lidocaine and epinephrine 1:[id number].  Note: Prior to procedure we discussed expectations for healing, risk of infection, and scar formation. Discussed other treatment options available. Discussed the risk of pain, infection, scarring, hypo- or hyperpigmentation and recurrence or need for re-treatment. The benefits of treatment and alternative treatments were also discussed.    During this procedure, the universal protocol was utilized. The patient's identity was confirmed by no less than two patient identifiers, correct procedure was verified, correct site was verified and marked as applicable and a final pause was completed.    Sterile technique was used throughout the procedure. The skin was cleaned and prepped with Chloroprep. A 4 mm margin was marked out on each side of the lesion. Sterile drapes were laid out. Once adequate anesthesia was obtained, an elliptical incision was made with a #10 blade through the epidermis and dermis. The tissue specimen was placed in formalin and sent to pathology.    Direct pressure and monopolar cautery was applied for hemostasis.  Defect was approximated with 0 Vicryl.  Edges were approximated with 6-0 monocryl interrupted simple stitch.  Minimal bleeding occurred. Dressing was applied and patient left in satisfactory condition.    Widest diameter: 16 mm.  Final length of defect: 6 cm.

## 2019-11-04 ENCOUNTER — TELEPHONE (OUTPATIENT)
Dept: FAMILY MEDICINE | Facility: CLINIC | Age: 69
End: 2019-11-04

## 2019-11-04 LAB — COPATH REPORT: NORMAL

## 2019-11-04 NOTE — TELEPHONE ENCOUNTER
----- Message from Nataliya Bailon MD sent at 11/4/2019  1:35 PM CST -----  Please call :      No residual basal cell carcinoma seen.    Thank you,   Nataliya Bailon M.D.

## 2019-11-04 NOTE — TELEPHONE ENCOUNTER
Patient notified of test results and providers message, patient has no further questions.    Kendra HURN BSN  [facility]  [phone]

## 2019-11-04 NOTE — TELEPHONE ENCOUNTER
Left message on answering machine for patient to call back.      Kendra CARTERRN  [facility]  [phone]

## 2019-11-07 ENCOUNTER — ALLIED HEALTH/NURSE VISIT (OUTPATIENT)
Dept: NURSING | Facility: CLINIC | Age: 69
End: 2019-11-07
Payer: COMMERCIAL

## 2019-11-07 DIAGNOSIS — Z48.01 ENCOUNTER FOR CHANGE OR REMOVAL OF SURGICAL WOUND DRESSING: Primary | ICD-10-CM

## 2019-11-07 PROCEDURE — 99207 ZZC NO CHARGE NURSE ONLY: CPT

## 2019-11-07 NOTE — PROGRESS NOTES
Pt returned to clinic for post surgery 1 week follow up bandage change. Pt has no complaints, denies pain. Bandage removed right upper back, area cleansed with normal saline. Site is healing and wound edges approximating well. Reapplied new steri strips and paper tape.    Advised to watch for signs/sx of infection; spreading redness, drainage, odor, fever. Call or report promptly to clinic. Pt given written instructions and informed to rtc as needed. Patient verbalized understanding.     Shahla Knight MA

## 2019-11-07 NOTE — PATIENT INSTRUCTIONS

## 2019-11-12 ENCOUNTER — TRANSFERRED RECORDS (OUTPATIENT)
Dept: HEALTH INFORMATION MANAGEMENT | Facility: CLINIC | Age: 69
End: 2019-11-12

## 2019-12-19 ENCOUNTER — OFFICE VISIT (OUTPATIENT)
Dept: OPTOMETRY | Facility: CLINIC | Age: 69
End: 2019-12-19
Payer: COMMERCIAL

## 2019-12-19 DIAGNOSIS — Z96.1 PSEUDOPHAKIA OF BOTH EYES: ICD-10-CM

## 2019-12-19 DIAGNOSIS — H52.12 MYOPIA OF LEFT EYE: ICD-10-CM

## 2019-12-19 DIAGNOSIS — H52.221 REGULAR ASTIGMATISM OF RIGHT EYE: ICD-10-CM

## 2019-12-19 DIAGNOSIS — Z01.00 EXAMINATION OF EYES AND VISION: Primary | ICD-10-CM

## 2019-12-19 DIAGNOSIS — H52.4 PRESBYOPIA: ICD-10-CM

## 2019-12-19 PROCEDURE — 92015 DETERMINE REFRACTIVE STATE: CPT | Performed by: OPTOMETRIST

## 2019-12-19 PROCEDURE — 92014 COMPRE OPH EXAM EST PT 1/>: CPT | Performed by: OPTOMETRIST

## 2019-12-19 ASSESSMENT — REFRACTION_MANIFEST
OS_SPHERE: -0.75
OD_ADD: +2.50
OD_SPHERE: -1.25
OD_AXIS: 045
OS_ADD: +2.50
OD_CYLINDER: +1.00

## 2019-12-19 ASSESSMENT — VISUAL ACUITY
METHOD: SNELLEN - LINEAR
OS_CC+: -1
OS_SC: 20/25
OS_CC: 20/20
OD_CC: 20/30-1
OS_CC: 20/20
OD_SC: 20/100
CORRECTION_TYPE: GLASSES
OS_SC+: -1
OD_CC: 20/20
OD_SC+: -1

## 2019-12-19 ASSESSMENT — REFRACTION_WEARINGRX
OD_AXIS: 045
OS_SPHERE: +2.50
OS_ADD: +2.50
OD_CYLINDER: +1.25
OD_SPHERE: -1.50
OS_CYLINDER: SPHERE
OD_SPHERE: +2.50
OD_ADD: +2.50
SPECS_TYPE: OTC READERS
SPECS_TYPE: PAL
OD_CYLINDER: SPHERE
OS_SPHERE: -0.75
OS_CYLINDER: SPHERE

## 2019-12-19 ASSESSMENT — CONF VISUAL FIELD
OD_NORMAL: 1
OS_NORMAL: 1

## 2019-12-19 ASSESSMENT — TONOMETRY
IOP_METHOD: TONOPEN
OS_IOP_MMHG: 18
OD_IOP_MMHG: 18

## 2019-12-19 ASSESSMENT — EXTERNAL EXAM - LEFT EYE: OS_EXAM: NORMAL

## 2019-12-19 ASSESSMENT — CUP TO DISC RATIO
OD_RATIO: 0.2
OS_RATIO: 0.25

## 2019-12-19 ASSESSMENT — EXTERNAL EXAM - RIGHT EYE: OD_EXAM: NORMAL

## 2019-12-19 ASSESSMENT — SLIT LAMP EXAM - LIDS
COMMENTS: NORMAL
COMMENTS: NORMAL

## 2019-12-19 NOTE — LETTER
12/19/2019         RE: Tiffany Stovall  [address]  [place] [address]        Dear Colleague,    Thank you for referring your patient, Tiffany Stovall, to the [facility]. Please see a copy of my visit note below.    Chief Complaint   Patient presents with     Annual Eye Exam      Accompanied by self  Last Eye Exam: 10-  Dilated Previously: Yes    What are you currently using to see?  otc readers and no line bifocals for driving only       Distance Vision Acuity: Satisfied with vision    Near Vision Acuity: Satisfied with vision while reading  with otc readers    Eye Comfort: good  Do you use eye drops? : Yes: after swimming otc artificial tears  Occupation or Hobbies: [employer]          Medical, surgical and family histories reviewed and updated 12/19/2019.       OBJECTIVE: See Ophthalmology exam    ASSESSMENT:    ICD-10-CM    1. Examination of eyes and vision Z01.00 EYE EXAM (SIMPLE-NONBILLABLE)   2. Presbyopia H52.4 REFRACTION   3. Regular astigmatism of right eye H52.221 REFRACTION   4. Myopia of left eye H52.12 REFRACTION   5. Pseudophakia of both eyes Z96.1 EYE EXAM (SIMPLE-NONBILLABLE)      PLAN:     Patient Instructions   Eyeglass prescription given.    Return in 1 year for a complete eye exam or sooner if needed.    Jose Luis Foss OD           Again, thank you for allowing me to participate in the care of your patient.        Sincerely,        Jose Luis Foss OD

## 2019-12-19 NOTE — PATIENT INSTRUCTIONS
Eyeglass prescription given.    Return in 1 year for a complete eye exam or sooner if needed.    Jose Luis Foss, DASIA      The affects of the dilating drops last for 4- 6 hours.  You will be more sensitive to light and vision will be blurry up close.  Mydriatic sunglasses were given if needed.      Optometry Providers       Clinic Locations                                 Telephone Number   Dr. Kendra Dent   [place]   [place] [phone]     [facility] Hours:                [place] [facility] Hours:       [place] [facility] Hours:   [address]   [address] [address]     [address]  [place] [address]   [place] [address]    [place] [address]  Phone: [phone]                    Phone: [phone]     Phone: [phone]                      Monday 8:00-7:00                          Monday 8:00-7:00                          Monday 8:00-7:00              Tuesday 8:00-6:00                          Tuesday 8:00-7:00                          Tuesday 8:00-7:00              Wednesday 8:00-6:00                  Wednesday 8:00-7:00                   Wednesday 8:00-7:00      Thursday 8:00-6:00                        Thursday 8:00-7:00                         Thursday 8:00-7:00            Friday 8:00-5:00                              Friday 8:00-5:00                              Friday 8:00-5:00    [facility] Hours:   [address] [place] [address]  [phone]    Monday 8:00-7:00  Tuesday 8:00-7:00  Wednesday 8:00-7:00  Thursday 8:00-7:00  Friday 8:00-5:00  Please log on to [email or web address] to order your contact lenses.  The link is found on the Eye Care and Vision Services page.  As always, Thank you for trusting us with your health care needs!

## 2019-12-19 NOTE — PROGRESS NOTES
Chief Complaint   Patient presents with     Annual Eye Exam      Accompanied by self  Last Eye Exam: 10-  Dilated Previously: Yes    What are you currently using to see?  otc readers and no line bifocals for driving only       Distance Vision Acuity: Satisfied with vision    Near Vision Acuity: Satisfied with vision while reading  with otc readers    Eye Comfort: good  Do you use eye drops? : Yes: after swimming otc artificial tears  Occupation or Hobbies: [employer]          Medical, surgical and family histories reviewed and updated 12/19/2019.       OBJECTIVE: See Ophthalmology exam    ASSESSMENT:    ICD-10-CM    1. Examination of eyes and vision Z01.00 EYE EXAM (SIMPLE-NONBILLABLE)   2. Presbyopia H52.4 REFRACTION   3. Regular astigmatism of right eye H52.221 REFRACTION   4. Myopia of left eye H52.12 REFRACTION   5. Pseudophakia of both eyes Z96.1 EYE EXAM (SIMPLE-NONBILLABLE)      PLAN:     Patient Instructions   Eyeglass prescription given.    Return in 1 year for a complete eye exam or sooner if needed.    Jose Luis Foss, OD

## 2020-01-09 ENCOUNTER — HOSPITAL ENCOUNTER (OUTPATIENT)
Dept: MAMMOGRAPHY | Facility: CLINIC | Age: 70
Discharge: HOME | End: 2020-01-09
Attending: FAMILY MEDICINE | Admitting: FAMILY MEDICINE
Payer: COMMERCIAL

## 2020-01-09 DIAGNOSIS — Z12.31 VISIT FOR SCREENING MAMMOGRAM: ICD-10-CM

## 2020-01-09 PROCEDURE — 77067 SCR MAMMO BI INCL CAD: CPT

## 2020-02-04 ENCOUNTER — MYC MEDICAL ADVICE (OUTPATIENT)
Dept: FAMILY MEDICINE | Facility: CLINIC | Age: 70
End: 2020-02-04

## 2020-02-10 ENCOUNTER — TRANSFERRED RECORDS (OUTPATIENT)
Dept: HEALTH INFORMATION MANAGEMENT | Facility: CLINIC | Age: 70
End: 2020-02-10

## 2020-04-02 ENCOUNTER — MYC MEDICAL ADVICE (OUTPATIENT)
Dept: FAMILY MEDICINE | Facility: CLINIC | Age: 70
End: 2020-04-02

## 2020-04-03 ASSESSMENT — ASTHMA QUESTIONNAIRES: ACT_TOTALSCORE: 25

## 2020-05-12 ENCOUNTER — MYC REFILL (OUTPATIENT)
Dept: FAMILY MEDICINE | Facility: CLINIC | Age: 70
End: 2020-05-12

## 2020-05-12 ENCOUNTER — MYC MEDICAL ADVICE (OUTPATIENT)
Dept: FAMILY MEDICINE | Facility: CLINIC | Age: 70
End: 2020-05-12

## 2020-05-12 DIAGNOSIS — T78.40XA ALLERGIC REACTION, INITIAL ENCOUNTER: ICD-10-CM

## 2020-05-15 RX ORDER — EPINEPHRINE 0.3 MG/.3ML
0.3 INJECTION SUBCUTANEOUS
Qty: 0.6 ML | Refills: 1 | Status: SHIPPED | OUTPATIENT
Start: 2020-05-15 | End: 2022-06-02

## 2020-05-15 NOTE — TELEPHONE ENCOUNTER
Prescription approved per [facility] Refill Protocol.      Treva Wilkerson RN, BSN, PHN

## 2020-06-21 ENCOUNTER — MYC REFILL (OUTPATIENT)
Dept: FAMILY MEDICINE | Facility: CLINIC | Age: 70
End: 2020-06-21

## 2020-06-21 DIAGNOSIS — I10 HYPERTENSION GOAL BP (BLOOD PRESSURE) < 140/90: ICD-10-CM

## 2020-06-21 DIAGNOSIS — E03.4 HYPOTHYROIDISM DUE TO ACQUIRED ATROPHY OF THYROID: ICD-10-CM

## 2020-06-21 DIAGNOSIS — E78.5 HYPERLIPIDEMIA LDL GOAL <130: ICD-10-CM

## 2020-06-23 RX ORDER — ATORVASTATIN CALCIUM 40 MG/1
40 TABLET, FILM COATED ORAL DAILY
Qty: 90 TABLET | Refills: 0 | Status: SHIPPED | OUTPATIENT
Start: 2020-06-23 | End: 2020-08-26

## 2020-06-23 RX ORDER — LEVOTHYROXINE SODIUM 112 UG/1
TABLET ORAL
Qty: 90 TABLET | Refills: 0 | Status: SHIPPED | OUTPATIENT
Start: 2020-06-23 | End: 2020-08-26

## 2020-06-23 RX ORDER — LOSARTAN POTASSIUM 50 MG/1
TABLET ORAL
Qty: 90 TABLET | Refills: 0 | Status: SHIPPED | OUTPATIENT
Start: 2020-06-23 | End: 2020-06-24

## 2020-06-23 NOTE — TELEPHONE ENCOUNTER
"Requested Prescriptions   Pending Prescriptions Disp Refills     losartan (COZAAR) 50 MG tablet [Pharmacy Med Name: LOSARTAN TABS 50MG] 90 tablet 3     Sig: TAKE 1 TABLET DAILY       Angiotensin-II Receptors Passed - 6/21/2020  3:20 PM        Passed - Last blood pressure under 140/90 in past 12 months     BP Readings from Last 3 Encounters:   10/31/19 124/58   10/24/19 138/76   10/08/19 137/73                 Passed - Recent (12 mo) or future (30 days) visit within the authorizing provider's specialty     Patient has had an office visit with the authorizing provider or a provider within the authorizing providers department within the previous 12 mos or has a future within next 30 days. See \"Patient Info\" tab in inbasket, or \"Choose Columns\" in Meds & Orders section of the refill encounter.              Passed - Medication is active on med list        Passed - Patient is age 18 or older        Passed - No active pregnancy on record        Passed - Normal serum creatinine on file in past 12 months     Recent Labs   Lab Test 10/07/19  2331   CR 0.63       Ok to refill medication if creatinine is low          Passed - Normal serum potassium on file in past 12 months     Recent Labs   Lab Test 10/24/19  1211   POTASSIUM 4.3                    Passed - No positive pregnancy test in past 12 months           levothyroxine (SYNTHROID/LEVOTHROID) 112 MCG tablet [Pharmacy Med Name: L-THYROXINE (SYNTHROID) TABS 112MCG] 90 tablet 3     Sig: TAKE 1 TABLET EVERY MORNING       Thyroid Protocol Passed - 6/21/2020  3:20 PM        Passed - Patient is 12 years or older        Passed - Recent (12 mo) or future (30 days) visit within the authorizing provider's specialty     Patient has had an office visit with the authorizing provider or a provider within the authorizing providers department within the previous 12 mos or has a future within next 30 days. See \"Patient Info\" tab in inbasket, or \"Choose Columns\" in Meds & Orders section of " the refill encounter.              Passed - Medication is active on med list        Passed - Normal TSH on file in past 12 months     Recent Labs   Lab Test 10/07/19  2331   TSH 0.92              Passed - No active pregnancy on record     If patient is pregnant or has had a positive pregnancy test, please check TSH.          Passed - No positive pregnancy test in past 12 months     If patient is pregnant or has had a positive pregnancy test, please check TSH.             Prescription approved per [facility] Refill Protocol.      Treva Wilkerson RN, BSN, PHN

## 2020-06-24 RX ORDER — HYDROCHLOROTHIAZIDE 12.5 MG/1
12.5 TABLET ORAL DAILY
Qty: 90 TABLET | Refills: 0 | Status: SHIPPED | OUTPATIENT
Start: 2020-06-24 | End: 2020-08-26

## 2020-07-02 ENCOUNTER — VIRTUAL VISIT (OUTPATIENT)
Dept: FAMILY MEDICINE | Facility: CLINIC | Age: 70
End: 2020-07-02
Payer: COMMERCIAL

## 2020-07-02 VITALS — SYSTOLIC BLOOD PRESSURE: 110 MMHG | DIASTOLIC BLOOD PRESSURE: 60 MMHG

## 2020-07-02 DIAGNOSIS — I10 HYPERTENSION GOAL BP (BLOOD PRESSURE) < 140/90: Primary | ICD-10-CM

## 2020-07-02 DIAGNOSIS — E03.4 HYPOTHYROIDISM DUE TO ACQUIRED ATROPHY OF THYROID: ICD-10-CM

## 2020-07-02 DIAGNOSIS — J45.30 MILD PERSISTENT ASTHMA WITHOUT COMPLICATION: ICD-10-CM

## 2020-07-02 DIAGNOSIS — E78.5 HYPERLIPIDEMIA LDL GOAL <130: ICD-10-CM

## 2020-07-02 PROCEDURE — 99214 OFFICE O/P EST MOD 30 MIN: CPT | Mod: 95 | Performed by: FAMILY MEDICINE

## 2020-07-02 ASSESSMENT — PAIN SCALES - GENERAL: PAINLEVEL: NO PAIN (0)

## 2020-07-02 NOTE — PATIENT INSTRUCTIONS
Continue current medication.    Return to clinic for fasting labs in 1-2 months.  Refills will be updated when results return.

## 2020-07-02 NOTE — PROGRESS NOTES
"Tiffany Stovall is a 69 year old female who is being evaluated via a billable video visit.      The patient has been notified of following:     \"This video visit will be conducted via a call between you and your physician/provider. We have found that certain health care needs can be provided without the need for an in-person physical exam.  This service lets us provide the care you need with a video conversation.  If a prescription is necessary we can send it directly to your pharmacy.  If lab work is needed we can place an order for that and you can then stop by our lab to have the test done at a later time.    Video visits are billed at different rates depending on your insurance coverage.  Please reach out to your insurance provider with any questions.    If during the course of the call the physician/provider feels a video visit is not appropriate, you will not be charged for this service.\"    Patient has given verbal consent for Video visit? Yes  How would you like to obtain your AVS? [facility]  Patient would like the video invitation sent by: Send to e-mail at: [email or web address]  Will anyone else be joining your video visit? No      Video Start Time: 214 pm    Subjective     Tiffany Stovall is a 69 year old female who presents today via video visit for the following health issues:    HPI  Hyperlipidemia Follow-Up      Are you regularly taking any medication or supplement to lower your cholesterol?   Yes- Lipitor    Are you having muscle aches or other side effects that you think could be caused by your cholesterol lowering medication?  No    Hypertension Follow-up      Do you check your blood pressure regularly outside of the clinic? Yes     Are you following a low salt diet? Yes    Are your blood pressures ever more than 140 on the top number (systolic) OR more   than 90 on the bottom number (diastolic), for example 140/90? No      How many servings of fruits and vegetables do you eat daily?  4 or " "more    On average, how many sweetened beverages do you drink each day (Examples: soda, juice, sweet tea, etc.  Do NOT count diet or artificially sweetened beverages)?   0    How many days per week do you exercise enough to make your heart beat faster? 7    How many minutes a day do you exercise enough to make your heart beat faster? 60 or more    How many days per week do you miss taking your medication? 0             Asthma Follow-Up    Was ACT completed today?  No      Do you have a cough?  No    Are you experiencing any wheezing in your chest?  No    Do you have any shortness of breath?  No     How often are you using a short-acting (rescue) inhaler or nebulizer, such as Albuterol?  not since January    How many days per week do you miss taking your asthma controller medication?  0    Please describe any recent triggers for your asthma: upper respiratory infections and pollens    Have you had any Emergency Room Visits, Urgent Care Visits, or Hospital Admissions since your last office visit?  No      How many days per week do you exercise enough to make your heart beat faster? Regularly - biking, swimming    How many days per week do you miss taking your medication? 0      BP Readings from Last 3 Encounters:   07/02/20 110/60   10/31/19 124/58   10/24/19 138/76    Wt Readings from Last 3 Encounters:   10/24/19 65 kg (143 lb 4.8 oz)   10/07/19 64.4 kg (142 lb)   05/06/19 65.3 kg (144 lb)                    Reviewed and updated as needed this visit by Provider         Review of Systems   Constitutional, HEENT, cardiovascular, pulmonary, gi and gu systems are negative, except as otherwise noted.      Objective    Vitals - Patient Reported  Systolic (Patient Reported): 116  Diastolic (Patient Reported): 60  Weight (Patient Reported): 66.2 kg (146 lb)  Height (Patient Reported): 166.4 cm (5' 5.5\")  BMI (Based on Pt Reported Ht/Wt): 23.93  Pulse (Patient Reported): 64        Physical Exam     GENERAL: Healthy, alert " and no distress  EYES: Eyes grossly normal to inspection.  No discharge or erythema, or obvious scleral/conjunctival abnormalities.  RESP: No audible wheeze, cough, or visible cyanosis.  No visible retractions or increased work of breathing.    SKIN: Visible skin clear. No significant rash, abnormal pigmentation or lesions.  NEURO: Cranial nerves grossly intact.  Mentation and speech appropriate for age.  PSYCH: Mentation appears normal, affect normal/bright, judgement and insight intact, normal speech and appearance well-groomed.      Diagnostic Test Results:  Labs reviewed in Epic        Assessment & Plan     1. Hypertension goal BP (blood pressure) < 140/90  Continue current treatment.  Follow up for labs in 1-2 months.  Will update refills when results return.    - **Comprehensive metabolic panel FUTURE anytime; Future  - **CBC with platelets FUTURE anytime; Future  - Albumin Random Urine Quantitative with Creat Ratio; Future    2. Hyperlipidemia LDL goal <130  Return to clinic for labs as noted.  Continue Lipitor.    - Lipid panel reflex to direct LDL Fasting; Future  - **Comprehensive metabolic panel FUTURE anytime; Future    3. Hypothyroidism due to acquired atrophy of thyroid  Continue levothyroxine as previous.  Return to clinic for labs.  - **TSH with free T4 reflex FUTURE anytime; Future    4. Mild persistent asthma without complication  AAP - continue the Advair as previous.  She will verify insurance coverage prior to her next refill. If another brand is covered better by insurance, will change at time of refill need.         Patient Instructions   Continue current medication.    Return to clinic for fasting labs in 1-2 months.  Refills will be updated when results return.        Return in about 2 months (around 9/2/2020) for Lab Work.    Darby Williamson MD  [facility]      Video-Visit Details    Type of service:  Video Visit    Video End Time:  2:31 pm    Originating Location (pt.  Location): Home    Distant Location (provider location):  [facility]     Platform used for Video Visit: [employer]           Darby Williamson MD

## 2020-07-02 NOTE — LETTER
My Asthma Action Plan    Name: Tiffany Stovall   YOB: 1950  Date: 7/2/2020   My doctor: Darby Williamson MD   My clinic: [facility]        My Control Medicine: Fluticasone propionate + salmeterol (Advair HFA) -  230/21 mcg 1 puff daily  My Rescue Medicine: Albuterol nebulizer solution as needed  Albuterol (Proair/Ventolin/Proventil HFA) 2-4 puffs EVERY 4 HOURS as needed. Use a spacer if recommended by your provider.   My Asthma Severity:   Mild Persistent  Know your asthma triggers: upper respiratory infections and pollens  None            GREEN ZONE   Good Control    I feel good    No cough or wheeze    Can work, sleep and play without asthma symptoms       Take your asthma control medicine every day.     1. If exercise triggers your asthma, take your rescue medication    15 minutes before exercise or sports, and    During exercise if you have asthma symptoms  2. Spacer to use with inhaler: If you have a spacer, make sure to use it with your inhaler             YELLOW ZONE Getting Worse  I have ANY of these:    I do not feel good    Cough or wheeze    Chest feels tight    Wake up at night   1. Keep taking your Green Zone medications  2. Start taking your rescue medicine:    every 20 minutes for up to 1 hour. Then every 4 hours for 24-48 hours.  3. If you stay in the Yellow Zone for more than 12-24 hours, contact your doctor.  4. If you do not return to the Green Zone in 12-24 hours or you get worse, start taking your oral steroid medicine if prescribed by your provider.           RED ZONE Medical Alert - Get Help  I have ANY of these:    I feel awful    Medicine is not helping    Breathing getting harder    Trouble walking or talking    Nose opens wide to breathe       1. Take your rescue medicine NOW  2. If your provider has prescribed an oral steroid medicine, start taking it NOW  3. Call your doctor NOW  4. If you are still in the Red Zone after 20 minutes and you have  not reached your doctor:    Take your rescue medicine again and    Call 911 or go to the emergency room right away    See your regular doctor within 2 weeks of an Emergency Room or Urgent Care visit for follow-up treatment.          Annual Reminders:  Meet with Asthma Educator,  Flu Shot in the Fall, consider Pneumonia Vaccination for patients with asthma (aged 19 and older).    Pharmacy:    [facility]  [facility] - [place], [place] - [address] [id number] [address] [employer] - [place] [address] [address]    Electronically signed by Darby Williamson MD   Date: 07/02/20                      Asthma Triggers  How To Control Things That Make Your Asthma Worse    Triggers are things that make your asthma worse.  Look at the list below to help you find your triggers and what you can do about them.  You can help prevent asthma flare-ups by staying away from your triggers.      Trigger                                                          What you can do   Cigarette Smoke  Tobacco smoke can make asthma worse. Do not allow smoking in your home, car or around you.  Be sure no one smokes at a child s day care or school.  If you smoke, ask your health care provider for ways to help you quit.  Ask family members to quit too.  Ask your health care provider for a referral to Quit Plan to help you quit smoking, or call 0-691-893-PLAN.     Colds, Flu, Bronchitis  These are common triggers of asthma. Wash your hands often.  Don t touch your eyes, nose or mouth.  Get a flu shot every year.     Dust Mites  These are tiny bugs that live in cloth or carpet. They are too small to see. Wash sheets and blankets in hot water every week.   Encase pillows and mattress in dust mite proof covers.  Avoid having carpet if you can. If you have carpet, vacuum weekly.   Use a dust mask and HEPA vacuum.   Pollen and Outdoor Mold  Some people are allergic to trees, grass, or weed pollen, or molds. Try  to keep your windows closed.  Limit time out doors when pollen count is high.   Ask you health care provider about taking medicine during allergy season.     Animal Dander  Some people are allergic to skin flakes, urine or saliva from pets with fur or feathers. Keep pets with fur or feathers out of your home.    If you can t keep the pet outdoors, then keep the pet out of your bedroom.  Keep the bedroom door closed.  Keep pets off cloth furniture and away from stuffed toys.     Mice, Rats, and Cockroaches   Some people are allergic to the waste from these pests.   Cover food and garbage.  Clean up spills and food crumbs.  Store grease in the refrigerator.   Keep food out of the bedroom.   Indoor Mold  This can be a trigger if your home has high moisture. Fix leaking faucets, pipes, or other sources of water.   Clean moldy surfaces.  Dehumidify basement if it is damp and smelly.   Smoke, Strong Odors, and Sprays  These can reduce air quality. Stay away from strong odors and sprays, such as perfume, powder, hair spray, paints, smoke incense, paint, cleaning products, candles and new carpet.   Exercise or Sports  Some people with asthma have this trigger. Be active!  Ask your doctor about taking medicine before sports or exercise to prevent symptoms.    Warm up for 5-10 minutes before and after sports or exercise.     Other Triggers of Asthma  Cold air:  Cover your nose and mouth with a scarf.  Sometimes laughing or crying can be a trigger.  Some medicines and food can trigger asthma.

## 2020-08-23 DIAGNOSIS — I10 HYPERTENSION GOAL BP (BLOOD PRESSURE) < 140/90: ICD-10-CM

## 2020-08-25 DIAGNOSIS — E03.4 HYPOTHYROIDISM DUE TO ACQUIRED ATROPHY OF THYROID: ICD-10-CM

## 2020-08-25 DIAGNOSIS — E78.5 HYPERLIPIDEMIA LDL GOAL <130: ICD-10-CM

## 2020-08-25 DIAGNOSIS — I10 HYPERTENSION GOAL BP (BLOOD PRESSURE) < 140/90: ICD-10-CM

## 2020-08-25 LAB
ALBUMIN SERPL-MCNC: 4.4 G/DL (ref 3.4–5)
ALP SERPL-CCNC: 66 U/L (ref 40–150)
ALT SERPL W P-5'-P-CCNC: 36 U/L (ref 0–50)
ANION GAP SERPL CALCULATED.3IONS-SCNC: 4 MMOL/L (ref 3–14)
AST SERPL W P-5'-P-CCNC: 25 U/L (ref 0–45)
BILIRUB SERPL-MCNC: 0.6 MG/DL (ref 0.2–1.3)
BUN SERPL-MCNC: 16 MG/DL (ref 7–30)
CALCIUM SERPL-MCNC: 9.5 MG/DL (ref 8.5–10.1)
CHLORIDE SERPL-SCNC: 102 MMOL/L (ref 94–109)
CHOLEST SERPL-MCNC: 141 MG/DL
CO2 SERPL-SCNC: 28 MMOL/L (ref 20–32)
CREAT SERPL-MCNC: 0.77 MG/DL (ref 0.52–1.04)
CREAT UR-MCNC: 88 MG/DL
ERYTHROCYTE [DISTWIDTH] IN BLOOD BY AUTOMATED COUNT: 12.2 % (ref 10–15)
GFR SERPL CREATININE-BSD FRML MDRD: 80 ML/MIN/{1.73_M2}
GLUCOSE SERPL-MCNC: 94 MG/DL (ref 70–99)
HCT VFR BLD AUTO: 39.7 % (ref 35–47)
HDLC SERPL-MCNC: 64 MG/DL
HGB BLD-MCNC: 13 G/DL (ref 11.7–15.7)
LDLC SERPL CALC-MCNC: 65 MG/DL
MCH RBC QN AUTO: 29.5 PG (ref 26.5–33)
MCHC RBC AUTO-ENTMCNC: 32.7 G/DL (ref 31.5–36.5)
MCV RBC AUTO: 90 FL (ref 78–100)
MICROALBUMIN UR-MCNC: 7 MG/L
MICROALBUMIN/CREAT UR: 7.47 MG/G CR (ref 0–25)
NONHDLC SERPL-MCNC: 77 MG/DL
PLATELET # BLD AUTO: 249 10E9/L (ref 150–450)
POTASSIUM SERPL-SCNC: 4 MMOL/L (ref 3.4–5.3)
PROT SERPL-MCNC: 7.4 G/DL (ref 6.8–8.8)
RBC # BLD AUTO: 4.4 10E12/L (ref 3.8–5.2)
SODIUM SERPL-SCNC: 134 MMOL/L (ref 133–144)
TRIGL SERPL-MCNC: 59 MG/DL
TSH SERPL DL<=0.005 MIU/L-ACNC: 0.53 MU/L (ref 0.4–4)
WBC # BLD AUTO: 4.8 10E9/L (ref 4–11)

## 2020-08-25 PROCEDURE — 36415 COLL VENOUS BLD VENIPUNCTURE: CPT | Performed by: FAMILY MEDICINE

## 2020-08-25 PROCEDURE — 84443 ASSAY THYROID STIM HORMONE: CPT | Performed by: FAMILY MEDICINE

## 2020-08-25 PROCEDURE — 80053 COMPREHEN METABOLIC PANEL: CPT | Performed by: FAMILY MEDICINE

## 2020-08-25 PROCEDURE — 85027 COMPLETE CBC AUTOMATED: CPT | Performed by: FAMILY MEDICINE

## 2020-08-25 PROCEDURE — 82043 UR ALBUMIN QUANTITATIVE: CPT | Performed by: FAMILY MEDICINE

## 2020-08-25 PROCEDURE — 80061 LIPID PANEL: CPT | Performed by: FAMILY MEDICINE

## 2020-08-25 RX ORDER — POTASSIUM CHLORIDE 1500 MG/1
TABLET, EXTENDED RELEASE ORAL
Qty: 90 TABLET | Refills: 2 | Status: SHIPPED | OUTPATIENT
Start: 2020-08-25 | End: 2020-12-03

## 2020-08-25 NOTE — TELEPHONE ENCOUNTER
Prescription approved per [facility] Refill Protocol.  Carmelita Caballero RN  [facility]

## 2020-09-01 ENCOUNTER — MYC MEDICAL ADVICE (OUTPATIENT)
Dept: FAMILY MEDICINE | Facility: CLINIC | Age: 70
End: 2020-09-01

## 2020-09-01 DIAGNOSIS — J45.30 MILD PERSISTENT ASTHMA WITHOUT COMPLICATION: ICD-10-CM

## 2020-09-28 ENCOUNTER — ALLIED HEALTH/NURSE VISIT (OUTPATIENT)
Dept: FAMILY MEDICINE | Facility: CLINIC | Age: 70
End: 2020-09-28
Payer: COMMERCIAL

## 2020-09-28 DIAGNOSIS — Z23 NEED FOR PROPHYLACTIC VACCINATION AND INOCULATION AGAINST INFLUENZA: Primary | ICD-10-CM

## 2020-09-28 PROCEDURE — G0008 ADMIN INFLUENZA VIRUS VAC: HCPCS

## 2020-09-28 PROCEDURE — 99207: CPT

## 2020-09-28 PROCEDURE — 90662 IIV NO PRSV INCREASED AG IM: CPT

## 2020-10-05 ENCOUNTER — TRANSFERRED RECORDS (OUTPATIENT)
Dept: HEALTH INFORMATION MANAGEMENT | Facility: CLINIC | Age: 70
End: 2020-10-05

## 2020-10-26 ENCOUNTER — OFFICE VISIT (OUTPATIENT)
Dept: VASCULAR SURGERY | Facility: CLINIC | Age: 70
End: 2020-10-26
Payer: COMMERCIAL

## 2020-10-26 DIAGNOSIS — I83.811 VARICOSE VEINS OF LEG WITH PAIN, RIGHT: Primary | ICD-10-CM

## 2020-10-26 PROCEDURE — 99203 OFFICE O/P NEW LOW 30 MIN: CPT | Performed by: SURGERY

## 2020-10-26 NOTE — PROGRESS NOTES
[facility] CONSULTATION    HPI:    Tiffany Stovall is a pleasant 70 year old female who presents with complaints of right leg pain and varicose veins.  We have apparently treated her left lower extremity with radiofrequency ablation of the left great saphenous vein and phlebectomies in November 2017, last seeing her in January 2018.  She has been fine from the standpoint of her left lower extremity.    She describes pain in her right calf as an aching, tiredness and heaviness as well as a burning pain, worse when she is on her feet for long peers of time and improving with elevation of leg and compression hose.  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  This is occurring despite use of compression hose which she has worn for years.  She admit occasional swelling of her left ankle toward the end of the day.    She has no history of deep vein thrombosis, superficial thrombophlebitis or hemorrhage.    Her family history is significant for varicose veins.    PAST MEDICAL HISTORY:   Past Medical History:   Diagnosis Date     Bronchial spasm      Cancer (H)     basal cell, squamous cell skin ca     Compression fracture of T12 vertebra (H) 03/17/2014     GERD (gastroesophageal reflux disease)      HTN - hypertension      Hyperlipaemia      Postmenopausal 1999     Uncomplicated asthma      Unspecified hypothyroidism     Hypothyroidism       PAST SURGICAL HISTORY:   Past Surgical History:   Procedure Laterality Date     ARTHROPLASTY KNEE Left 1/8/2019    Procedure: LEFT TOTAL KNEE ARTHROPLASTY (NELSON NEPHEW)^;  Surgeon: Tom Terry MD;  Location: [facility] OR     ARTHROSCOPY KNEE RT/LT  1978     BIOPSY OF BREAST, NEEDLE CORE  1995    Papilloma     C REMV CATARACT INTRACAP,INSERT LENS  2010     CARPAL TUNNEL RELEASE RT/LT  2010    bilateral     CATARACT IOL, RT/LT       CHOLECYSTECTOMY, LAPOROSCOPIC  2006     Cholecystectomy, Laparoscopic     COLONOSCOPY WITH CO2 INSUFFLATION N/A 1/16/2017    Procedure: COLONOSCOPY WITH CO2 INSUFFLATION;  Surgeon: Elkin Lemus MD;  Location: [facility] OR     ENDOSCOPIC STRIPPING VEIN(S)  Nov 14, 2013    stab phlebectomies     HC COLONOSCOPY THRU STOMA, DIAGNOSTIC  11/2006    diverticulosis, no polyps found, next one due in 2016     HYSTERECTOMY, PAP NO LONGER INDICATED  1999    bleeding, atypical cells on endobx, endometriosis     HYSTERECTOMY, CAMILLE  1999    fibroids, endometrial hyperplasia     MOHS MICROGRAPHIC PROCEDURE  2010    BCC lip     MOHS MICROGRAPHIC PROCEDURE  12/13/2016    squamus lt neck,basal top rt foot/3rd toe     MOHS MICROGRAPHIC PROCEDURE  03/22/2018    Left leg     SALPINGO OOPHORECTOMY,R/L/TERI  1999    Salpingo Oophorectomy, RT/LT/TERI     TONSILLECTOMY & ADENOIDECTOMY  1968     TUBAL/ECTOPIC PREGNANCY  1983,1984       FAMILY HISTORY:   Family History   Problem Relation Age of Onset     Thyroid Disease Mother         hypo     Diabetes Mother         A1C   6.1     Lipids Mother      Asthma Father      Thyroid Disease Father         hypo     Cancer Father         basil cell     Prostate Cancer Father      Diabetes Maternal Grandmother      Prostate Cancer Brother      Anesthesia Reaction No family hx of        SOCIAL HISTORY:   Social History     Tobacco Use     Smoking status: Never Smoker     Smokeless tobacco: Never Used   Substance Use Topics     Alcohol use: Yes     Comment: socially       REVIEW OF SYSTEMS: Review Of Systems  Skin: negative  Eyes: negative  Ears/Nose/Throat: negative  Respiratory: No shortness of breath, dyspnea on exertion, cough, or hemoptysis  Cardiovascular: negative  Gastrointestinal: negative  Genitourinary: negative  Musculoskeletal: Nocturnal foot pain, leg pain, occasional leg swelling  Neurologic: negative  Psychiatric: negative  Hematologic/Lymphatic/Immunologic: negative  Endocrine: negative      Vital signs:  There were no vitals  taken for this visit.    Current Outpatient Medications   Medication Sig Dispense Refill     albuterol (2.5 MG/3ML) 0.083% nebulizer solution Take 1 vial (2.5 mg) by nebulization every 6 hours as needed for shortness of breath / dyspnea 25 vial 2     albuterol (ALBUTEROL) 108 (90 BASE) MCG/ACT Inhaler Inhale 1-2 puffs into the lungs every 4 hours as needed for shortness of breath / dyspnea 1 Inhaler 2     atorvastatin (LIPITOR) 40 MG tablet Take 1 tablet (40 mg) by mouth daily 90 tablet 3     EPINEPHrine (ANY BX GENERIC EQUIV) 0.3 MG/0.3ML injection 2-pack Inject 0.3 mLs (0.3 mg) into the muscle once as needed 0.6 mL 1     esomeprazole (NEXIUM) 20 MG DR capsule Take 2 capsules (40 mg) by mouth daily       fluticasone-salmeterol (ADVAIR DISKUS) 250-50 MCG/DOSE inhaler Inhale 1 puff into the lungs 2 times daily 3 Inhaler 3     hydrochlorothiazide (HYDRODIURIL) 12.5 MG tablet Take 1 tablet (12.5 mg) by mouth daily 90 tablet 1     KLOR-CON 20 MEQ CR tablet TAKE 1 TABLET DAILY 90 tablet 2     levothyroxine (SYNTHROID/LEVOTHROID) 112 MCG tablet Take 1 tablet (112 mcg) by mouth daily 90 tablet 3     loratadine (CLARITIN) 10 MG tablet Take 10 mg by mouth daily as needed        losartan (COZAAR) 50 MG tablet Take 1 tablet (50 mg) by mouth daily 90 tablet 1       PHYSICAL EXAM:  General: Pleasant, NAD.   HEENT: Normocephalic, atraumatic, external ears and nose normal.   Respiratory: Normal respiratory effort.   Cardiovascular: Pulse is regular.   Musculoskeletal: Gait and station normal.  The joints of her fingers and toes without deformity.  There is no cyanosis of her nailbeds.   EXTREMITIES: Right lower extremity: 4 to 6 mm varicosities beginning in the right lateral popliteal crease coursing down the posterior aspect of the right calf.  No significant edema or venous stasis changes.    Left lower extremity: No significant varicosities, edema or venous stasis changes.  PULSES: R/L (3=normal pulse, 0=no palpable pulse)  dorsalis pedis: 3/3; posterior tibial: 3/3.      Neurologic: Grossly normal  Psychiatric: Mood, affect, judgment and insight are normal     ASSESSMENT:  C3, VCSS 4 left lower extremity chronic venous insufficiency.  We discussed options of continued conservative management with use of compression hose, leg elevation, dietary measures and exercise.  Risk of conservative management including superficial thrombophlebitis, bleeding and progression of the disease process were discussed.    We discussed the option of obtaining a right lower extremity venous competency study to see if there he is correctable axial incompetence.  Based on findings of the ultrasound, she may be a candidate for endovenous ablation in the office setting with oral sedation and local anesthesia.  Details of procedure including risks of bleeding, infection, nerve injury, scarring, hyperpigmentation, deep vein thrombosis, recanalization of the treated veins and recurrent varicose veins were discussed.  She voiced understanding and her questions were answered.    PLAN:  Vital extremity venous competency study with video visit results     Amadou Wellington MD    Dictated using Dragon voice-recognition software which may result in transcription errors    [facility] NEW PATIENT:

## 2020-10-26 NOTE — LETTER
10/26/2020         RE: Tiffany Stovall  [address]  [facility] [address]        Dear Colleague,    Thank you for referring your patient, Tiffany Stovall, to the [facility] [place]. Please see a copy of my visit note below.    [facility] CONSULTATION    HPI:    Tiffany Stovall is a pleasant 70 year old female who presents with complaints of right leg pain and varicose veins.  We have apparently treated her left lower extremity with radiofrequency ablation of the left great saphenous vein and phlebectomies in November 2017, last seeing her in January 2018.  She has been fine from the standpoint of her left lower extremity.    She describes pain in her right calf as an aching, tiredness and heaviness as well as a burning pain, worse when she is on her feet for long peers of time and improving with elevation of leg and compression hose.  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  This is occurring despite use of compression hose which she has worn for years.  She admit occasional swelling of her left ankle toward the end of the day.    She has no history of deep vein thrombosis, superficial thrombophlebitis or hemorrhage.    Her family history is significant for varicose veins.    PAST MEDICAL HISTORY:   Past Medical History:   Diagnosis Date     Bronchial spasm      Cancer (H)     basal cell, squamous cell skin ca     Compression fracture of T12 vertebra (H) 03/17/2014     GERD (gastroesophageal reflux disease)      HTN - hypertension      Hyperlipaemia      Postmenopausal 1999     Uncomplicated asthma      Unspecified hypothyroidism     Hypothyroidism       PAST SURGICAL HISTORY:   Past Surgical History:   Procedure Laterality Date     ARTHROPLASTY KNEE Left 1/8/2019    Procedure: LEFT TOTAL KNEE ARTHROPLASTY (NELSON NEPHEW)^;  Surgeon: Tom Terry MD;   Location: [facility] OR     ARTHROSCOPY KNEE RT/LT  1978     BIOPSY OF BREAST, NEEDLE CORE  1995    Papilloma     C REMV CATARACT INTRACAP,INSERT LENS  2010     CARPAL TUNNEL RELEASE RT/LT  2010    bilateral     CATARACT IOL, RT/LT       CHOLECYSTECTOMY, LAPOROSCOPIC  2006    Cholecystectomy, Laparoscopic     COLONOSCOPY WITH CO2 INSUFFLATION N/A 1/16/2017    Procedure: COLONOSCOPY WITH CO2 INSUFFLATION;  Surgeon: Elkin Lemus MD;  Location: [facility] OR     ENDOSCOPIC STRIPPING VEIN(S)  Nov 14, 2013    stab phlebectomies     HC COLONOSCOPY THRU STOMA, DIAGNOSTIC  11/2006    diverticulosis, no polyps found, next one due in 2016     HYSTERECTOMY, PAP NO LONGER INDICATED  1999    bleeding, atypical cells on endobx, endometriosis     HYSTERECTOMY, CAMILLE  1999    fibroids, endometrial hyperplasia     MOHS MICROGRAPHIC PROCEDURE  2010    BCC lip     MOHS MICROGRAPHIC PROCEDURE  12/13/2016    squamus lt neck,basal top rt foot/3rd toe     MOHS MICROGRAPHIC PROCEDURE  03/22/2018    Left leg     SALPINGO OOPHORECTOMY,R/L/TERI  1999    Salpingo Oophorectomy, RT/LT/TERI     TONSILLECTOMY & ADENOIDECTOMY  1968     TUBAL/ECTOPIC PREGNANCY  1983,1984       FAMILY HISTORY:   Family History   Problem Relation Age of Onset     Thyroid Disease Mother         hypo     Diabetes Mother         A1C   6.1     Lipids Mother      Asthma Father      Thyroid Disease Father         hypo     Cancer Father         basil cell     Prostate Cancer Father      Diabetes Maternal Grandmother      Prostate Cancer Brother      Anesthesia Reaction No family hx of        SOCIAL HISTORY:   Social History     Tobacco Use     Smoking status: Never Smoker     Smokeless tobacco: Never Used   Substance Use Topics     Alcohol use: Yes     Comment: socially       REVIEW OF SYSTEMS: Review Of Systems  Skin: negative  Eyes: negative  Ears/Nose/Throat: negative  Respiratory: No shortness of breath, dyspnea on exertion, cough, or hemoptysis  Cardiovascular:  negative  Gastrointestinal: negative  Genitourinary: negative  Musculoskeletal: Nocturnal foot pain, leg pain, occasional leg swelling  Neurologic: negative  Psychiatric: negative  Hematologic/Lymphatic/Immunologic: negative  Endocrine: negative      Vital signs:  There were no vitals taken for this visit.    Current Outpatient Medications   Medication Sig Dispense Refill     albuterol (2.5 MG/3ML) 0.083% nebulizer solution Take 1 vial (2.5 mg) by nebulization every 6 hours as needed for shortness of breath / dyspnea 25 vial 2     albuterol (ALBUTEROL) 108 (90 BASE) MCG/ACT Inhaler Inhale 1-2 puffs into the lungs every 4 hours as needed for shortness of breath / dyspnea 1 Inhaler 2     atorvastatin (LIPITOR) 40 MG tablet Take 1 tablet (40 mg) by mouth daily 90 tablet 3     EPINEPHrine (ANY BX GENERIC EQUIV) 0.3 MG/0.3ML injection 2-pack Inject 0.3 mLs (0.3 mg) into the muscle once as needed 0.6 mL 1     esomeprazole (NEXIUM) 20 MG DR capsule Take 2 capsules (40 mg) by mouth daily       fluticasone-salmeterol (ADVAIR DISKUS) 250-50 MCG/DOSE inhaler Inhale 1 puff into the lungs 2 times daily 3 Inhaler 3     hydrochlorothiazide (HYDRODIURIL) 12.5 MG tablet Take 1 tablet (12.5 mg) by mouth daily 90 tablet 1     KLOR-CON 20 MEQ CR tablet TAKE 1 TABLET DAILY 90 tablet 2     levothyroxine (SYNTHROID/LEVOTHROID) 112 MCG tablet Take 1 tablet (112 mcg) by mouth daily 90 tablet 3     loratadine (CLARITIN) 10 MG tablet Take 10 mg by mouth daily as needed        losartan (COZAAR) 50 MG tablet Take 1 tablet (50 mg) by mouth daily 90 tablet 1       PHYSICAL EXAM:  General: Pleasant, NAD.   HEENT: Normocephalic, atraumatic, external ears and nose normal.   Respiratory: Normal respiratory effort.   Cardiovascular: Pulse is regular.   Musculoskeletal: Gait and station normal.  The joints of her fingers and toes without deformity.  There is no cyanosis of her nailbeds.   EXTREMITIES: Right lower extremity: 4 to 6 mm varicosities  beginning in the right lateral popliteal crease coursing down the posterior aspect of the right calf.  No significant edema or venous stasis changes.    Left lower extremity: No significant varicosities, edema or venous stasis changes.  PULSES: R/L (3=normal pulse, 0=no palpable pulse) dorsalis pedis: 3/3; posterior tibial: 3/3.      Neurologic: Grossly normal  Psychiatric: Mood, affect, judgment and insight are normal     ASSESSMENT:  C3, VCSS 4 left lower extremity chronic venous insufficiency.  We discussed options of continued conservative management with use of compression hose, leg elevation, dietary measures and exercise.  Risk of conservative management including superficial thrombophlebitis, bleeding and progression of the disease process were discussed.    We discussed the option of obtaining a right lower extremity venous competency study to see if there he is correctable axial incompetence.  Based on findings of the ultrasound, she may be a candidate for endovenous ablation in the office setting with oral sedation and local anesthesia.  Details of procedure including risks of bleeding, infection, nerve injury, scarring, hyperpigmentation, deep vein thrombosis, recanalization of the treated veins and recurrent varicose veins were discussed.  She voiced understanding and her questions were answered.    PLAN:  Vital extremity venous competency study with video visit results     Amadou Wellington MD    Dictated using Dragon voice-recognition software which may result in transcription errors    [facility] NEW PATIENT:                  Again, thank you for allowing me to participate in the care of your patient.        Sincerely,        Amadou Wellington MD

## 2020-11-11 ENCOUNTER — MYC REFILL (OUTPATIENT)
Dept: FAMILY MEDICINE | Facility: CLINIC | Age: 70
End: 2020-11-11

## 2020-11-11 DIAGNOSIS — J45.31 MILD PERSISTENT ASTHMA WITH EXACERBATION: ICD-10-CM

## 2020-11-11 DIAGNOSIS — J45.30 MILD PERSISTENT ASTHMA WITHOUT COMPLICATION: ICD-10-CM

## 2020-11-13 RX ORDER — ALBUTEROL SULFATE 0.83 MG/ML
2.5 SOLUTION RESPIRATORY (INHALATION) EVERY 6 HOURS PRN
Qty: 25 VIAL | Refills: 0 | Status: SHIPPED | OUTPATIENT
Start: 2020-11-13 | End: 2023-01-04

## 2020-11-13 RX ORDER — ALBUTEROL SULFATE 90 UG/1
1-2 AEROSOL, METERED RESPIRATORY (INHALATION) EVERY 4 HOURS PRN
Qty: 1 INHALER | Refills: 0 | Status: SHIPPED | OUTPATIENT
Start: 2020-11-13 | End: 2022-12-19

## 2020-11-13 NOTE — TELEPHONE ENCOUNTER
Routing refill request to provider for review/approval because:  A break in medication: both medications have not been filled since 2016 and 2017.  ACT is not up to date    ACT Total Scores 4/17/2018 5/6/2019 4/2/2020   ACT TOTAL SCORE - - -   ASTHMA ER VISITS - - -   ASTHMA HOSPITALIZATIONS - - -   ACT TOTAL SCORE (Goal Greater than or Equal to 20) 25 25 25   In the past 12 months, how many times did you visit the emergency room for your asthma without being admitted to the hospital? 0 0 0   In the past 12 months, how many times were you hospitalized overnight because of your asthma? 0 0 0

## 2020-11-24 ENCOUNTER — ANCILLARY PROCEDURE (OUTPATIENT)
Dept: ULTRASOUND IMAGING | Facility: CLINIC | Age: 70
End: 2020-11-24
Attending: SURGERY
Payer: COMMERCIAL

## 2020-11-24 DIAGNOSIS — I83.811 VARICOSE VEINS OF LEG WITH PAIN, RIGHT: ICD-10-CM

## 2020-11-24 PROCEDURE — 93971 EXTREMITY STUDY: CPT | Mod: RT | Performed by: SURGERY

## 2020-11-30 ENCOUNTER — VIRTUAL VISIT (OUTPATIENT)
Dept: VASCULAR SURGERY | Facility: CLINIC | Age: 70
End: 2020-11-30
Payer: COMMERCIAL

## 2020-11-30 DIAGNOSIS — I83.811 VARICOSE VEINS OF LEG WITH PAIN, RIGHT: Primary | ICD-10-CM

## 2020-11-30 PROCEDURE — 99213 OFFICE O/P EST LOW 20 MIN: CPT | Mod: 95 | Performed by: SURGERY

## 2020-11-30 NOTE — Clinical Note
Multiple, medic necessary stab phlebectomies right lower extremity-10-20.  1 to 2 weeks later ultrasound-guided, medically necessary sclerotherapy

## 2020-11-30 NOTE — PROGRESS NOTES
"Tiffany Stovall is a 70 year old female who is being evaluated via a billable video visit.      The patient has been notified of following:     \"This video visit will be conducted via a call between you and your physician/provider. We have found that certain health care needs can be provided without the need for an in-person physical exam.  This service lets us provide the care you need with a video conversation.  If a prescription is necessary we can send it directly to your pharmacy.  If lab work is needed we can place an order for that and you can then stop by our lab to have the test done at a later time.    Video visits are billed at different rates depending on your insurance coverage.  Please reach out to your insurance provider with any questions.    If during the course of the call the physician/provider feels a video visit is not appropriate, you will not be charged for this service.\"    Patient has given verbal consent for Video visit? Yes  How would you like to obtain your AVS? MyChart  If you are dropped from the video visit, the video invite should be resent to: Text to cell phone: [phone]  Will anyone else be joining your video visit? No        Video-Visit Details    Type of service:  Video Visit    Telephone call Start Time: 3:42 PM  Telephone call End Time: 3:58 PM    Originating Location (pt. Location): Home    Distant Location (provider location):  [facility] [place]     Platform used for Video Visit: [facility]     [facility] Clinic Note    Tiffany Stovall presents in follow-up of recurrent right lower extremity pain and varicose veins.  Please see my consultation of 10/26/2020 for details.  She returned on 11/24/2020 for a right lower extremity venous competency study, the results of which we will discuss today.    Physical Exam blood pressure is  General: Pleasant female in no acute distress.  Blood pressure 151/64, pulse 78 and regular    Ultrasound:  Right " lower extremity: No deep vein thrombosis.  Her right common femoral through mid femoral veins are incompetent.  Her distal femoral and popliteal veins are competent.    Her right great saphenous vein is surgically absent in the proximal to mid thigh but patent and competent from the mid thigh to the knee.  It is incompetent from the knee to the ankle but is quite small.    The right small saphenous vein is not visualized in the proximal mid calf.  It is competent in the distal calf.  The saphenous popliteal junction is incompetent.    There is a 3.7 mm diameter incompetent vein branch coursing from the stump of the previously treated right small saphenous vein coursing down the posterior right calf.  Reflux time in this incompetent vein branch is 8.5 seconds.    The anterior sensory saphenous vein is competent.    The vein of Giacomini is not visualized.    Assessment:  Recurrent, symptomatic right lower extremity varicose veins.  We discussed options of continued conservative management with compression, elevation, dietary measures and exercise.  She has been pursuing these for some time but only to have her symptoms worsen.    If she chose treatment, there is no axial vein that needs ablation.  The origin of the symptomatic veins in the popliteal fossa and posterior calf are from the stump of the previously treated small saphenous vein.  The stump is too short to perform ablation.    I think she would be best served with medically necessary phlebectomies to remove the varicose vein followed by ultrasound-guided, medically necessary sclerotherapy to treat the popliteal fossa varicosities between the stump of the small saphenous vein and the subcutaneous tissues.  She also has subfascial varicosities along the course of the previously treated right small saphenous vein which could also be treated with ultrasound-guided sclerotherapy at the same session.    Details of phlebectomy including risks of bleeding,  infection and nerve injury were discussed.  Details of sclerotherapy including risks of allergic reaction, deep vein thrombosis, superficial thrombophlebitis and hyperpigmentation were discussed.    Plan:  Please see above    Amadou Wellington MD    Dictated using Dragon voice recognition software which may result in transcription errors

## 2020-11-30 NOTE — LETTER
"    11/30/2020         RE: Tiffany Stovall  [address]  [facility] [address]        Dear Colleague,    Thank you for referring your patient, Tiffany Stovall, to the [facility] [place]. Please see a copy of my visit note below.    Tiffany Stovall is a 70 year old female who is being evaluated via a billable video visit.      The patient has been notified of following:     \"This video visit will be conducted via a call between you and your physician/provider. We have found that certain health care needs can be provided without the need for an in-person physical exam.  This service lets us provide the care you need with a video conversation.  If a prescription is necessary we can send it directly to your pharmacy.  If lab work is needed we can place an order for that and you can then stop by our lab to have the test done at a later time.    Video visits are billed at different rates depending on your insurance coverage.  Please reach out to your insurance provider with any questions.    If during the course of the call the physician/provider feels a video visit is not appropriate, you will not be charged for this service.\"    Patient has given verbal consent for Video visit? Yes  How would you like to obtain your AVS? MyChart  If you are dropped from the video visit, the video invite should be resent to: Text to cell phone: [phone]  Will anyone else be joining your video visit? No        Video-Visit Details    Type of service:  Video Visit    Telephone call Start Time: 3:42 PM  Telephone call End Time: 3:58 PM    Originating Location (pt. Location): Home    Distant Location (provider location):  [facility] [place]     Platform used for Video Visit: [employer]     [facility] Clinic Note    Tiffany Stovall presents in follow-up of recurrent right lower extremity pain and varicose veins.  Please see my consultation of 10/26/2020 for details.  She " returned on 11/24/2020 for a right lower extremity venous competency study, the results of which we will discuss today.    Physical Exam blood pressure is  General: Pleasant female in no acute distress.  Blood pressure 151/64, pulse 78 and regular    Ultrasound:  Right lower extremity: No deep vein thrombosis.  Her right common femoral through mid femoral veins are incompetent.  Her distal femoral and popliteal veins are competent.    Her right great saphenous vein is surgically absent in the proximal to mid thigh but patent and competent from the mid thigh to the knee.  It is incompetent from the knee to the ankle but is quite small.    The right small saphenous vein is not visualized in the proximal mid calf.  It is competent in the distal calf.  The saphenous popliteal junction is incompetent.    There is a 3.7 mm diameter incompetent vein branch coursing from the stump of the previously treated right small saphenous vein coursing down the posterior right calf.  Reflux time in this incompetent vein branch is 8.5 seconds.    The anterior sensory saphenous vein is competent.    The vein of Giacomini is not visualized.    Assessment:  Recurrent, symptomatic right lower extremity varicose veins.  We discussed options of continued conservative management with compression, elevation, dietary measures and exercise.  She has been pursuing these for some time but only to have her symptoms worsen.    If she chose treatment, there is no axial vein that needs ablation.  The origin of the symptomatic veins in the popliteal fossa and posterior calf are from the stump of the previously treated small saphenous vein.  The stump is too short to perform ablation.    I think she would be best served with medically necessary phlebectomies to remove the varicose vein followed by ultrasound-guided, medically necessary sclerotherapy to treat the popliteal fossa varicosities between the stump of the small saphenous vein and the  subcutaneous tissues.  She also has subfascial varicosities along the course of the previously treated right small saphenous vein which could also be treated with ultrasound-guided sclerotherapy at the same session.    Details of phlebectomy including risks of bleeding, infection and nerve injury were discussed.  Details of sclerotherapy including risks of allergic reaction, deep vein thrombosis, superficial thrombophlebitis and hyperpigmentation were discussed.    Plan:  Please see above    Amadou Wellington MD    Dictated using Dragon voice recognition software which may result in transcription errors                      Again, thank you for allowing me to participate in the care of your patient.        Sincerely,        Amadou Wellington MD

## 2020-12-02 DIAGNOSIS — E03.4 HYPOTHYROIDISM DUE TO ACQUIRED ATROPHY OF THYROID: ICD-10-CM

## 2020-12-02 DIAGNOSIS — E78.5 HYPERLIPIDEMIA LDL GOAL <130: ICD-10-CM

## 2020-12-02 DIAGNOSIS — I10 HYPERTENSION GOAL BP (BLOOD PRESSURE) < 140/90: ICD-10-CM

## 2020-12-03 RX ORDER — LOSARTAN POTASSIUM AND HYDROCHLOROTHIAZIDE 25; 100 MG/1; MG/1
TABLET ORAL
Qty: 90 TABLET | Refills: 3 | OUTPATIENT
Start: 2020-12-03

## 2020-12-03 RX ORDER — LEVOTHYROXINE SODIUM 112 UG/1
TABLET ORAL
Qty: 90 TABLET | Refills: 2 | Status: SHIPPED | OUTPATIENT
Start: 2020-12-03 | End: 2021-03-07 | Stop reason: DRUGHIGH

## 2020-12-03 RX ORDER — ATORVASTATIN CALCIUM 40 MG/1
TABLET, FILM COATED ORAL
Qty: 90 TABLET | Refills: 2 | Status: SHIPPED | OUTPATIENT
Start: 2020-12-03 | End: 2021-12-20

## 2020-12-03 RX ORDER — POTASSIUM CHLORIDE 1500 MG/1
TABLET, EXTENDED RELEASE ORAL
Qty: 90 TABLET | Refills: 2 | Status: SHIPPED | OUTPATIENT
Start: 2020-12-03 | End: 2021-06-14

## 2020-12-03 NOTE — TELEPHONE ENCOUNTER
Please verify with patient what she is taking.  Looks like should have refill of losartan 50 mg and hydrochlorothiazide 12.5 mg remaining at the pharmacy

## 2020-12-03 NOTE — TELEPHONE ENCOUNTER
Routing refill request to provider for review/approval because:  Drug not active on patient's medication list. Separate medications on medication list with different dosing.    Other medications sent to new pharmacy per protocol.    Amelia Arana RN, [facility] Triage

## 2020-12-03 NOTE — TELEPHONE ENCOUNTER
Patient contacted. She states medication use to be combination but because of cancer problem with the , medication got changed to separate pills for Hydrochlorothiazide 12.5mg and Losartan 50mg once daily. She does not need refills for any medication at this time. She has one or more refills left on all medications with [facility] mail order pharmacy.    [facility] contacted and pharmacy [facility] states that there was a glitch in the computer system causing refill requests being sent for patients from 2018. Medications cancelled.    Trey Stapleton CMA

## 2020-12-07 DIAGNOSIS — Z96.652 STATUS POST TOTAL LEFT KNEE REPLACEMENT: ICD-10-CM

## 2020-12-09 RX ORDER — AMOXICILLIN 500 MG/1
2000 CAPSULE ORAL ONCE
Qty: 4 CAPSULE | Refills: 3 | Status: SHIPPED | OUTPATIENT
Start: 2020-12-09 | End: 2020-12-09

## 2020-12-09 NOTE — TELEPHONE ENCOUNTER
Routing refill request to provider for review/approval because:  Drug not active on patient's medication list    Amelia Arana RN, [facility] Triage

## 2021-01-10 ENCOUNTER — HEALTH MAINTENANCE LETTER (OUTPATIENT)
Age: 71
End: 2021-01-10

## 2021-02-02 ENCOUNTER — TRANSFERRED RECORDS (OUTPATIENT)
Dept: HEALTH INFORMATION MANAGEMENT | Facility: CLINIC | Age: 71
End: 2021-02-02

## 2021-03-01 ENCOUNTER — VIRTUAL VISIT (OUTPATIENT)
Dept: FAMILY MEDICINE | Facility: CLINIC | Age: 71
End: 2021-03-01
Payer: COMMERCIAL

## 2021-03-01 DIAGNOSIS — E78.5 HYPERLIPIDEMIA LDL GOAL <130: ICD-10-CM

## 2021-03-01 DIAGNOSIS — I48.0 PAF (PAROXYSMAL ATRIAL FIBRILLATION) (H): ICD-10-CM

## 2021-03-01 DIAGNOSIS — I10 HYPERTENSION GOAL BP (BLOOD PRESSURE) < 140/90: Primary | ICD-10-CM

## 2021-03-01 DIAGNOSIS — E03.4 HYPOTHYROIDISM DUE TO ACQUIRED ATROPHY OF THYROID: ICD-10-CM

## 2021-03-01 DIAGNOSIS — R74.8 ELEVATED LIVER ENZYMES: ICD-10-CM

## 2021-03-01 PROCEDURE — 99214 OFFICE O/P EST MOD 30 MIN: CPT | Mod: 95 | Performed by: FAMILY MEDICINE

## 2021-03-01 RX ORDER — HYDROCHLOROTHIAZIDE 25 MG/1
25 TABLET ORAL DAILY
Qty: 90 TABLET | Refills: 0 | Status: SHIPPED | OUTPATIENT
Start: 2021-03-01 | End: 2021-05-04

## 2021-03-01 NOTE — PROGRESS NOTES
Amita is a 70 year old who is being evaluated via a billable video visit.      How would you like to obtain your AVS? MyChart  If the video visit is dropped, the invitation should be resent by: Text to cell phone: [phone]  Will anyone else be joining your video visit? No    Video Start Time: 4:59 pm    Assessment & Plan     Hypertension goal BP (blood pressure) < 140/90  BP currently controlled with diltiazem 120, losartan 50 and hydrochlorothiazide 25.  Labs ordered.  Continue current treatment.   - hydrochlorothiazide (HYDRODIURIL) 25 MG tablet; Take 1 tablet (25 mg) by mouth daily  - Albumin Random Urine Quantitative with Creat Ratio; Future  - **CBC with platelets FUTURE anytime; Future  - Lipid panel reflex to direct LDL Fasting; Future    Elevated liver enzymes  Reassess levels.   - **Comprehensive metabolic panel FUTURE anytime; Future    Hyperlipidemia LDL goal <130  Return to clinic for fasting labs in anticipation of annual.   - **Comprehensive metabolic panel FUTURE anytime; Future  - Lipid panel reflex to direct LDL Fasting; Future    Hypothyroidism due to acquired atrophy of thyroid  As above.   - **TSH with free T4 reflex FUTURE anytime; Future    PAF (paroxysmal atrial fibrillation) (H)  Ongoing care with cardiology.  Continues on xarleto.    - Magnesium; Future  - **CBC with platelets FUTURE anytime; Future    Review of prior external note(s) from - [facility]where information from [employer] reviewed  Review of the result(s) of each unique test - stress testing, labs         Patient Instructions   Lab orders placed for upcoming cardiology appointment and annual exam.    Continue to monitor BP at home.    Continue current medication.  hydrochlorothiazide refill sent.       Return in about 4 weeks (around 3/29/2021) for wellness exam.    Darby Williamson MD  [facility]    Subjective   Amita is a 70 year old who presents for the following health issues     HPI     Erratic  heart rhythm started around Thanksgiving after about 14 months without irregular beats.    Atrial fib episodes were documented and she was started on metoprolol Jan 9th by cardiology.  - significant side effects of bronchospasm and dizziness, low pulse.  cut Losartan down to 25 mg.  Follow up with cardiology Feb 2.  Switch to Diltiazem 120 mg with losartan 25 mg and continued on hydrochlorothiazide at 12.5 mg.  Was noting elevated BPs at home so increased to Losartan 50 mg in AM.  BP's 140/80-90's.  Last wednesday stress testing 140/80 then at clinic 170/90.  Normal stress testing - couplets and 58 beats atrial tachy.  Weight gain 4# since early Feb.      Monitor BP - 3-4 times a day.  Friday am called back with recommendations.  Increase to Cardizem 240 mg and decrease to 25 mg losartan and hydrochlorothiazide at 12.5.  Concerns about doing this due to side effects to the cardizem - bowel function alteration and no a fib episodes on the current dose diltiazem.  Determine to make the following changes:  Cardizem 120 mg Losartan 50 mg hydrochlorothiazide 25 mg. - currently tolerating this well with loss of the 4 # of weight gain.  /65, HR 63 and weight 149#   Needs to do labs for previsit for cardiology appointment and wants to do yearly labs then as well.  Orders needed.  Hypertension Follow-up      Do you check your blood pressure regularly outside of the clinic? Yes     Are you following a low salt diet? Yes    Are your blood pressures ever more than 140 on the top number (systolic) OR more   than 90 on the bottom number (diastolic), for example 140/90? Yes      How many servings of fruits and vegetables do you eat daily?  4 or more    On average, how many sweetened beverages do you drink each day (Examples: soda, juice, sweet tea, etc.  Do NOT count diet or artificially sweetened beverages)?   0    How many days per week do you exercise enough to make your heart beat faster? 5    How many minutes a day do  you exercise enough to make your heart beat faster? 60 or more    How many days per week do you miss taking your medication? 0        Review of Systems   Constitutional, HEENT, cardiovascular, pulmonary, gi and gu systems are negative, except as otherwise noted.      Objective           Vitals:  No vitals were obtained today due to virtual visit.    Physical Exam   GENERAL: Healthy, alert and no distress  EYES: Eyes grossly normal to inspection.  No discharge or erythema, or obvious scleral/conjunctival abnormalities.  RESP: No audible wheeze, cough, or visible cyanosis.  No visible retractions or increased work of breathing.    SKIN: Visible skin clear. No significant rash, abnormal pigmentation or lesions.  NEURO: Cranial nerves grossly intact.  Mentation and speech appropriate for age.  PSYCH: Mentation appears normal, affect normal/bright, judgement and insight intact, normal speech and appearance well-groomed.    Orders Only on 08/25/2020   Component Date Value Ref Range Status     Creatinine Urine 08/25/2020 88  mg/dL Final     Albumin Urine mg/L 08/25/2020 7  mg/L Final     Albumin Urine mg/g Cr 08/25/2020 7.47  0 - 25 mg/g Cr Final     TSH 08/25/2020 0.53  0.40 - 4.00 mU/L Final     WBC 08/25/2020 4.8  4.0 - 11.0 10e9/L Final     RBC Count 08/25/2020 4.40  3.8 - 5.2 10e12/L Final     Hemoglobin 08/25/2020 13.0  11.7 - 15.7 g/dL Final     Hematocrit 08/25/2020 39.7  35.0 - 47.0 % Final     MCV 08/25/2020 90  78 - 100 fl Final     MCH 08/25/2020 29.5  26.5 - 33.0 pg Final     MCHC 08/25/2020 32.7  31.5 - 36.5 g/dL Final     RDW 08/25/2020 12.2  10.0 - 15.0 % Final     Platelet Count 08/25/2020 249  150 - 450 10e9/L Final     Sodium 08/25/2020 134  133 - 144 mmol/L Final     Potassium 08/25/2020 4.0  3.4 - 5.3 mmol/L Final     Chloride 08/25/2020 102  94 - 109 mmol/L Final     Carbon Dioxide 08/25/2020 28  20 - 32 mmol/L Final     Anion Gap 08/25/2020 4  3 - 14 mmol/L Final     Glucose 08/25/2020 94  70 - 99  mg/dL Final    Fasting specimen     Urea Nitrogen 08/25/2020 16  7 - 30 mg/dL Final     Creatinine 08/25/2020 0.77  0.52 - 1.04 mg/dL Final     GFR Estimate 08/25/2020 80  >60 mL/min/[1.73_m2] Final    Comment: Non  GFR Calc  Starting 12/18/2018, serum creatinine based estimated GFR (eGFR) will be   calculated using the Chronic Kidney Disease Epidemiology Collaboration   (CKD-EPI) equation.       GFR Estimate If Black 08/25/2020 >90  >60 mL/min/[1.73_m2] Final    Comment:  GFR Calc  Starting 12/18/2018, serum creatinine based estimated GFR (eGFR) will be   calculated using the Chronic Kidney Disease Epidemiology Collaboration   (CKD-EPI) equation.       Calcium 08/25/2020 9.5  8.5 - 10.1 mg/dL Final     Bilirubin Total 08/25/2020 0.6  0.2 - 1.3 mg/dL Final     Albumin 08/25/2020 4.4  3.4 - 5.0 g/dL Final     Protein Total 08/25/2020 7.4  6.8 - 8.8 g/dL Final     Alkaline Phosphatase 08/25/2020 66  40 - 150 U/L Final     ALT 08/25/2020 36  0 - 50 U/L Final     AST 08/25/2020 25  0 - 45 U/L Final     Cholesterol 08/25/2020 141  <200 mg/dL Final     Triglycerides 08/25/2020 59  <150 mg/dL Final    Fasting specimen     HDL Cholesterol 08/25/2020 64  >49 mg/dL Final     LDL Cholesterol Calculated 08/25/2020 65  <100 mg/dL Final    Desirable:       <100 mg/dl     Non HDL Cholesterol 08/25/2020 77  <130 mg/dL Final               Video-Visit Details    Type of service:  Video Visit    Video End Time:5:18 pm    Originating Location (pt. Location): Home    Distant Location (provider location):  [facility]     Platform used for Video Visit: [employer]

## 2021-03-02 PROBLEM — I48.0 PAF (PAROXYSMAL ATRIAL FIBRILLATION) (H): Status: ACTIVE | Noted: 2021-03-02

## 2021-03-02 RX ORDER — DILTIAZEM HYDROCHLORIDE 120 MG/1
1 CAPSULE, COATED, EXTENDED RELEASE ORAL EVERY EVENING
COMMUNITY
Start: 2021-02-02

## 2021-03-02 NOTE — PATIENT INSTRUCTIONS
Lab orders placed for upcoming cardiology appointment and annual exam.    Continue to monitor BP at home.    Continue current medication.  hydrochlorothiazide refill sent.

## 2021-03-05 DIAGNOSIS — E03.4 HYPOTHYROIDISM DUE TO ACQUIRED ATROPHY OF THYROID: ICD-10-CM

## 2021-03-05 DIAGNOSIS — R74.8 ELEVATED LIVER ENZYMES: ICD-10-CM

## 2021-03-05 DIAGNOSIS — I10 HYPERTENSION GOAL BP (BLOOD PRESSURE) < 140/90: ICD-10-CM

## 2021-03-05 DIAGNOSIS — E78.5 HYPERLIPIDEMIA LDL GOAL <130: ICD-10-CM

## 2021-03-05 DIAGNOSIS — I48.0 PAF (PAROXYSMAL ATRIAL FIBRILLATION) (H): ICD-10-CM

## 2021-03-05 LAB
ALBUMIN SERPL-MCNC: 4 G/DL (ref 3.4–5)
ALP SERPL-CCNC: 68 U/L (ref 40–150)
ALT SERPL W P-5'-P-CCNC: 34 U/L (ref 0–50)
ANION GAP SERPL CALCULATED.3IONS-SCNC: 6 MMOL/L (ref 3–14)
AST SERPL W P-5'-P-CCNC: 23 U/L (ref 0–45)
BILIRUB SERPL-MCNC: 0.5 MG/DL (ref 0.2–1.3)
BUN SERPL-MCNC: 15 MG/DL (ref 7–30)
CALCIUM SERPL-MCNC: 9.2 MG/DL (ref 8.5–10.1)
CHLORIDE SERPL-SCNC: 99 MMOL/L (ref 94–109)
CHOLEST SERPL-MCNC: 126 MG/DL
CO2 SERPL-SCNC: 26 MMOL/L (ref 20–32)
CREAT SERPL-MCNC: 0.7 MG/DL (ref 0.52–1.04)
CREAT UR-MCNC: 69 MG/DL
ERYTHROCYTE [DISTWIDTH] IN BLOOD BY AUTOMATED COUNT: 12.3 % (ref 10–15)
GFR SERPL CREATININE-BSD FRML MDRD: 89 ML/MIN/{1.73_M2}
GLUCOSE SERPL-MCNC: 95 MG/DL (ref 70–99)
HCT VFR BLD AUTO: 36 % (ref 35–47)
HDLC SERPL-MCNC: 66 MG/DL
HGB BLD-MCNC: 11.8 G/DL (ref 11.7–15.7)
LDLC SERPL CALC-MCNC: 51 MG/DL
MAGNESIUM SERPL-MCNC: 1.8 MG/DL (ref 1.6–2.3)
MCH RBC QN AUTO: 29.4 PG (ref 26.5–33)
MCHC RBC AUTO-ENTMCNC: 32.8 G/DL (ref 31.5–36.5)
MCV RBC AUTO: 90 FL (ref 78–100)
MICROALBUMIN UR-MCNC: <5 MG/L
MICROALBUMIN/CREAT UR: NORMAL MG/G CR (ref 0–25)
NONHDLC SERPL-MCNC: 60 MG/DL
PLATELET # BLD AUTO: 296 10E9/L (ref 150–450)
POTASSIUM SERPL-SCNC: 3.9 MMOL/L (ref 3.4–5.3)
PROT SERPL-MCNC: 7.1 G/DL (ref 6.8–8.8)
RBC # BLD AUTO: 4.02 10E12/L (ref 3.8–5.2)
SODIUM SERPL-SCNC: 131 MMOL/L (ref 133–144)
T4 FREE SERPL-MCNC: 1.44 NG/DL (ref 0.76–1.46)
TRIGL SERPL-MCNC: 44 MG/DL
TSH SERPL DL<=0.005 MIU/L-ACNC: 0.17 MU/L (ref 0.4–4)
WBC # BLD AUTO: 5.7 10E9/L (ref 4–11)

## 2021-03-05 PROCEDURE — 80061 LIPID PANEL: CPT | Performed by: FAMILY MEDICINE

## 2021-03-05 PROCEDURE — 84439 ASSAY OF FREE THYROXINE: CPT | Performed by: FAMILY MEDICINE

## 2021-03-05 PROCEDURE — 83735 ASSAY OF MAGNESIUM: CPT | Performed by: FAMILY MEDICINE

## 2021-03-05 PROCEDURE — 82043 UR ALBUMIN QUANTITATIVE: CPT | Performed by: FAMILY MEDICINE

## 2021-03-05 PROCEDURE — 36415 COLL VENOUS BLD VENIPUNCTURE: CPT | Performed by: FAMILY MEDICINE

## 2021-03-05 PROCEDURE — 85027 COMPLETE CBC AUTOMATED: CPT | Performed by: FAMILY MEDICINE

## 2021-03-05 PROCEDURE — 84443 ASSAY THYROID STIM HORMONE: CPT | Performed by: FAMILY MEDICINE

## 2021-03-05 PROCEDURE — 80053 COMPREHEN METABOLIC PANEL: CPT | Performed by: FAMILY MEDICINE

## 2021-03-06 ENCOUNTER — MYC MEDICAL ADVICE (OUTPATIENT)
Dept: FAMILY MEDICINE | Facility: CLINIC | Age: 71
End: 2021-03-06

## 2021-03-06 DIAGNOSIS — E03.4 HYPOTHYROIDISM DUE TO ACQUIRED ATROPHY OF THYROID: Primary | ICD-10-CM

## 2021-03-06 DIAGNOSIS — E03.4 HYPOTHYROIDISM DUE TO ACQUIRED ATROPHY OF THYROID: ICD-10-CM

## 2021-03-06 NOTE — RESULT ENCOUNTER NOTE
Dear Amita Williamson is out of the office and I am reviewing your results.   Your urine does not show excess protein.   Your thyroid test shows that we have you on too much thyroid medication.  It looks like you have been on the 112 mcg daily for some time. I recommend decreasing to 100 mcg daily and recheck labs in 6-8 weeks.  Please let us know which pharmacy you wish to use.  Your cholesterol is excellent.   Your electrolytes, blood sugar, kidney function and liver function were normal.    Your magnesium level is normal.   Your blood counts and hemoglobin were normal.    Keep your upcoming appointment with Dr. Williamson as scheduled.   Please call or MyChart my office with any questions or concerns.    Keerthi Poole, PAC

## 2021-03-07 RX ORDER — LEVOTHYROXINE SODIUM 100 UG/1
100 TABLET ORAL DAILY
Qty: 90 TABLET | Refills: 1 | Status: SHIPPED | OUTPATIENT
Start: 2021-03-07 | End: 2021-05-10

## 2021-03-10 ENCOUNTER — HOSPITAL ENCOUNTER (OUTPATIENT)
Dept: MAMMOGRAPHY | Facility: CLINIC | Age: 71
Discharge: HOME | End: 2021-03-10
Attending: FAMILY MEDICINE | Admitting: FAMILY MEDICINE
Payer: COMMERCIAL

## 2021-03-10 DIAGNOSIS — Z12.31 VISIT FOR SCREENING MAMMOGRAM: ICD-10-CM

## 2021-03-10 PROCEDURE — 77063 BREAST TOMOSYNTHESIS BI: CPT

## 2021-03-12 ENCOUNTER — MYC MEDICAL ADVICE (OUTPATIENT)
Dept: VASCULAR SURGERY | Facility: CLINIC | Age: 71
End: 2021-03-12

## 2021-03-12 NOTE — TELEPHONE ENCOUNTER
Patient was last seen on 11/30/2020 with Dr. Wellington regarding procedure on right leg, phlebectomies 10-20 stabs, with 1-2 weeks postop ultrasound guided sclerotherapy. Surgery scheduling pending. On 2/2/2021, patient developed A. Fib and was started on Xalrelto 20 mg PO daily. Her last A. Fib was 2/3/2021 with no reoccurrence. She had a video visit with her cardiologist, whom encouraged her to proceed with the vein surgery. Per cardiologist, her risk of a stroke is relatively low, and is fine to stop Xalrelto prior. Per Dr. Wellington, once surgery is scheduled, patient can stop her Xalrelto 3 days prior to surgery and resume the night after her procedure.     Patient works as a school nurse substitute. Regarding days off, patient was encouraged to take 48 hours off of work postop.     Also, she is planning to do a trip via driving later this year. Informed patient to avoid long car drive 2-3 weeks postop. She will be scheduled for a 1-2 weeks postop ultrasound guided sclerotherapy after her phlebectomy procedure as well.     Patient acknowledges understanding of the above information. Writer will route information to surgery scheduler.

## 2021-03-19 ENCOUNTER — TELEPHONE (OUTPATIENT)
Dept: VASCULAR SURGERY | Facility: CLINIC | Age: 71
End: 2021-03-19

## 2021-03-19 DIAGNOSIS — I83.811 VARICOSE VEINS OF LEG WITH PAIN, RIGHT: Primary | ICD-10-CM

## 2021-03-19 NOTE — CONFIDENTIAL NOTE
Vein Clinic Preoperative Nurse Call    Procedure: Right leg phlebs (med nec)  Date: 3/26/2021  Surgeon: Dr. Wellington  Time: 1300  Check in time: 1200    Called and left detailed message. Informed patient: when to check in (1200) to sign consent, to bring their preop medications in their original bottle with them (2 mg ativan, 0.1mg clonidine). Patient will take the medications after signing the consent to the procedure. Instructed patient to wear a mask and wear loose-fitting comfortable clothing, and bring their compression hose. Ensured patient has a /someone that will be responsible for them the rest of the day. No visitors are allowed in the clinic, but would like  to also be present at check in. Once patient is taken into the procedure room,  can either stay in the parking lot or leave. If  does leave, IF POSSIBLE, we ask that they do not go more than 15-20 mins from our clinic. Once procedure is completed, we will keep patient in recovery for 30-45 mins, and call  with aftercare instructions. Informed patient, that if possible, they should sit in the backseat to elevate their leg on the ride home.    Pt needs thigh-high compression hose for procedure. Status of the hose: patient is in the process of purchasing them. She will not need them on procedure day, but for her upcoming ultrasound guided sclerotherapy.    Special instructions: Patient aware from previous conversation, to stop Xalrelto 3 days prior to procedure.    Special COVID-19 instructions: Patient aware they need to wear a mask to our clinic and during their procedure. Patient aware that their  cannot come into the clinic and must wait in car. Nurse will call pt's  when procedure is over.    Patient understands that if they have any of the following symptoms (fever, cough, shortness of breath, rash), they need to notify us immediately to cancel their procedure and will have to reschedule for a later  date.        Kal Knight RN  3/19/2021

## 2021-03-23 ENCOUNTER — TELEPHONE (OUTPATIENT)
Dept: VASCULAR SURGERY | Facility: CLINIC | Age: 71
End: 2021-03-23

## 2021-03-23 RX ORDER — LORAZEPAM 1 MG/1
TABLET ORAL
Qty: 2 TABLET | Refills: 0 | Status: SHIPPED | OUTPATIENT
Start: 2021-03-23 | End: 2021-03-29

## 2021-03-23 RX ORDER — AMOXICILLIN 500 MG/1
CAPSULE ORAL
Qty: 4 CAPSULE | Refills: 0 | Status: SHIPPED | OUTPATIENT
Start: 2021-03-23 | End: 2022-03-10

## 2021-03-23 RX ORDER — CLONIDINE HYDROCHLORIDE 0.1 MG/1
TABLET ORAL
Qty: 1 TABLET | Refills: 0 | Status: SHIPPED | OUTPATIENT
Start: 2021-03-23 | End: 2021-03-29

## 2021-03-23 NOTE — TELEPHONE ENCOUNTER
Called patient to answer her questions. She had questions on if she could eat and drink prior to procedure. Pt can eat and drink normally and it is encouraged to eat prior to coming. She verbalized understanding.

## 2021-03-26 ENCOUNTER — OFFICE VISIT (OUTPATIENT)
Dept: VASCULAR SURGERY | Facility: CLINIC | Age: 71
End: 2021-03-26
Payer: COMMERCIAL

## 2021-03-26 VITALS — SYSTOLIC BLOOD PRESSURE: 141 MMHG | HEART RATE: 74 BPM | DIASTOLIC BLOOD PRESSURE: 78 MMHG | OXYGEN SATURATION: 97 %

## 2021-03-26 DIAGNOSIS — I83.811 VARICOSE VEINS OF RIGHT LOWER EXTREMITY WITH PAIN: Primary | ICD-10-CM

## 2021-03-26 PROCEDURE — 37765 STAB PHLEB VEINS XTR 10-20: CPT | Mod: RT | Performed by: SURGERY

## 2021-03-26 RX ORDER — HYDROCODONE BITARTRATE AND ACETAMINOPHEN 5; 325 MG/1; MG/1
1 TABLET ORAL EVERY 6 HOURS PRN
Refills: 0 | Status: CANCELLED | OUTPATIENT
Start: 2021-03-26

## 2021-03-26 NOTE — PROGRESS NOTES
Vein Clinic Procedure Note    Indications:  Recurrent right leg pain and varicose veins; status post radiofrequency ablation    Procedure:  1. Multiple stab phlebectomies right lower extremity (medically necessary-20 stabs)    Procedure Description  Details of procedure including risks of bleeding, infection, nerve injury, scarring, hyperpigmentation and deep vein thrombosis were discussed.  Patient voiced understanding and wished to proceed.  Informed consent was obtained.    I had the patient stand and marked varicosities coursing from the popliteal crease to the mid to distal posterior calf, wrapping medially to the medial calf and anterior leg with an indelible marker.  We proceeded to the operating room and had the patient lie prone on the operating table.  I imaged her right popliteal fossa with ultrasound to confirm the anatomy of the the origin of the varicosities and there relationship to the previously treated small saphenous vein stump from which the varicosities were excited.  We will be performing ultrasound-guided, medically necessary sclerotherapy of these subfascial varicosities in about 2 weeks.    We prepped and draped the right lower extremity in a sterile fashion.  We then took a timeout to confirm the appropriate operative site and procedure: Multiple stab phlebectomies right lower extremity.     Phlebectomies  Tumescent anesthetic was injected around each of the marked varicosities the block allowed to take effect.  We made stab wounds beside each of the marked varicosities with 11 scalpel, retrieved the veins with either vein hooks or lorena hooks, clamped them with mosquito clamps and a avulsed them.  Hemostasis was secured with pressure.    After completing the phlebectomies, the leg was cleaned with saline solution and petroleum jelly applied to the skin.  We then dressed the leg with ABD pads, cast padding and an Ace bandage from the toes to the groin.  We observe the patient for 30  minutes to ensure excellent stasis then took her to her in a wheelchair.  Post procedure instructions were given in verbal and written form to the patient and her , both of whom voiced understanding and his questions were answered.    The patient tolerated the procedure well without evidence of allergic reaction or other complications and will return in 48 hours for wound check and dressing change.    Phlebectomy    Date/Time: 3/26/2021 2:01 PM  Performed by: Amadou Wellington MD  Authorized by: Amadou Wellington MD     Time out: Immediately prior to the procedure a time out was called    Preparation: Patient was prepped and draped in usual sterile fashion    1st Assist:  Phuong Conde, CST/CSFA  Circulator:  Kal Knight RN  Procedure:  Phlebectomies  Type:  Medically Necessary  Procedure side:  Right  Stabs:  10-20  Patient tolerance:  Patient tolerated the procedure well with no immediate complications  Wrap/Hose:  Wraps        Flowsheet Data 3/26/2021   Procedure Start Time:  1:24 PM   Prep: Chloraprep   Side: Right   # PHLEB Sites: Right le   Sedation taken: Yes   Pre Pt. Physical / Cognitive Limitations: WNL   TOTAL Tumescent Injected volume (ml): 96   Max Volume Tumescent (ml): 286   Post Pt. Physical / Cognitive Limitations: WNL   Procedure End Time:  1:49 PM   D/C Instructions given, states readiness to leave and escorted to car: Yes       C Darlin Wellington MD    Dictated using Dragon voice recognition software which may result in transcription errorsPre-procedure Nursing Note    Tiffany Stovall presents to clinic for Vein Procedure  .   /Person Responsible for Patient: Arnold Johnson ()  Phone Number: [phone] (ok to [facility])    Prophylactic Medication:Antibiotics, 60426 mg,   Time Taken: 1206   Sedation Medication: Ativan, 2 mg ,   Time Taken: 1206 and Clonidine, 0.1 mg,   Time Taken: 1206  Compression Stockings: Hose at home  The procedure is being  performed on RLE.  Patient understanding of procedure matches consent? YES    Patient's pre-procedure medications verified by Kal Knight.    Kal Knight RN on 3/26/2021 at 12:06 PM

## 2021-03-26 NOTE — LETTER
3/26/2021         RE: Tiffany Stovall  [address]  [place] [place] [facility] [place] [facility] [address]        Dear Colleague,    Thank you for referring your patient, Tiffany Stovall, to the [facility] [place]. Please see a copy of my visit note below.        Vein Clinic Procedure Note    Indications:  Recurrent right leg pain and varicose veins; status post radiofrequency ablation    Procedure:  1. Multiple stab phlebectomies right lower extremity (medically necessary-20 stabs)    Procedure Description  Details of procedure including risks of bleeding, infection, nerve injury, scarring, hyperpigmentation and deep vein thrombosis were discussed.  Patient voiced understanding and wished to proceed.  Informed consent was obtained.    I had the patient stand and marked varicosities coursing from the popliteal crease to the mid to distal posterior calf, wrapping medially to the medial calf and anterior leg with an indelible marker.  We proceeded to the operating room and had the patient lie prone on the operating table.  I imaged her right popliteal fossa with ultrasound to confirm the anatomy of the the origin of the varicosities and there relationship to the previously treated small saphenous vein stump from which the varicosities were excited.  We will be performing ultrasound-guided, medically necessary sclerotherapy of these subfascial varicosities in about 2 weeks.    We prepped and draped the right lower extremity in a sterile fashion.  We then took a timeout to confirm the appropriate operative site and procedure: Multiple stab phlebectomies right lower extremity.     Phlebectomies  Tumescent anesthetic was injected around each of the marked varicosities the block allowed to take effect.  We made stab wounds beside each of the marked varicosities with 11 scalpel, retrieved the veins with either vein hooks or lorena hooks, clamped them with mosquito clamps and a avulsed them.  Hemostasis was secured  with pressure.    After completing the phlebectomies, the leg was cleaned with saline solution and petroleum jelly applied to the skin.  We then dressed the leg with ABD pads, cast padding and an Ace bandage from the toes to the groin.  We observe the patient for 30 minutes to ensure excellent stasis then took her to her in a wheelchair.  Post procedure instructions were given in verbal and written form to the patient and her , both of whom voiced understanding and his questions were answered.    The patient tolerated the procedure well without evidence of allergic reaction or other complications and will return in 48 hours for wound check and dressing change.    Phlebectomy    Date/Time: 3/26/2021 2:01 PM  Performed by: Amadou Wellington MD  Authorized by: Amadou Wellington MD     Time out: Immediately prior to the procedure a time out was called    Preparation: Patient was prepped and draped in usual sterile fashion    1st Assist:  Phuong Conde, CST/CSFA  Circulator:  Kal Knight RN  Procedure:  Phlebectomies  Type:  Medically Necessary  Procedure side:  Right  Stabs:  10-20  Patient tolerance:  Patient tolerated the procedure well with no immediate complications  Wrap/Hose:  Wraps        Flowsheet Data 3/26/2021   Procedure Start Time:  1:24 PM   Prep: Chloraprep   Side: Right   # PHLEB Sites: Right le   Sedation taken: Yes   Pre Pt. Physical / Cognitive Limitations: WNL   TOTAL Tumescent Injected volume (ml): 96   Max Volume Tumescent (ml): 286   Post Pt. Physical / Cognitive Limitations: WNL   Procedure End Time:  1:49 PM   D/C Instructions given, states readiness to leave and escorted to car: Yes       MONTRELL Wellington MD    Dictated using Dragon voice recognition software which may result in transcription errorsPre-procedure Nursing Note    Tiffany Stovall presents to clinic for Vein Procedure  .   /Person Responsible for Patient: Arnold Johnson ()  Phone  Number: [phone] (ok to [facility])    Prophylactic Medication:Antibiotics, 51378 mg,   Time Taken: 1206   Sedation Medication: Ativan, 2 mg ,   Time Taken: 1206 and Clonidine, 0.1 mg,   Time Taken: 1206  Compression Stockings: Hose at home  The procedure is being performed on RLE.  Patient understanding of procedure matches consent? YES    Patient's pre-procedure medications verified by Kal Knight.    Kal Knight RN on 3/26/2021 at 12:06 PM      Again, thank you for allowing me to participate in the care of your patient.        Sincerely,        Amadou Wellington MD

## 2021-03-26 NOTE — NURSING NOTE
Per Dr. Wellington, ok for patient to cancel 72 hours postop. Patient to call with questions or concerns postop. Acknowledges understanding.

## 2021-03-28 NOTE — PATIENT INSTRUCTIONS
Return to clinic for labs in May/June.    Referral to sleep clinic to assess need for sleep study or other evaluation related to the Atrial Fib.      Patient Education   Personalized Prevention Plan  You are due for the preventive services outlined below.  Your care team is available to assist you in scheduling these services.  If you have already completed any of these items, please share that information with your care team to update in your medical record.  There are no preventive care reminders to display for this patient.

## 2021-03-28 NOTE — PROGRESS NOTES
"  SUBJECTIVE:   Tiffany Stovall is a 70 year old female who presents for Preventive Visit.    {Split Bill scripting  The purpose of this visit is to discuss your medical history and prevent health problems before you are sick. You may be responsible for a co-pay, coinsurance, or deductible if your visit today includes services such as checking on a sore throat, having an x-ray or lab test, or treating and evaluating a new or existing condition :053965}  Patient has been advised of split billing requirements and indicates understanding: {Yes and No:709964}  Are you in the first 12 months of your Medicare Part B coverage?  { :433570::\"No\"}    Physical Health:    In general, how would you rate your overall physical health? { :651769}    Outside of work, how many days during the week do you exercise? { :680095}    Outside of work, approximately how many minutes a day do you exercise?{ :376903}    If you drink alcohol do you typically have >3 drinks per day or >7 drinks per week? { :787589}    Do you usually eat at least 4 servings of fruit and vegetables a day, include whole grains & fiber and avoid regularly eating high fat or \"junk\" foods? { :463675::\"Yes\"}    Do you have any problems taking medications regularly?  { :610220::\"No\"}    Do you have any side effects from medications? { :745716}    Needs assistance for the following daily activities: { :113933}    Which of the following safety concerns are present in your home?  { :322494::\"none identified\"}     Hearing impairment: { :095758}    In the past 6 months, have you been bothered by leaking of urine? { :680175}    Mental Health:    In general, how would you rate your overall mental or emotional health? { :422669}  PHQ-2 Score:      Do you feel safe in your environment? { :587812}    Have you ever done Advance Care Planning? (For example, a Health Directive, POLST, or a discussion with a medical provider or your loved ones about your wishes): { " ":607469}    Additional concerns to address?  {If YES, notify patient they may be responsible for a copay, coinsurance or deductible if the visit today includes services such as checking on a sore throat, having an x-ray or lab test, or treating and evaluating a new or existing condition :344870::\"No\"}    Fall risk:  { :810147}  {If any of the above assessments are answered yes, consider ordering appropriate referrals (Optional):400672::\"click delete button to remove this line now\"}  Cognitive Screening: { :612193}    {Do you have sleep apnea, excessive snoring or daytime drowsiness? (Optional):583692}    {Outside tests to abstract? :437720}    {additional problems to add (Optional):380056}    Reviewed and updated as needed this visit by clinical staff                 Reviewed and updated as needed this visit by Provider                Social History     Tobacco Use     Smoking status: Never Smoker     Smokeless tobacco: Never Used   Substance Use Topics     Alcohol use: Yes     Comment: socially                           Current providers sharing in care for this patient include: {Rooming staff:  Please update Care Team in Rooming Activity, refresh this note and then delete this statement}  Patient Care Team:  Darby Williamson MD as PCP - General (Family Practice)  Darby Williamson MD as Assigned PCP  Jose Luis Foss OD as Assigned Surgical Provider  Amadou Wellington MD as Assigned Heart and Vascular Provider    The following health maintenance items are reviewed in Epic and correct as of today:  Health Maintenance   Topic Date Due     MEDICARE ANNUAL WELLNESS VISIT  05/06/2020     FALL RISK ASSESSMENT  05/06/2020     ASTHMA CONTROL TEST  10/02/2020     PHQ-2  01/01/2021     COVID-19 Vaccine (2 - Moderna 2-dose series) 02/19/2021     ASTHMA ACTION PLAN  07/02/2021     TSH W/FREE T4 REFLEX  03/05/2022     Pneumococcal Vaccine: Pediatrics (0 to 5 Years) and At-Risk Patients (6 to 64 Years) (2 of 2) " "2022     Pneumococcal Vaccine: 65+ Years (2 of 2) 2022     MAMMO SCREENING  03/10/2023     ADVANCE CARE PLANNING  2023     LIPID  2026     COLORECTAL CANCER SCREENING  2027     DTAP/TDAP/TD IMMUNIZATION (3 - Td) 2029     DEXA  2034     HEPATITIS C SCREENING  Completed     INFLUENZA VACCINE  Completed     ZOSTER IMMUNIZATION  Completed     IPV IMMUNIZATION  Aged Out     MENINGITIS IMMUNIZATION  Aged Out     HEPATITIS B IMMUNIZATION  Aged Out     {Chronicprobdata (Optional):937588}  {Decision Support (Optional):582702}    ROS:  {ROS COMP:768368}    OBJECTIVE:   There were no vitals taken for this visit. Estimated body mass index is 23.48 kg/m  as calculated from the following:    Height as of 10/24/19: 1.664 m (5' 5.5\").    Weight as of 10/24/19: 65 kg (143 lb 4.8 oz).  EXAM:   {Exam :026414}    {Diagnostic Test Results (Optional):383995::\"Diagnostic Test Results:\",\"Labs reviewed in Epic\"}    ASSESSMENT / PLAN:   {Diag Picklist:238348}    Patient has been advised of split billing requirements and indicates understanding: {YES / NO:047078::\"Yes\"}    COUNSELING:  {Medicare Counselin}    Estimated body mass index is 23.48 kg/m  as calculated from the following:    Height as of 10/24/19: 1.664 m (5' 5.5\").    Weight as of 10/24/19: 65 kg (143 lb 4.8 oz).    {Weight Management Plan (ACO) Complete if BMI is abnormal-  Ages 18-64  BMI >24.9.  Age 65+ with BMI <23 or >30 (Optional):577743}    She reports that she has never smoked. She has never used smokeless tobacco.    Appropriate preventive services were discussed with this patient, including applicable screening as appropriate for cardiovascular disease, diabetes, osteopenia/osteoporosis, and glaucoma.  As appropriate for age/gender, discussed screening for colorectal cancer, prostate cancer, breast cancer, and cervical cancer. Checklist reviewing preventive services available has been given to the patient.    Reviewed " patients plan of care and provided an AVS. The {CarePlan:253228} for Tiffany meets the Care Plan requirement. This Care Plan has been established and reviewed with the {PATIENT, FAMILY MEMBER, CAREGIVER:187594}.    Counseling Resources:  ATP IV Guidelines  Pooled Cohorts Equation Calculator  Breast Cancer Risk Calculator  BRCA-Related Cancer Risk Assessment: FHS-7 Tool  FRAX Risk Assessment  ICSI Preventive Guidelines  Dietary Guidelines for Americans, 2010  USDA's MyPlate  ASA Prophylaxis  Lung CA Screening    Darby Williamson MD  [facility]

## 2021-03-29 ENCOUNTER — OFFICE VISIT (OUTPATIENT)
Dept: FAMILY MEDICINE | Facility: CLINIC | Age: 71
End: 2021-03-29
Payer: COMMERCIAL

## 2021-03-29 VITALS
OXYGEN SATURATION: 99 % | TEMPERATURE: 98.1 F | BODY MASS INDEX: 24.99 KG/M2 | RESPIRATION RATE: 12 BRPM | DIASTOLIC BLOOD PRESSURE: 80 MMHG | HEIGHT: 65 IN | WEIGHT: 150 LBS | SYSTOLIC BLOOD PRESSURE: 136 MMHG | HEART RATE: 73 BPM

## 2021-03-29 DIAGNOSIS — J45.30 MILD PERSISTENT ASTHMA WITHOUT COMPLICATION: ICD-10-CM

## 2021-03-29 DIAGNOSIS — I48.0 PAF (PAROXYSMAL ATRIAL FIBRILLATION) (H): ICD-10-CM

## 2021-03-29 DIAGNOSIS — E03.4 HYPOTHYROIDISM DUE TO ACQUIRED ATROPHY OF THYROID: ICD-10-CM

## 2021-03-29 DIAGNOSIS — I10 HYPERTENSION GOAL BP (BLOOD PRESSURE) < 140/90: ICD-10-CM

## 2021-03-29 DIAGNOSIS — Z00.00 ENCOUNTER FOR MEDICARE ANNUAL WELLNESS EXAM: Primary | ICD-10-CM

## 2021-03-29 PROCEDURE — G0439 PPPS, SUBSEQ VISIT: HCPCS | Performed by: FAMILY MEDICINE

## 2021-03-29 RX ORDER — ALBUTEROL SULFATE 90 UG/1
1-2 AEROSOL, METERED RESPIRATORY (INHALATION) EVERY 4 HOURS PRN
Qty: 18 G | Refills: 1 | Status: CANCELLED | OUTPATIENT
Start: 2021-03-29

## 2021-03-29 ASSESSMENT — MIFFLIN-ST. JEOR: SCORE: 1211.28

## 2021-03-29 ASSESSMENT — ACTIVITIES OF DAILY LIVING (ADL): CURRENT_FUNCTION: NO ASSISTANCE NEEDED

## 2021-03-29 NOTE — PROGRESS NOTES
"SUBJECTIVE:   Tiffany Stovall is a 70 year old female who presents for Preventive Visit.      Patient has been advised of split billing requirements and indicates understanding: Yes   Are you in the first 12 months of your Medicare coverage?  No    Healthy Habits:    In general, how would you rate your overall health?  Very good    Frequency of exercise:  6-7 days/week    Duration of exercise:  Greater than 60 minutes    Do you usually eat at least 4 servings of fruit and vegetables a day, include whole grains    & fiber and avoid regularly eating high fat or \"junk\" foods?  Yes    Taking medications regularly:  No    Barriers to taking medications:  None    Medication side effects:  None    Ability to successfully perform activities of daily living:  No assistance needed    Home Safety:  No safety concerns identified    Hearing Impairment:  No hearing concerns    In the past 6 months, have you been bothered by leaking of urine?  No    In general, how would you rate your overall mental or emotional health?  Very good      PHQ-2 Total Score:    Additional concerns today:  No    Do you feel safe in your environment? Yes    Have you ever done Advance Care Planning? (For example, a Health Directive, POLST, or a discussion with a medical provider or your loved ones about your wishes): Yes, advance care planning is on file.       Fall risk  Fallen 2 or more times in the past year?: No  Any fall with injury in the past year?: No    Cognitive Screening   1) Repeat 3 items (Leader, Season, Table)    2) Clock draw: NORMAL  3) 3 item recall: Recalls 3 objects  Results: 3 items recalled: COGNITIVE IMPAIRMENT LESS LIKELY    Mini-CogTM Copyright [employer] [place]. Licensed by the author for use in [facility]; reprinted with permission (magda@.[facility]). All rights reserved.      Do you have sleep apnea, excessive snoring or daytime drowsiness?: no    Reviewed and updated as needed this visit by clinical staff  Tobacco  " Allergies  Meds  Problems  Med Hx  Surg Hx  Fam Hx          Reviewed and updated as needed this visit by Provider  Tobacco  Allergies  Meds   Med Hx  Surg Hx  Fam Hx  Soc Hx       Social History     Tobacco Use     Smoking status: Never Smoker     Smokeless tobacco: Never Used   Substance Use Topics     Alcohol use: Yes     Comment: socially     If you drink alcohol do you typically have >3 drinks per day or >7 drinks per week? No    Alcohol Use 3/29/2021   Prescreen: >3 drinks/day or >7 drinks/week? -   Prescreen: >3 drinks/day or >7 drinks/week? No           Hyperlipidemia Follow-Up      Are you regularly taking any medication or supplement to lower your cholesterol?   Yes- Lipitor    Are you having muscle aches or other side effects that you think could be caused by your cholesterol lowering medication?  No    Hypertension Follow-up      Do you check your blood pressure regularly outside of the clinic? Yes     Are you following a low salt diet? Yes    Are your blood pressures ever more than 140 on the top number (systolic) OR more   than 90 on the bottom number (diastolic), for example 140/90? No    Asthma Follow-Up    Was ACT completed today?    Yes    ACT Total Scores 3/29/2021   ACT TOTAL SCORE -   ASTHMA ER VISITS -   ASTHMA HOSPITALIZATIONS -   ACT TOTAL SCORE (Goal Greater than or Equal to 20) 25   In the past 12 months, how many times did you visit the emergency room for your asthma without being admitted to the hospital? 0   In the past 12 months, how many times were you hospitalized overnight because of your asthma? 0          How many days per week do you miss taking your asthma controller medication?  0    Please describe any recent triggers for your asthma: Environmental allergens, metoprolol    Have you had any Emergency Room Visits, Urgent Care Visits, or Hospital Admissions since your last office visit?  No    Hypothyroidism Follow-up      Since last visit, patient describes the  "following symptoms: Weight stable, no hair loss, no skin changes, no constipation, no loose stools    Atrial fibrillation-managed with cardiology through [facility].  She was originally placed on metoprolol but that caused respiratory symptoms so that was discontinued and diltiazem was started.  Her blood pressure was increased with the diltiazem alone and she was restarted on her losartan and recently restarted on the hydrochlorothiazide with improvement in her blood pressure.  They attempted to do a home sleep oxygen study for her but the patient reports \"questioning of the accuracy since her own home oximeter read much higher.  She is currently anticoagulated with Xarelto and rate has been controlled with diltiazem.    Current providers sharing in care for this patient include:   Patient Care Team:  Darby Williamson MD as PCP - General (Family Practice)  Jose Luis Foss OD as Assigned Surgical Provider  Amadou Wellington MD as Assigned Heart and Vascular Provider  Darby Williamson MD as Assigned PCP    The following health maintenance items are reviewed in Epic and correct as of today:  Health Maintenance Due   Topic Date Due     FALL RISK ASSESSMENT  05/06/2020     ASTHMA CONTROL TEST  10/02/2020     PHQ-2  01/01/2021     BP Readings from Last 3 Encounters:   03/29/21 136/80   03/26/21 (!) 141/78   07/02/20 110/60    Wt Readings from Last 3 Encounters:   03/29/21 68 kg (150 lb)   10/24/19 65 kg (143 lb 4.8 oz)   10/07/19 64.4 kg (142 lb)                  Pneumonia Vaccine: Series completed  Mammogram Screening: Mammogram Screening: Recommended mammography every 1-2 years with patient discussion and risk factor consideration  Any new diagnosis of family breast, ovarian, or bowel cancer? No    FSH-7: No flowsheet data found.      Pertinent mammograms are reviewed under the imaging tab.    Review of Systems  10 point ROS of systems including Constitutional, Eyes, Respiratory, Cardiovascular, " "Gastroenterology, Genitourinary, Integumentary, Muscularskeletal, Psychiatric were all negative except for pertinent positives noted in my HPI.      OBJECTIVE:   /80   Pulse 73   Temp 98.1  F (36.7  C)   Resp 12   Ht 1.651 m (5' 5\")   Wt 68 kg (150 lb)   SpO2 99%   BMI 24.96 kg/m   Estimated body mass index is 24.96 kg/m  as calculated from the following:    Height as of this encounter: 1.651 m (5' 5\").    Weight as of this encounter: 68 kg (150 lb).  Physical Exam  GENERAL APPEARANCE: healthy, alert and no distress  EYES: Eyes grossly normal to inspection, PERRL and conjunctivae and sclerae normal  HENT: ear canals and TM's normal, nose and mouth without ulcers or lesions, oropharynx clear and oral mucous membranes moist  NECK: no adenopathy, no asymmetry, masses, or scars and thyroid normal to palpation  RESP: lungs clear to auscultation - no rales, rhonchi or wheezes  BREAST: normal without masses, tenderness or nipple discharge and no palpable axillary masses or adenopathy  CV: regular rate and rhythm, normal S1 S2, no S3 or S4, no murmur, click or rub, no peripheral edema and peripheral pulses strong  ABDOMEN: soft, nontender, no hepatosplenomegaly, no masses and bowel sounds normal   (female): normal female external genitalia, normal urethral meatus, vaginal mucosal atrophy noted, normal cervix, adnexae, and uterus without masses or abnormal discharge  MS: no musculoskeletal defects are noted and gait is age appropriate without ataxia  SKIN: no suspicious lesions or rashes  NEURO: Normal strength and tone, sensory exam grossly normal, mentation intact and speech normal  PSYCH: mentation appears normal and affect normal/bright    Diagnostic Test Results:  Labs reviewed in Epic    ASSESSMENT / PLAN:   1. Encounter for Medicare annual wellness exam  Screening and preventive care discussed    2. Mild persistent asthma without complication  Since discontinuing metoprolol her asthma has been under " "good control.  Has refills available for Advair and albuterol.    3. Hypertension goal BP (blood pressure) < 140/90  Improved with the addition of the hydrochlorothiazide.  Continue current treatment.  Monitor blood pressure at home and follow-up with if elevated lab testing in May for potassium and renal function.  - **Basic metabolic panel FUTURE anytime; Future    4. Hypothyroidism due to acquired atrophy of thyroid  Recent thyroid testing indicated elevated TSH with high normal T4.  She is decreased to 100 mcg daily at that time.  Follow-up labs planned in May orders are placed.  - **TSH with free T4 reflex FUTURE anytime; Future    5. PAF (paroxysmal atrial fibrillation) (H)  With the question of an abnormal sleep oxygen level on a home evaluation, a referral has been placed to the sleep clinic.  She would prefer to do a home study because she fears she will not sleep well at a sleep center.  She will schedule this at her convenience.  - SLEEP EVALUATION & MANAGEMENT REFERRAL - [facility] [place]  [phone] (Age 15 and up); Future    Patient has been advised of split billing requirements and indicates understanding: Yes  COUNSELING:  Reviewed preventive health counseling, as reflected in patient instructions       Regular exercise       Healthy diet/nutrition       Vision screening       Dental care       Bladder control       Fall risk prevention       Osteoporosis prevention/bone health       Colon cancer screening    Estimated body mass index is 24.96 kg/m  as calculated from the following:    Height as of this encounter: 1.651 m (5' 5\").    Weight as of this encounter: 68 kg (150 lb).        She reports that she has never smoked. She has never used smokeless tobacco.      Appropriate preventive services were discussed with this patient, including applicable screening as appropriate for cardiovascular disease, diabetes, osteopenia/osteoporosis, and glaucoma.  As appropriate for " age/gender, discussed screening for colorectal cancer, prostate cancer, breast cancer, and cervical cancer. Checklist reviewing preventive services available has been given to the patient.    Reviewed patients plan of care and provided an AVS. The Intermediate Care Plan ( asthma action plan, low back pain action plan, and migraine action plan) for Tiffany meets the Care Plan requirement. This Care Plan has been established and reviewed with the Patient.    Counseling Resources:  ATP IV Guidelines  Pooled Cohorts Equation Calculator  Breast Cancer Risk Calculator  Breast Cancer: Medication to Reduce Risk  FRAX Risk Assessment  ICSI Preventive Guidelines  Dietary Guidelines for Americans, 2010  [employer]'s MyPlate  ASA Prophylaxis  Lung CA Screening    Darby Williamson MD  [facility]    Identified Health Risks:        This chart was documented by provider using a voice activated software called Dragon in addition to manual typing. There may be vocabulary errors or other grammatical errors due to this.

## 2021-03-30 ASSESSMENT — ASTHMA QUESTIONNAIRES: ACT_TOTALSCORE: 25

## 2021-04-05 ENCOUNTER — OFFICE VISIT (OUTPATIENT)
Dept: VASCULAR SURGERY | Facility: CLINIC | Age: 71
End: 2021-04-05
Payer: COMMERCIAL

## 2021-04-05 DIAGNOSIS — I83.811 VARICOSE VEINS OF RIGHT LOWER EXTREMITY WITH PAIN: Primary | ICD-10-CM

## 2021-04-05 PROCEDURE — 36470 NJX SCLRSNT 1 INCMPTNT VEIN: CPT | Mod: RT | Performed by: SURGERY

## 2021-04-05 PROCEDURE — 76942 ECHO GUIDE FOR BIOPSY: CPT | Performed by: SURGERY

## 2021-04-05 NOTE — LETTER
4/5/2021         RE: Tiffany Stovall  [address]  [place] [address]        Dear Colleague,    Thank you for referring your patient, Tiffany Stovall, to the [facility] [place]. Please see a copy of my visit note below.        Vein Clinic Sclerotherapy Note    Indications:  Recurrent right leg pain and varicose veins     Procedure:  Ultrasound-guided, medically necessary sclerotherapy recurrent, symptomatic right posterior calf varicose veins and short segment recanalized right small saphenous vein     Procedure description  Details of procedure including risks of allergic reaction, deep vein thrombosis, ulceration, superficial thrombophlebitis and hyperpigmentation were discussed.  The patient voiced understanding and wished to proceed.  Informed consent was obtained.    The patient underwent phlebectomies recently but had not had the origin of the recurrent varicosities treated in the popliteal fossa.  I have her return today for ultrasound interrogation and ultrasound-guided sclerotherapy of these popliteal fossa and posterior calf varicosities in the subcutaneous tissues.    I imaged the right popliteal fossa with ultrasound and noted that the subfascial varicosities were closed from the junction with the soft of the small saphenous vein to the subcutaneous tissues.  There was nothing to be treated in the popliteal fossa.    I imaged the right calf and noted that there was a segment of the small saphenous vein that was patent with a tributary coursing from it coursing up into the subcutaneous tissues near the area where phlebectomies have been performed.  I isolated this segment of the small saphenous vein and the tributary under ultrasound guidance, directed 27-gauge needle and then injected 1.5 mL of 1% polidocanol foam into them.  This filled the veins nicely.    I had the patient perform ankle pumps on the table, then she donned thigh-high compression.  We had her  walk for 10 minutes prior to getting the car drive home and we will have her return in 6 weeks in follow-up.    She tolerated the procedure well without evidence of allergic reaction or other complications.    Sclerotherapy    Date/Time: 4/5/2021 9:30 AM  Performed by: Amadou Wellington MD  Authorized by: Amadou Wellington MD     Time out: Immediately prior to the procedure a time out was called    Type:  Medically Necessary  Vein:  One Vein  Yes    Procedure side:  Right  Solution/Amount:  1% POLIDOCANOL  Syringes::  1.5ml  Patient tolerance:  Patient tolerated the procedure well with no immediate complications  Wrap/Hose:  Willi Wellington MD    Dictated using Dragon voice recognition software which may result in transcription errors      Again, thank you for allowing me to participate in the care of your patient.        Sincerely,        Amadou Wellington MD

## 2021-04-05 NOTE — PROGRESS NOTES
Vein Clinic Sclerotherapy Note    Indications:  Recurrent right leg pain and varicose veins     Procedure:  Ultrasound-guided, medically necessary sclerotherapy recurrent, symptomatic right posterior calf varicose veins and short segment recanalized right small saphenous vein     Procedure description  Details of procedure including risks of allergic reaction, deep vein thrombosis, ulceration, superficial thrombophlebitis and hyperpigmentation were discussed.  The patient voiced understanding and wished to proceed.  Informed consent was obtained.    The patient underwent phlebectomies recently but had not had the origin of the recurrent varicosities treated in the popliteal fossa.  I have her return today for ultrasound interrogation and ultrasound-guided sclerotherapy of these popliteal fossa and posterior calf varicosities in the subcutaneous tissues.    I imaged the right popliteal fossa with ultrasound and noted that the subfascial varicosities were closed from the junction with the soft of the small saphenous vein to the subcutaneous tissues.  There was nothing to be treated in the popliteal fossa.    I imaged the right calf and noted that there was a segment of the small saphenous vein that was patent with a tributary coursing from it coursing up into the subcutaneous tissues near the area where phlebectomies have been performed.  I isolated this segment of the small saphenous vein and the tributary under ultrasound guidance, directed 27-gauge needle and then injected 1.5 mL of 1% polidocanol foam into them.  This filled the veins nicely.    I had the patient perform ankle pumps on the table, then she donned thigh-high compression.  We had her walk for 10 minutes prior to getting the car drive home and we will have her return in 6 weeks in follow-up.    She tolerated the procedure well without evidence of allergic reaction or other complications.    Sclerotherapy    Date/Time: 4/5/2021 9:30 AM  Performed  by: Amadou Wellington MD  Authorized by: Amadou Wellington MD     Time out: Immediately prior to the procedure a time out was called    Type:  Medically Necessary  Vein:  One Vein  Yes    Procedure side:  Right  Solution/Amount:  1% POLIDOCANOL  Syringes::  1.5ml  Patient tolerance:  Patient tolerated the procedure well with no immediate complications  Wrap/Hose:  Willi Wellington MD    Dictated using Dragon voice recognition software which may result in transcription errors

## 2021-05-04 ENCOUNTER — MYC REFILL (OUTPATIENT)
Dept: FAMILY MEDICINE | Facility: CLINIC | Age: 71
End: 2021-05-04

## 2021-05-04 DIAGNOSIS — I10 HYPERTENSION GOAL BP (BLOOD PRESSURE) < 140/90: ICD-10-CM

## 2021-05-05 NOTE — TELEPHONE ENCOUNTER
Routing refill request to provider for review/approval because:  Labs out of range:    Sodium   Date Value Ref Range Status   03/05/2021 131 (L) 133 - 144 mmol/L Final         Amelia Parry BSN, RN

## 2021-05-06 ENCOUNTER — OFFICE VISIT (OUTPATIENT)
Dept: OPTOMETRY | Facility: CLINIC | Age: 71
End: 2021-05-06
Payer: COMMERCIAL

## 2021-05-06 DIAGNOSIS — H52.12 MYOPIA OF LEFT EYE: ICD-10-CM

## 2021-05-06 DIAGNOSIS — H52.4 PRESBYOPIA: ICD-10-CM

## 2021-05-06 DIAGNOSIS — Z01.00 EXAMINATION OF EYES AND VISION: Primary | ICD-10-CM

## 2021-05-06 DIAGNOSIS — I10 HYPERTENSION GOAL BP (BLOOD PRESSURE) < 140/90: ICD-10-CM

## 2021-05-06 DIAGNOSIS — H10.13 ALLERGIC CONJUNCTIVITIS OF BOTH EYES: ICD-10-CM

## 2021-05-06 DIAGNOSIS — H52.221 REGULAR ASTIGMATISM OF RIGHT EYE: ICD-10-CM

## 2021-05-06 DIAGNOSIS — E03.4 HYPOTHYROIDISM DUE TO ACQUIRED ATROPHY OF THYROID: ICD-10-CM

## 2021-05-06 DIAGNOSIS — Z96.1 PSEUDOPHAKIA OF BOTH EYES: ICD-10-CM

## 2021-05-06 LAB
ANION GAP SERPL CALCULATED.3IONS-SCNC: 4 MMOL/L (ref 3–14)
BUN SERPL-MCNC: 18 MG/DL (ref 7–30)
CALCIUM SERPL-MCNC: 9.2 MG/DL (ref 8.5–10.1)
CHLORIDE SERPL-SCNC: 99 MMOL/L (ref 94–109)
CO2 SERPL-SCNC: 29 MMOL/L (ref 20–32)
CREAT SERPL-MCNC: 0.74 MG/DL (ref 0.52–1.04)
GFR SERPL CREATININE-BSD FRML MDRD: 83 ML/MIN/{1.73_M2}
GLUCOSE SERPL-MCNC: 97 MG/DL (ref 70–99)
POTASSIUM SERPL-SCNC: 3.5 MMOL/L (ref 3.4–5.3)
SODIUM SERPL-SCNC: 132 MMOL/L (ref 133–144)
TSH SERPL DL<=0.005 MIU/L-ACNC: 0.48 MU/L (ref 0.4–4)

## 2021-05-06 PROCEDURE — 36415 COLL VENOUS BLD VENIPUNCTURE: CPT | Performed by: FAMILY MEDICINE

## 2021-05-06 PROCEDURE — 84443 ASSAY THYROID STIM HORMONE: CPT | Performed by: FAMILY MEDICINE

## 2021-05-06 PROCEDURE — 80048 BASIC METABOLIC PNL TOTAL CA: CPT | Performed by: FAMILY MEDICINE

## 2021-05-06 PROCEDURE — 92015 DETERMINE REFRACTIVE STATE: CPT | Performed by: OPTOMETRIST

## 2021-05-06 PROCEDURE — 92014 COMPRE OPH EXAM EST PT 1/>: CPT | Performed by: OPTOMETRIST

## 2021-05-06 RX ORDER — HYDROCHLOROTHIAZIDE 25 MG/1
25 TABLET ORAL DAILY
Qty: 90 TABLET | Refills: 0 | Status: SHIPPED | OUTPATIENT
Start: 2021-05-06 | End: 2021-05-10

## 2021-05-06 RX ORDER — OLOPATADINE HYDROCHLORIDE 2 MG/ML
1 SOLUTION/ DROPS OPHTHALMIC DAILY
Qty: 2.5 ML | Refills: 11 | Status: CANCELLED | OUTPATIENT
Start: 2021-05-06 | End: 2022-05-06

## 2021-05-06 ASSESSMENT — REFRACTION_MANIFEST
OS_AXIS: 130
OD_CYLINDER: +1.25
OS_ADD: +2.50
OS_CYLINDER: +0.75
OD_SPHERE: -1.50
OS_SPHERE: -0.75
OD_ADD: +2.50
OD_AXIS: 045

## 2021-05-06 ASSESSMENT — TONOMETRY
IOP_METHOD: TONOPEN
OS_IOP_MMHG: 19
OD_IOP_MMHG: 17

## 2021-05-06 ASSESSMENT — CONF VISUAL FIELD
OD_NORMAL: 1
OS_NORMAL: 1

## 2021-05-06 ASSESSMENT — REFRACTION_WEARINGRX
OD_SPHERE: -1.50
SPECS_TYPE: PAL
OD_CYLINDER: +1.25
OS_SPHERE: -0.75
OD_ADD: +2.50
OS_CYLINDER: SPHERE
OS_ADD: +2.50
OD_AXIS: 045

## 2021-05-06 ASSESSMENT — VISUAL ACUITY
OS_CC: 20/25
OD_SC: 20/70
CORRECTION_TYPE: GLASSES
OD_CC: 20/20
METHOD: SNELLEN - LINEAR
OS_SC: 20/20
OS_CC: 20/30-1
OS_CC+: -1
OD_CC: 20/30-1

## 2021-05-06 ASSESSMENT — SLIT LAMP EXAM - LIDS
COMMENTS: NORMAL
COMMENTS: NORMAL

## 2021-05-06 ASSESSMENT — CUP TO DISC RATIO
OS_RATIO: 0.25
OD_RATIO: 0.2

## 2021-05-06 ASSESSMENT — EXTERNAL EXAM - LEFT EYE: OS_EXAM: NORMAL

## 2021-05-06 ASSESSMENT — EXTERNAL EXAM - RIGHT EYE: OD_EXAM: NORMAL

## 2021-05-06 NOTE — PATIENT INSTRUCTIONS
Eyeglass prescription given.  Optional change in eyeglass prescription.    OTC Pataday to be used once daily for itchy eyes.  Use as needed.    Refresh tears 1 drop 2-4x daily prn.    Return in 1 year for a complete eye exam or sooner if needed.    Jose Luis Foss, DASIA    The affects of the dilating drops last for 4- 6 hours.  You will be more sensitive to light and vision will be blurry up close.  Do not drive if you do not feel comfortable.  Mydriatic sunglasses were given if needed.    Optometry Providers       Clinic Locations                                 Telephone Number   Dr. Kendra Armendariz [phone]     [facility] Hours:                [facility] Hours:       [facility] Hours:   [address]   [address] [facility]     [address] [facility]  [place] [address]   [place] [address]    [place] [address]  Phone: [phone]                    Phone: [phone]     Phone: [phone]                      Monday 8:00-7:00                          Monday 8:00-7:00                          Monday 8:00-7:00              Tuesday 8:00-6:00                          Tuesday 8:00-7:00                          Tuesday 8:00-7:00              Wednesday 8:00-6:00                  Wednesday 8:00-7:00                   Wednesday 8:00-7:00      Thursday 8:00-6:00                        Thursday 8:00-7:00                         Thursday 8:00-7:00            Friday 8:00-5:00                              Friday 8:00-5:00                              Friday 8:00-5:00    [facility] Hours:   [address] [facility] [place] [id number] [phone]    Monday 8:00-7:00  Tuesday 8:00-7:00  Wednesday 8:00-7:00  Thursday 8:00-7:00  Friday 8:00-5:00  Please log on to [email or web address] to order your contact lenses.  The link is found on the [facility] page.  As always, Thank you for trusting us  with your health care needs!

## 2021-05-06 NOTE — LETTER
5/6/2021         RE: Tiffany Stovall  [address]  [facility] [facility] [place] [address]        Dear Colleague,    Thank you for referring your patient, Tiffany Stovall, to the [facility]. Please see a copy of my visit note below.    Chief Complaint   Patient presents with     Annual Eye Exam      Accompanied by self  Last Eye Exam: 12-  Dilated Previously: Yes    What are you currently using to see?  glasses and otc readers and rx sunglasses        Distance Vision Acuity: Satisfied with vision    Near Vision Acuity: Satisfied with vision while reading  with glasses    Eye Comfort: itchy during allergy season   Do you use eye drops? 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          Medical, surgical and family histories reviewed and updated 5/6/2021.       OBJECTIVE: See Ophthalmology exam    ASSESSMENT:    ICD-10-CM    1. Examination of eyes and vision  Z01.00    2. Presbyopia  H52.4    3. Regular astigmatism of right eye  H52.221    4. Myopia of left eye  H52.12    5. Pseudophakia of both eyes  Z96.1    6. Allergic conjunctivitis of both eyes  H10.13       PLAN:     Patient Instructions   Eyeglass prescription given.  Optional change in eyeglass prescription.    OTC Pataday to be used once daily for itchy eyes.  Use as needed.    Refresh tears 1 drop 2-4x daily prn.    Return in 1 year for a complete eye exam or sooner if needed.    Jose Luis Foss, OD           Again, thank you for allowing me to participate in the care of your patient.        Sincerely,        Jose Luis Foss, OD

## 2021-05-06 NOTE — PROGRESS NOTES
Chief Complaint   Patient presents with     Annual Eye Exam      Accompanied by self  Last Eye Exam: 12-  Dilated Previously: Yes    What are you currently using to see?  glasses and otc readers and rx sunglasses        Distance Vision Acuity: Satisfied with vision    Near Vision Acuity: Satisfied with vision while reading  with glasses    Eye Comfort: itchy during allergy season   Do you use eye drops? 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          Medical, surgical and family histories reviewed and updated 5/6/2021.       OBJECTIVE: See Ophthalmology exam    ASSESSMENT:    ICD-10-CM    1. Examination of eyes and vision  Z01.00    2. Presbyopia  H52.4    3. Regular astigmatism of right eye  H52.221    4. Myopia of left eye  H52.12    5. Pseudophakia of both eyes  Z96.1    6. Allergic conjunctivitis of both eyes  H10.13       PLAN:     Patient Instructions   Eyeglass prescription given.  Optional change in eyeglass prescription.    OTC Pataday to be used once daily for itchy eyes.  Use as needed.    Refresh tears 1 drop 2-4x daily prn.    Return in 1 year for a complete eye exam or sooner if needed.    Jose Luis Foss, OD

## 2021-05-17 ENCOUNTER — OFFICE VISIT (OUTPATIENT)
Dept: VASCULAR SURGERY | Facility: CLINIC | Age: 71
End: 2021-05-17
Payer: COMMERCIAL

## 2021-05-17 DIAGNOSIS — I83.811 VARICOSE VEINS OF RIGHT LOWER EXTREMITY WITH PAIN: Primary | ICD-10-CM

## 2021-05-17 PROCEDURE — 99213 OFFICE O/P EST LOW 20 MIN: CPT | Performed by: SURGERY

## 2021-05-17 NOTE — PROGRESS NOTES
[facility] [place] office note  Tiffany Stovall returns in follow-up above multiple stab phlebectomies of her right lower extremity followed by ultrasound-guided sclerotherapy of a patent segment of her tortuous, right small saphenous vein which was supplying the varicosities on the calf.  This segment of vein was not amenable to endovascular treatment as it was too tortuous.  We performed sclerotherapy of this on 4/5/2021.  She had no problems or complications following this.    On 5/3/2021 she was driving home from [place] and noted some discomfort and stiffness in her right knee when she stood up to walk at a rest stop.  Later that evening, she developed some swelling in the right popliteal fossa which made it somewhat difficult for her to bend her right knee.  She then noticed some ecchymosis which slowly resolved and now is completely normal.  She is on Xarelto anticoagulation.    Physical exam  General: Pleasant female in no acute distress  Right lower extremity: There are no visible varicose veins of the right calf.  These have resolved nicely.  There are scattered telangiectasias over the anterior aspect of her leg and a few scattered over the posterior aspect but no sizable varicose veins.    There is no ecchymosis or fullness in the right popliteal fossa.    I performed a bedside ultrasound which revealed a noncompressible small saphenous vein with good obliteration of the tributary coursing from the mid small saphenous vein.    Close inspection of the popliteal fossa revealed no evidence of thrombosis with fully compressible popliteal veins.  She did have a Baker's cyst but no significant inflammation surrounding it.  No areas of hematoma were noted.    Impression  Good result following ultrasound-guided sclerotherapy of the tortuous right small saphenous vein with phlebectomies.  She will return on an as-needed basis in this regard.    She may have had an episode of spontaneous hemorrhage  from a small, subcutaneous artery or vein which led to the fullness following  May 3 recurrence.  I see nothing that needs to be further evaluated as her symptoms have improved and there is nothing of significance noted on ultrasound.  The Sanchez's cyst has been present as noted on her previous ultrasound.    Plan  She will return on an as-needed basis.    MONTRELL Wellington MD    Dictated using Dragon voice recognition software which may result in transcription errors

## 2021-05-17 NOTE — LETTER
5/17/2021         RE: Tiffany Stovall  [address]  [facility] [address]        Dear Colleague,    Thank you for referring your patient, Tiffany Stovall, to the [facility] [place]. Please see a copy of my visit note below.    [facility] [place] office note  Tiffany Stovall returns in follow-up above multiple stab phlebectomies of her right lower extremity followed by ultrasound-guided sclerotherapy of a patent segment of her tortuous, right small saphenous vein which was supplying the varicosities on the calf.  This segment of vein was not amenable to endovascular treatment as it was too tortuous.  We performed sclerotherapy of this on 4/5/2021.  She had no problems or complications following this.    On 5/3/2021 she was driving home from [place] and noted some discomfort and stiffness in her right knee when she stood up to walk at a rest stop.  Later that evening, she developed some swelling in the right popliteal fossa which made it somewhat difficult for her to bend her right knee.  She then noticed some ecchymosis which slowly resolved and now is completely normal.  She is on Xarelto anticoagulation.    Physical exam  General: Pleasant female in no acute distress  Right lower extremity: There are no visible varicose veins of the right calf.  These have resolved nicely.  There are scattered telangiectasias over the anterior aspect of her leg and a few scattered over the posterior aspect but no sizable varicose veins.    There is no ecchymosis or fullness in the right popliteal fossa.    I performed a bedside ultrasound which revealed a noncompressible small saphenous vein with good obliteration of the tributary coursing from the mid small saphenous vein.    Close inspection of the popliteal fossa revealed no evidence of thrombosis with fully compressible popliteal veins.  She did have a Baker's cyst but no significant inflammation surrounding it.  No areas of  hematoma were noted.    Impression  Good result following ultrasound-guided sclerotherapy of the tortuous right small saphenous vein with phlebectomies.  She will return on an as-needed basis in this regard.    She may have had an episode of spontaneous hemorrhage from a small, subcutaneous artery or vein which led to the fullness following  May 3 recurrence.  I see nothing that needs to be further evaluated as her symptoms have improved and there is nothing of significance noted on ultrasound.  The Sanchez's cyst has been present as noted on her previous ultrasound.    Plan  She will return on an as-needed basis.    MONTRELL Wellington MD    Dictated using Dragon voice recognition software which may result in transcription errors      Again, thank you for allowing me to participate in the care of your patient.        Sincerely,        Amadou Wellington MD

## 2021-09-29 ENCOUNTER — IMMUNIZATION (OUTPATIENT)
Dept: FAMILY MEDICINE | Facility: CLINIC | Age: 71
End: 2021-09-29
Payer: COMMERCIAL

## 2021-09-29 DIAGNOSIS — Z23 NEED FOR PROPHYLACTIC VACCINATION AND INOCULATION AGAINST INFLUENZA: Primary | ICD-10-CM

## 2021-09-29 PROCEDURE — G0008 ADMIN INFLUENZA VIRUS VAC: HCPCS

## 2021-09-29 PROCEDURE — 90662 IIV NO PRSV INCREASED AG IM: CPT

## 2021-09-29 PROCEDURE — 99207 PR NO CHARGE NURSE ONLY: CPT

## 2021-09-29 NOTE — PROGRESS NOTES
Prior to immunization administration, verified patients identity using patient s name and date of birth. Please see Immunization Activity for additional information.     Screening Questionnaire for Adult Immunization    Are you sick today?   No   Do you have allergies to medications, food, a vaccine component or latex?   No   Have you ever had a serious reaction after receiving a vaccination?   No   Do you have a long-term health problem with heart, lung, kidney, or metabolic disease (e.g., diabetes), asthma, a blood disorder, no spleen, complement component deficiency, a cochlear implant, or a spinal fluid leak?  Are you on long-term aspirin therapy?   No   Do you have cancer, leukemia, HIV/AIDS, or any other immune system problem?   No   Do you have a parent, brother, or sister with an immune system problem?   No   In the past 3 months, have you taken medications that affect  your immune system, such as prednisone, other steroids, or anticancer drugs; drugs for the treatment of rheumatoid arthritis, Crohn s disease, or psoriasis; or have you had radiation treatments?   No   Have you had a seizure, or a brain or other nervous system problem?   No   During the past year, have you received a transfusion of blood or blood    products, or been given immune (gamma) globulin or antiviral drug?   No   For women: Are you pregnant or is there a chance you could become       pregnant during the next month?   No   Have you received any vaccinations in the past 4 weeks?   No     Immunization questionnaire answers were all negative.        Per orders of Dr. Williamson, injection of Fluzone High Dose given by Yuliana Webb. Patient instructed to remain in clinic for 15 minutes afterwards, and to report any adverse reaction to me immediately.       Screening performed by Yuliana Webb on 9/29/2021 at 2:06 PM.

## 2021-10-01 ENCOUNTER — OFFICE VISIT (OUTPATIENT)
Dept: FAMILY MEDICINE | Facility: CLINIC | Age: 71
End: 2021-10-01
Payer: COMMERCIAL

## 2021-10-01 VITALS — DIASTOLIC BLOOD PRESSURE: 82 MMHG | SYSTOLIC BLOOD PRESSURE: 120 MMHG

## 2021-10-01 DIAGNOSIS — D22.9 MULTIPLE BENIGN MELANOCYTIC NEVI: ICD-10-CM

## 2021-10-01 DIAGNOSIS — L82.1 SK (SEBORRHEIC KERATOSIS): Primary | ICD-10-CM

## 2021-10-01 DIAGNOSIS — L57.8 SUN-DAMAGED SKIN: ICD-10-CM

## 2021-10-01 DIAGNOSIS — Z85.828 HISTORY OF NONMELANOMA SKIN CANCER: ICD-10-CM

## 2021-10-01 DIAGNOSIS — Z12.83 SCREENING EXAM FOR SKIN CANCER: ICD-10-CM

## 2021-10-01 PROCEDURE — 99203 OFFICE O/P NEW LOW 30 MIN: CPT | Performed by: DERMATOLOGY

## 2021-10-01 NOTE — LETTER
10/1/2021         RE: Tiffany Stovall  [address]  [facility] [address]        Dear Colleague,    Thank you for referring your patient, Tiffany Stovall, to the [facility] [place]. Please see a copy of my visit note below.    [facility] D[facility]atology Clinic Note - EP    Encounter Date: Oct 1, 2021    Dermatology Problem List:  #. History of NMSC  - sBCC, right upper back, s/p excision 10/2019    ____________________________________________    Assessment & Plan:     #. Seborrheic keratoses   - Monitor: Patient to continue monitoring at home and will contact the clinic for any changes.  - NTD: No further management at this time.    #. Solar lentigines  - Discussed that these are a sign of sun damage over time, and indicate increased risk of skin cancer.    #. Benign melanocytic nevi of the trunk  - reassurance provided; no lesions concerning for malignancy  - photoprotection (regular use of SPF30+ broad spectrum sunscreen and sun protective clothing) recommended  - ABCDE of melanoma discussed    #. History of nonmelanoma skin cancer (NMSC), last lesion identified and treated in October 2019  - no evidence of recurrent disease via inspection or palpation; all sites well-healed  - photoprotection discussed (SPF30+ sunscreen and proper use, UPF clothing, sun avoidance, tanning bed avoidance)  - continued skin surveillance recommended    Procedures Performed:   None    Follow-up: 6 months    Michael Gould MD  Dermatology/Dermatopathology Staff Physician  , Department of Dermatology    ____________________________________________    CC: Skin Check      HPI:  Ms. Tiffany Stovall is a(n) extremely pleasant 71 year old female who presents today as a new patient for FBSE. She has spent a good deal of time in [place], and is a swimmer.     The patient denies any painful, bleeding, or nonhealing lesions, or any new or changing moles.    Patient is otherwise feeling  well, without additional skin concerns.     Labs Reviewed:  N/A    Physical Exam:  Vitals: /82   SKIN: Total skin excluding the undergarment areas was performed. The exam included the head/face, neck, both arms, chest, back, abdomen, both legs, digits and/or nails.   - Scattered brown macules on sun exposed areas.  - There are waxy stuck on tan to brown papules on the R shoulder, R upper lip.  - There is no erythema, telangectasias, nodularity, or pigmentation on the back.  - There are multiple light brown macules and papules on the trunk and extremities.  - No other lesions of concern on areas examined.     Medications:  Current Outpatient Medications   Medication     albuterol (PROAIR HFA) 108 (90 Base) MCG/ACT inhaler     albuterol (PROVENTIL) (2.5 MG/3ML) 0.083% neb solution     amoxicillin (AMOXIL) 500 MG capsule     atorvastatin (LIPITOR) 40 MG tablet     diltiazem ER COATED BEADS (CARDIZEM CD/CARTIA XT) 120 MG 24 hr capsule     EPINEPHrine (ANY BX GENERIC EQUIV) 0.3 MG/0.3ML injection 2-pack     esomeprazole (NEXIUM) 20 MG DR capsule     fluticasone-salmeterol (ADVAIR DISKUS) 250-50 MCG/DOSE inhaler     hydrochlorothiazide (HYDRODIURIL) 25 MG tablet     KLOR-CON 20 MEQ CR tablet     levothyroxine (SYNTHROID/LEVOTHROID) 100 MCG tablet     loratadine (CLARITIN) 10 MG tablet     losartan (COZAAR) 50 MG tablet     rivaroxaban ANTICOAGULANT (XARELTO) 20 MG TABS tablet     No current facility-administered medications for this visit.      Past Medical History:   Patient Active Problem List   Diagnosis     GERD (gastroesophageal reflux disease)     Elevated liver enzymes     Mild persistent asthma     Hypertension goal BP (blood pressure) < 140/90     Hyperlipidemia LDL goal <130     Varicose veins of legs     Hypothyroidism     Allergic reaction     History of basal cell carcinoma     Family history of nonmelanoma skin cancer     Primary osteoarthritis of left knee     Complex tear of medial meniscus of left  knee as current injury, subsequent encounter     History of squamous cell carcinoma in situ of skin     Status post total left knee replacement     Palpitations     PAF (paroxysmal atrial fibrillation) (H)     Past Medical History:   Diagnosis Date     Bronchial spasm      Cancer (H)     basal cell, squamous cell skin ca     Compression fracture of T12 vertebra (H) 03/17/2014     GERD (gastroesophageal reflux disease)      HTN - hypertension      Hyperlipaemia      Postmenopausal 1999     Uncomplicated asthma      Unspecified hypothyroidism     Hypothyroidism       CC No referring provider defined for this encounter. on close of this encounter.    This note has been created using voice recognition software; while it has been reviewed, some errors may persist.       Again, thank you for allowing me to participate in the care of your patient.        Sincerely,        Michael Gould MD

## 2021-10-25 NOTE — PROGRESS NOTES
[facility] Dermatology Clinic Note - EP    Encounter Date: Oct 1, 2021    Dermatology Problem List:  #. History of NMSC  - sBCC, right upper back, s/p excision 10/2019    ____________________________________________    Assessment & Plan:     #. Seborrheic keratoses   - Monitor: Patient to continue monitoring at home and will contact the clinic for any changes.  - NTD: No further management at this time.    #. Solar lentigines  - Discussed that these are a sign of sun damage over time, and indicate increased risk of skin cancer.    #. Benign melanocytic nevi of the trunk  - reassurance provided; no lesions concerning for malignancy  - photoprotection (regular use of SPF30+ broad spectrum sunscreen and sun protective clothing) recommended  - ABCDE of melanoma discussed    #. History of nonmelanoma skin cancer (NMSC), last lesion identified and treated in October 2019  - no evidence of recurrent disease via inspection or palpation; all sites well-healed  - photoprotection discussed (SPF30+ sunscreen and proper use, UPF clothing, sun avoidance, tanning bed avoidance)  - continued skin surveillance recommended    Procedures Performed:   None    Follow-up: 6 months    Michael Gould MD  Dermatology/Dermatopathology Staff Physician  , Department of Dermatology    ____________________________________________    CC: Skin Check      HPI:  Ms. Tiffany Stovall is a(n) extremely pleasant 71 year old female who presents today as a new patient for FBSE. She has spent a good deal of time in [place], and is a swimmer.     The patient denies any painful, bleeding, or nonhealing lesions, or any new or changing moles.    Patient is otherwise feeling well, without additional skin concerns.     Labs Reviewed:  N/A    Physical Exam:  Vitals: /82   SKIN: Total skin excluding the undergarment areas was performed. The exam included the head/face, neck, both arms, chest, back, abdomen, both legs, digits and/or  nails.   - Scattered brown macules on sun exposed areas.  - There are waxy stuck on tan to brown papules on the R shoulder, R upper lip.  - There is no erythema, telangectasias, nodularity, or pigmentation on the back.  - There are multiple light brown macules and papules on the trunk and extremities.  - No other lesions of concern on areas examined.     Medications:  Current Outpatient Medications   Medication     albuterol (PROAIR HFA) 108 (90 Base) MCG/ACT inhaler     albuterol (PROVENTIL) (2.5 MG/3ML) 0.083% neb solution     amoxicillin (AMOXIL) 500 MG capsule     atorvastatin (LIPITOR) 40 MG tablet     diltiazem ER COATED BEADS (CARDIZEM CD/CARTIA XT) 120 MG 24 hr capsule     EPINEPHrine (ANY BX GENERIC EQUIV) 0.3 MG/0.3ML injection 2-pack     esomeprazole (NEXIUM) 20 MG DR capsule     fluticasone-salmeterol (ADVAIR DISKUS) 250-50 MCG/DOSE inhaler     hydrochlorothiazide (HYDRODIURIL) 25 MG tablet     KLOR-CON 20 MEQ CR tablet     levothyroxine (SYNTHROID/LEVOTHROID) 100 MCG tablet     loratadine (CLARITIN) 10 MG tablet     losartan (COZAAR) 50 MG tablet     rivaroxaban ANTICOAGULANT (XARELTO) 20 MG TABS tablet     No current facility-administered medications for this visit.      Past Medical History:   Patient Active Problem List   Diagnosis     GERD (gastroesophageal reflux disease)     Elevated liver enzymes     Mild persistent asthma     Hypertension goal BP (blood pressure) < 140/90     Hyperlipidemia LDL goal <130     Varicose veins of legs     Hypothyroidism     Allergic reaction     History of basal cell carcinoma     Family history of nonmelanoma skin cancer     Primary osteoarthritis of left knee     Complex tear of medial meniscus of left knee as current injury, subsequent encounter     History of squamous cell carcinoma in situ of skin     Status post total left knee replacement     Palpitations     PAF (paroxysmal atrial fibrillation) (H)     Past Medical History:   Diagnosis Date     Bronchial  spasm      Cancer (H)     basal cell, squamous cell skin ca     Compression fracture of T12 vertebra (H) 03/17/2014     GERD (gastroesophageal reflux disease)      HTN - hypertension      Hyperlipaemia      Postmenopausal 1999     Uncomplicated asthma      Unspecified hypothyroidism     Hypothyroidism       CC No referring provider defined for this encounter. on close of this encounter.    This note has been created using voice recognition software; while it has been reviewed, some errors may persist.

## 2021-11-15 ENCOUNTER — OFFICE VISIT (OUTPATIENT)
Dept: OPTOMETRY | Facility: CLINIC | Age: 71
End: 2021-11-15
Payer: COMMERCIAL

## 2021-11-15 DIAGNOSIS — H20.9 IRITIS, TRAUMATIC: ICD-10-CM

## 2021-11-15 DIAGNOSIS — S05.02XA ABRASION OF LEFT CORNEA, INITIAL ENCOUNTER: Primary | ICD-10-CM

## 2021-11-15 PROCEDURE — 99213 OFFICE O/P EST LOW 20 MIN: CPT | Performed by: OPTOMETRIST

## 2021-11-15 RX ORDER — NEOMYCIN SULFATE, POLYMYXIN B SULFATE AND DEXAMETHASONE 3.5; 10000; 1 MG/ML; [USP'U]/ML; MG/ML
SUSPENSION/ DROPS OPHTHALMIC
Qty: 5 ML | Refills: 0 | Status: SHIPPED | OUTPATIENT
Start: 2021-11-15 | End: 2021-11-29

## 2021-11-15 ASSESSMENT — VISUAL ACUITY
OD_SC: 20/50
OD_CC: 20/20
OS_SC+: -1
METHOD: SNELLEN - LINEAR
OS_CC+: -1
OS_SC: 20/30
CORRECTION_TYPE: GLASSES
OS_CC: 20/30
OD_CC+: -1

## 2021-11-15 ASSESSMENT — CUP TO DISC RATIO
OD_RATIO: 0.2
OS_RATIO: 0.25

## 2021-11-15 ASSESSMENT — TONOMETRY
OS_IOP_MMHG: 17
IOP_METHOD: TONOPEN

## 2021-11-15 ASSESSMENT — SLIT LAMP EXAM - LIDS
COMMENTS: NORMAL
COMMENTS: NORMAL

## 2021-11-15 ASSESSMENT — EXTERNAL EXAM - RIGHT EYE: OD_EXAM: NORMAL

## 2021-11-15 ASSESSMENT — EXTERNAL EXAM - LEFT EYE: OS_EXAM: NORMAL

## 2021-11-15 NOTE — PATIENT INSTRUCTIONS
Maxitrol- 1 drop left eye 4 x day for 7 days then 2 x day for 7 days.    Return in 2-3 weeks (going out of town next week) for recheck or sooner if needed.    Ok to use artificial tears as needed.    Jose Luis Foss, OD    The affects of the dilating drops last for 4- 6 hours.  You will be more sensitive to light and vision will be blurry up close.  Do not drive if you do not feel comfortable.  Mydriatic sunglasses were given if needed.

## 2021-11-15 NOTE — PROGRESS NOTES
Chief Complaint   Patient presents with     Corneal Abrasion Evaluation     Corneal Abrasion Evaluation       HPI      Corneal Abrasion Evaluation      Laterality:  left eye     Duration:  10 days     Associated symptoms:  eye pain, blurred vision, vision loss, redness, photophobia, lid swelling, and tearing     Pain scale:  1/10     Course:  gradually improving     Treatments tried:  eye drops     Response to treatment:  moderate improvement         Comments      Patient stopped tobramycin drops on 11-13 given to her  from ER at [place] on Nov 7. Patient was poked in eye with a dogwood stick in a floral arrangement.          Patient does not wear contacts       Medical, surgical and family histories reviewed and updated 11/15/2021.       OBJECTIVE: See Ophthalmology exam    ASSESSMENT:    ICD-10-CM    1. Abrasion of left cornea, initial encounter  S05.02XA neomycin-polymyxin-dexamethasone (MAXITROL) 3.5-63256-6.1 SUSP ophthalmic susp   2. Iritis, traumatic  H20.9 neomycin-polymyxin-dexamethasone (MAXITROL) 3.5-56591-4.1 SUSP ophthalmic susp      PLAN:     Patient Instructions   Maxitrol- 1 drop left eye 4 x day for 7 days then 2 x day for 7 days.    Return in 2-3 weeks (going out of town next week) for recheck or sooner if needed.    Ok to use artificial tears as needed.    Jose Luis Foss, OD

## 2021-11-15 NOTE — LETTER
11/15/2021         RE: Tiffany Stovall  [address]  [facility] [address]        Dear Colleague,    Thank you for referring your patient, Tiffany Stovall, to the [facility]. Please see a copy of my visit note below.    Chief Complaint   Patient presents with     Corneal Abrasion Evaluation     Corneal Abrasion Evaluation       HPI      Corneal Abrasion Evaluation      Laterality:  left eye     Duration:  10 days     Associated symptoms:  eye pain, blurred vision, vision loss, redness, photophobia, lid swelling, and tearing     Pain scale:  1/10     Course:  gradually improving     Treatments tried:  eye drops     Response to treatment:  moderate improvement         Comments      Patient stopped tobramycin drops on 11-13 given to her  from ER at [facility] on Nov 7. Patient was poked in eye with a dogwood stick in a floral arrangement.          Patient does not wear contacts       Medical, surgical and family histories reviewed and updated 11/15/2021.       OBJECTIVE: See Ophthalmology exam    ASSESSMENT:    ICD-10-CM    1. Abrasion of left cornea, initial encounter  S05.02XA neomycin-polymyxin-dexamethasone (MAXITROL) 3.5-94054-1.1 SUSP ophthalmic susp   2. Iritis, traumatic  H20.9 neomycin-polymyxin-dexamethasone (MAXITROL) 3.5-61629-8.1 SUSP ophthalmic susp      PLAN:     Patient Instructions   Maxitrol- 1 drop left eye 4 x day for 7 days then 2 x day for 7 days.    Return in 2-3 weeks (going out of town next week) for recheck or sooner if needed.    Ok to use artificial tears as needed.    Jose Luis Foss, DASIA           Again, thank you for allowing me to participate in the care of your patient.        Sincerely,        Jose Luis Foss, OD

## 2021-12-07 ENCOUNTER — OFFICE VISIT (OUTPATIENT)
Dept: OPTOMETRY | Facility: CLINIC | Age: 71
End: 2021-12-07
Payer: COMMERCIAL

## 2021-12-07 DIAGNOSIS — S05.02XD ABRASION OF LEFT CORNEA, SUBSEQUENT ENCOUNTER: Primary | ICD-10-CM

## 2021-12-07 DIAGNOSIS — H26.492 LEFT POSTERIOR CAPSULAR OPACIFICATION: ICD-10-CM

## 2021-12-07 DIAGNOSIS — H17.9 CORNEAL SCAR: ICD-10-CM

## 2021-12-07 DIAGNOSIS — Z96.1 PSEUDOPHAKIA OF BOTH EYES: ICD-10-CM

## 2021-12-07 PROCEDURE — 99213 OFFICE O/P EST LOW 20 MIN: CPT | Performed by: OPTOMETRIST

## 2021-12-07 ASSESSMENT — VISUAL ACUITY
OS_SC: 20/25
CORRECTION_TYPE: GLASSES
OD_CC: 20/30-1
METHOD: SNELLEN - LINEAR
OS_CC: 20/30
OD_CC: 20/20
OS_CC: 20/30-1
OD_SC: 20/100

## 2021-12-07 ASSESSMENT — TONOMETRY
OS_IOP_MMHG: 15
IOP_METHOD: TONOPEN

## 2021-12-07 ASSESSMENT — SLIT LAMP EXAM - LIDS
COMMENTS: NORMAL
COMMENTS: NORMAL

## 2021-12-07 ASSESSMENT — EXTERNAL EXAM - LEFT EYE: OS_EXAM: NORMAL

## 2021-12-07 ASSESSMENT — EXTERNAL EXAM - RIGHT EYE: OD_EXAM: NORMAL

## 2021-12-07 NOTE — PATIENT INSTRUCTIONS
Soothe XP- 1 drop both eyes 2 x day.  Drop is oily and may blur vision temporarily.    Heat to the eyes daily for 10-15 minutes nightly with warm washcloth or reusable gel masks from the pharmacy or  [employer] heat masks can be purchased at [facility].    There is mild clouding left eye of the posterior capsule.  We will monitor this with your annual eye exam.    Return 5/2022 for comprehensive eye exam or sooner if needed.    Jose Luis Foss, OD     Abdomen soft, nontender, nondistended, bowel sounds present in all 4 quadrants.

## 2021-12-07 NOTE — LETTER
"    12/7/2021         RE: Tiffany Stovall  [address]  [facility] [address]        Dear Colleague,    Thank you for referring your patient, Tiffany Stovall, to the [facility]. Please see a copy of my visit note below.    Chief Complaint   Patient presents with     corneal abrasian follow up       Left eye feels much better. Every once in a while the left eye will feel a little \"filmy\".    No questions or concerns. Just want to make sure everything is okay.     Off of drops for 1 week        Nahomy Link - Optometric Assistant     See Review Of Systems       Medical, surgical and family histories reviewed and updated 12/7/2021.         OBJECTIVE: See Ophthalmology exam    ASSESSMENT:    ICD-10-CM    1. Abrasion of left cornea, subsequent encounter  S05.02XD     resolved   2. Corneal scar  H17.9    3. Pseudophakia of both eyes  Z96.1    4. Left posterior capsular opacification  H26.492       PLAN:    Patient Instructions   Soothe XP- 1 drop both eyes 2 x day.  Drop is oily and may blur vision temporarily.    Heat to the eyes daily for 10-15 minutes nightly with warm washcloth or reusable gel masks from the pharmacy or  [employer] heat masks can be purchased at [facility].    There is mild clouding left eye of the posterior capsule.  We will monitor this with your annual eye exam.    Return 5/2022 for comprehensive eye exam or sooner if needed.    Jose Luis Foss, DASIA           Again, thank you for allowing me to participate in the care of your patient.        Sincerely,        Jose Luis Foss, OD    "

## 2021-12-07 NOTE — PROGRESS NOTES
"Chief Complaint   Patient presents with     corneal abrasian follow up       Left eye feels much better. Every once in a while the left eye will feel a little \"filmy\".    No questions or concerns. Just want to make sure everything is okay.     Off of drops for 1 week        Nahomy Link - Optometric Assistant     See Review Of Systems       Medical, surgical and family histories reviewed and updated 12/7/2021.         OBJECTIVE: See Ophthalmology exam    ASSESSMENT:    ICD-10-CM    1. Abrasion of left cornea, subsequent encounter  S05.02XD     resolved   2. Corneal scar  H17.9    3. Pseudophakia of both eyes  Z96.1    4. Left posterior capsular opacification  H26.492       PLAN:    Patient Instructions   Soothe XP- 1 drop both eyes 2 x day.  Drop is oily and may blur vision temporarily.    Heat to the eyes daily for 10-15 minutes nightly with warm washcloth or reusable gel masks from the pharmacy or  [employer] heat masks can be purchased at [facility].    There is mild clouding left eye of the posterior capsule.  We will monitor this with your annual eye exam.    Return 5/2022 for comprehensive eye exam or sooner if needed.    Jose Luis Foss, OD       "

## 2021-12-18 DIAGNOSIS — J45.30 MILD PERSISTENT ASTHMA WITHOUT COMPLICATION: ICD-10-CM

## 2021-12-18 DIAGNOSIS — E78.5 HYPERLIPIDEMIA LDL GOAL <130: ICD-10-CM

## 2021-12-18 DIAGNOSIS — E03.4 HYPOTHYROIDISM DUE TO ACQUIRED ATROPHY OF THYROID: ICD-10-CM

## 2021-12-21 ENCOUNTER — MYC MEDICAL ADVICE (OUTPATIENT)
Dept: FAMILY MEDICINE | Facility: CLINIC | Age: 71
End: 2021-12-21
Payer: COMMERCIAL

## 2021-12-21 DIAGNOSIS — J45.30 MILD PERSISTENT ASTHMA WITHOUT COMPLICATION: ICD-10-CM

## 2021-12-21 DIAGNOSIS — E78.5 HYPERLIPIDEMIA LDL GOAL <130: ICD-10-CM

## 2021-12-21 DIAGNOSIS — I10 HYPERTENSION GOAL BP (BLOOD PRESSURE) < 140/90: ICD-10-CM

## 2021-12-21 DIAGNOSIS — E03.4 HYPOTHYROIDISM DUE TO ACQUIRED ATROPHY OF THYROID: ICD-10-CM

## 2021-12-21 RX ORDER — LOSARTAN POTASSIUM 50 MG/1
50 TABLET ORAL DAILY
Qty: 90 TABLET | Refills: 1 | Status: SHIPPED | OUTPATIENT
Start: 2021-12-21 | End: 2022-06-27

## 2021-12-21 RX ORDER — ATORVASTATIN CALCIUM 40 MG/1
40 TABLET, FILM COATED ORAL DAILY
Qty: 90 TABLET | Refills: 2 | Status: CANCELLED | OUTPATIENT
Start: 2021-12-21

## 2021-12-21 RX ORDER — ATORVASTATIN CALCIUM 40 MG/1
TABLET, FILM COATED ORAL
Qty: 90 TABLET | Refills: 0 | Status: SHIPPED | OUTPATIENT
Start: 2021-12-21 | End: 2022-04-07

## 2021-12-21 RX ORDER — LEVOTHYROXINE SODIUM 100 UG/1
TABLET ORAL
Qty: 90 TABLET | Refills: 0 | Status: SHIPPED | OUTPATIENT
Start: 2021-12-21 | End: 2022-04-07

## 2021-12-21 RX ORDER — LEVOTHYROXINE SODIUM 100 UG/1
100 TABLET ORAL DAILY
Qty: 90 TABLET | Refills: 1 | Status: CANCELLED | OUTPATIENT
Start: 2021-12-21

## 2021-12-21 NOTE — TELEPHONE ENCOUNTER
There is not a current, normal ACT on file in the last 6 months.  RN unable to refill medication.    Randi Washington BSN, RN

## 2022-02-23 ENCOUNTER — MYC MEDICAL ADVICE (OUTPATIENT)
Dept: FAMILY MEDICINE | Facility: CLINIC | Age: 72
End: 2022-02-23
Payer: COMMERCIAL

## 2022-02-23 DIAGNOSIS — E78.5 HYPERLIPIDEMIA LDL GOAL <130: ICD-10-CM

## 2022-02-23 DIAGNOSIS — R74.8 ELEVATED LIVER ENZYMES: ICD-10-CM

## 2022-02-23 DIAGNOSIS — E03.4 HYPOTHYROIDISM DUE TO ACQUIRED ATROPHY OF THYROID: Primary | ICD-10-CM

## 2022-02-23 DIAGNOSIS — I10 HYPERTENSION GOAL BP (BLOOD PRESSURE) < 140/90: ICD-10-CM

## 2022-02-23 DIAGNOSIS — I48.0 PAF (PAROXYSMAL ATRIAL FIBRILLATION) (H): ICD-10-CM

## 2022-02-24 NOTE — TELEPHONE ENCOUNTER
Please review and place orders if appropriate.    Anny Gonzalez RN [facility]

## 2022-03-07 ENCOUNTER — LAB (OUTPATIENT)
Dept: LAB | Facility: CLINIC | Age: 72
End: 2022-03-07
Payer: COMMERCIAL

## 2022-03-07 DIAGNOSIS — R74.8 ELEVATED LIVER ENZYMES: ICD-10-CM

## 2022-03-07 DIAGNOSIS — I10 HYPERTENSION GOAL BP (BLOOD PRESSURE) < 140/90: ICD-10-CM

## 2022-03-07 DIAGNOSIS — E78.5 HYPERLIPIDEMIA LDL GOAL <130: ICD-10-CM

## 2022-03-07 DIAGNOSIS — E03.4 HYPOTHYROIDISM DUE TO ACQUIRED ATROPHY OF THYROID: ICD-10-CM

## 2022-03-07 DIAGNOSIS — I48.0 PAF (PAROXYSMAL ATRIAL FIBRILLATION) (H): ICD-10-CM

## 2022-03-07 LAB
ALBUMIN SERPL-MCNC: 4.4 G/DL (ref 3.4–5)
ALP SERPL-CCNC: 61 U/L (ref 40–150)
ALT SERPL W P-5'-P-CCNC: 37 U/L (ref 0–50)
ANION GAP SERPL CALCULATED.3IONS-SCNC: 7 MMOL/L (ref 3–14)
AST SERPL W P-5'-P-CCNC: 26 U/L (ref 0–45)
BILIRUB SERPL-MCNC: 0.6 MG/DL (ref 0.2–1.3)
BUN SERPL-MCNC: 15 MG/DL (ref 7–30)
CALCIUM SERPL-MCNC: 9.7 MG/DL (ref 8.5–10.1)
CHLORIDE BLD-SCNC: 101 MMOL/L (ref 94–109)
CHOLEST SERPL-MCNC: 141 MG/DL
CO2 SERPL-SCNC: 26 MMOL/L (ref 20–32)
CREAT SERPL-MCNC: 0.78 MG/DL (ref 0.52–1.04)
CREAT UR-MCNC: 113 MG/DL
ERYTHROCYTE [DISTWIDTH] IN BLOOD BY AUTOMATED COUNT: 12.3 % (ref 10–15)
FASTING STATUS PATIENT QL REPORTED: YES
GFR SERPL CREATININE-BSD FRML MDRD: 82 ML/MIN/1.73M2
GLUCOSE BLD-MCNC: 103 MG/DL (ref 70–99)
HCT VFR BLD AUTO: 40.3 % (ref 35–47)
HDLC SERPL-MCNC: 65 MG/DL
HGB BLD-MCNC: 13.2 G/DL (ref 11.7–15.7)
LDLC SERPL CALC-MCNC: 65 MG/DL
MCH RBC QN AUTO: 30.5 PG (ref 26.5–33)
MCHC RBC AUTO-ENTMCNC: 32.8 G/DL (ref 31.5–36.5)
MCV RBC AUTO: 93 FL (ref 78–100)
MICROALBUMIN UR-MCNC: 11 MG/L
MICROALBUMIN/CREAT UR: 9.73 MG/G CR (ref 0–25)
NONHDLC SERPL-MCNC: 76 MG/DL
PLATELET # BLD AUTO: 288 10E3/UL (ref 150–450)
POTASSIUM BLD-SCNC: 3.8 MMOL/L (ref 3.4–5.3)
PROT SERPL-MCNC: 7.4 G/DL (ref 6.8–8.8)
RBC # BLD AUTO: 4.33 10E6/UL (ref 3.8–5.2)
SODIUM SERPL-SCNC: 134 MMOL/L (ref 133–144)
TRIGL SERPL-MCNC: 53 MG/DL
TSH SERPL DL<=0.005 MIU/L-ACNC: 0.48 MU/L (ref 0.4–4)
WBC # BLD AUTO: 6.3 10E3/UL (ref 4–11)

## 2022-03-07 PROCEDURE — 85027 COMPLETE CBC AUTOMATED: CPT

## 2022-03-07 PROCEDURE — 80053 COMPREHEN METABOLIC PANEL: CPT

## 2022-03-07 PROCEDURE — 84443 ASSAY THYROID STIM HORMONE: CPT

## 2022-03-07 PROCEDURE — 82043 UR ALBUMIN QUANTITATIVE: CPT

## 2022-03-07 PROCEDURE — 36415 COLL VENOUS BLD VENIPUNCTURE: CPT

## 2022-03-07 PROCEDURE — 80061 LIPID PANEL: CPT

## 2022-03-08 NOTE — RESULT ENCOUNTER NOTE
"Your urine testing is normal.  There is no elevated protein present.  Your thyroid testing indicates you are on the correct dosage of medication.  Your cholesterol levels are under good control with current treatment  Your blood sugar is borderline elevated.  This is in the \"prediabetic\" range.  Exercise and limiting carbohydrate and sugars in diet can be helpful at preventing progression to diabetes.  Your kidney and liver testing are normal.  Blood cell counts are normal.  Please call or MyChart message me if you have any questions.    See you soon,     PSK"

## 2022-03-09 ENCOUNTER — MYC REFILL (OUTPATIENT)
Dept: VASCULAR SURGERY | Facility: CLINIC | Age: 72
End: 2022-03-09
Payer: COMMERCIAL

## 2022-03-09 ENCOUNTER — MYC MEDICAL ADVICE (OUTPATIENT)
Dept: FAMILY MEDICINE | Facility: CLINIC | Age: 72
End: 2022-03-09
Payer: COMMERCIAL

## 2022-03-09 DIAGNOSIS — Z96.652 STATUS POST TOTAL LEFT KNEE REPLACEMENT: Primary | ICD-10-CM

## 2022-03-10 RX ORDER — AMOXICILLIN 500 MG/1
CAPSULE ORAL
Qty: 4 CAPSULE | Refills: 0 | OUTPATIENT
Start: 2022-03-10

## 2022-03-10 RX ORDER — AMOXICILLIN 500 MG/1
CAPSULE ORAL
Qty: 4 CAPSULE | Refills: 1 | Status: SHIPPED | OUTPATIENT
Start: 2022-03-10 | End: 2022-08-11

## 2022-03-10 RX ORDER — AMOXICILLIN 500 MG/1
CAPSULE ORAL
COMMUNITY
Start: 2020-12-09 | End: 2022-03-10

## 2022-04-15 ENCOUNTER — TELEPHONE (OUTPATIENT)
Dept: FAMILY MEDICINE | Facility: CLINIC | Age: 72
End: 2022-04-15
Payer: COMMERCIAL

## 2022-04-15 NOTE — TELEPHONE ENCOUNTER
Patient Quality Outreach    Patient is due for the following:   Depression  -  PHQ2 needed    NEXT STEPS:   Patient was scheduled for an appointment.    Type of outreach:    Phone, spoke to patient/parent. completed phq2        Questions for provider review:    None     Clover Craft CMA   Closing encounter

## 2022-04-21 ENCOUNTER — HOSPITAL ENCOUNTER (OUTPATIENT)
Dept: MAMMOGRAPHY | Facility: CLINIC | Age: 72
Discharge: HOME | End: 2022-04-21
Attending: FAMILY MEDICINE | Admitting: FAMILY MEDICINE
Payer: COMMERCIAL

## 2022-04-21 DIAGNOSIS — Z12.31 VISIT FOR SCREENING MAMMOGRAM: ICD-10-CM

## 2022-04-21 PROCEDURE — 77067 SCR MAMMO BI INCL CAD: CPT

## 2022-05-06 ENCOUNTER — OFFICE VISIT (OUTPATIENT)
Dept: FAMILY MEDICINE | Facility: CLINIC | Age: 72
End: 2022-05-06
Payer: COMMERCIAL

## 2022-05-06 VITALS — DIASTOLIC BLOOD PRESSURE: 72 MMHG | SYSTOLIC BLOOD PRESSURE: 132 MMHG

## 2022-05-06 DIAGNOSIS — L24.9 IRRITANT DERMATITIS: Primary | ICD-10-CM

## 2022-05-06 PROCEDURE — 99213 OFFICE O/P EST LOW 20 MIN: CPT | Performed by: DERMATOLOGY

## 2022-05-06 RX ORDER — DESONIDE 0.5 MG/G
OINTMENT TOPICAL 2 TIMES DAILY
Qty: 60 G | Refills: 3 | Status: SHIPPED | OUTPATIENT
Start: 2022-05-06 | End: 2022-08-11

## 2022-05-06 NOTE — PROGRESS NOTES
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  ____________________________________________    Assessment & Plan:     # Irritant dermatitis, upper lip   Discussed possible causes of this lesion, including cheilitis, HSV infection, NMSC, and others.  She reports thorough moisturizing of this area already and states that there does not seem to be a particularly obvious pattern of triggers. We discussed red flag symptoms and future considerations, including biopsy.   - Start desonide 0.05% ointment. Topical steroid precautions discussed.    # History of nonmelanoma skin cancer (NMSC), last lesion identified and treated in October 2019  - No evidence of recurrent disease via inspection or palpation; all sites well-healed  - Photoprotection discussed (SPF30+ sunscreen and proper use, UPF clothing, sun avoidance, tanning bed avoidance)  - Continued annual skin exams recommended.    Procedures Performed:   None      Follow-up: As scheduled on 6/10/22 for FBSE.    Scribe Disclosure:  I, Fannie Demarco, am serving as a scribe to document services personally performed by Michael Gould MD based on data collection and the provider's statements to me.     Staff attestation:  The documentation recorded by the scribe accurately reflects the services I personally performed and the decisions I personally made. I have made edits where needed.    Michael Gould MD  Staff Dermatologist and Dermatopathologist  , Department of Dermatology  ____________________________________________    CC: Derm Problem (Lesion upper R lip bleeding off/on over the past 6 weeks)      HPI:  Ms. Tiffany Stovall is a(n) extremely pleasant 71 year old female who presents  today as a return patient for a lesion on the lip. The patient was last seen in dermatology on 10/1/2021 by myself, at which time she had a benign skin exam.    Today, the patient reports a lesion on her upper lip which first appeared several years ago in March 2017, at which time she had very chapped lips and one area kept bleeding more than the others. She saw Dr. Bailon at the time and this lesion was treated with cryotherapy. The lesion then resolved, before returning in the summer of 2019. She saw another dermatologist and was prescribed Aldara, which she did not use until January 2020.     The patient then reports that her upper lip did not react to the Aldara, however her lower lip had a robust reaction. In January 2021 the lesion returned and she used the Aldara again and had the same outcome as prior use. Then, in March 2022 the lesion returned once again. She states that she has had very persistent bleeding from this site, notably that she has been waking in the morning with blood on her pillow, and she reports tingling to the area.     Patient denies cold sores or blisters associated with this lesion. She has never tried a topical steroid on this area.    Patient is otherwise feeling well, without additional skin concerns.     Labs Reviewed:  N/A    Physical Exam:  Vitals: /72   SKIN: Focused examination of the lips was performed.  - 2 mm fissured thin papule with erythema.   - No other lesions of concern on areas examined.     Medications:  Current Outpatient Medications   Medication     albuterol (PROAIR HFA) 108 (90 Base) MCG/ACT inhaler     albuterol (PROVENTIL) (2.5 MG/3ML) 0.083% neb solution     amoxicillin (AMOXIL) 500 MG capsule     atorvastatin (LIPITOR) 40 MG tablet     diltiazem ER COATED BEADS (CARDIZEM CD/CARTIA XT) 120 MG 24 hr capsule     EPINEPHrine (ANY BX GENERIC EQUIV) 0.3 MG/0.3ML injection 2-pack     esomeprazole (NEXIUM) 20 MG DR capsule     fluticasone-salmeterol (ADVAIR)  250-50 MCG/DOSE inhaler     hydrochlorothiazide (HYDRODIURIL) 25 MG tablet     KLOR-CON 20 MEQ CR tablet     levothyroxine (SYNTHROID/LEVOTHROID) 100 MCG tablet     loratadine (CLARITIN) 10 MG tablet     losartan (COZAAR) 50 MG tablet     rivaroxaban ANTICOAGULANT (XARELTO) 20 MG TABS tablet     desonide (DESOWEN) 0.05 % external ointment     No current facility-administered medications for this visit.      Past Medical History:   Patient Active Problem List   Diagnosis     GERD (gastroesophageal reflux disease)     Elevated liver enzymes     Mild persistent asthma     Hypertension goal BP (blood pressure) < 140/90     Hyperlipidemia LDL goal <130     Varicose veins of legs     Hypothyroidism     Allergic reaction     History of basal cell carcinoma     Family history of nonmelanoma skin cancer     Primary osteoarthritis of left knee     Complex tear of medial meniscus of left knee as current injury, subsequent encounter     History of squamous cell carcinoma in situ of skin     Status post total left knee replacement     Palpitations     PAF (paroxysmal atrial fibrillation) (H)     Past Medical History:   Diagnosis Date     Bronchial spasm      Cancer (H)     basal cell, squamous cell skin ca     Compression fracture of T12 vertebra (H) 03/17/2014     GERD (gastroesophageal reflux disease)      HTN - hypertension      Hyperlipaemia      Postmenopausal 1999     Uncomplicated asthma      Unspecified hypothyroidism     Hypothyroidism       This note has been created using voice recognition software; while it has been reviewed, some errors may persist.

## 2022-05-06 NOTE — LETTER
5/6/2022         RE: Tiffany Stovall  [address]  [facility] [address]        Dear Colleague,    Thank you for referring your patient, Tiffany Stovall, to the [facility]. Please see a copy of my visit note below.    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  ____________________________________________    Assessment & Plan:     # Irritant dermatitis, upper lip   Discussed possible causes of this lesion, including cheilitis, HSV infection, NMSC, and others.  She reports thorough moisturizing of this area already and states that there does not seem to be a particularly obvious pattern of triggers. We discussed red flag symptoms and future considerations, including biopsy.   - Start desonide 0.05% ointment. Topical steroid precautions discussed.    # History of nonmelanoma skin cancer (NMSC), last lesion identified and treated in October 2019  - No evidence of recurrent disease via inspection or palpation; all sites well-healed  - Photoprotection discussed (SPF30+ sunscreen and proper use, UPF clothing, sun avoidance, tanning bed avoidance)  - Continued annual skin exams recommended.    Procedures Performed:   None      Follow-up: As scheduled on 6/10/22 for FBSE.    Scribe Disclosure:  I, Fannie Demarco, am serving as a scribe to document services personally performed by Michael Gould MD based on data collection and the provider's statements to me.     Staff attestation:  The documentation recorded by the scribe accurately reflects the services I personally performed and the decisions I personally made. I have made edits where needed.    Michael Gould MD  Staff Dermatologist and  Dermatopathologist  , Department of Dermatology  ____________________________________________    CC: Derm Problem (Lesion upper R lip bleeding off/on over the past 6 weeks)      HPI:  Ms. Tiffany Stovall is a(n) extremely pleasant 71 year old female who presents today as a return patient for a lesion on the lip. The patient was last seen in dermatology on 10/1/2021 by myself, at which time she had a benign skin exam.    Today, the patient reports a lesion on her upper lip which first appeared several years ago in March 2017, at which time she had very chapped lips and one area kept bleeding more than the others. She saw Dr. Bailon at the time and this lesion was treated with cryotherapy. The lesion then resolved, before returning in the summer of 2019. She saw another dermatologist and was prescribed Aldara, which she did not use until January 2020.     The patient then reports that her upper lip did not react to the Aldara, however her lower lip had a robust reaction. In January 2021 the lesion returned and she used the Aldara again and had the same outcome as prior use. Then, in March 2022 the lesion returned once again. She states that she has had very persistent bleeding from this site, notably that she has been waking in the morning with blood on her pillow, and she reports tingling to the area.     Patient denies cold sores or blisters associated with this lesion. She has never tried a topical steroid on this area.    Patient is otherwise feeling well, without additional skin concerns.     Labs Reviewed:  N/A    Physical Exam:  Vitals: /72   SKIN: Focused examination of the lips was performed.  - 2 mm fissured thin papule with erythema.   - No other lesions of concern on areas examined.     Medications:  Current Outpatient Medications   Medication     albuterol (PROAIR HFA) 108 (90 Base) MCG/ACT inhaler     albuterol (PROVENTIL) (2.5 MG/3ML) 0.083% neb solution     amoxicillin  (AMOXIL) 500 MG capsule     atorvastatin (LIPITOR) 40 MG tablet     diltiazem ER COATED BEADS (CARDIZEM CD/CARTIA XT) 120 MG 24 hr capsule     EPINEPHrine (ANY BX GENERIC EQUIV) 0.3 MG/0.3ML injection 2-pack     esomeprazole (NEXIUM) 20 MG DR capsule     fluticasone-salmeterol (ADVAIR) 250-50 MCG/DOSE inhaler     hydrochlorothiazide (HYDRODIURIL) 25 MG tablet     KLOR-CON 20 MEQ CR tablet     levothyroxine (SYNTHROID/LEVOTHROID) 100 MCG tablet     loratadine (CLARITIN) 10 MG tablet     losartan (COZAAR) 50 MG tablet     rivaroxaban ANTICOAGULANT (XARELTO) 20 MG TABS tablet     desonide (DESOWEN) 0.05 % external ointment     No current facility-administered medications for this visit.      Past Medical History:   Patient Active Problem List   Diagnosis     GERD (gastroesophageal reflux disease)     Elevated liver enzymes     Mild persistent asthma     Hypertension goal BP (blood pressure) < 140/90     Hyperlipidemia LDL goal <130     Varicose veins of legs     Hypothyroidism     Allergic reaction     History of basal cell carcinoma     Family history of nonmelanoma skin cancer     Primary osteoarthritis of left knee     Complex tear of medial meniscus of left knee as current injury, subsequent encounter     History of squamous cell carcinoma in situ of skin     Status post total left knee replacement     Palpitations     PAF (paroxysmal atrial fibrillation) (H)     Past Medical History:   Diagnosis Date     Bronchial spasm      Cancer (H)     basal cell, squamous cell skin ca     Compression fracture of T12 vertebra (H) 03/17/2014     GERD (gastroesophageal reflux disease)      HTN - hypertension      Hyperlipaemia      Postmenopausal 1999     Uncomplicated asthma      Unspecified hypothyroidism     Hypothyroidism       This note has been created using voice recognition software; while it has been reviewed, some errors may persist.       Again, thank you for allowing me to participate in the care of your patient.         Sincerely,        Michael Gould MD

## 2022-05-18 ENCOUNTER — OFFICE VISIT (OUTPATIENT)
Dept: OPTOMETRY | Facility: CLINIC | Age: 72
End: 2022-05-18
Payer: COMMERCIAL

## 2022-05-18 DIAGNOSIS — H52.4 PRESBYOPIA: ICD-10-CM

## 2022-05-18 DIAGNOSIS — Z01.00 EXAMINATION OF EYES AND VISION: Primary | ICD-10-CM

## 2022-05-18 DIAGNOSIS — H33.22 LEFT RETINAL DETACHMENT: ICD-10-CM

## 2022-05-18 DIAGNOSIS — H26.492 POSTERIOR CAPSULAR OPACIFICATION NON VISUALLY SIGNIFICANT OF LEFT EYE: ICD-10-CM

## 2022-05-18 DIAGNOSIS — Z96.1 PSEUDOPHAKIA OF BOTH EYES: ICD-10-CM

## 2022-05-18 PROCEDURE — 92015 DETERMINE REFRACTIVE STATE: CPT | Performed by: OPTOMETRIST

## 2022-05-18 PROCEDURE — 92014 COMPRE OPH EXAM EST PT 1/>: CPT | Performed by: OPTOMETRIST

## 2022-05-18 ASSESSMENT — REFRACTION_WEARINGRX
OS_ADD: +2.50
OD_AXIS: 045
OD_ADD: +2.50
OS_AXIS: 130
OS_ADD: +2.50
OS_CYLINDER: SPHERE
OD_ADD: +2.50
OD_SPHERE: -1.50
SPECS_TYPE: EXAM
OD_CYLINDER: +1.25
OD_CYLINDER: +1.25
OD_AXIS: 045
OD_SPHERE: -1.50
SPECS_TYPE: PAL
OS_SPHERE: -0.75
OS_SPHERE: -0.75
OS_CYLINDER: +0.75

## 2022-05-18 ASSESSMENT — KERATOMETRY
OD_AXISANGLE_DEGREES: 086
OD_K2POWER_DIOPTERS: 45.25
METHOD_AUTO_MANUAL: AUTOMATED
OS_AXISANGLE_DEGREES: 093
OS_AXISANGLE2_DEGREES: 003
OS_K1POWER_DIOPTERS: 43.25
OD_K1POWER_DIOPTERS: 43.00
OS_K2POWER_DIOPTERS: 45.50
OD_AXISANGLE2_DEGREES: 176

## 2022-05-18 ASSESSMENT — REFRACTION_MANIFEST
OD_ADD: +2.50
OD_CYLINDER: +1.00
OS_SPHERE: -0.75
OS_CYLINDER: +1.75
OS_ADD: +2.50
OD_AXIS: 045
OS_AXIS: 119
OD_SPHERE: -1.50

## 2022-05-18 ASSESSMENT — CUP TO DISC RATIO
OS_RATIO: 0.25
OD_RATIO: 0.2

## 2022-05-18 ASSESSMENT — VISUAL ACUITY
OS_CC: 20/25
OS_SC: 20/40
OD_CC: 20/20
OD_SC: 20/50
CORRECTION_TYPE: GLASSES
OS_CC: 20/40
OD_CC: 20/20
OD_SC+: -1
METHOD: SNELLEN - LINEAR

## 2022-05-18 ASSESSMENT — EXTERNAL EXAM - LEFT EYE: OS_EXAM: NORMAL

## 2022-05-18 ASSESSMENT — EXTERNAL EXAM - RIGHT EYE: OD_EXAM: NORMAL

## 2022-05-18 ASSESSMENT — TONOMETRY
IOP_METHOD: TONOPEN
OD_IOP_MMHG: 15
OS_IOP_MMHG: 16

## 2022-05-18 ASSESSMENT — CONF VISUAL FIELD
OS_NORMAL: 1
OD_NORMAL: 1

## 2022-05-18 ASSESSMENT — SLIT LAMP EXAM - LIDS
COMMENTS: NORMAL
COMMENTS: NORMAL

## 2022-05-18 NOTE — LETTER
5/18/2022         RE: Tiffany Stovall  [address]  [place] [facility] [place] [address]        Dear Colleague,    Thank you for referring your patient, Tiffany Stovall, to the [facility]. Please see a copy of my visit note below.    Chief Complaint   Patient presents with     Annual Eye Exam      Accompanied by self  Last Eye Exam: 5-6-2021  Dilated Previously: Yes    What are you currently using to see?  Glasses and otc readers     Distance Vision Acuity: Noticed gradual change in both eyes    Near Vision Acuity: Satisfied with vision while reading  with glasses    Eye Comfort: filmy os eye for about a month- April  Do you use eye drops? : Yes: systane  Occupation or Hobbies: [employer]    History of cataract surgery both eyes with Dr. Chip Whitlock at [facility] with Yag right eye     History of eye injury left eye-Patient was poked in eye with a dogwood stick in a floral arrangement -11/07/2021.    Was followed for corneal abrasion and dilated fundus exam was normal 11/15/2021.    Medical, surgical and family histories reviewed and updated 5/18/2022.       OBJECTIVE: See Ophthalmology exam    ASSESSMENT:    ICD-10-CM    1. Examination of eyes and vision  Z01.00 EYE EXAM (SIMPLE-NONBILLABLE)   2. Left retinal detachment  H33.22 Vitreoretinal Surgery Referral   3. Pseudophakia of both eyes  Z96.1    4. Posterior capsular opacification non visually significant of left eye  H26.492    5. Presbyopia  H52.4 REFRACTION       PLAN:     Patient Instructions   Referral to [facility] for evaluation. (Previously Vitreoretinal Surgery) [facility]-  Thursday, May 19th at 7:50 AM-(per phone consult) do not eat or drink anything after midnight.    Jose Luis Foss, DASIA           Again, thank you for allowing me to participate in the care of your patient.        Sincerely,        Jose Luis Foss, OD

## 2022-05-18 NOTE — PATIENT INSTRUCTIONS
Referral to [facility] for evaluation. (Previously Vitreoretinal Surgery) [facility]-  Thursday, May 19th at 7:50 AM-(per phone consult) do not eat or drink anything after midnight.    Jose Luis Foss, DASIA      The affects of the dilating drops last for 4- 6 hours.  You will be more sensitive to light and vision will be blurry up close.  Do not drive if you do not feel comfortable.  Mydriatic sunglasses were given if needed.      Optometry Providers       Clinic Locations                                 Telephone Number   Dr. Kendra eDnt   [place]  [place] [address] [phone]     [place] [facility] Hours:                [facility] Hours:       [facility] Hours:   [address]   [address]      [address] [facility]  [place] [address]   [place] [address]    [place] [address]  Phone: [phone]                    Phone: [phone]     Phone: [phone]                      Monday 8:00-6:00                          Monday 8:00-6:00                          Monday 8:00-6:00              Tuesday 8:00-6:00                          Tuesday 8:00-6:00                          Tuesday 8:00-6:00              Wednesday 8:00-6:00                  Wednesday 8:00-6:00                   Wednesday 8:00-6:00      Thursday 8:00-6:00                        Thursday 8:00-6:00                         Thursday 8:00-6:00            Friday 8:00-5:00                              Friday 8:00-5:00                              Friday 8:00-5:00    [facility] Hours:   [address] [facility] [place] [id number] [phone]    Monday 9:00-6:00  Tuesday 9:00-6:00  Wednesday 9:00-6:00  Thursday 9:00-6:00  Friday 9:00-5:00  Please log on to [email or web address] to order your contact lenses.  The link is found on the [facility] page.  As always, Thank you for trusting us with  your health care needs!

## 2022-05-18 NOTE — PROGRESS NOTES
Chief Complaint   Patient presents with     Annual Eye Exam      Accompanied by self  Last Eye Exam: 5-6-2021  Dilated Previously: Yes    What are you currently using to see?  Glasses and otc readers     Distance Vision Acuity: Noticed gradual change in both eyes    Near Vision Acuity: Satisfied with vision while reading  with glasses    Eye Comfort: filmy os eye for about a month- April  Do you use eye drops? : Yes: systane  Occupation or Hobbies: [employer] [place]    History of cataract surgery both eyes with Dr. Chip Whitlock at [facility] with Yag right eye     History of eye injury left eye-Patient was poked in eye with a dogwood stick in a floral arrangement -11/07/2021.    Was followed for corneal abrasion and dilated fundus exam was normal 11/15/2021.    Medical, surgical and family histories reviewed and updated 5/18/2022.       OBJECTIVE: See Ophthalmology exam    ASSESSMENT:    ICD-10-CM    1. Examination of eyes and vision  Z01.00 EYE EXAM (SIMPLE-NONBILLABLE)   2. Left retinal detachment  H33.22 Vitreoretinal Surgery Referral   3. Pseudophakia of both eyes  Z96.1    4. Posterior capsular opacification non visually significant of left eye  H26.492    5. Presbyopia  H52.4 REFRACTION       PLAN:     Patient Instructions   Referral to [facility] for evaluation. (Previously Vitreoretinal Surgery) [facility]-  Thursday, May 19th at 7:50 AM-(per phone consult) do not eat or drink anything after midnight.    Jose Luis Foss, OD

## 2022-05-19 ENCOUNTER — TRANSFERRED RECORDS (OUTPATIENT)
Dept: HEALTH INFORMATION MANAGEMENT | Facility: CLINIC | Age: 72
End: 2022-05-19
Payer: COMMERCIAL

## 2022-05-25 ENCOUNTER — OFFICE VISIT (OUTPATIENT)
Dept: OPTOMETRY | Facility: CLINIC | Age: 72
End: 2022-05-25
Payer: COMMERCIAL

## 2022-05-25 DIAGNOSIS — H04.123 DRY EYE SYNDROME OF BOTH EYES: ICD-10-CM

## 2022-05-25 DIAGNOSIS — H52.223 REGULAR ASTIGMATISM OF BOTH EYES: ICD-10-CM

## 2022-05-25 DIAGNOSIS — H52.01 HYPEROPIA OF RIGHT EYE: ICD-10-CM

## 2022-05-25 DIAGNOSIS — H52.4 PRESBYOPIA: Primary | ICD-10-CM

## 2022-05-25 PROCEDURE — 99207 PR NO CHARGE LOS: CPT | Performed by: OPTOMETRIST

## 2022-05-25 ASSESSMENT — REFRACTION_MANIFEST
OS_ADD: +2.50
OD_SPHERE: -1.50
OS_AXIS: 120
OD_ADD: +2.50
OD_CYLINDER: +1.25
OS_CYLINDER: +1.25
OS_SPHERE: -0.75
OD_AXIS: 045

## 2022-05-25 ASSESSMENT — SLIT LAMP EXAM - LIDS
COMMENTS: MEIBOMIAN GLAND DYSFUNCTION
COMMENTS: MEIBOMIAN GLAND DYSFUNCTION

## 2022-05-25 ASSESSMENT — REFRACTION_WEARINGRX
OS_AXIS: 065
OS_ADD: +2.50
OD_AXIS: 045
SPECS_TYPE: PAL
OD_SPHERE: -1.50
OD_ADD: +2.50
OD_CYLINDER: +1.25
OS_CYLINDER: +0.25
OS_SPHERE: -1.00

## 2022-05-25 ASSESSMENT — VISUAL ACUITY
OS_CC: 20/40
METHOD: SNELLEN - LINEAR
OD_CC: 20/20
CORRECTION_TYPE: GLASSES

## 2022-05-25 ASSESSMENT — EXTERNAL EXAM - RIGHT EYE: OD_EXAM: NORMAL

## 2022-05-25 ASSESSMENT — EXTERNAL EXAM - LEFT EYE: OS_EXAM: NORMAL

## 2022-05-25 NOTE — LETTER
5/25/2022         RE: Tiffany Stovall  [address]  [facility] [address]        Dear Colleague,    Thank you for referring your patient, Tiffany Stovall, to the [facility]. Please see a copy of my visit note below.    Chief Complaint   Patient presents with     Refraction     Refraction only- had comprehensive eye exam last week and there was concerns of retinal detachment left eye.  Was sent to retinal specialist- and no tear found. Verifying prescription today.    Medical, surgical and family histories reviewed and updated 5/25/2022.       OBJECTIVE: See Ophthalmology exam    ASSESSMENT:    ICD-10-CM    1. Presbyopia  H52.4    2. Regular astigmatism of both eyes  H52.223    3. Hyperopia of right eye  H52.01    4. Dry eye syndrome of both eyes  H04.123       PLAN:     Patient Instructions   Recommend new glasses.  Give the glasses 1-2 weeks to adjust to the new prescription.  You may get headaches or eyestrain as your eyes get used to the new prescription.  Sometimes the symptoms get worse before it gets better.  If any problems after 1-2 weeks schedule an appointment for a recheck.      Artificial tears- 1 drop both eyes 2-4 x daily.    Return in 1 year for a complete eye exam or sooner if needed.    Jose Luis Foss, OD           Again, thank you for allowing me to participate in the care of your patient.        Sincerely,        Jose Luis Foss, OD

## 2022-05-25 NOTE — PATIENT INSTRUCTIONS
Recommend new glasses.  Give the glasses 1-2 weeks to adjust to the new prescription.  You may get headaches or eyestrain as your eyes get used to the new prescription.  Sometimes the symptoms get worse before it gets better.  If any problems after 1-2 weeks schedule an appointment for a recheck.      Artificial tears- 1 drop both eyes 2-4 x daily.    Return in 1 year for a complete eye exam or sooner if needed.    Jose Luis Foss, OD

## 2022-05-25 NOTE — PROGRESS NOTES
Chief Complaint   Patient presents with     Refraction     Refraction only- had comprehensive eye exam last week and there was concerns of retinal detachment left eye.  Was sent to retinal specialist- and no tear found. Verifying prescription today.    Medical, surgical and family histories reviewed and updated 5/25/2022.       OBJECTIVE: See Ophthalmology exam    ASSESSMENT:    ICD-10-CM    1. Presbyopia  H52.4    2. Regular astigmatism of both eyes  H52.223    3. Hyperopia of right eye  H52.01    4. Dry eye syndrome of both eyes  H04.123       PLAN:     Patient Instructions   Recommend new glasses.  Give the glasses 1-2 weeks to adjust to the new prescription.  You may get headaches or eyestrain as your eyes get used to the new prescription.  Sometimes the symptoms get worse before it gets better.  If any problems after 1-2 weeks schedule an appointment for a recheck.      Artificial tears- 1 drop both eyes 2-4 x daily.    Return in 1 year for a complete eye exam or sooner if needed.    Jose Luis Foss, OD

## 2022-06-02 ENCOUNTER — MYC MEDICAL ADVICE (OUTPATIENT)
Dept: FAMILY MEDICINE | Facility: CLINIC | Age: 72
End: 2022-06-02
Payer: COMMERCIAL

## 2022-06-02 DIAGNOSIS — T78.40XA ALLERGIC REACTION, INITIAL ENCOUNTER: ICD-10-CM

## 2022-06-02 RX ORDER — EPINEPHRINE 0.3 MG/.3ML
0.3 INJECTION SUBCUTANEOUS
Qty: 0.6 ML | Refills: 1 | Status: SHIPPED | OUTPATIENT
Start: 2022-06-02 | End: 2023-07-14

## 2022-06-04 ENCOUNTER — HEALTH MAINTENANCE LETTER (OUTPATIENT)
Age: 72
End: 2022-06-04

## 2022-06-23 DIAGNOSIS — J45.30 MILD PERSISTENT ASTHMA WITHOUT COMPLICATION: ICD-10-CM

## 2022-06-23 RX ORDER — FLUTICASONE PROPIONATE AND SALMETEROL 250; 50 UG/1; UG/1
POWDER RESPIRATORY (INHALATION)
Qty: 180 EACH | Refills: 0 | Status: SHIPPED | OUTPATIENT
Start: 2022-06-23 | End: 2022-08-12

## 2022-06-23 NOTE — TELEPHONE ENCOUNTER
"Requested Prescriptions   Pending Prescriptions Disp Refills    fluticasone-salmeterol (ADVAIR) 250-50 MCG/ACT inhaler [Pharmacy Med Name: FLUTICA/SALM(ADVAIR)DISKUS 60 250/50MCG]  3     Sig: USE 1 INHALATION TWICE A DAY        Inhaled Steroids Protocol Failed - 6/23/2022  8:11 AM        Failed - Asthma control assessment score within normal limits in last 6 months     Please review ACT score.           Failed - Recent (6 mo) or future (30 days) visit within the authorizing provider's specialty     Patient had office visit in the last 6 months or has a visit in the next 30 days with authorizing provider or within the authorizing provider's specialty.  See \"Patient Info\" tab in inbasket, or \"Choose Columns\" in Meds & Orders section of the refill encounter.            Passed - Patient is age 12 or older        Passed - Medication is active on med list       Long-Acting Beta Agonist Inhalers Protocol  Failed - 6/23/2022  8:11 AM        Failed - Asthma control assessment score within normal limits in last 6 months     Please review ACT score.           Failed - Recent (6 mo) or future (30 days) visit within the authorizing provider's specialty     Patient had office visit in the last 6 months or has a visit in the next 30 days with authorizing provider or within the authorizing provider's specialty.  See \"Patient Info\" tab in inbasket, or \"Choose Columns\" in Meds & Orders section of the refill encounter.            Passed - Patient is age 12 or older        Passed - Order for Serevent, Striverdi, or Foradil and pt has steroid inhaler        Passed - Medication is active on med list              "

## 2022-06-27 ENCOUNTER — MYC REFILL (OUTPATIENT)
Dept: FAMILY MEDICINE | Facility: CLINIC | Age: 72
End: 2022-06-27

## 2022-06-27 DIAGNOSIS — I10 HYPERTENSION GOAL BP (BLOOD PRESSURE) < 140/90: ICD-10-CM

## 2022-06-27 DIAGNOSIS — E03.4 HYPOTHYROIDISM DUE TO ACQUIRED ATROPHY OF THYROID: ICD-10-CM

## 2022-06-27 DIAGNOSIS — E78.5 HYPERLIPIDEMIA LDL GOAL <130: ICD-10-CM

## 2022-06-27 RX ORDER — ATORVASTATIN CALCIUM 40 MG/1
TABLET, FILM COATED ORAL
Qty: 90 TABLET | Refills: 0 | Status: CANCELLED | OUTPATIENT
Start: 2022-06-27

## 2022-06-27 RX ORDER — LEVOTHYROXINE SODIUM 100 UG/1
TABLET ORAL
Qty: 90 TABLET | Refills: 0 | Status: CANCELLED | OUTPATIENT
Start: 2022-06-27

## 2022-06-27 RX ORDER — LOSARTAN POTASSIUM 50 MG/1
50 TABLET ORAL DAILY
Qty: 90 TABLET | Refills: 0 | Status: CANCELLED | OUTPATIENT
Start: 2022-06-27

## 2022-06-27 RX ORDER — POTASSIUM CHLORIDE 1500 MG/1
TABLET, EXTENDED RELEASE ORAL
Qty: 90 TABLET | Refills: 0 | Status: CANCELLED | OUTPATIENT
Start: 2022-06-27

## 2022-06-27 RX ORDER — HYDROCHLOROTHIAZIDE 25 MG/1
TABLET ORAL
Qty: 90 TABLET | Refills: 0 | Status: CANCELLED | OUTPATIENT
Start: 2022-06-27

## 2022-08-10 ASSESSMENT — ENCOUNTER SYMPTOMS
HEARTBURN: 0
ARTHRALGIAS: 0
FEVER: 0
CHILLS: 0
MYALGIAS: 0
SORE THROAT: 0
BREAST MASS: 0
FREQUENCY: 0
EYE PAIN: 0
COUGH: 0
ABDOMINAL PAIN: 0
JOINT SWELLING: 0
HEMATOCHEZIA: 0
DIZZINESS: 0
HEADACHES: 0
SHORTNESS OF BREATH: 0
DIARRHEA: 0
WEAKNESS: 0
PALPITATIONS: 0
CONSTIPATION: 0
NAUSEA: 0
DYSURIA: 0
PARESTHESIAS: 0
NERVOUS/ANXIOUS: 0
HEMATURIA: 0

## 2022-08-10 ASSESSMENT — ACTIVITIES OF DAILY LIVING (ADL): CURRENT_FUNCTION: NO ASSISTANCE NEEDED

## 2022-08-11 ENCOUNTER — OFFICE VISIT (OUTPATIENT)
Dept: FAMILY MEDICINE | Facility: CLINIC | Age: 72
End: 2022-08-11
Payer: COMMERCIAL

## 2022-08-11 VITALS
TEMPERATURE: 97.8 F | SYSTOLIC BLOOD PRESSURE: 118 MMHG | BODY MASS INDEX: 24.44 KG/M2 | DIASTOLIC BLOOD PRESSURE: 78 MMHG | WEIGHT: 152.1 LBS | HEART RATE: 78 BPM | OXYGEN SATURATION: 97 % | RESPIRATION RATE: 14 BRPM | HEIGHT: 66 IN

## 2022-08-11 DIAGNOSIS — Z96.652 STATUS POST TOTAL LEFT KNEE REPLACEMENT: ICD-10-CM

## 2022-08-11 DIAGNOSIS — H91.93 BILATERAL HEARING LOSS, UNSPECIFIED HEARING LOSS TYPE: ICD-10-CM

## 2022-08-11 DIAGNOSIS — I10 HYPERTENSION GOAL BP (BLOOD PRESSURE) < 140/90: ICD-10-CM

## 2022-08-11 DIAGNOSIS — J45.30 MILD PERSISTENT ASTHMA WITHOUT COMPLICATION: ICD-10-CM

## 2022-08-11 DIAGNOSIS — I48.0 PAF (PAROXYSMAL ATRIAL FIBRILLATION) (H): ICD-10-CM

## 2022-08-11 DIAGNOSIS — E78.5 HYPERLIPIDEMIA LDL GOAL <130: ICD-10-CM

## 2022-08-11 DIAGNOSIS — E03.4 HYPOTHYROIDISM DUE TO ACQUIRED ATROPHY OF THYROID: ICD-10-CM

## 2022-08-11 DIAGNOSIS — Z00.00 ENCOUNTER FOR MEDICARE ANNUAL WELLNESS EXAM: Primary | ICD-10-CM

## 2022-08-11 PROCEDURE — G0009 ADMIN PNEUMOCOCCAL VACCINE: HCPCS | Performed by: FAMILY MEDICINE

## 2022-08-11 PROCEDURE — 90677 PCV20 VACCINE IM: CPT | Performed by: FAMILY MEDICINE

## 2022-08-11 PROCEDURE — G0439 PPPS, SUBSEQ VISIT: HCPCS | Performed by: FAMILY MEDICINE

## 2022-08-11 PROCEDURE — 99214 OFFICE O/P EST MOD 30 MIN: CPT | Mod: 25 | Performed by: FAMILY MEDICINE

## 2022-08-11 RX ORDER — AMOXICILLIN 500 MG/1
CAPSULE ORAL
Qty: 4 CAPSULE | Refills: 4 | Status: SHIPPED | OUTPATIENT
Start: 2022-08-11

## 2022-08-11 ASSESSMENT — ENCOUNTER SYMPTOMS
EYE PAIN: 0
SORE THROAT: 0
FREQUENCY: 0
NAUSEA: 0
DIZZINESS: 0
ABDOMINAL PAIN: 0
CONSTIPATION: 0
PARESTHESIAS: 0
HEADACHES: 0
FEVER: 0
DIARRHEA: 0
HEMATOCHEZIA: 0
DYSURIA: 0
CHILLS: 0
WEAKNESS: 0
HEMATURIA: 0
COUGH: 0
BREAST MASS: 0
JOINT SWELLING: 0
NERVOUS/ANXIOUS: 0
PALPITATIONS: 0
ARTHRALGIAS: 0
SHORTNESS OF BREATH: 0
HEARTBURN: 0
MYALGIAS: 0

## 2022-08-11 ASSESSMENT — PAIN SCALES - GENERAL: PAINLEVEL: NO PAIN (0)

## 2022-08-11 ASSESSMENT — ACTIVITIES OF DAILY LIVING (ADL): CURRENT_FUNCTION: NO ASSISTANCE NEEDED

## 2022-08-11 NOTE — PROGRESS NOTES
"SUBJECTIVE:   Tiffany Stovall is a 71 year old female who presents for Preventive Visit.    Patient has been advised of split billing requirements and indicates understanding: Yes  Are you in the first 12 months of your Medicare coverage?  No    Healthy Habits:     In general, how would you rate your overall health?  Good    Frequency of exercise:  6-7 days/week    Duration of exercise:  Greater than 60 minutes    Do you usually eat at least 4 servings of fruit and vegetables a day, include whole grains    & fiber and avoid regularly eating high fat or \"junk\" foods?  Yes    Taking medications regularly:  Yes    Medication side effects:  Other    Ability to successfully perform activities of daily living:  No assistance needed    Home Safety:  No safety concerns identified    Hearing Impairment:  Difficulty following a conversation in a noisy restaurant or crowded room and feel that people are mumbling or not speaking clearly    In the past 6 months, have you been bothered by leaking of urine?  No    In general, how would you rate your overall mental or emotional health?  Excellent      PHQ-2 Total Score: 0  variety swimming, walking, biking, strengthening.    Hearing concern- noting gradual changes in hearing bilaterally.     Do you feel safe in your environment? Yes    Have you ever done Advance Care Planning? (For example, a Health Directive, POLST, or a discussion with a medical provider or your loved ones about your wishes): Yes, advance care planning is on file.     Fall risk  Fallen 2 or more times in the past year?: No  Any fall with injury in the past year?: No  click delete button to remove this line now  Cognitive Screening   1) Repeat 3 items (Leader, Season, Table)    2) Clock draw: NORMAL  3) 3 item recall: Recalls 3 objects  Results: 3 items recalled: COGNITIVE IMPAIRMENT LESS LIKELY    Mini-CogTM Copyright S Román. Licensed by the author for use in [facility]; reprinted with " permission (magda@[facility]). All rights reserved.      Do you have sleep apnea, excessive snoring or daytime drowsiness?: no    Reviewed and updated as needed this visit by clinical staff   Tobacco  Allergies  Meds   Med Hx  Surg Hx  Fam Hx  Soc Hx          Reviewed and updated as needed this visit by Provider   Tobacco  Allergies  Meds   Med Hx  Surg Hx  Fam Hx  Soc Hx         Social History     Tobacco Use     Smoking status: Never Smoker     Smokeless tobacco: Never Used   Substance Use Topics     Alcohol use: Yes     Comment: 1-4 drinks (wine, liquor) per month     If you drink alcohol do you typically have >3 drinks per day or >7 drinks per week? No    No flowsheet data found.            Current providers sharing in care for this patient include:   Patient Care Team:  Darby Williamson MD as PCP - General (Family Practice)  Amadou Wellington MD as Assigned Heart and Vascular Provider  Darby Williamson MD as Assigned PCP  Jose Luis oFss OD as Assigned Surgical Provider  Michael Gould MD as MD (Dermatology)    The following health maintenance items are reviewed in Epic and correct as of today:  There are no preventive care reminders to display for this patient.  BP Readings from Last 3 Encounters:   08/11/22 118/78   05/06/22 132/72   10/01/21 120/82    Wt Readings from Last 3 Encounters:   08/11/22 69 kg (152 lb 1.6 oz)   03/29/21 68 kg (150 lb)   10/24/19 65 kg (143 lb 4.8 oz)                  Pneumonia Vaccine:   Last P23 5 years ago - booster P20 planned today.  Mammogram Screening: Mammogram Screening: Recommended mammography every 1-2 years with patient discussion and risk factor consideration    Breast CA Risk Assessment (FHS-7) 8/10/2022   Do you have a family history of breast, colon, or ovarian cancer? No / Unknown         Mammogram Screening: Recommended mammography every 1-2 years with patient discussion and risk factor consideration  Pertinent mammograms are reviewed under  "the imaging tab.    Review of Systems   Constitutional: Negative for chills and fever.   HENT: Negative for congestion, ear pain, hearing loss and sore throat.    Eyes: Negative for pain and visual disturbance.   Respiratory: Negative for cough and shortness of breath.    Cardiovascular: Negative for chest pain, palpitations and peripheral edema.   Gastrointestinal: Negative for abdominal pain, constipation, diarrhea, heartburn, hematochezia and nausea.   Breasts:  Negative for tenderness, breast mass and discharge.   Genitourinary: Negative for dysuria, frequency, genital sores, hematuria, pelvic pain, urgency, vaginal bleeding and vaginal discharge.   Musculoskeletal: Negative for arthralgias, joint swelling and myalgias.   Skin: Negative for rash.   Neurological: Negative for dizziness, weakness, headaches and paresthesias.   Psychiatric/Behavioral: Negative for mood changes. The patient is not nervous/anxious.          OBJECTIVE:   /78   Pulse 78   Temp 97.8  F (36.6  C) (Temporal)   Resp 14   Ht 1.664 m (5' 5.5\")   Wt 69 kg (152 lb 1.6 oz)   SpO2 97%   BMI 24.93 kg/m   Estimated body mass index is 24.93 kg/m  as calculated from the following:    Height as of this encounter: 1.664 m (5' 5.5\").    Weight as of this encounter: 69 kg (152 lb 1.6 oz).  Physical Exam  GENERAL APPEARANCE: healthy, alert and no distress  EYES: Eyes grossly normal to inspection, PERRL and conjunctivae and sclerae normal  HENT: ear canals and TM's normal, nose and mouth without ulcers or lesions, oropharynx clear and oral mucous membranes moist  NECK: no adenopathy, no asymmetry, masses, or scars and thyroid normal to palpation  RESP: lungs clear to auscultation - no rales, rhonchi or wheezes  CV: regular rate and rhythm, normal S1 S2, no S3 or S4, no murmur, click or rub, no peripheral edema and peripheral pulses strong  ABDOMEN: soft, nontender, no hepatosplenomegaly, no masses and bowel sounds normal  MS: no " musculoskeletal defects are noted and gait is age appropriate without ataxia  SKIN: irregular contour of the lip noted.   NEURO: Normal strength and tone, sensory exam grossly normal, mentation intact and speech normal  PSYCH: mentation appears normal and affect normal/bright    Diagnostic Test Results:  Labs reviewed in Epic    ASSESSMENT / PLAN:   (Z00.00) Encounter for Medicare annual wellness exam  (primary encounter diagnosis)  Comment: labs reviewed from March 2022  Plan: screening and preventative care discussed.     (I10) Hypertension goal BP (blood pressure) < 140/90  Comment: BP well controlled.    Plan: Comprehensive metabolic panel (BMP + Alb, Alk         Phos, ALT, AST, Total. Bili, TP), CBC with         platelets, Albumin Random Urine Quantitative         with Creat Ratio, potassium chloride ER         (KLOR-CON) 20 MEQ CR tablet,         hydrochlorothiazide (HYDRODIURIL) 25 MG tablet,        losartan (COZAAR) 50 MG tablet        Labs in 6 months with follow up appointment to follow.  Continue current medication.     (I48.0) PAF (paroxysmal atrial fibrillation) (H)  Comment: continues with anticoagulation and rate control.   Plan: CBC with platelets        Follow up with cardiology in March 2023 due.  Labs pre visit and continue diltiazem and xarelto    (E78.5) Hyperlipidemia LDL goal <130  Comment: excellent control of lipids.    Plan: Lipid panel reflex to direct LDL Fasting,         atorvastatin (LIPITOR) 40 MG tablet        Continue current lipitor.  Recheck labs in March.     (E03.4) Hypothyroidism due to acquired atrophy of thyroid  Comment: euthyroid on recent testing.   Plan: TSH with free T4 reflex, levothyroxine         (SYNTHROID/LEVOTHROID) 100 MCG tablet        Labs again in March 2023.     (J45.30) Mild persistent asthma without complication  Comment: controlled.   Plan: fluticasone-salmeterol (ADVAIR) 250-50 MCG/ACT         inhaler        Prn albuterol.     (H91.93) Bilateral hearing  "loss, unspecified hearing loss type  Comment: referral for hearing evaluation planned.  Plan: Adult Audiology  Referral            (Z96.652) Status post total left knee replacement  Comment: prn use with invasive procedures.   Plan: amoxicillin (AMOXIL) 500 MG capsule              Patient has been advised of split billing requirements and indicates understanding: Yes    COUNSELING:  Reviewed preventive health counseling, as reflected in patient instructions       Regular exercise       Healthy diet/nutrition       Vision screening       Hearing screening       Dental care       Bladder control       Fall risk prevention       Immunizations    Vaccinated for: Pneumococcal             Osteoporosis prevention/bone health       Colon cancer screening    Estimated body mass index is 24.93 kg/m  as calculated from the following:    Height as of this encounter: 1.664 m (5' 5.5\").    Weight as of this encounter: 69 kg (152 lb 1.6 oz).        She reports that she has never smoked. She has never used smokeless tobacco.      Appropriate preventive services were discussed with this patient, including applicable screening as appropriate for cardiovascular disease, diabetes, osteopenia/osteoporosis, and glaucoma.  As appropriate for age/gender, discussed screening for colorectal cancer, prostate cancer, breast cancer, and cervical cancer. Checklist reviewing preventive services available has been given to the patient.    Reviewed patients plan of care and provided an AVS. The Intermediate Care Plan ( asthma action plan, low back pain action plan, and migraine action plan) for Tiffany meets the Care Plan requirement. This Care Plan has been established and reviewed with the Patient.    Counseling Resources:  ATP IV Guidelines  Pooled Cohorts Equation Calculator  Breast Cancer Risk Calculator  Breast Cancer: Medication to Reduce Risk  FRAX Risk Assessment  ICSI Preventive Guidelines  Dietary Guidelines for Americans, " 2010  USDA's MyPlate  ASA Prophylaxis  Lung CA Screening    Darby Williamson MD  [facility]    Identified Health Risks:    The patient was provided with written information regarding signs of hearing loss.          Patient Instructions     Return to clinic for labs in 6 months.      Pneumonia vaccine booster today.    Refill Amoxicillin for use with dental procedures.    Referral for hearing evaluation.

## 2022-08-11 NOTE — LETTER
My Asthma Action Plan    Name: Tiffany Stovall   YOB: 1950  Date: 8/12/2022   My doctor: Darby Williamson MD   My clinic: [facility]        My Control Medicine: Fluticasone propionate + salmeterol (Advair Diskus or Wixela Inhub) -  250/50 mcg 1 inhalation twice a day  My Rescue Medicine: Albuterol nebulizer solution as needed  Albuterol (Proair/Ventolin/Proventil HFA) 2-4 puffs EVERY 4 HOURS as needed. Use a spacer if recommended by your provider.   My Asthma Severity:   Mild Persistent  Know your asthma triggers:   Environmental allergens, metoprolol            GREEN ZONE   Good Control    I feel good    No cough or wheeze    Can work, sleep and play without asthma symptoms       Take your asthma control medicine every day.     1. If exercise triggers your asthma, take your rescue medication    15 minutes before exercise or sports, and    During exercise if you have asthma symptoms  2. Spacer to use with inhaler: If you have a spacer, make sure to use it with your inhaler             YELLOW ZONE Getting Worse  I have ANY of these:    I do not feel good    Cough or wheeze    Chest feels tight    Wake up at night   1. Keep taking your Green Zone medications  2. Start taking your rescue medicine:    every 20 minutes for up to 1 hour. Then every 4 hours for 24-48 hours.  3. If you stay in the Yellow Zone for more than 12-24 hours, contact your doctor.  4. If you do not return to the Green Zone in 12-24 hours or you get worse, start taking your oral steroid medicine if prescribed by your provider.           RED ZONE Medical Alert - Get Help  I have ANY of these:    I feel awful    Medicine is not helping    Breathing getting harder    Trouble walking or talking    Nose opens wide to breathe       1. Take your rescue medicine NOW  2. If your provider has prescribed an oral steroid medicine, start taking it NOW  3. Call your doctor NOW  4. If you are still in the Red Zone  after 20 minutes and you have not reached your doctor:    Take your rescue medicine again and    Call 911 or go to the emergency room right away    See your regular doctor within 2 weeks of an Emergency Room or Urgent Care visit for follow-up treatment.          Annual Reminders:  Meet with Asthma Educator,  Flu Shot in the Fall, consider Pneumonia Vaccination for patients with asthma (aged 19 and older).    Pharmacy[facility]    [facility]  [facility] - [place], [place] - [facility] Located within [facility] [address] [address] [employer] - [place] [address] [address]    Electronically signed by Darby Williamson MD   Date: 08/12/22                      Asthma Triggers  How To Control Things That Make Your Asthma Worse    Triggers are things that make your asthma worse.  Look at the list below to help you find your triggers and what you can do about them.  You can help prevent asthma flare-ups by staying away from your triggers.      Trigger                                                          What you can do   Cigarette Smoke  Tobacco smoke can make asthma worse. Do not allow smoking in your home, car or around you.  Be sure no one smokes at a child s day care or school.  If you smoke, ask your health care provider for ways to help you quit.  Ask family members to quit too.  Ask your health care provider for a referral to Quit Plan to help you quit smoking, or call 2-333-699-PLAN.     Colds, Flu, Bronchitis  These are common triggers of asthma. Wash your hands often.  Don t touch your eyes, nose or mouth.  Get a flu shot every year.     Dust Mites  These are tiny bugs that live in cloth or carpet. They are too small to see. Wash sheets and blankets in hot water every week.   Encase pillows and mattress in dust mite proof covers.  Avoid having carpet if you can. If you have carpet, vacuum weekly.   Use a dust mask and HEPA vacuum.   Pollen and Outdoor Mold  Some people are allergic to trees, grass,  or weed pollen, or molds. Try to keep your windows closed.  Limit time out doors when pollen count is high.   Ask you health care provider about taking medicine during allergy season.     Animal Dander  Some people are allergic to skin flakes, urine or saliva from pets with fur or feathers. Keep pets with fur or feathers out of your home.    If you can t keep the pet outdoors, then keep the pet out of your bedroom.  Keep the bedroom door closed.  Keep pets off cloth furniture and away from stuffed toys.     Mice, Rats, and Cockroaches   Some people are allergic to the waste from these pests.   Cover food and garbage.  Clean up spills and food crumbs.  Store grease in the refrigerator.   Keep food out of the bedroom.   Indoor Mold  This can be a trigger if your home has high moisture. Fix leaking faucets, pipes, or other sources of water.   Clean moldy surfaces.  Dehumidify basement if it is damp and smelly.   Smoke, Strong Odors, and Sprays  These can reduce air quality. Stay away from strong odors and sprays, such as perfume, powder, hair spray, paints, smoke incense, paint, cleaning products, candles and new carpet.   Exercise or Sports  Some people with asthma have this trigger. Be active!  Ask your doctor about taking medicine before sports or exercise to prevent symptoms.    Warm up for 5-10 minutes before and after sports or exercise.     Other Triggers of Asthma  Cold air:  Cover your nose and mouth with a scarf.  Sometimes laughing or crying can be a trigger.  Some medicines and food can trigger asthma.

## 2022-08-11 NOTE — PATIENT INSTRUCTIONS
Return to clinic for labs in 6 months.      Pneumonia vaccine booster today.    Refill Amoxicillin for use with dental procedures.    Referral for hearing evaluation.        Patient Education   Personalized Prevention Plan  You are due for the preventive services outlined below.  Your care team is available to assist you in scheduling these services.  If you have already completed any of these items, please share that information with your care team to update in your medical record.  Health Maintenance Due   Topic Date Due    Pneumococcal Vaccine (3 - PPSV23 or PCV20) 03/02/2021    Asthma Action Plan - yearly  07/02/2021    Asthma Control Test  09/29/2021       Signs of Hearing Loss      Hearing much better with one ear can be a sign of hearing loss.   Hearing loss is a problem shared by many people. In fact, it is one of the most common health problems, particularly as people age. Most people age 65 and older have some hearing loss. By age 80, almost everyone does. Hearing loss often occurs slowly over the years. So you may not realize your hearing has gotten worse.  Have your hearing checked  Call your healthcare provider if you:  Have to strain to hear normal conversation  Have to watch other people s faces very carefully to follow what they re saying  Need to ask people to repeat what they ve said  Often misunderstand what people are saying  Turn the volume of the television or radio up so high that others complain  Feel that people are mumbling when they re talking to you  Find that the effort to hear leaves you feeling tired and irritated  Notice, when using the phone, that you hear better with one ear than the other  [employer] last reviewed this educational content on 1/1/2020 2000-2021 [employer]. All rights reserved. This information is not intended as a substitute for professional medical care. Always follow your healthcare professional's instructions.

## 2022-08-11 NOTE — NURSING NOTE
Prior to immunization administration, verified patients identity using patient s name and date of birth. Please see Immunization Activity for additional information.     Screening Questionnaire for Adult Immunization    Are you sick today?   No   Do you have allergies to medications, food, a vaccine component or latex?   No   Have you ever had a serious reaction after receiving a vaccination?   No   Do you have a long-term health problem with heart, lung, kidney, or metabolic disease (e.g., diabetes), asthma, a blood disorder, no spleen, complement component deficiency, a cochlear implant, or a spinal fluid leak?  Are you on long-term aspirin therapy?   No   Do you have cancer, leukemia, HIV/AIDS, or any other immune system problem?   No   Do you have a parent, brother, or sister with an immune system problem?   No   In the past 3 months, have you taken medications that affect  your immune system, such as prednisone, other steroids, or anticancer drugs; drugs for the treatment of rheumatoid arthritis, Crohn s disease, or psoriasis; or have you had radiation treatments?   No   Have you had a seizure, or a brain or other nervous system problem?   No   During the past year, have you received a transfusion of blood or blood    products, or been given immune (gamma) globulin or antiviral drug?   No   For women: Are you pregnant or is there a chance you could become       pregnant during the next month?   No   Have you received any vaccinations in the past 4 weeks?   No     Immunization questionnaire answers were all negative.        Per orders of Dr. Williamson, injection of Prevnar 20 given by Francy Ayala. Patient instructed to remain in clinic for 15 minutes afterwards, and to report any adverse reaction to me immediately.       Screening performed by Francy Ayala on 8/11/2022 at 3:04 PM.

## 2022-08-12 RX ORDER — FLUTICASONE PROPIONATE AND SALMETEROL 250; 50 UG/1; UG/1
1 POWDER RESPIRATORY (INHALATION) 2 TIMES DAILY
Qty: 180 EACH | Refills: 1 | Status: SHIPPED | OUTPATIENT
Start: 2022-08-12 | End: 2023-08-21

## 2022-08-12 RX ORDER — HYDROCHLOROTHIAZIDE 25 MG/1
25 TABLET ORAL DAILY
Qty: 90 TABLET | Refills: 1 | Status: SHIPPED | OUTPATIENT
Start: 2022-08-12 | End: 2023-02-28

## 2022-08-12 RX ORDER — LEVOTHYROXINE SODIUM 100 UG/1
100 TABLET ORAL DAILY
Qty: 90 TABLET | Refills: 1 | Status: SHIPPED | OUTPATIENT
Start: 2022-08-12 | End: 2023-03-31

## 2022-08-12 RX ORDER — POTASSIUM CHLORIDE 1500 MG/1
20 TABLET, EXTENDED RELEASE ORAL DAILY
Qty: 90 TABLET | Refills: 1 | Status: SHIPPED | OUTPATIENT
Start: 2022-08-12 | End: 2023-03-31

## 2022-08-12 RX ORDER — LOSARTAN POTASSIUM 50 MG/1
50 TABLET ORAL DAILY
Qty: 90 TABLET | Refills: 1 | Status: SHIPPED | OUTPATIENT
Start: 2022-08-12 | End: 2023-02-27

## 2022-08-12 RX ORDER — ATORVASTATIN CALCIUM 40 MG/1
40 TABLET, FILM COATED ORAL DAILY
Qty: 90 TABLET | Refills: 1 | Status: SHIPPED | OUTPATIENT
Start: 2022-08-12 | End: 2023-02-27

## 2022-08-12 ASSESSMENT — ASTHMA QUESTIONNAIRES: ACT_TOTALSCORE: 25

## 2022-08-19 ENCOUNTER — OFFICE VISIT (OUTPATIENT)
Dept: FAMILY MEDICINE | Facility: CLINIC | Age: 72
End: 2022-08-19
Payer: COMMERCIAL

## 2022-08-19 DIAGNOSIS — L57.0 ACTINIC KERATOSIS: Primary | ICD-10-CM

## 2022-08-19 DIAGNOSIS — L82.0 SEBORRHEIC KERATOSIS, INFLAMED: ICD-10-CM

## 2022-08-19 DIAGNOSIS — D49.2 NEOPLASM OF UNSPECIFIED BEHAVIOR OF BONE, SOFT TISSUE, AND SKIN: ICD-10-CM

## 2022-08-19 DIAGNOSIS — D22.9 MULTIPLE BENIGN NEVI: ICD-10-CM

## 2022-08-19 DIAGNOSIS — D18.01 CHERRY ANGIOMA: ICD-10-CM

## 2022-08-19 DIAGNOSIS — L81.4 SOLAR LENTIGO: ICD-10-CM

## 2022-08-19 PROCEDURE — 88342 IMHCHEM/IMCYTCHM 1ST ANTB: CPT | Performed by: DERMATOLOGY

## 2022-08-19 PROCEDURE — 11102 TANGNTL BX SKIN SINGLE LES: CPT | Performed by: DERMATOLOGY

## 2022-08-19 PROCEDURE — 88305 TISSUE EXAM BY PATHOLOGIST: CPT | Performed by: DERMATOLOGY

## 2022-08-19 PROCEDURE — 88341 IMHCHEM/IMCYTCHM EA ADD ANTB: CPT | Performed by: DERMATOLOGY

## 2022-08-19 PROCEDURE — 17000 DESTRUCT PREMALG LESION: CPT | Mod: XS | Performed by: DERMATOLOGY

## 2022-08-19 PROCEDURE — 99213 OFFICE O/P EST LOW 20 MIN: CPT | Mod: 25 | Performed by: DERMATOLOGY

## 2022-08-19 PROCEDURE — 17003 DESTRUCT PREMALG LES 2-14: CPT | Mod: XS | Performed by: DERMATOLOGY

## 2022-08-19 ASSESSMENT — PAIN SCALES - GENERAL: PAINLEVEL: NO PAIN (0)

## 2022-08-19 NOTE — LETTER
8/19/2022         RE: Tiffany Stovall  [address]  [place] [facility] [id number]        Dear Colleague,    Thank you for referring your patient, Tiffany Stovall, to [facility]. Please see a copy of my visit note below.    [facility] Dermatology Clinic Note - EP    Encounter Date: Aug 19, 2022    Dermatology Problem List:  0. NUBs  - right lower medial chest s/p shave biopsy 8/19/22  - upper lip s/p outside biopsy 8/17/22 (previous tx: Aldara, desonide 0.05% ointment)  1. History of NMSC  - sBCC, right upper back, s/p excision 10/2019  ____________________________________________    Assessment & Plan:     # NUB, right lower medial chest ddx MIS/MM vs. DN  - Shave biopsy today, see procedure note below.    # NUB, upper lip   Discussed biopsy by outside dermatology provider with possibility of MMS pending pathology.    # AKs x5, left superior shin, left hand, right dorsal hand, right upper arm, right clavicle  - Cryotherapy today, see procedure note below.    # Benign lesions  Multiple benign nevi, solar lentigos, seborrheic keratoses, inflamed seborrheic keratoses, cherry angiomas. Explained to patient benign nature of lesion. No treatment is necessary at this time unless the lesion changes or becomes symptomatic.   - ABCDs of melanoma were discussed and self skin checks were advised.  - Sun precaution was advised including the use of sun screens of SPF 30 or higher, sun protective clothing, and avoidance of tanning beds.     # History of nonmelanoma skin cancer (NMSC), last lesion identified and treated in October 2019  - No evidence of recurrent disease via inspection or palpation; all sites well-healed  - Photoprotection discussed (SPF30+ sunscreen and proper use, UPF clothing, sun avoidance, tanning bed avoidance)  - Continued annual skin exams recommended.    Procedures Performed:   - Shave biopsy procedure note, location(s): see above. After discussion of benefits and  risks including but not limited to bleeding, infection, scar, incomplete removal, recurrence, and non-diagnostic biopsy, written consent and photographs were obtained. The area was cleaned with isopropyl alcohol. 0.5mL of 1% lidocaine with epinephrine was injected to obtain adequate anesthesia of lesion(s). Shave biopsy at site(s) performed. Hemostasis was achieved with aluminium chloride. Petrolatum ointment and a sterile dressing were applied. The patient tolerated the procedure and no complications were noted. The patient was provided with verbal and written post care instructions.     - Cryotherapy procedure note, location(s): see above. After verbal consent and discussion of risks and benefits including, but not limited to, dyspigmentation/scar, blister, and pain, 5 lesion(s) was(were) treated with 1-2 mm freeze border for 1-2 cycles with liquid nitrogen. Post cryotherapy instructions were provided.    Follow-up: pending pathology    Scribe Disclosure:  I, Uli Loja, am serving as a scribe to document services personally performed by Michael Gould MD based on data collection and the provider's statements to me.    Staff attestation:  The documentation recorded by the scribe accurately reflects the services I personally performed and the decisions I personally made. I have made edits where needed.    Michael Gould MD  Staff Dermatologist and Dermatopathologist  , Department of Dermatology     ____________________________________________    CC: Skin Check (Hx of BCC)      HPI:  Ms. Tiffany Stovall is a(n) extremely pleasant 71 year old female who presents today as a return patient for a FBSE. Last seen in dermatology by me on 5/6/22, at which time patient was started on desonide 0.05% ointment for treatment of irritant dermatitis to the upper lip.    Today, patient notes that the desonide caused significant lip thinning, and so she stopped taking it. She then received a biopsy  for the lesion to her upper lip about 2 days ago to see if Mohs surgery is required. She further notes spots of concern to her left knee and left nose.    The patient denies any painful, bleeding, or nonhealing lesions, or any new or changing moles.    Patient is otherwise feeling well, without additional skin concerns.     Labs Reviewed:  N/A    Physical Exam:  Vitals: There were no vitals taken for this visit.  SKIN: Total skin excluding the undergarment areas was performed. The exam included the head/face, neck, both arms, chest, back, abdomen, both legs, digits and/or nails.   - 2 mm scaly papule just inferior to a previous NMSC scar on the left superior shin.  - 4 mm black macule with clods and strings of pigments superiorally and a pigment network which is filled in at the inferior aspect of the lesion to the right lower medial chest.  - There are erythematous macules with overyling adherent scale on the left hand, right dorsal hand, right upper arm, and right clavicle.   - There is a dome shaped bright red papule on the left nasal sidebridge.   - Multiple regular brown pigmented macules and papules are identified on the trunk and extremities.   - Scattered brown macules on sun exposed areas.  - There are waxy stuck on tan to brown papules on the trunk and extremities.  - There is a tan to brown waxy stuck on papules with surrounding erythema on the right leg.   - No other lesions of concern on areas examined.     Medications:  Current Outpatient Medications   Medication     albuterol (PROAIR HFA) 108 (90 Base) MCG/ACT inhaler     albuterol (PROVENTIL) (2.5 MG/3ML) 0.083% neb solution     amoxicillin (AMOXIL) 500 MG capsule     atorvastatin (LIPITOR) 40 MG tablet     diltiazem ER COATED BEADS (CARDIZEM CD/CARTIA XT) 120 MG 24 hr capsule     EPINEPHrine (ANY BX GENERIC EQUIV) 0.3 MG/0.3ML injection 2-pack     esomeprazole (NEXIUM) 20 MG DR capsule     fluticasone-salmeterol (ADVAIR) 250-50 MCG/ACT inhaler      hydrochlorothiazide (HYDRODIURIL) 25 MG tablet     levothyroxine (SYNTHROID/LEVOTHROID) 100 MCG tablet     loratadine (CLARITIN) 10 MG tablet     losartan (COZAAR) 50 MG tablet     potassium chloride ER (KLOR-CON) 20 MEQ CR tablet     rivaroxaban ANTICOAGULANT (XARELTO) 20 MG TABS tablet     No current facility-administered medications for this visit.      Past Medical History:   Patient Active Problem List   Diagnosis     GERD (gastroesophageal reflux disease)     Elevated liver enzymes     Mild persistent asthma     Hypertension goal BP (blood pressure) < 140/90     Hyperlipidemia LDL goal <130     Varicose veins of legs     Hypothyroidism     Allergic reaction     History of basal cell carcinoma     Family history of nonmelanoma skin cancer     Primary osteoarthritis of left knee     Complex tear of medial meniscus of left knee as current injury, subsequent encounter     History of squamous cell carcinoma in situ of skin     Status post total left knee replacement     Palpitations     PAF (paroxysmal atrial fibrillation) (H)     Past Medical History:   Diagnosis Date     Bronchial spasm      Cancer (H)     basal cell, squamous cell skin ca     Compression fracture of T12 vertebra (H) 03/17/2014     GERD (gastroesophageal reflux disease)      HTN - hypertension      Hyperlipaemia      Postmenopausal 01/01/1999     Uncomplicated asthma      Unspecified hypothyroidism     Hypothyroidism       CC Referred Self, MD  No address on file on close of this encounter.    This note has been created using voice recognition software; while it has been reviewed, some errors may persist.       Again, thank you for allowing me to participate in the care of your patient.        Sincerely,        Michael Gould MD

## 2022-08-19 NOTE — PROGRESS NOTES
[facility] Dermatology Clinic Note - EP    Encounter Date: Aug 19, 2022    Dermatology Problem List:  0. NUBs  - right lower medial chest s/p shave biopsy 8/19/22  - upper lip s/p outside biopsy 8/17/22 (previous tx: [facility]ara, desonide 0.05% ointment)  1. History of NMSC  - sBCC, right upper back, s/p excision 10/2019  ____________________________________________    Assessment & Plan:     # NUB, right lower medial chest ddx MIS/MM vs. DN  - Shave biopsy today, see procedure note below.    # NUB, upper lip   Discussed biopsy by outside dermatology provider with possibility of MMS pending pathology.    # AKs x5, left superior shin, left hand, right dorsal hand, right upper arm, right clavicle  - Cryotherapy today, see procedure note below.    # Benign lesions  Multiple benign nevi, solar lentigos, seborrheic keratoses, inflamed seborrheic keratoses, cherry angiomas. Explained to patient benign nature of lesion. No treatment is necessary at this time unless the lesion changes or becomes symptomatic.   - ABCDs of melanoma were discussed and self skin checks were advised.  - Sun precaution was advised including the use of sun screens of SPF 30 or higher, sun protective clothing, and avoidance of tanning beds.     # History of nonmelanoma skin cancer (NMSC), last lesion identified and treated in October 2019  - No evidence of recurrent disease via inspection or palpation; all sites well-healed  - Photoprotection discussed (SPF30+ sunscreen and proper use, UPF clothing, sun avoidance, tanning bed avoidance)  - Continued annual skin exams recommended.    Procedures Performed:   - Shave biopsy procedure note, location(s): see above. After discussion of benefits and risks including but not limited to bleeding, infection, scar, incomplete removal, recurrence, and non-diagnostic biopsy, written consent and photographs were obtained. The area was cleaned with isopropyl alcohol. 0.5mL of 1% lidocaine with epinephrine was injected to  obtain adequate anesthesia of lesion(s). Shave biopsy at site(s) performed. Hemostasis was achieved with aluminium chloride. Petrolatum ointment and a sterile dressing were applied. The patient tolerated the procedure and no complications were noted. The patient was provided with verbal and written post care instructions.     - Cryotherapy procedure note, location(s): see above. After verbal consent and discussion of risks and benefits including, but not limited to, dyspigmentation/scar, blister, and pain, 5 lesion(s) was(were) treated with 1-2 mm freeze border for 1-2 cycles with liquid nitrogen. Post cryotherapy instructions were provided.    Follow-up: pending pathology    Scribe Disclosure:  I, Uli Loja, am serving as a scribe to document services personally performed by Michael Gould MD based on data collection and the provider's statements to me.    Staff attestation:  The documentation recorded by the scribe accurately reflects the services I personally performed and the decisions I personally made. I have made edits where needed.    Michael Gould MD  Staff Dermatologist and Dermatopathologist  , Department of Dermatology     ____________________________________________    CC: Skin Check (Hx of BCC)      HPI:  Ms. Tiffany Stovall is a(n) extremely pleasant 71 year old female who presents today as a return patient for a FBSE. Last seen in dermatology by me on 5/6/22, at which time patient was started on desonide 0.05% ointment for treatment of irritant dermatitis to the upper lip.    Today, patient notes that the desonide caused significant lip thinning, and so she stopped taking it. She then received a biopsy for the lesion to her upper lip about 2 days ago to see if Mohs surgery is required. She further notes spots of concern to her left knee and left nose.    The patient denies any painful, bleeding, or nonhealing lesions, or any new or changing moles.    Patient is  otherwise feeling well, without additional skin concerns.     Labs Reviewed:  N/A    Physical Exam:  Vitals: There were no vitals taken for this visit.  SKIN: Total skin excluding the undergarment areas was performed. The exam included the head/face, neck, both arms, chest, back, abdomen, both legs, digits and/or nails.   - 2 mm scaly papule just inferior to a previous NMSC scar on the left superior shin.  - 4 mm black macule with clods and strings of pigments superiorally and a pigment network which is filled in at the inferior aspect of the lesion to the right lower medial chest.  - There are erythematous macules with overyling adherent scale on the left hand, right dorsal hand, right upper arm, and right clavicle.   - There is a dome shaped bright red papule on the left nasal sidebridge.   - Multiple regular brown pigmented macules and papules are identified on the trunk and extremities.   - Scattered brown macules on sun exposed areas.  - There are waxy stuck on tan to brown papules on the trunk and extremities.  - There is a tan to brown waxy stuck on papules with surrounding erythema on the right leg.   - No other lesions of concern on areas examined.     Medications:  Current Outpatient Medications   Medication     albuterol (PROAIR HFA) 108 (90 Base) MCG/ACT inhaler     albuterol (PROVENTIL) (2.5 MG/3ML) 0.083% neb solution     amoxicillin (AMOXIL) 500 MG capsule     atorvastatin (LIPITOR) 40 MG tablet     diltiazem ER COATED BEADS (CARDIZEM CD/CARTIA XT) 120 MG 24 hr capsule     EPINEPHrine (ANY BX GENERIC EQUIV) 0.3 MG/0.3ML injection 2-pack     esomeprazole (NEXIUM) 20 MG DR capsule     fluticasone-salmeterol (ADVAIR) 250-50 MCG/ACT inhaler     hydrochlorothiazide (HYDRODIURIL) 25 MG tablet     levothyroxine (SYNTHROID/LEVOTHROID) 100 MCG tablet     loratadine (CLARITIN) 10 MG tablet     losartan (COZAAR) 50 MG tablet     potassium chloride ER (KLOR-CON) 20 MEQ CR tablet     rivaroxaban ANTICOAGULANT  (XARELTO) 20 MG TABS tablet     No current facility-administered medications for this visit.      Past Medical History:   Patient Active Problem List   Diagnosis     GERD (gastroesophageal reflux disease)     Elevated liver enzymes     Mild persistent asthma     Hypertension goal BP (blood pressure) < 140/90     Hyperlipidemia LDL goal <130     Varicose veins of legs     Hypothyroidism     Allergic reaction     History of basal cell carcinoma     Family history of nonmelanoma skin cancer     Primary osteoarthritis of left knee     Complex tear of medial meniscus of left knee as current injury, subsequent encounter     History of squamous cell carcinoma in situ of skin     Status post total left knee replacement     Palpitations     PAF (paroxysmal atrial fibrillation) (H)     Past Medical History:   Diagnosis Date     Bronchial spasm      Cancer (H)     basal cell, squamous cell skin ca     Compression fracture of T12 vertebra (H) 03/17/2014     GERD (gastroesophageal reflux disease)      HTN - hypertension      Hyperlipaemia      Postmenopausal 01/01/1999     Uncomplicated asthma      Unspecified hypothyroidism     Hypothyroidism       CC Referred Self, MD  No address on file on close of this encounter.    This note has been created using voice recognition software; while it has been reviewed, some errors may persist.

## 2022-08-19 NOTE — PATIENT INSTRUCTIONS
Wound Care After a Biopsy    What is a skin biopsy?  A skin biopsy allows the doctor to examine a very small piece of tissue under the microscope to determine the diagnosis and the best treatment for the skin condition. A local anesthetic (numbing medicine)  is injected with a very small needle into the skin area to be tested. A small piece of skin is taken from the area. Sometimes a suture (stitch) is used.     What are the risks of a skin biopsy?  I will experience scar, bleeding, swelling, pain, crusting and redness. I may experience incomplete removal or recurrence. Risks of this procedure are excessive bleeding, bruising, infection, nerve damage, numbness, thick (hypertrophic or keloidal) scar and non-diagnostic biopsy.    How should I care for my wound for the first 24 hours?  Keep the wound dry and covered for 24 hours  If it bleeds, hold direct pressure on the area for 15 minutes. If bleeding does not stop then go to the emergency room  Avoid strenuous exercise the first 1-2 days or as your doctor instructs you    How should I care for the wound after 24 hours?  After 24 hours, remove the bandage  You may bathe or shower as normal  If you had a scalp biopsy, you can shampoo as usual and can use shower water to clean the biopsy site daily  Clean the wound twice a day with gentle soap and water  Do not scrub, be gentle  Apply white petroleum/Vaseline after cleaning the wound with a cotton swab or a clean finger, and keep the site covered with a Bandaid /bandage. Bandages are not necessary with a scalp biopsy  If you are unable to cover the site with a Bandaid /bandage, re-apply ointment 2-3 times a day to keep the site moist. Moisture will help with healing  Avoid strenuous activity for first 1-2 days  Avoid lakes, rivers, pools, and oceans until the stitches are removed or the site is healed    How do I clean my wound?  Wash hands thoroughly with soap or use hand  before all wound care  Clean the  wound with gentle soap and water  Apply white petroleum/Vaseline  to wound after it is clean  Replace the Bandaid /bandage to keep the wound covered for the first few days or as instructed by your doctor  If you had a scalp biopsy, warm shower water to the area on a daily basis should suffice    What should I use to clean my wound?   Cotton-tipped applicators (Qtips )  White petroleum jelly (Vaseline ). Use a clean new container and use Q-tips to apply.  Bandaids   as needed  Gentle soap     How should I care for my wound long term?  Do not get your wound dirty  Keep up with wound care for one week or until the area is healed.  A small scab will form and fall off by itself when the area is completely healed. The area will be red and will become pink in color as it heals. Sun protection is very important for how your scar will turn out. Sunscreen with an SPF 30 or greater is recommended once the area is healed.  You should have some soreness but it should be mild and slowly go away over several days. Talk to your doctor about using tylenol for pain,    When should I call my doctor?  If you have increased:   Pain or swelling  Pus or drainage (clear or slightly yellow drainage is ok)  Temperature over 100F  Spreading redness or warmth around wound    When will I hear about my results?  The biopsy results can take 2 weeks to come back.  Your results will automatically release to [facility] before your provider has even reviewed them.  The clinic will call you with the results, send you a [employer] message, or have you schedule a follow-up clinic or phone time to discuss the results.  Contact our clinics if you do not hear from us in 2 weeks.    Who should I call with questions?  [facility]: [phone]  [facility]: [phone]  For urgent needs outside of business hours call the [facility] at [phone] and ask for the dermatology resident on call      Cryotherapy    What is it?  Use of a very cold liquid, such as liquid nitrogen, to freeze and destroy abnormal skin cells that need to be removed    What should I expect?  Tenderness and redness  A small blister that might grow and fill with dark purple blood. There may be crusting.  More than one treatment may be needed if the lesions do not go away.    How do I care for the treated area?  Gently wash the area with your hands when bathing.  Use a thin layer of Vaseline to help with healing. You may use a Band-Aid.   The area should heal within 7-10 days and may leave behind a pink or lighter color.   Do not use an antibiotic or Neosporin ointment.   You may take acetaminophen (Tylenol) for pain.     Call your doctor if you have:  Severe pain  Signs of infection (warmth, redness, cloudy yellow drainage, and or a bad smell)  Questions or concerns    Who should I call with questions?      [facility]: [phone]      [facility]: [phone]      For urgent needs outside of business hours call the [facility] at [phone] and ask for the dermatology resident on call       Patient Education     Checking for Skin Cancer  You can find cancer early by checking your skin each month. There are 3 kinds of skin cancer. They are melanoma, basal cell carcinoma, and squamous cell carcinoma. Doing monthly skin checks is the best way to find new marks or skin changes. Follow the instructions below for checking your skin.   The ABCDEs of checking moles for melanoma   Check your moles or growths for signs of melanoma using ABCDE:   Asymmetry: the sides of the mole or growth don t match  Border: the edges are ragged, notched, or blurred  Color: the color within the mole or growth varies  Diameter: the mole or growth is larger than 6 mm (size of a pencil eraser)  Evolving: the size, shape, or color of the mole or growth is changing (evolving is not shown in  the images below)    Checking for other types of skin cancer  Basal cell carcinoma or squamous cell carcinoma have symptoms such as:     A spot or mole that looks different from all other marks on your skin  Changes in how an area feels, such as itching, tenderness, or pain  Changes in the skin's surface, such as oozing, bleeding, or scaliness  A sore that does not heal  New swelling or redness beyond the border of a mole    Who s at risk?  Anyone can get skin cancer. But you are at greater risk if you have:   Fair skin, light-colored hair, or light-colored eyes  Many moles or abnormal moles on your skin  A history of sunburns from sunlight or tanning beds  A family history of skin cancer  A history of exposure to radiation or chemicals  A weakened immune system  If you have had skin cancer in the past, you are at risk for recurring skin cancer.   How to check your skin  Do your monthly skin checkups in front of a full-length mirror. Check all parts of your body, including your:   Head (ears, face, neck, and scalp)  Torso (front, back, and sides)  Arms (tops, undersides, upper, and lower armpits)  Hands (palms, backs, and fingers, including under the nails)  Buttocks and genitals  Legs (front, back, and sides)  Feet (tops, soles, toes, including under the nails, and between toes)  If you have a lot of moles, take digital photos of them each month. Make sure to take photos both up close and from a distance. These can help you see if any moles change over time.   Most skin changes are not cancer. But if you see any changes in your skin, call your doctor right away. Only he or she can diagnose a problem. If you have skin cancer, seeing your doctor can be the first step toward getting the treatment that could save your life.   [employer] last reviewed this educational content on 4/1/2019 2000-2020 [employer] [address] [place] [address], [place] [address]. All rights reserved. This information is not intended  as a substitute for professional medical care. Always follow your healthcare professional's instructions.       When should I call my doctor?  If you are worsening or not improving, please, contact us or seek urgent care as noted below.     Who should I call with questions (adults)?  [facility] (adult and pediatric): [phone]  [facility] (adult): [phone]  For urgent needs outside of business hours call the [facility] at [phone] and ask for the dermatology resident on call to be paged  If this is a medical emergency and you are unable to reach an ER, Call 911    Who should I call with questions (pediatric)?  [facility]- Pediatric Dermatology  Dr. Trini Mcpherson, Dr. Jamin Moore, Dr. Guera Centeno, GIOVANNY Traore, Dr. Noemi Grier, Dr. Oliva Billings & Dr. Tom Sosa  Non-urgent nurse triage line; [phone]- [place] and [place] SANTILLAN Care Coordinators   Carli (/Complex ) [phone]    If you need a prescription refill, please contact your pharmacy. Refills are approved or denied by our Physicians during normal business hours, Monday through Fridays  Per office policy, refills will not be granted if you have not been seen within the past year (or sooner depending on your child's condition)    Scheduling Information:  Pediatric Appointment Scheduling and Call Center [phone]  Radiology Scheduling- [phone]  Sedation Unit Scheduling- [phone]  [place] Scheduling- General [phone]; Pediatric Dermatology [phone]  Main  Services: [phone]  Japanese: [phone]  Taiwanese: [phone]  Hmong/Maldivian/Jamaican: [phone]  Preadmission Nursing Department Fax Number: [phone] (Fax all pre-operative paperwork to this number)    For urgent matters arising during evenings, weekends, or holidays that cannot wait for normal business hours  please call [phone] and ask for the dermatology resident on call to be paged.

## 2022-08-29 LAB
PATH REPORT.COMMENTS IMP SPEC: ABNORMAL
PATH REPORT.COMMENTS IMP SPEC: YES
PATH REPORT.FINAL DX SPEC: ABNORMAL
PATH REPORT.GROSS SPEC: ABNORMAL
PATH REPORT.MICROSCOPIC SPEC OTHER STN: ABNORMAL
PATH REPORT.RELEVANT HX SPEC: ABNORMAL

## 2022-08-30 ENCOUNTER — TELEPHONE (OUTPATIENT)
Dept: FAMILY MEDICINE | Facility: CLINIC | Age: 72
End: 2022-08-30

## 2022-08-30 NOTE — TELEPHONE ENCOUNTER
Pt read results and message from Dr. Gould via my chart. Pt called back and scheduled excision for Friday 11/11 at 12:45 at  Skin with Dr. Gould.  Scheduled 6 month follow up on 2/3 at 9:15 am with Dr. Gould.    Pt states understanding.    Dia DAVIS RN  [facility] [place]  [phone]

## 2022-08-30 NOTE — TELEPHONE ENCOUNTER
Left message for pt to call dermatology nurse at [phone].  Advised there is no vm at this number, if no answer to call back at a later time.    Pt needs to schedule excision with Dr. Gould.  First available is Friday 11/11 at 12:45 pm.    Dia DAVIS RN  [facility] [place]  [phone]

## 2022-08-30 NOTE — TELEPHONE ENCOUNTER
----- Message from Michael Gould MD sent at 8/30/2022  7:26 AM CDT -----  Biopsy showed evolving MIS - please schedule at next available excision slot, and ensure 6 month follow up skin check is scheduled. Thanks!

## 2022-09-01 ENCOUNTER — E-VISIT (OUTPATIENT)
Dept: FAMILY MEDICINE | Facility: CLINIC | Age: 72
End: 2022-09-01
Payer: COMMERCIAL
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
## 2022-09-01 PROCEDURE — 99421 OL DIG E/M SVC 5-10 MIN: CPT | Performed by: FAMILY MEDICINE

## 2022-09-01 NOTE — PATIENT INSTRUCTIONS
Begin Augmentin twice a day for 10 days.    Monitor the area for worsening redness, pain, swelling or other symptoms

## 2022-09-30 ENCOUNTER — IMMUNIZATION (OUTPATIENT)
Dept: FAMILY MEDICINE | Facility: CLINIC | Age: 72
End: 2022-09-30
Payer: COMMERCIAL

## 2022-09-30 DIAGNOSIS — Z23 HIGH PRIORITY FOR 2019-NCOV VACCINE: Primary | ICD-10-CM

## 2022-09-30 PROCEDURE — 99207 PR NO CHARGE NURSE ONLY: CPT

## 2022-09-30 PROCEDURE — 91312 COVID-19,PF,PFIZER BOOSTER BIVALENT: CPT

## 2022-09-30 PROCEDURE — 0124A COVID-19,PF,PFIZER BOOSTER BIVALENT: CPT

## 2022-10-05 ENCOUNTER — OFFICE VISIT (OUTPATIENT)
Dept: AUDIOLOGY | Facility: CLINIC | Age: 72
End: 2022-10-05
Attending: FAMILY MEDICINE
Payer: COMMERCIAL

## 2022-10-05 DIAGNOSIS — H91.93 BILATERAL HEARING LOSS, UNSPECIFIED HEARING LOSS TYPE: ICD-10-CM

## 2022-10-05 DIAGNOSIS — H93.293 ABNORMAL AUDITORY PERCEPTION, BILATERAL: Primary | ICD-10-CM

## 2022-10-05 PROCEDURE — 92557 COMPREHENSIVE HEARING TEST: CPT | Performed by: AUDIOLOGIST

## 2022-10-05 PROCEDURE — 92550 TYMPANOMETRY & REFLEX THRESH: CPT | Performed by: AUDIOLOGIST

## 2022-10-05 NOTE — PROGRESS NOTES
AUDIOLOGY REPORT    SUBJECTIVE:  Tiffany Stovall is a 72 year old female who was seen in the Audiology Clinic at the [facility] for audiologic evaluation, referred by Darby Williamson M.D. The patient suspects a gradual hearing loss bilaterally. The patient also reports occasional non-bothersome tinnitus bilaterally. The patient denies otalgia, aural fullness, otorrhea, and dizziness.     OBJECTIVE:  Abuse Screening:  Do you feel unsafe at home or work/school? No  Do you feel threatened by someone? No  Does anyone try to keep you from having contact with others, or doing things outside of your home? No  Physical signs of abuse present? No     Otoscopic exam indicates ears are clear of cerumen bilaterally     Pure Tone Thresholds assessed using conventional audiometry with good  reliability from 250-8000 Hz bilaterally using insert earphones     RIGHT:  normal hearing thresholds    LEFT:    normal hearing thresholds    Tympanogram:    RIGHT: normal eardrum mobility    LEFT:   normal eardrum mobility    Reflexes (reported by stimulus ear):  RIGHT: Ipsilateral is present at normal levels  RIGHT: Contralateral is present at normal levels  LEFT:   Ipsilateral is present at normal levels  LEFT:   Contralateral is present at normal levels      Speech Reception Threshold:    RIGHT: 10 dB HL    LEFT:   10 dB HL  Word Recognition Score:     RIGHT: 100% at 50 dB HL using NU-6 recorded word list.    LEFT:   100% at 50 dB HL using NU-6 recorded word list.      ASSESSMENT:     ICD-10-CM    1. Abnormal auditory perception, bilateral  H93.293 Cmprhn Audiometry Thrshld Eval & Speech Recog (19421)     Tymps / Reflex   (48168)   2. Bilateral hearing loss, unspecified hearing loss type  H91.93 Adult Audiology  Referral       .Today s results were discussed with the patient in detail.     PLAN: It is recommended that the patient follow-up if new concerns arise.  Please call this clinic with  questions regarding these results or recommendations.        Chi Blackwood.  Doctor of Audiology  [place] License # [id number]

## 2022-10-09 ENCOUNTER — HEALTH MAINTENANCE LETTER (OUTPATIENT)
Age: 72
End: 2022-10-09

## 2022-11-10 NOTE — PROGRESS NOTES
DERMATOLOGIC SURGERY REPORT    Dermatology Problem List:  0. NUB upper lip s/p outside biopsy 8/17/2022 (previous tx: Aldara, desonide 0.05% ointment)  1. MIS, right lower medial chest s/p excision 11/11/2022  2. History of NMSC  - sBCC, right upper back, s/p excision 10/2019    NAME OF PROCEDURE:  EXCISION AND INTERMEDIATE CLOSURE    Surgeon:  Michael Gould MD    PREOPERATIVE DIAGNOSIS: Melanoma in situ  POSTOPERATIVE DIAGNOSIS: Same  FINAL EXCISION SIZE: 8mm lesion with 5mm margins  TOTAL EXCISED DIAMETER: 18mm  FINAL REPAIR LENGTH:  33mm    INDICATIONS:  This patient presented with a 8mm x 8mm scar from MIS biopsy on the right lower medial chest. Excision was indicated. We discussed the principles of treatment and most likely complications including bleeding, infection, wound dehiscence, pain, nerve damage, and scarring. Informed consent was obtained and the patient underwent the procedure as follows.    PROCEDURE:  The patient was taken to the operative suite. The treatment area was anesthetized with 1% lidocaine with 1:373705 epinephrine. The area was washed with Hibiclens, rinsed with saline and draped with sterile towels. The lesion was delineated and excised down to subcutaneous fat. Hemostasis was obtained by electrocoagulation. With a margin of 5mm, the final excision size was 18mm x 18mm.      REPAIR:  In order to repair this defect while maintaining the normal anatomic relations and function, we elected to utilize an intermediate linear closure. Closure was oriented so that the wound was in the patient's natural skin tension lines. Deeper layers of the subcutaneous tissue were undermined first. Deep dermal and subcutaneous layer closure was performed using 3-0 Vicryl deep, intradermal and subcutaneous sutures. Two redundant columns were removed by triangulation. Final cutaneous approximation was achieved with 4-0 Prolene simple running sutures.     The final wound length was 33mm.  A total of 10mL of  anesthesia was administered for all surgical sites. Estimated blood loss was less than 10mL for all surgical sites. A sterile pressure dressing was applied and wound care instructions, with a written handout, were given. The patient was discharged alert and ambulatory.    Scribe Disclosure:  I, Uli Loja, am serving as a scribe to document services personally performed by Michael oGuld MD based on data collection and the provider's statements to me.     Staff attestation:  The documentation recorded by the scribe accurately reflects the services I personally performed and the decisions I personally made. I have made edits where needed.    Michael Gould MD  Staff Dermatologist and Dermatopathologist  , Department of Dermatology

## 2022-11-11 ENCOUNTER — OFFICE VISIT (OUTPATIENT)
Dept: FAMILY MEDICINE | Facility: CLINIC | Age: 72
End: 2022-11-11
Payer: COMMERCIAL

## 2022-11-11 DIAGNOSIS — D03.52 MELANOMA IN SITU OF RIGHT BREAST (H): Primary | ICD-10-CM

## 2022-11-11 PROCEDURE — 88305 TISSUE EXAM BY PATHOLOGIST: CPT | Performed by: PATHOLOGY

## 2022-11-11 PROCEDURE — 11602 EXC TR-EXT MAL+MARG 1.1-2 CM: CPT | Performed by: DERMATOLOGY

## 2022-11-11 PROCEDURE — 12032 INTMD RPR S/A/T/EXT 2.6-7.5: CPT | Performed by: DERMATOLOGY

## 2022-11-11 NOTE — LETTER
11/11/2022         RE: Tiffany Stovall  [address]  [place] [address]        Dear Colleague,    Thank you for referring your patient, Tiffany Stovall, to the [facility]. Please see a copy of my visit note below.    DERMATOLOGIC SURGERY REPORT    Dermatology Problem List:  0. NUB upper lip s/p outside biopsy 8/17/2022 (previous tx: Aldara, desonide 0.05% ointment)  1. MIS, right lower medial chest s/p excision 11/11/2022  2. History of NMSC  - sBCC, right upper back, s/p excision 10/2019    NAME OF PROCEDURE:  EXCISION AND INTERMEDIATE CLOSURE    Surgeon:  Michael Gould MD    PREOPERATIVE DIAGNOSIS: Melanoma in situ  POSTOPERATIVE DIAGNOSIS: Same  FINAL EXCISION SIZE: 8mm lesion with 5mm margins  TOTAL EXCISED DIAMETER: 18mm  FINAL REPAIR LENGTH:  33mm    INDICATIONS:  This patient presented with a 8mm x 8mm scar from MIS biopsy on the right lower medial chest. Excision was indicated. We discussed the principles of treatment and most likely complications including bleeding, infection, wound dehiscence, pain, nerve damage, and scarring. Informed consent was obtained and the patient underwent the procedure as follows.    PROCEDURE:  The patient was taken to the operative suite. The treatment area was anesthetized with 1% lidocaine with 1:[id number] epinephrine. The area was washed with Hibiclens, rinsed with saline and draped with sterile towels. The lesion was delineated and excised down to subcutaneous fat. Hemostasis was obtained by electrocoagulation. With a margin of 5mm, the final excision size was 18mm x 18mm.      REPAIR:  In order to repair this defect while maintaining the normal anatomic relations and function, we elected to utilize an intermediate linear closure. Closure was oriented so that the wound was in the patient's natural skin tension lines. Deeper layers of the subcutaneous tissue were undermined first. Deep dermal and subcutaneous layer closure was  performed using 3-0 Vicryl deep, intradermal and subcutaneous sutures. Two redundant columns were removed by triangulation. Final cutaneous approximation was achieved with 4-0 Prolene simple running sutures.     The final wound length was 33mm.  A total of 10mL of anesthesia was administered for all surgical sites. Estimated blood loss was less than 10mL for all surgical sites. A sterile pressure dressing was applied and wound care instructions, with a written handout, were given. The patient was discharged alert and ambulatory.    Scribe Disclosure:  I, Uli Loja, am serving as a scribe to document services personally performed by Michael Gould MD based on data collection and the provider's statements to me.     Staff attestation:  The documentation recorded by the scribe accurately reflects the services I personally performed and the decisions I personally made. I have made edits where needed.    Michael Gould MD  Staff Dermatologist and Dermatopathologist  , Department of Dermatology       Again, thank you for allowing me to participate in the care of your patient.        Sincerely,        Michael Gould MD

## 2022-11-15 LAB
PATH REPORT.COMMENTS IMP SPEC: NORMAL
PATH REPORT.COMMENTS IMP SPEC: NORMAL
PATH REPORT.FINAL DX SPEC: NORMAL
PATH REPORT.GROSS SPEC: NORMAL
PATH REPORT.MICROSCOPIC SPEC OTHER STN: NORMAL
PATH REPORT.RELEVANT HX SPEC: NORMAL

## 2022-11-22 ENCOUNTER — TELEPHONE (OUTPATIENT)
Dept: FAMILY MEDICINE | Facility: CLINIC | Age: 72
End: 2022-11-22

## 2022-11-22 NOTE — TELEPHONE ENCOUNTER
----- Message from Michael Gould MD sent at 11/21/2022  3:44 PM CST -----  Successfully excised. Please ensure 6 month follow up has been scheduled.

## 2022-11-22 NOTE — TELEPHONE ENCOUNTER
Pt reviewed results and message from Dr. Gould via my chart.    Pt has appt scheduled for 2/3/23 for 6 month skin check.    Dia DAVIS RN  [facility]  [phone]

## 2022-12-19 DIAGNOSIS — J45.30 MILD PERSISTENT ASTHMA WITHOUT COMPLICATION: ICD-10-CM

## 2022-12-19 DIAGNOSIS — U07.1 INFECTION DUE TO 2019 NOVEL CORONAVIRUS: Primary | ICD-10-CM

## 2022-12-19 RX ORDER — ALBUTEROL SULFATE 90 UG/1
1-2 AEROSOL, METERED RESPIRATORY (INHALATION) EVERY 4 HOURS PRN
Qty: 18 G | Refills: 1 | Status: SHIPPED | OUTPATIENT
Start: 2022-12-19 | End: 2023-08-21

## 2023-01-04 DIAGNOSIS — J45.30 MILD PERSISTENT ASTHMA WITHOUT COMPLICATION: ICD-10-CM

## 2023-01-04 DIAGNOSIS — J45.31 MILD PERSISTENT ASTHMA WITH EXACERBATION: ICD-10-CM

## 2023-01-04 RX ORDER — ALBUTEROL SULFATE 0.83 MG/ML
2.5 SOLUTION RESPIRATORY (INHALATION) EVERY 6 HOURS PRN
Qty: 75 ML | Refills: 1 | Status: SHIPPED | OUTPATIENT
Start: 2023-01-04

## 2023-03-10 ENCOUNTER — LAB (OUTPATIENT)
Dept: LAB | Facility: CLINIC | Age: 73
End: 2023-03-10
Payer: COMMERCIAL

## 2023-03-10 DIAGNOSIS — I48.0 PAF (PAROXYSMAL ATRIAL FIBRILLATION) (H): ICD-10-CM

## 2023-03-10 DIAGNOSIS — E03.4 HYPOTHYROIDISM DUE TO ACQUIRED ATROPHY OF THYROID: ICD-10-CM

## 2023-03-10 DIAGNOSIS — E78.5 HYPERLIPIDEMIA LDL GOAL <130: ICD-10-CM

## 2023-03-10 DIAGNOSIS — I10 HYPERTENSION GOAL BP (BLOOD PRESSURE) < 140/90: ICD-10-CM

## 2023-03-10 LAB
ALBUMIN SERPL-MCNC: 4.2 G/DL (ref 3.4–5)
ALP SERPL-CCNC: 60 U/L (ref 40–150)
ALT SERPL W P-5'-P-CCNC: 30 U/L (ref 0–50)
ANION GAP SERPL CALCULATED.3IONS-SCNC: 4 MMOL/L (ref 3–14)
AST SERPL W P-5'-P-CCNC: 23 U/L (ref 0–45)
BILIRUB SERPL-MCNC: 0.7 MG/DL (ref 0.2–1.3)
BUN SERPL-MCNC: 17 MG/DL (ref 7–30)
CALCIUM SERPL-MCNC: 9.9 MG/DL (ref 8.5–10.1)
CHLORIDE BLD-SCNC: 99 MMOL/L (ref 94–109)
CHOLEST SERPL-MCNC: 138 MG/DL
CO2 SERPL-SCNC: 28 MMOL/L (ref 20–32)
CREAT SERPL-MCNC: 0.76 MG/DL (ref 0.52–1.04)
CREAT UR-MCNC: 137 MG/DL
ERYTHROCYTE [DISTWIDTH] IN BLOOD BY AUTOMATED COUNT: 11.8 % (ref 10–15)
FASTING STATUS PATIENT QL REPORTED: YES
GFR SERPL CREATININE-BSD FRML MDRD: 84 ML/MIN/1.73M2
GLUCOSE BLD-MCNC: 102 MG/DL (ref 70–99)
HCT VFR BLD AUTO: 38.5 % (ref 35–47)
HDLC SERPL-MCNC: 68 MG/DL
HGB BLD-MCNC: 12.7 G/DL (ref 11.7–15.7)
LDLC SERPL CALC-MCNC: 59 MG/DL
MCH RBC QN AUTO: 31 PG (ref 26.5–33)
MCHC RBC AUTO-ENTMCNC: 33 G/DL (ref 31.5–36.5)
MCV RBC AUTO: 94 FL (ref 78–100)
MICROALBUMIN UR-MCNC: 7 MG/L
MICROALBUMIN/CREAT UR: 5.11 MG/G CR (ref 0–25)
NONHDLC SERPL-MCNC: 70 MG/DL
PLATELET # BLD AUTO: 251 10E3/UL (ref 150–450)
POTASSIUM BLD-SCNC: 3.9 MMOL/L (ref 3.4–5.3)
PROT SERPL-MCNC: 7.1 G/DL (ref 6.8–8.8)
RBC # BLD AUTO: 4.1 10E6/UL (ref 3.8–5.2)
SODIUM SERPL-SCNC: 131 MMOL/L (ref 133–144)
TRIGL SERPL-MCNC: 55 MG/DL
TSH SERPL DL<=0.005 MIU/L-ACNC: 0.48 MU/L (ref 0.4–4)
WBC # BLD AUTO: 5.4 10E3/UL (ref 4–11)

## 2023-03-10 PROCEDURE — 85027 COMPLETE CBC AUTOMATED: CPT

## 2023-03-10 PROCEDURE — 82570 ASSAY OF URINE CREATININE: CPT

## 2023-03-10 PROCEDURE — 80061 LIPID PANEL: CPT

## 2023-03-10 PROCEDURE — 84443 ASSAY THYROID STIM HORMONE: CPT

## 2023-03-10 PROCEDURE — 82043 UR ALBUMIN QUANTITATIVE: CPT

## 2023-03-10 PROCEDURE — 80053 COMPREHEN METABOLIC PANEL: CPT

## 2023-03-10 PROCEDURE — 36415 COLL VENOUS BLD VENIPUNCTURE: CPT

## 2023-03-11 ENCOUNTER — HOSPITAL ENCOUNTER (INPATIENT)
Facility: CLINIC | Age: 73
LOS: 6 days | Discharge: HOME | DRG: 481 | End: 2023-03-17
Attending: EMERGENCY MEDICINE | Admitting: STUDENT IN AN ORGANIZED HEALTH CARE EDUCATION/TRAINING PROGRAM
Payer: COMMERCIAL

## 2023-03-11 ENCOUNTER — ANESTHESIA (OUTPATIENT)
Dept: SURGERY | Facility: CLINIC | Age: 73
DRG: 481 | End: 2023-03-11
Payer: COMMERCIAL

## 2023-03-11 ENCOUNTER — APPOINTMENT (OUTPATIENT)
Dept: GENERAL RADIOLOGY | Facility: CLINIC | Age: 73
DRG: 481 | End: 2023-03-11
Attending: EMERGENCY MEDICINE
Payer: COMMERCIAL

## 2023-03-11 ENCOUNTER — APPOINTMENT (OUTPATIENT)
Dept: GENERAL RADIOLOGY | Facility: CLINIC | Age: 73
DRG: 481 | End: 2023-03-11
Attending: ORTHOPAEDIC SURGERY
Payer: COMMERCIAL

## 2023-03-11 ENCOUNTER — ANESTHESIA EVENT (OUTPATIENT)
Dept: SURGERY | Facility: CLINIC | Age: 73
DRG: 481 | End: 2023-03-11
Payer: COMMERCIAL

## 2023-03-11 DIAGNOSIS — E78.5 HYPERLIPIDEMIA LDL GOAL <130: ICD-10-CM

## 2023-03-11 DIAGNOSIS — I10 HYPERTENSION GOAL BP (BLOOD PRESSURE) < 140/90: ICD-10-CM

## 2023-03-11 DIAGNOSIS — D50.9 IRON DEFICIENCY ANEMIA, UNSPECIFIED IRON DEFICIENCY ANEMIA TYPE: Primary | ICD-10-CM

## 2023-03-11 DIAGNOSIS — S72.22XA: ICD-10-CM

## 2023-03-11 LAB
ABO/RH(D): NORMAL
ANION GAP SERPL CALCULATED.3IONS-SCNC: 9 MMOL/L (ref 7–15)
ANTIBODY SCREEN: NEGATIVE
ATRIAL RATE - MUSE: 60 BPM
BASOPHILS # BLD AUTO: 0 10E3/UL (ref 0–0.2)
BASOPHILS NFR BLD AUTO: 0 %
BUN SERPL-MCNC: 13.7 MG/DL (ref 8–23)
CALCIUM SERPL-MCNC: 9.4 MG/DL (ref 8.8–10.2)
CHLORIDE SERPL-SCNC: 100 MMOL/L (ref 98–107)
CREAT SERPL-MCNC: 0.68 MG/DL (ref 0.51–0.95)
CREAT SERPL-MCNC: 0.91 MG/DL (ref 0.51–0.95)
DEPRECATED HCO3 PLAS-SCNC: 26 MMOL/L (ref 22–29)
DIASTOLIC BLOOD PRESSURE - MUSE: NORMAL MMHG
EOSINOPHIL # BLD AUTO: 0.1 10E3/UL (ref 0–0.7)
EOSINOPHIL NFR BLD AUTO: 1 %
ERYTHROCYTE [DISTWIDTH] IN BLOOD BY AUTOMATED COUNT: 11.7 % (ref 10–15)
GFR SERPL CREATININE-BSD FRML MDRD: 68 ML/MIN/1.73M2
GFR SERPL CREATININE-BSD FRML MDRD: >90 ML/MIN/1.73M2
GLUCOSE SERPL-MCNC: 155 MG/DL (ref 70–99)
HCT VFR BLD AUTO: 36.4 % (ref 35–47)
HGB BLD-MCNC: 12.4 G/DL (ref 11.7–15.7)
IMM GRANULOCYTES # BLD: 0 10E3/UL
IMM GRANULOCYTES NFR BLD: 0 %
INR PPP: 1.5 (ref 0.85–1.15)
INTERPRETATION ECG - MUSE: NORMAL
LYMPHOCYTES # BLD AUTO: 1.5 10E3/UL (ref 0.8–5.3)
LYMPHOCYTES NFR BLD AUTO: 19 %
MCH RBC QN AUTO: 31.1 PG (ref 26.5–33)
MCHC RBC AUTO-ENTMCNC: 34.1 G/DL (ref 31.5–36.5)
MCV RBC AUTO: 91 FL (ref 78–100)
MONOCYTES # BLD AUTO: 0.6 10E3/UL (ref 0–1.3)
MONOCYTES NFR BLD AUTO: 7 %
NEUTROPHILS # BLD AUTO: 5.6 10E3/UL (ref 1.6–8.3)
NEUTROPHILS NFR BLD AUTO: 73 %
NRBC # BLD AUTO: 0 10E3/UL
NRBC BLD AUTO-RTO: 0 /100
P AXIS - MUSE: 16 DEGREES
PLATELET # BLD AUTO: 278 10E3/UL (ref 150–450)
POTASSIUM SERPL-SCNC: 4.6 MMOL/L (ref 3.4–5.3)
PR INTERVAL - MUSE: 152 MS
QRS DURATION - MUSE: 74 MS
QT - MUSE: 458 MS
QTC - MUSE: 458 MS
R AXIS - MUSE: -11 DEGREES
RBC # BLD AUTO: 3.99 10E6/UL (ref 3.8–5.2)
SODIUM SERPL-SCNC: 135 MMOL/L (ref 136–145)
SPECIMEN EXPIRATION DATE: NORMAL
SYSTOLIC BLOOD PRESSURE - MUSE: NORMAL MMHG
T AXIS - MUSE: 66 DEGREES
VENTRICULAR RATE- MUSE: 60 BPM
WBC # BLD AUTO: 7.8 10E3/UL (ref 4–11)

## 2023-03-11 PROCEDURE — 86850 RBC ANTIBODY SCREEN: CPT | Performed by: EMERGENCY MEDICINE

## 2023-03-11 PROCEDURE — 250N000011 HC RX IP 250 OP 636

## 2023-03-11 PROCEDURE — 85025 COMPLETE CBC W/AUTO DIFF WBC: CPT | Performed by: EMERGENCY MEDICINE

## 2023-03-11 PROCEDURE — 96375 TX/PRO/DX INJ NEW DRUG ADDON: CPT

## 2023-03-11 PROCEDURE — 36415 COLL VENOUS BLD VENIPUNCTURE: CPT | Performed by: EMERGENCY MEDICINE

## 2023-03-11 PROCEDURE — 80048 BASIC METABOLIC PNL TOTAL CA: CPT | Performed by: EMERGENCY MEDICINE

## 2023-03-11 PROCEDURE — 999N000141 HC STATISTIC PRE-PROCEDURE NURSING ASSESSMENT: Performed by: ORTHOPAEDIC SURGERY

## 2023-03-11 PROCEDURE — 258N000003 HC RX IP 258 OP 636: Performed by: EMERGENCY MEDICINE

## 2023-03-11 PROCEDURE — 272N000001 HC OR GENERAL SUPPLY STERILE: Performed by: ORTHOPAEDIC SURGERY

## 2023-03-11 PROCEDURE — 258N000003 HC RX IP 258 OP 636: Performed by: ANESTHESIOLOGY

## 2023-03-11 PROCEDURE — 258N000003 HC RX IP 258 OP 636: Performed by: NURSE ANESTHETIST, CERTIFIED REGISTERED

## 2023-03-11 PROCEDURE — 250N000025 HC SEVOFLURANE, PER MIN: Performed by: ORTHOPAEDIC SURGERY

## 2023-03-11 PROCEDURE — 250N000009 HC RX 250: Performed by: NURSE ANESTHETIST, CERTIFIED REGISTERED

## 2023-03-11 PROCEDURE — 250N000011 HC RX IP 250 OP 636: Performed by: PHYSICIAN ASSISTANT

## 2023-03-11 PROCEDURE — 93005 ELECTROCARDIOGRAM TRACING: CPT

## 2023-03-11 PROCEDURE — C1713 ANCHOR/SCREW BN/BN,TIS/BN: HCPCS | Performed by: ORTHOPAEDIC SURGERY

## 2023-03-11 PROCEDURE — 96361 HYDRATE IV INFUSION ADD-ON: CPT

## 2023-03-11 PROCEDURE — 360N000084 HC SURGERY LEVEL 4 W/ FLUORO, PER MIN: Performed by: ORTHOPAEDIC SURGERY

## 2023-03-11 PROCEDURE — 250N000011 HC RX IP 250 OP 636: Performed by: NURSE ANESTHETIST, CERTIFIED REGISTERED

## 2023-03-11 PROCEDURE — 86923 COMPATIBILITY TEST ELECTRIC: CPT | Performed by: PHYSICIAN ASSISTANT

## 2023-03-11 PROCEDURE — 96374 THER/PROPH/DIAG INJ IV PUSH: CPT

## 2023-03-11 PROCEDURE — 250N000013 HC RX MED GY IP 250 OP 250 PS 637: Performed by: STUDENT IN AN ORGANIZED HEALTH CARE EDUCATION/TRAINING PROGRAM

## 2023-03-11 PROCEDURE — 250N000011 HC RX IP 250 OP 636: Performed by: ANESTHESIOLOGY

## 2023-03-11 PROCEDURE — 250N000009 HC RX 250: Performed by: ORTHOPAEDIC SURGERY

## 2023-03-11 PROCEDURE — 250N000009 HC RX 250: Performed by: EMERGENCY MEDICINE

## 2023-03-11 PROCEDURE — 96376 TX/PRO/DX INJ SAME DRUG ADON: CPT

## 2023-03-11 PROCEDURE — 82565 ASSAY OF CREATININE: CPT | Performed by: PHYSICIAN ASSISTANT

## 2023-03-11 PROCEDURE — 250N000011 HC RX IP 250 OP 636: Performed by: EMERGENCY MEDICINE

## 2023-03-11 PROCEDURE — 85610 PROTHROMBIN TIME: CPT | Performed by: EMERGENCY MEDICINE

## 2023-03-11 PROCEDURE — 120N000001 HC R&B MED SURG/OB

## 2023-03-11 PROCEDURE — 370N000017 HC ANESTHESIA TECHNICAL FEE, PER MIN: Performed by: ORTHOPAEDIC SURGERY

## 2023-03-11 PROCEDURE — 250N000009 HC RX 250: Performed by: ANESTHESIOLOGY

## 2023-03-11 PROCEDURE — 99223 1ST HOSP IP/OBS HIGH 75: CPT | Mod: AI | Performed by: STUDENT IN AN ORGANIZED HEALTH CARE EDUCATION/TRAINING PROGRAM

## 2023-03-11 PROCEDURE — 0QS736Z REPOSITION LEFT UPPER FEMUR WITH INTRAMEDULLARY INTERNAL FIXATION DEVICE, PERCUTANEOUS APPROACH: ICD-10-PCS | Performed by: ORTHOPAEDIC SURGERY

## 2023-03-11 PROCEDURE — 99285 EMERGENCY DEPT VISIT HI MDM: CPT | Mod: 25

## 2023-03-11 PROCEDURE — 710N000009 HC RECOVERY PHASE 1, LEVEL 1, PER MIN: Performed by: ORTHOPAEDIC SURGERY

## 2023-03-11 PROCEDURE — 86923 COMPATIBILITY TEST ELECTRIC: CPT | Performed by: HOSPITALIST

## 2023-03-11 PROCEDURE — 36415 COLL VENOUS BLD VENIPUNCTURE: CPT | Performed by: PHYSICIAN ASSISTANT

## 2023-03-11 PROCEDURE — 258N000003 HC RX IP 258 OP 636: Performed by: PHYSICIAN ASSISTANT

## 2023-03-11 PROCEDURE — 73502 X-RAY EXAM HIP UNI 2-3 VIEWS: CPT

## 2023-03-11 PROCEDURE — 86901 BLOOD TYPING SEROLOGIC RH(D): CPT | Performed by: EMERGENCY MEDICINE

## 2023-03-11 PROCEDURE — 250N000011 HC RX IP 250 OP 636: Performed by: ORTHOPAEDIC SURGERY

## 2023-03-11 PROCEDURE — 999N000179 XR SURGERY CARM FLUORO LESS THAN 5 MIN W STILLS

## 2023-03-11 PROCEDURE — C1769 GUIDE WIRE: HCPCS | Performed by: ORTHOPAEDIC SURGERY

## 2023-03-11 DEVICE — IMPLANTABLE DEVICE
Type: IMPLANTABLE DEVICE | Site: HIP | Status: FUNCTIONAL
Brand: CABLE-READY®

## 2023-03-11 DEVICE — IMPLANTABLE DEVICE: Type: IMPLANTABLE DEVICE | Site: HIP | Status: FUNCTIONAL

## 2023-03-11 DEVICE — SCREW BN 42MM 5MM LCK X25 STRL IM NL 04.045.042S: Type: IMPLANTABLE DEVICE | Site: HIP | Status: FUNCTIONAL

## 2023-03-11 DEVICE — IMP SCR SYN TFNA FENESTRATED LAG 95MM 04.038.195S: Type: IMPLANTABLE DEVICE | Site: HIP | Status: FUNCTIONAL

## 2023-03-11 RX ORDER — CEFAZOLIN SODIUM 1 G/3ML
1 INJECTION, POWDER, FOR SOLUTION INTRAMUSCULAR; INTRAVENOUS EVERY 8 HOURS
Status: COMPLETED | OUTPATIENT
Start: 2023-03-11 | End: 2023-03-12

## 2023-03-11 RX ORDER — LIDOCAINE 40 MG/G
CREAM TOPICAL
Status: DISCONTINUED | OUTPATIENT
Start: 2023-03-11 | End: 2023-03-17 | Stop reason: HOSPADM

## 2023-03-11 RX ORDER — HYDROMORPHONE HCL IN WATER/PF 6 MG/30 ML
0.4 PATIENT CONTROLLED ANALGESIA SYRINGE INTRAVENOUS EVERY 5 MIN PRN
Status: DISCONTINUED | OUTPATIENT
Start: 2023-03-11 | End: 2023-03-11 | Stop reason: HOSPADM

## 2023-03-11 RX ORDER — LIDOCAINE HYDROCHLORIDE 20 MG/ML
INJECTION, SOLUTION INFILTRATION; PERINEURAL PRN
Status: DISCONTINUED | OUTPATIENT
Start: 2023-03-11 | End: 2023-03-11

## 2023-03-11 RX ORDER — NALOXONE HYDROCHLORIDE 0.4 MG/ML
0.2 INJECTION, SOLUTION INTRAMUSCULAR; INTRAVENOUS; SUBCUTANEOUS
Status: DISCONTINUED | OUTPATIENT
Start: 2023-03-11 | End: 2023-03-17 | Stop reason: HOSPADM

## 2023-03-11 RX ORDER — FLUTICASONE FUROATE AND VILANTEROL 100; 25 UG/1; UG/1
1 POWDER RESPIRATORY (INHALATION) DAILY
Status: DISCONTINUED | OUTPATIENT
Start: 2023-03-12 | End: 2023-03-16

## 2023-03-11 RX ORDER — POLYETHYLENE GLYCOL 3350 17 G/17G
17 POWDER, FOR SOLUTION ORAL DAILY PRN
Status: DISCONTINUED | OUTPATIENT
Start: 2023-03-11 | End: 2023-03-17 | Stop reason: HOSPADM

## 2023-03-11 RX ORDER — ENOXAPARIN SODIUM 100 MG/ML
40 INJECTION SUBCUTANEOUS EVERY 24 HOURS
Status: DISCONTINUED | OUTPATIENT
Start: 2023-03-12 | End: 2023-03-13

## 2023-03-11 RX ORDER — PROPOFOL 10 MG/ML
INJECTION, EMULSION INTRAVENOUS PRN
Status: DISCONTINUED | OUTPATIENT
Start: 2023-03-11 | End: 2023-03-11

## 2023-03-11 RX ORDER — FENTANYL CITRATE 0.05 MG/ML
50 INJECTION, SOLUTION INTRAMUSCULAR; INTRAVENOUS EVERY 5 MIN PRN
Status: DISCONTINUED | OUTPATIENT
Start: 2023-03-11 | End: 2023-03-11 | Stop reason: HOSPADM

## 2023-03-11 RX ORDER — ONDANSETRON 4 MG/1
4 TABLET, ORALLY DISINTEGRATING ORAL EVERY 30 MIN PRN
Status: DISCONTINUED | OUTPATIENT
Start: 2023-03-11 | End: 2023-03-11 | Stop reason: HOSPADM

## 2023-03-11 RX ORDER — POTASSIUM CHLORIDE 1500 MG/1
20 TABLET, EXTENDED RELEASE ORAL DAILY
Status: DISCONTINUED | OUTPATIENT
Start: 2023-03-11 | End: 2023-03-17 | Stop reason: HOSPADM

## 2023-03-11 RX ORDER — CHOLECALCIFEROL (VITAMIN D3) 50 MCG
50 TABLET ORAL DAILY
Status: DISCONTINUED | OUTPATIENT
Start: 2023-03-11 | End: 2023-03-17 | Stop reason: HOSPADM

## 2023-03-11 RX ORDER — NALOXONE HYDROCHLORIDE 0.4 MG/ML
0.4 INJECTION, SOLUTION INTRAMUSCULAR; INTRAVENOUS; SUBCUTANEOUS
Status: DISCONTINUED | OUTPATIENT
Start: 2023-03-11 | End: 2023-03-17 | Stop reason: HOSPADM

## 2023-03-11 RX ORDER — HYDROMORPHONE HYDROCHLORIDE 1 MG/ML
INJECTION, SOLUTION INTRAMUSCULAR; INTRAVENOUS; SUBCUTANEOUS
Status: COMPLETED
Start: 2023-03-11 | End: 2023-03-11

## 2023-03-11 RX ORDER — ALBUTEROL SULFATE 90 UG/1
1-2 AEROSOL, METERED RESPIRATORY (INHALATION) EVERY 4 HOURS PRN
Status: DISCONTINUED | OUTPATIENT
Start: 2023-03-11 | End: 2023-03-17 | Stop reason: HOSPADM

## 2023-03-11 RX ORDER — ONDANSETRON 2 MG/ML
4 INJECTION INTRAMUSCULAR; INTRAVENOUS ONCE
Status: COMPLETED | OUTPATIENT
Start: 2023-03-11 | End: 2023-03-11

## 2023-03-11 RX ORDER — MULTIVIT WITH MINERALS/LUTEIN
1 TABLET ORAL DAILY
COMMUNITY

## 2023-03-11 RX ORDER — ONDANSETRON 4 MG/1
4 TABLET, ORALLY DISINTEGRATING ORAL EVERY 6 HOURS PRN
Status: DISCONTINUED | OUTPATIENT
Start: 2023-03-11 | End: 2023-03-17 | Stop reason: HOSPADM

## 2023-03-11 RX ORDER — SODIUM CHLORIDE 9 MG/ML
INJECTION, SOLUTION INTRAVENOUS ONCE
Status: COMPLETED | OUTPATIENT
Start: 2023-03-11 | End: 2023-03-11

## 2023-03-11 RX ORDER — ATORVASTATIN CALCIUM 40 MG/1
40 TABLET, FILM COATED ORAL DAILY
Status: DISCONTINUED | OUTPATIENT
Start: 2023-03-11 | End: 2023-03-17 | Stop reason: HOSPADM

## 2023-03-11 RX ORDER — ALBUTEROL SULFATE 0.83 MG/ML
2.5 SOLUTION RESPIRATORY (INHALATION) EVERY 6 HOURS PRN
Status: DISCONTINUED | OUTPATIENT
Start: 2023-03-11 | End: 2023-03-17 | Stop reason: HOSPADM

## 2023-03-11 RX ORDER — ONDANSETRON 2 MG/ML
INJECTION INTRAMUSCULAR; INTRAVENOUS PRN
Status: DISCONTINUED | OUTPATIENT
Start: 2023-03-11 | End: 2023-03-11

## 2023-03-11 RX ORDER — BISACODYL 5 MG
10 TABLET, DELAYED RELEASE (ENTERIC COATED) ORAL DAILY PRN
Status: DISCONTINUED | OUTPATIENT
Start: 2023-03-11 | End: 2023-03-17 | Stop reason: HOSPADM

## 2023-03-11 RX ORDER — HYDROMORPHONE HCL IN WATER/PF 6 MG/30 ML
0.4 PATIENT CONTROLLED ANALGESIA SYRINGE INTRAVENOUS
Status: DISCONTINUED | OUTPATIENT
Start: 2023-03-11 | End: 2023-03-17 | Stop reason: HOSPADM

## 2023-03-11 RX ORDER — PROCHLORPERAZINE 25 MG
12.5 SUPPOSITORY, RECTAL RECTAL EVERY 12 HOURS PRN
Status: DISCONTINUED | OUTPATIENT
Start: 2023-03-11 | End: 2023-03-17 | Stop reason: HOSPADM

## 2023-03-11 RX ORDER — ACETAMINOPHEN 325 MG/1
975 TABLET ORAL EVERY 8 HOURS
Status: DISCONTINUED | OUTPATIENT
Start: 2023-03-11 | End: 2023-03-17 | Stop reason: HOSPADM

## 2023-03-11 RX ORDER — FENTANYL CITRATE 50 UG/ML
INJECTION, SOLUTION INTRAMUSCULAR; INTRAVENOUS PRN
Status: DISCONTINUED | OUTPATIENT
Start: 2023-03-11 | End: 2023-03-11

## 2023-03-11 RX ORDER — DIAZEPAM 10 MG/2ML
5 INJECTION, SOLUTION INTRAMUSCULAR; INTRAVENOUS ONCE
Status: COMPLETED | OUTPATIENT
Start: 2023-03-11 | End: 2023-03-11

## 2023-03-11 RX ORDER — TRANEXAMIC ACID 10 MG/ML
1 INJECTION, SOLUTION INTRAVENOUS ONCE
Status: COMPLETED | OUTPATIENT
Start: 2023-03-11 | End: 2023-03-11

## 2023-03-11 RX ORDER — MAGNESIUM HYDROXIDE 1200 MG/15ML
LIQUID ORAL PRN
Status: DISCONTINUED | OUTPATIENT
Start: 2023-03-11 | End: 2023-03-11 | Stop reason: HOSPADM

## 2023-03-11 RX ORDER — AMOXICILLIN 250 MG
1 CAPSULE ORAL 2 TIMES DAILY
Status: DISCONTINUED | OUTPATIENT
Start: 2023-03-11 | End: 2023-03-17 | Stop reason: HOSPADM

## 2023-03-11 RX ORDER — VASOPRESSIN IN 0.9 % NACL 2 UNIT/2ML
SYRINGE (ML) INTRAVENOUS PRN
Status: DISCONTINUED | OUTPATIENT
Start: 2023-03-11 | End: 2023-03-11

## 2023-03-11 RX ORDER — HYDROMORPHONE HCL IN WATER/PF 6 MG/30 ML
0.2 PATIENT CONTROLLED ANALGESIA SYRINGE INTRAVENOUS EVERY 5 MIN PRN
Status: DISCONTINUED | OUTPATIENT
Start: 2023-03-11 | End: 2023-03-11 | Stop reason: HOSPADM

## 2023-03-11 RX ORDER — PROCHLORPERAZINE MALEATE 5 MG
5 TABLET ORAL EVERY 6 HOURS PRN
Status: DISCONTINUED | OUTPATIENT
Start: 2023-03-11 | End: 2023-03-17 | Stop reason: HOSPADM

## 2023-03-11 RX ORDER — PANTOPRAZOLE SODIUM 40 MG/1
40 TABLET, DELAYED RELEASE ORAL
Status: DISCONTINUED | OUTPATIENT
Start: 2023-03-12 | End: 2023-03-12

## 2023-03-11 RX ORDER — AMOXICILLIN 250 MG
2 CAPSULE ORAL 2 TIMES DAILY
Status: DISCONTINUED | OUTPATIENT
Start: 2023-03-11 | End: 2023-03-17 | Stop reason: HOSPADM

## 2023-03-11 RX ORDER — VANCOMYCIN HYDROCHLORIDE 1 G/20ML
INJECTION, POWDER, LYOPHILIZED, FOR SOLUTION INTRAVENOUS PRN
Status: DISCONTINUED | OUTPATIENT
Start: 2023-03-11 | End: 2023-03-11 | Stop reason: HOSPADM

## 2023-03-11 RX ORDER — SODIUM CHLORIDE, SODIUM LACTATE, POTASSIUM CHLORIDE, CALCIUM CHLORIDE 600; 310; 30; 20 MG/100ML; MG/100ML; MG/100ML; MG/100ML
INJECTION, SOLUTION INTRAVENOUS CONTINUOUS
Status: DISCONTINUED | OUTPATIENT
Start: 2023-03-11 | End: 2023-03-11 | Stop reason: HOSPADM

## 2023-03-11 RX ORDER — ONDANSETRON 2 MG/ML
4 INJECTION INTRAMUSCULAR; INTRAVENOUS EVERY 6 HOURS PRN
Status: DISCONTINUED | OUTPATIENT
Start: 2023-03-11 | End: 2023-03-17 | Stop reason: HOSPADM

## 2023-03-11 RX ORDER — ONDANSETRON 2 MG/ML
4 INJECTION INTRAMUSCULAR; INTRAVENOUS EVERY 30 MIN PRN
Status: DISCONTINUED | OUTPATIENT
Start: 2023-03-11 | End: 2023-03-11 | Stop reason: HOSPADM

## 2023-03-11 RX ORDER — DILTIAZEM HYDROCHLORIDE 120 MG/1
120 CAPSULE, COATED, EXTENDED RELEASE ORAL EVERY EVENING
Status: DISCONTINUED | OUTPATIENT
Start: 2023-03-11 | End: 2023-03-17 | Stop reason: HOSPADM

## 2023-03-11 RX ORDER — HYDROMORPHONE HCL IN WATER/PF 6 MG/30 ML
0.2 PATIENT CONTROLLED ANALGESIA SYRINGE INTRAVENOUS
Status: DISCONTINUED | OUTPATIENT
Start: 2023-03-11 | End: 2023-03-17 | Stop reason: HOSPADM

## 2023-03-11 RX ORDER — FENTANYL CITRATE 0.05 MG/ML
25 INJECTION, SOLUTION INTRAMUSCULAR; INTRAVENOUS EVERY 5 MIN PRN
Status: DISCONTINUED | OUTPATIENT
Start: 2023-03-11 | End: 2023-03-11 | Stop reason: HOSPADM

## 2023-03-11 RX ORDER — KETOCONAZOLE 20 MG/ML
SHAMPOO TOPICAL
COMMUNITY
Start: 2023-02-02 | End: 2023-06-27

## 2023-03-11 RX ORDER — CEFAZOLIN SODIUM/WATER 2 G/20 ML
SYRINGE (ML) INTRAVENOUS PRN
Status: DISCONTINUED | OUTPATIENT
Start: 2023-03-11 | End: 2023-03-11

## 2023-03-11 RX ORDER — EPHEDRINE SULFATE 50 MG/ML
INJECTION, SOLUTION INTRAMUSCULAR; INTRAVENOUS; SUBCUTANEOUS PRN
Status: DISCONTINUED | OUTPATIENT
Start: 2023-03-11 | End: 2023-03-11

## 2023-03-11 RX ORDER — HYDROMORPHONE HYDROCHLORIDE 1 MG/ML
0.5 INJECTION, SOLUTION INTRAMUSCULAR; INTRAVENOUS; SUBCUTANEOUS
Status: DISCONTINUED | OUTPATIENT
Start: 2023-03-11 | End: 2023-03-11

## 2023-03-11 RX ORDER — LEVOTHYROXINE SODIUM 100 UG/1
100 TABLET ORAL DAILY
Status: DISCONTINUED | OUTPATIENT
Start: 2023-03-12 | End: 2023-03-17 | Stop reason: HOSPADM

## 2023-03-11 RX ORDER — LIDOCAINE 40 MG/G
CREAM TOPICAL
Status: DISCONTINUED | OUTPATIENT
Start: 2023-03-11 | End: 2023-03-11

## 2023-03-11 RX ORDER — BISACODYL 5 MG
5 TABLET, DELAYED RELEASE (ENTERIC COATED) ORAL DAILY PRN
Status: DISCONTINUED | OUTPATIENT
Start: 2023-03-11 | End: 2023-03-17 | Stop reason: HOSPADM

## 2023-03-11 RX ORDER — SODIUM CHLORIDE, SODIUM LACTATE, POTASSIUM CHLORIDE, CALCIUM CHLORIDE 600; 310; 30; 20 MG/100ML; MG/100ML; MG/100ML; MG/100ML
INJECTION, SOLUTION INTRAVENOUS CONTINUOUS
Status: DISCONTINUED | OUTPATIENT
Start: 2023-03-11 | End: 2023-03-12

## 2023-03-11 RX ORDER — OXYCODONE HYDROCHLORIDE 5 MG/1
5 TABLET ORAL EVERY 4 HOURS PRN
Status: DISCONTINUED | OUTPATIENT
Start: 2023-03-11 | End: 2023-03-17 | Stop reason: HOSPADM

## 2023-03-11 RX ORDER — DEXAMETHASONE SODIUM PHOSPHATE 4 MG/ML
INJECTION, SOLUTION INTRA-ARTICULAR; INTRALESIONAL; INTRAMUSCULAR; INTRAVENOUS; SOFT TISSUE PRN
Status: DISCONTINUED | OUTPATIENT
Start: 2023-03-11 | End: 2023-03-11

## 2023-03-11 RX ADMIN — Medication 1 UNITS: at 15:13

## 2023-03-11 RX ADMIN — PHENYLEPHRINE HYDROCHLORIDE 150 MCG: 10 INJECTION INTRAVENOUS at 14:43

## 2023-03-11 RX ADMIN — PHENYLEPHRINE HYDROCHLORIDE 150 MCG: 10 INJECTION INTRAVENOUS at 14:36

## 2023-03-11 RX ADMIN — LIDOCAINE HYDROCHLORIDE 60 MG: 20 INJECTION, SOLUTION INFILTRATION; PERINEURAL at 14:05

## 2023-03-11 RX ADMIN — Medication 10 MG: at 14:13

## 2023-03-11 RX ADMIN — Medication 0.5 UNITS: at 15:20

## 2023-03-11 RX ADMIN — PHENYLEPHRINE HYDROCHLORIDE 100 MCG: 10 INJECTION INTRAVENOUS at 14:50

## 2023-03-11 RX ADMIN — FENTANYL CITRATE 25 MCG: 50 INJECTION, SOLUTION INTRAMUSCULAR; INTRAVENOUS at 16:58

## 2023-03-11 RX ADMIN — Medication 2 G: at 14:00

## 2023-03-11 RX ADMIN — ONDANSETRON 4 MG: 2 INJECTION INTRAMUSCULAR; INTRAVENOUS at 14:40

## 2023-03-11 RX ADMIN — Medication 30 ML: at 13:56

## 2023-03-11 RX ADMIN — SODIUM CHLORIDE, POTASSIUM CHLORIDE, SODIUM LACTATE AND CALCIUM CHLORIDE: 600; 310; 30; 20 INJECTION, SOLUTION INTRAVENOUS at 21:23

## 2023-03-11 RX ADMIN — DILTIAZEM HYDROCHLORIDE 120 MG: 120 CAPSULE, COATED, EXTENDED RELEASE ORAL at 21:21

## 2023-03-11 RX ADMIN — ATORVASTATIN CALCIUM 40 MG: 40 TABLET, FILM COATED ORAL at 21:22

## 2023-03-11 RX ADMIN — CEFAZOLIN 1 G: 1 INJECTION, POWDER, FOR SOLUTION INTRAMUSCULAR; INTRAVENOUS at 21:22

## 2023-03-11 RX ADMIN — PHENYLEPHRINE HYDROCHLORIDE 100 MCG: 10 INJECTION INTRAVENOUS at 15:29

## 2023-03-11 RX ADMIN — TRANEXAMIC ACID 1 G: 10 INJECTION, SOLUTION INTRAVENOUS at 12:23

## 2023-03-11 RX ADMIN — PHENYLEPHRINE HYDROCHLORIDE 100 MCG: 10 INJECTION INTRAVENOUS at 15:34

## 2023-03-11 RX ADMIN — SENNOSIDES AND DOCUSATE SODIUM 1 TABLET: 50; 8.6 TABLET ORAL at 21:22

## 2023-03-11 RX ADMIN — PROPOFOL 150 MG: 10 INJECTION, EMULSION INTRAVENOUS at 14:05

## 2023-03-11 RX ADMIN — DIAZEPAM 5 MG: 5 INJECTION INTRAMUSCULAR; INTRAVENOUS at 12:44

## 2023-03-11 RX ADMIN — Medication 5 MG: at 14:25

## 2023-03-11 RX ADMIN — OXYCODONE HYDROCHLORIDE 2.5 MG: 5 TABLET ORAL at 21:22

## 2023-03-11 RX ADMIN — HYDROMORPHONE HYDROCHLORIDE 0.5 MG: 1 INJECTION, SOLUTION INTRAMUSCULAR; INTRAVENOUS; SUBCUTANEOUS at 12:27

## 2023-03-11 RX ADMIN — SODIUM CHLORIDE, POTASSIUM CHLORIDE, SODIUM LACTATE AND CALCIUM CHLORIDE: 600; 310; 30; 20 INJECTION, SOLUTION INTRAVENOUS at 15:14

## 2023-03-11 RX ADMIN — HYDROMORPHONE HYDROCHLORIDE 0.5 MG: 1 INJECTION, SOLUTION INTRAMUSCULAR; INTRAVENOUS; SUBCUTANEOUS at 11:20

## 2023-03-11 RX ADMIN — PHENYLEPHRINE HYDROCHLORIDE 100 MCG: 10 INJECTION INTRAVENOUS at 14:34

## 2023-03-11 RX ADMIN — Medication 5 MG: at 14:20

## 2023-03-11 RX ADMIN — ONDANSETRON 4 MG: 2 INJECTION INTRAMUSCULAR; INTRAVENOUS at 12:27

## 2023-03-11 RX ADMIN — FENTANYL CITRATE 50 MCG: 50 INJECTION, SOLUTION INTRAMUSCULAR; INTRAVENOUS at 14:43

## 2023-03-11 RX ADMIN — DEXAMETHASONE SODIUM PHOSPHATE 4 MG: 4 INJECTION, SOLUTION INTRA-ARTICULAR; INTRALESIONAL; INTRAMUSCULAR; INTRAVENOUS; SOFT TISSUE at 14:18

## 2023-03-11 RX ADMIN — SODIUM CHLORIDE: 9 INJECTION, SOLUTION INTRAVENOUS at 11:20

## 2023-03-11 RX ADMIN — SODIUM CHLORIDE, POTASSIUM CHLORIDE, SODIUM LACTATE AND CALCIUM CHLORIDE: 600; 310; 30; 20 INJECTION, SOLUTION INTRAVENOUS at 13:39

## 2023-03-11 RX ADMIN — FENTANYL CITRATE 25 MCG: 50 INJECTION, SOLUTION INTRAMUSCULAR; INTRAVENOUS at 16:52

## 2023-03-11 RX ADMIN — PHENYLEPHRINE HYDROCHLORIDE 100 MCG: 10 INJECTION INTRAVENOUS at 14:49

## 2023-03-11 RX ADMIN — ACETAMINOPHEN 975 MG: 325 TABLET ORAL at 18:55

## 2023-03-11 RX ADMIN — Medication 10 MG: at 14:27

## 2023-03-11 RX ADMIN — Medication 10 MG: at 14:23

## 2023-03-11 RX ADMIN — Medication 0.5 UNITS: at 14:55

## 2023-03-11 RX ADMIN — FENTANYL CITRATE 25 MCG: 50 INJECTION, SOLUTION INTRAMUSCULAR; INTRAVENOUS at 14:32

## 2023-03-11 RX ADMIN — HYDROMORPHONE HYDROCHLORIDE 0.5 MG: 1 INJECTION, SOLUTION INTRAMUSCULAR; INTRAVENOUS; SUBCUTANEOUS at 10:33

## 2023-03-11 RX ADMIN — Medication 10 MG: at 14:15

## 2023-03-11 RX ADMIN — POTASSIUM CHLORIDE 20 MEQ: 1500 TABLET, EXTENDED RELEASE ORAL at 21:21

## 2023-03-11 RX ADMIN — FENTANYL CITRATE 25 MCG: 50 INJECTION, SOLUTION INTRAMUSCULAR; INTRAVENOUS at 14:30

## 2023-03-11 ASSESSMENT — ACTIVITIES OF DAILY LIVING (ADL)
ADLS_ACUITY_SCORE: 35

## 2023-03-11 ASSESSMENT — ENCOUNTER SYMPTOMS
BACK PAIN: 0
DIZZINESS: 1
DYSRHYTHMIAS: 1
COUGH: 0
FEVER: 0
CHILLS: 0
NECK PAIN: 0

## 2023-03-11 NOTE — ANESTHESIA PREPROCEDURE EVALUATION
Anesthesia Pre-Procedure Evaluation    Patient: Tiffany Stovall   MRN: [id number] : 1950        Procedure : Procedure(s):  INTERTROCHANTERIC HIP NAILING          Past Medical History:   Diagnosis Date     Bronchial spasm      Cancer (H)     basal cell, squamous cell skin ca     Compression fracture of T12 vertebra (H) 2014     GERD (gastroesophageal reflux disease)      HTN - hypertension      Hyperlipaemia      Postmenopausal 1999     Uncomplicated asthma      Unspecified hypothyroidism     Hypothyroidism      Past Surgical History:   Procedure Laterality Date     ABDOMEN SURGERY  Abd Hyst      ARTHROPLASTY KNEE Left 2019    Procedure: LEFT TOTAL KNEE ARTHROPLASTY (NELSON NEPHEW)^;  Surgeon: Tom eTrry MD;  Location: [facility] OR     ARTHROSCOPY KNEE RT/LT       BIOPSY OF BREAST, NEEDLE CORE      Papilloma     CARPAL TUNNEL RELEASE RT/LT      bilateral     CATARACT IOL, RT/LT       CHOLECYSTECTOMY, LAPOROSCOPIC      Cholecystectomy, Laparoscopic     COLONOSCOPY WITH CO2 INSUFFLATION N/A 2017    Procedure: COLONOSCOPY WITH CO2 INSUFFLATION;  Surgeon: Elkin Lemus MD;  Location: [facility]     ENDOSCOPIC STRIPPING VEIN(S)  2013    stab phlebectomies     HYSTERECTOMY, PAP NO LONGER INDICATED      bleeding, atypical cells on endobx, endometriosis     HYSTERECTOMY, CAMILLE      fibroids, endometrial hyperplasia     MOHS MICROGRAPHIC PROCEDURE      BCC lip     MOHS MICROGRAPHIC PROCEDURE  2016    squamus lt neck,basal top rt foot/3rd toe     MOHS MICROGRAPHIC PROCEDURE  2018    Left leg     SALPINGO OOPHORECTOMY,R/L/TERI      Salpingo Oophorectomy, RT/LT/TERI     TONSILLECTOMY & ADENOIDECTOMY  1968     TUBAL/ECTOPIC PREGNANCY  ,     ZZC REMV CATARACT INTRACAP,INSERT LENS       ZZHC COLONOSCOPY THRU STOMA, DIAGNOSTIC  2006    diverticulosis, no polyps found, next one due in 2016      Allergies   Allergen Reactions      Bees Anaphylaxis     Mary Shortness Of Breath     Asa [Aspirin]      bronchospasm     Ace Inhibitors Cough     Bee Difficulty breathing     Excedrin Extra Strength      Hornet Venom Anaphylaxis     Lisinopril Cough     New Medication Allergy      roses     Nsaids      bronchospasm     Other [Seasonal Allergies]      Poultry Meal      Wasp Venom Anaphylaxis      Social History     Tobacco Use     Smoking status: Never     Smokeless tobacco: Never   Substance Use Topics     Alcohol use: Yes     Comment: 1-4 drinks (wine, liquor) per month      Wt Readings from Last 1 Encounters:   03/11/23 68 kg (150 lb)        Anesthesia Evaluation            ROS/MED HX  ENT/Pulmonary:     (+) allergic rhinitis, asthma     Neurologic:       Cardiovascular:     (+) Dyslipidemia hypertension-----Taking blood thinners dysrhythmias, a-fib,     METS/Exercise Tolerance:     Hematologic:       Musculoskeletal:   (+) arthritis, fracture, Fracture location: LLE,     GI/Hepatic:     (+) GERD,     Renal/Genitourinary:       Endo:     (+) thyroid problem, hypothyroidism,     Psychiatric/Substance Use:       Infectious Disease:       Malignancy:       Other:            Physical Exam    Airway        Mallampati: II   TM distance: > 3 FB   Neck ROM: full     Respiratory Devices and Support         Dental       (+) Modest Abnormalities - crowns, retainers, 1 or 2 missing teeth      Cardiovascular   cardiovascular exam normal          Pulmonary   pulmonary exam normal                OUTSIDE LABS:  CBC:   Lab Results   Component Value Date    WBC 7.8 03/11/2023    WBC 5.4 03/10/2023    HGB 12.4 03/11/2023    HGB 12.7 03/10/2023    HCT 36.4 03/11/2023    HCT 38.5 03/10/2023     03/11/2023     03/10/2023     BMP:   Lab Results   Component Value Date     (L) 03/11/2023     (L) 03/10/2023    POTASSIUM 4.6 03/11/2023    POTASSIUM 3.9 03/10/2023    CHLORIDE 100 03/11/2023    CHLORIDE 99 03/10/2023    CO2 26 03/11/2023    CO2 28  03/10/2023    BUN 13.7 03/11/2023    BUN 17 03/10/2023    CR 0.68 03/11/2023    CR 0.76 03/10/2023     (H) 03/11/2023     (H) 03/10/2023     COAGS:   Lab Results   Component Value Date    INR 1.50 (H) 03/11/2023     POC: No results found for: BGM, HCG, HCGS  HEPATIC:   Lab Results   Component Value Date    ALBUMIN 4.2 03/10/2023    PROTTOTAL 7.1 03/10/2023    ALT 30 03/10/2023    AST 23 03/10/2023    ALKPHOS 60 03/10/2023    BILITOTAL 0.7 03/10/2023     OTHER:   Lab Results   Component Value Date    A1C 5.9 05/11/2016    ROGER 9.4 03/11/2023    MAG 1.8 03/05/2021    TSH 0.48 03/10/2023    T4 1.44 03/05/2021       Anesthesia Plan    ASA Status:  3      Anesthesia Type: General.     - Airway: LMA              Consents    Anesthesia Plan(s) and associated risks, benefits, and realistic alternatives discussed. Questions answered and patient/representative(s) expressed understanding.    - Discussed:     - Discussed with:  Patient         Postoperative Care    Pain management: Peripheral nerve block (Single Shot).        Comments:    Other Comments: GA.  Patient on diogo Doe

## 2023-03-11 NOTE — ANESTHESIA PROCEDURE NOTES
Airway       Patient location during procedure: OR  Staff -        Anesthesiologist:  Kiara Doe       CRNA: Reanna Jaramillo APRN CRNA       Performed By: CRNA  Consent for Airway        Urgency: elective  Indications and Patient Condition       Indications for airway management: haleigh-procedural       Induction type:intravenous       Mask difficulty assessment: 0 - not attempted    Final Airway Details       Final airway type: supraglottic airway    Supraglottic Airway Details        Type: LMA       Brand: Ambu AuraGain       LMA size: 4       Cuff Pressure (cm H2O): 20    Post intubation assessment        Placement verified by: capnometry, equal breath sounds and chest rise        Number of attempts at approach: 1       Ease of procedure: easy       Dentition: Intact and Unchanged

## 2023-03-11 NOTE — PHARMACY-ADMISSION MEDICATION HISTORY
Pharmacy Medication History  Admission medication history interview status for the 3/11/2023  admission is complete. See EPIC admission navigator for prior to admission medications     Location of Interview: Patient room  Medication history sources: Patient, Surescripts and Care Everywhere    Significant changes made to the medication list:  Added: Ketoconazole, vitamin D, multivitamin  Changed: Esomeprazole (2 capsules to 1 capsule), Advair (BID to every day)  Removed: Molnupiravir    In the past week, patient estimated taking medication this percent of the time: greater than 90%     Additional medication history information:   Pt only takes Advair once a day usually and says her symptoms are well-controlled. If her symptoms worsen, she increases to BID    Medication reconciliation completed by provider prior to medication history? No    Time spent in this activity: 15 minutes    Prior to Admission medications    Medication Sig Last Dose Taking? Auth Provider Long Term End Date   albuterol (PROAIR HFA) 108 (90 Base) MCG/ACT inhaler Inhale 1-2 puffs into the lungs every 4 hours as needed for shortness of breath  at PRN Yes Karley Costa, APRN CNP Yes    albuterol (PROVENTIL) (2.5 MG/3ML) 0.083% neb solution TAKE 1 VIAL (2.5 MG) BY NEBULIZATION EVERY 6 HOURS AS NEEDED FOR SHORTNESS OF BREATH / DYSPNEA  at PRN Yes Darby Williamson MD Yes    amoxicillin (AMOXIL) 500 MG capsule TAKE 4 CAPSULES (2,000 MG) BY MOUTH ONCE FOR 1 DOSE ONE HOUR BEFORE DENTAL CLEANING APPOINTMENT  at PRN Yes Darby Williamson MD     atorvastatin (LIPITOR) 40 MG tablet TAKE 1 TABLET DAILY 3/10/2023 at PM Yes Darby Williamson MD Yes    cholecalciferol (VITAMIN D3) 25 mcg (1000 units) capsule Take 2 capsules by mouth daily 3/10/2023 at PM Yes Unknown, Entered By History     diltiazem ER COATED BEADS (CARDIZEM CD/CARTIA XT) 120 MG 24 hr capsule Take 1 capsule by mouth every evening 3/10/2023 at PM Yes Reported, Patient Yes    EPINEPHrine (ANY BX  GENERIC EQUIV) 0.3 MG/0.3ML injection 2-pack Inject 0.3 mLs (0.3 mg) into the muscle once as needed  at PRN Yes Darby Williamson MD     esomeprazole (NEXIUM) 20 MG DR capsule Take 20 mg by mouth every morning 3/11/2023 at AM Yes Darby Williamson MD     fluticasone-salmeterol (ADVAIR) 250-50 MCG/ACT inhaler Inhale 1 puff into the lungs 2 times daily  Patient taking differently: Inhale 1 puff into the lungs daily 3/10/2023  Yes Darby Williamson MD Yes    hydrochlorothiazide (HYDRODIURIL) 25 MG tablet TAKE 1 TABLET DAILY 3/11/2023 at AM Yes Darby Williamson MD Yes    ketoconazole (NIZORAL) 2 % external shampoo WASH TRUNK 3X PER WEEK. LATHER AND LEAVE ON FOR 3-5 MINUTES PRIOR TO RINSING.  Yes Unknown, Entered By History     levothyroxine (SYNTHROID/LEVOTHROID) 100 MCG tablet Take 1 tablet (100 mcg) by mouth daily 3/11/2023 at AM Yes Darby Williamson MD Yes    loratadine (CLARITIN) 10 MG tablet Take 10 mg by mouth daily as needed   at PRN Yes Reported, Patient     losartan (COZAAR) 50 MG tablet TAKE 1 TABLET DAILY 3/11/2023 at AM Yes Darby Williamson MD Yes    multivitamin (CENTRUM SILVER) tablet Take 1 tablet by mouth daily 3/11/2023 at AM Yes Unknown, Entered By History     potassium chloride ER (KLOR-CON) 20 MEQ CR tablet Take 1 tablet (20 mEq) by mouth daily 3/10/2023 at MIDDAY Yes Darby Williamson MD     rivaroxaban ANTICOAGULANT (XARELTO) 20 MG TABS tablet Take 20 mg by mouth daily 3/10/2023 at 1900 Yes Reported, Patient Yes        The information provided in this note is only as accurate as the sources available at the time of update(s)

## 2023-03-11 NOTE — CONSULTS
[facility]    Orthopedics Consultation    Date of Admission:  3/11/2023    Assessment & Plan   Tiffany Stovall is a 72 year old female who was admitted on 3/11/2023 with significantly displaced left pertrochanteric hip fracture.  On Xeralto for afib.  Has received TXA.    Discussed both operative and nonoperative management.  Risks of surgery discussed included but not limited to bleeding, infection, damage to surrounding neurovascular structures, stiffness, malunion, delayed union, nonunion, need for revision surgery, blood clots, pulmonary embolus, stroke, anesthetic complications and even death.  No guarantees given or implied. Patient thoughtfully acknowledges risks and wishes to proceed fixation of left hip.        Andres Campos MD, MD    Code Status    Prior    Reason for Consult   Reason for consult: I was asked by Dr. Lynch to evaluate this patient for left hip fracture.    Primary Care Physician   Darby Williamson    History of Present Illness   Tiffany Stovall is a 72 year old female with significantly displaced left pertrochanteric hip fracture after slip and fall in parking lot.  On Xarelto with a history of atrial fibrillation who presents with hip pain.  States she has been diagnosed with osteopenia or osteoporosis in the past, denies being treated with bisphophonates.  She notes that immediately after the fall she experienced dizziness and visual disturbance.   Denies striking head or loss of consciousness.  Her last dose of Xarelto was taken yesterday night.    MEDS:   No current outpatient medications on file.       PAST MEDICAL HISTORY:   Past Medical History:   Diagnosis Date     Bronchial spasm      Cancer (H)     basal cell, squamous cell skin ca     Compression fracture of T12 vertebra (H) 03/17/2014     GERD (gastroesophageal reflux disease)      HTN - hypertension      Hyperlipaemia      Postmenopausal 01/01/1999     Uncomplicated asthma       Unspecified hypothyroidism     Hypothyroidism       PAST SURGICAL HISTORY:   Past Surgical History:   Procedure Laterality Date     ABDOMEN SURGERY  Abd Hyst 1999     ARTHROPLASTY KNEE Left 01/08/2019    Procedure: LEFT TOTAL KNEE ARTHROPLASTY (NELSON NEPHEW)^;  Surgeon: Tom Terry MD;  Location: [facility] OR     ARTHROSCOPY KNEE RT/LT  1978     BIOPSY OF BREAST, NEEDLE CORE  1995    Papilloma     CARPAL TUNNEL RELEASE RT/LT  2010    bilateral     CATARACT IOL, RT/LT       CHOLECYSTECTOMY, LAPOROSCOPIC  2006    Cholecystectomy, Laparoscopic     COLONOSCOPY WITH CO2 INSUFFLATION N/A 01/16/2017    Procedure: COLONOSCOPY WITH CO2 INSUFFLATION;  Surgeon: Elkin Lemus MD;  Location: [facility] OR     ENDOSCOPIC STRIPPING VEIN(S)  11/14/2013    stab phlebectomies     HYSTERECTOMY, PAP NO LONGER INDICATED  1999    bleeding, atypical cells on endobx, endometriosis     HYSTERECTOMY, CAMILLE  1999    fibroids, endometrial hyperplasia     MOHS MICROGRAPHIC PROCEDURE  2010    BCC lip     MOHS MICROGRAPHIC PROCEDURE  12/13/2016    squamus lt neck,basal top rt foot/3rd toe     MOHS MICROGRAPHIC PROCEDURE  03/22/2018    Left leg     SALPINGO OOPHORECTOMY,R/L/TERI  1999    Salpingo Oophorectomy, RT/LT/TERI     TONSILLECTOMY & ADENOIDECTOMY  1968     TUBAL/ECTOPIC PREGNANCY  1983,1984     ZZC REMV CATARACT INTRACAP,INSERT LENS  2010     ZZHC COLONOSCOPY THRU STOMA, DIAGNOSTIC  11/2006    diverticulosis, no polyps found, next one due in 2016       FAMILY HISTORY:   Family History   Problem Relation Age of Onset     Thyroid Disease Mother         hypo     Diabetes Mother         A1C   6.1     Lipids Mother      Basal cell carcinoma Mother      Asthma Father      Thyroid Disease Father         hypo     Cancer Father         basil cell     Prostate Cancer Father      Melanoma Father      Diabetes Maternal Grandmother      Prostate Cancer Brother      Anesthesia Reaction No family hx of        SOCIAL HISTORY:   Social History     Tobacco  Use     Smoking status: Never     Smokeless tobacco: Never   Substance Use Topics     Alcohol use: Yes     Comment: 1-4 drinks (wine, liquor) per month       ALLERGIES:    Allergies   Allergen Reactions     Bees Anaphylaxis     Mary Shortness Of Breath     Asa [Aspirin]      bronchospasm     Ace Inhibitors Cough     Bee Difficulty breathing     Excedrin Extra Strength      Hornet Venom Anaphylaxis     Lisinopril Cough     New Medication Allergy      roses     Nsaids      bronchospasm     Other [Seasonal Allergies]      Poultry Meal      Wasp Venom Anaphylaxis       ROS:  10 point ROS neg other than the symptoms noted above in the HPI.      Physical Exam   Temp: 97.5  F (36.4  C) Temp src: Skin BP: (!) 142/65 Pulse: 65   Resp: 15 SpO2: 100 % O2 Device: Oxymask Oxygen Delivery: 10 LPM  Vital Signs with Ranges  Temp:  [97.5  F (36.4  C)-98.1  F (36.7  C)] 97.5  F (36.4  C)  Pulse:  [48-65] 65  Resp:  [10-16] 15  BP: ()/(53-72) 142/65  SpO2:  [95 %-100 %] 100 %  150 lbs 0 oz    Constitutional: Pleasant, alert, appropriate, following commands.  HEENT: Head atraumatic normocephalic. Pupils equal round and reactive to light.  Respiratory: Unlabored breathing no audible wheeze  Cardiovascular: Regular rate and rhythm  GI: Abdomen soft nontender nondistended.  Lymph/Hematologic: No lymphadenopathy in areas examined  Genitourinary:  No marti  Skin: No rashes, no cyanosis, no edema.  Musculoskeletal: LLE shortened and externally rotated, NVI, SILT, skin intact.  Full painless rom BUE.  No pain with rom RLE.  Neurologic: normal without focal findings, mental status, speech normal, alert and oriented x iii, TYSON, reflexes normal and symmetric        Data   Results for orders placed or performed during the hospital encounter of 03/11/23 (from the past 24 hour(s))   CBC with platelets differential    Narrative    The following orders were created for panel order CBC with platelets differential.  Procedure                                Abnormality         Status                     ---------                               -----------         ------                     CBC with platelets and d...[[id number]]                      Final result                 Please view results for these tests on the individual orders.   Basic metabolic panel   Result Value Ref Range    Sodium 135 (L) 136 - 145 mmol/L    Potassium 4.6 3.4 - 5.3 mmol/L    Chloride 100 98 - 107 mmol/L    Carbon Dioxide (CO2) 26 22 - 29 mmol/L    Anion Gap 9 7 - 15 mmol/L    Urea Nitrogen 13.7 8.0 - 23.0 mg/dL    Creatinine 0.68 0.51 - 0.95 mg/dL    Calcium 9.4 8.8 - 10.2 mg/dL    Glucose 155 (H) 70 - 99 mg/dL    GFR Estimate >90 >60 mL/min/1.73m2   INR   Result Value Ref Range    INR 1.50 (H) 0.85 - 1.15   ABO/Rh type and screen    Narrative    The following orders were created for panel order ABO/Rh type and screen.  Procedure                               Abnormality         Status                     ---------                               -----------         ------                     Adult Type and Screen[[id number]]                            In process                   Please view results for these tests on the individual orders.   CBC with platelets and differential   Result Value Ref Range    WBC Count 7.8 4.0 - 11.0 10e3/uL    RBC Count 3.99 3.80 - 5.20 10e6/uL    Hemoglobin 12.4 11.7 - 15.7 g/dL    Hematocrit 36.4 35.0 - 47.0 %    MCV 91 78 - 100 fL    MCH 31.1 26.5 - 33.0 pg    MCHC 34.1 31.5 - 36.5 g/dL    RDW 11.7 10.0 - 15.0 %    Platelet Count 278 150 - 450 10e3/uL    % Neutrophils 73 %    % Lymphocytes 19 %    % Monocytes 7 %    % Eosinophils 1 %    % Basophils 0 %    % Immature Granulocytes 0 %    NRBCs per 100 WBC 0 <1 /100    Absolute Neutrophils 5.6 1.6 - 8.3 10e3/uL    Absolute Lymphocytes 1.5 0.8 - 5.3 10e3/uL    Absolute Monocytes 0.6 0.0 - 1.3 10e3/uL    Absolute Eosinophils 0.1 0.0 - 0.7 10e3/uL    Absolute Basophils 0.0 0.0 - 0.2 10e3/uL    Absolute  Immature Granulocytes 0.0 <=0.4 10e3/uL    Absolute NRBCs 0.0 10e3/uL   XR Pelvis and Hip Left 2 Views    Narrative    EXAM: XR PELVIS AND HIP LEFT 2 VIEWS  LOCATION: [facility]  DATE/TIME: 3/11/2023 11:02 AM    INDICATION: Check for fx left hip  pain left hip with deformity after fall  COMPARISON: None.      Impression    IMPRESSION: Comminuted intertrochanteric fracture of the left hip with significant displacement and foreshortening. No dislocation. Right hip negative for fracture. Pelvis negative for fracture.   EKG 12-lead, tracing only   Result Value Ref Range    Systolic Blood Pressure  mmHg    Diastolic Blood Pressure  mmHg    Ventricular Rate 60 BPM    Atrial Rate 60 BPM    SC Interval 152 ms    QRS Duration 74 ms     ms    QTc 458 ms    P Axis 16 degrees    R AXIS -11 degrees    T Axis 66 degrees    Interpretation ECG       Sinus rhythm  Low voltage QRS  Borderline ECG  When compared with ECG of 07-OCT-2019 23:14,  Vent. rate has decreased BY  40 BPM  Criteria for Septal infarct are no longer Present  Criteria for Inferior infarct are no longer Present  T wave amplitude has decreased in Lateral leads  Confirmed by GENERATED REPORT, COMPUTER ([id number]),  CLAIR MIRANDA ([id number]) on 3/11/2023 11:26:57 AM

## 2023-03-11 NOTE — H&P
[facility]    History and Physical - Hospitalist Service       Date of Admission:  3/11/2023    Assessment & Plan      Tiffany Stovall is a 72 year old female admitted on 3/11/2023. She presents after mechanical fall resulting in left comminuted intertrochanteric hip fracture.    Left intertrochanteric femur fracture  -Inpatient  - Consult orthopedics  - Hold rivaroxaban  - VTE PPX per orthopedics.  Okay to hold DOAC for somewhat prolonged period is only ordered for paroxysmal atrial fibrillation  - Consult PT/OT/SW  - Plan operative repair on 3/11/2023 urgently.  - Pain control with as needed oxycodone and hydromorphone.  Will have Tylenol scheduled  - Scheduled senna while on opiates    Mechanical fall  -PT/OT as above    Paroxysmal atrial fibrillation  - Hold rivaroxaban  - Telemetry  - Continue diltiazem    Asthma  *Well-controlled  - Continue PTA inhalers with therapeutic substitution based on hospital formulary    Hypertension  - Hold hydrochlorothiazide and losartan in perioperative period.  Pending blood pressure trend may resume    Hypothyroidism  - Continue PTA levothyroxine       Diet:  N.p.o. for now, advance diet as tolerated to regular postoperatively  DVT Prophylaxis: Pneumatic Compression Devices  Kirby Catheter: Not present  Lines: None     Cardiac Monitoring: None  Code Status:  Full code, discussed on admission with patient and  in room.    Clinically Significant Risk Factors Present on Admission               # Drug Induced Coagulation Defect: home medication list includes an anticoagulant medication    # Hypertension: home medication list includes antihypertensive(s)              Disposition Plan      Expected Discharge Date: 03/13/2023                  Arian Doyle MD  Hospitalist Service  [facility]  Securely message with [employer] (more info)  Text page via "[employer]" Paging/Directory      ______________________________________________________________________    Chief Complaint   Mechanical fall    History is obtained from the patient, electronic health record, emergency department physician and patient's spouse    History of Present Illness   Tiffany Stovall is a 72 year old female who has a history of paroxysmal atrial fibrillation with slow A-fib burden, well-controlled asthma, hyperlipidemia, hypothyroidism.  She presents to the hospital on 3/11/2023 after a mechanical fall on ice that same day.  She was getting out of her car to go to the gym when she had her feet go out from under her on the ice.  She fell and hit her left hip on the ice.  She had immediate onset of pain and was unable to bear weight.  She had no loss of consciousness, and she did not hit her head.  She remembers the entire fall except for after the fall with the onset of pain she did get somewhat blurry in her vision for a few seconds.      Past Medical History    Past Medical History:   Diagnosis Date     Bronchial spasm      Cancer (H)     basal cell, squamous cell skin ca     Compression fracture of T12 vertebra (H) 03/17/2014     GERD (gastroesophageal reflux disease)      HTN - hypertension      Hyperlipaemia      Postmenopausal 01/01/1999     Uncomplicated asthma      Unspecified hypothyroidism     Hypothyroidism       Past Surgical History   Past Surgical History:   Procedure Laterality Date     ABDOMEN SURGERY  Abd Hyst 1999     ARTHROPLASTY KNEE Left 01/08/2019    Procedure: LEFT TOTAL KNEE ARTHROPLASTY (NELSON NEPHEW)^;  Surgeon: Tom Terry MD;  Location: [facility] OR     ARTHROSCOPY KNEE RT/LT  1978     BIOPSY OF BREAST, NEEDLE CORE  1995    Papilloma     CARPAL TUNNEL RELEASE RT/LT  2010    bilateral     CATARACT IOL, RT/LT       CHOLECYSTECTOMY, LAPOROSCOPIC  2006    Cholecystectomy, Laparoscopic     COLONOSCOPY WITH CO2 INSUFFLATION N/A 01/16/2017    Procedure: COLONOSCOPY WITH CO2 INSUFFLATION;  Surgeon:  Elkin Lemus MD;  Location: [facility] OR     ENDOSCOPIC STRIPPING VEIN(S)  11/14/2013    stab phlebectomies     HYSTERECTOMY, PAP NO LONGER INDICATED  1999    bleeding, atypical cells on endobx, endometriosis     HYSTERECTOMY, CAMILLE  1999    fibroids, endometrial hyperplasia     MOHS MICROGRAPHIC PROCEDURE  2010    BCC lip     MOHS MICROGRAPHIC PROCEDURE  12/13/2016    squamus lt neck,basal top rt foot/3rd toe     MOHS MICROGRAPHIC PROCEDURE  03/22/2018    Left leg     SALPINGO OOPHORECTOMY,R/L/TERI  1999    Salpingo Oophorectomy, RT/LT/TERI     TONSILLECTOMY & ADENOIDECTOMY  1968     TUBAL/ECTOPIC PREGNANCY  1983,1984     ZZC REMV CATARACT INTRACAP,INSERT LENS  2010     ZZHC COLONOSCOPY THRU STOMA, DIAGNOSTIC  11/2006    diverticulosis, no polyps found, next one due in 2016       Prior to Admission Medications   Prior to Admission Medications   Prescriptions Last Dose Informant Patient Reported? Taking?   EPINEPHrine (ANY BX GENERIC EQUIV) 0.3 MG/0.3ML injection 2-pack  at PRN  No Yes   Sig: Inject 0.3 mLs (0.3 mg) into the muscle once as needed   albuterol (PROAIR HFA) 108 (90 Base) MCG/ACT inhaler  at PRN  No Yes   Sig: Inhale 1-2 puffs into the lungs every 4 hours as needed for shortness of breath   albuterol (PROVENTIL) (2.5 MG/3ML) 0.083% neb solution  at PRN  No Yes   Sig: TAKE 1 VIAL (2.5 MG) BY NEBULIZATION EVERY 6 HOURS AS NEEDED FOR SHORTNESS OF BREATH / DYSPNEA   amoxicillin (AMOXIL) 500 MG capsule  at PRN  No Yes   Sig: TAKE 4 CAPSULES (2,000 MG) BY MOUTH ONCE FOR 1 DOSE ONE HOUR BEFORE DENTAL CLEANING APPOINTMENT   atorvastatin (LIPITOR) 40 MG tablet 3/10/2023 at PM  No Yes   Sig: TAKE 1 TABLET DAILY   cholecalciferol (VITAMIN D3) 25 mcg (1000 units) capsule 3/10/2023 at PM  Yes Yes   Sig: Take 2 capsules by mouth daily   diltiazem ER COATED BEADS (CARDIZEM CD/CARTIA XT) 120 MG 24 hr capsule 3/10/2023 at PM  Yes Yes   Sig: Take 1 capsule by mouth every evening   esomeprazole (NEXIUM) 20 MG DR capsule  3/11/2023 at AM  Yes Yes   Sig: Take 20 mg by mouth every morning   fluticasone-salmeterol (ADVAIR) 250-50 MCG/ACT inhaler 3/11/2023 at AM  No Yes   Sig: Inhale 1 puff into the lungs 2 times daily   Patient taking differently: Inhale 1 puff into the lungs daily   hydrochlorothiazide (HYDRODIURIL) 25 MG tablet 3/11/2023 at AM  No Yes   Sig: TAKE 1 TABLET DAILY   ketoconazole (NIZORAL) 2 % external shampoo Past Week  Yes Yes   Sig: WASH TRUNK 3X PER WEEK. LATHER AND LEAVE ON FOR 3-5 MINUTES PRIOR TO RINSING.   levothyroxine (SYNTHROID/LEVOTHROID) 100 MCG tablet 3/11/2023 at AM  No Yes   Sig: Take 1 tablet (100 mcg) by mouth daily   loratadine (CLARITIN) 10 MG tablet  at PRN Self Yes Yes   Sig: Take 10 mg by mouth daily as needed    losartan (COZAAR) 50 MG tablet 3/11/2023 at AM  No Yes   Sig: TAKE 1 TABLET DAILY   multivitamin (CENTRUM SILVER) tablet 3/11/2023 at AM  Yes Yes   Sig: Take 1 tablet by mouth daily   potassium chloride ER (KLOR-CON) 20 MEQ CR tablet 3/10/2023 at MIDDAY  No Yes   Sig: Take 1 tablet (20 mEq) by mouth daily   rivaroxaban ANTICOAGULANT (XARELTO) 20 MG TABS tablet 3/10/2023 at 1900  Yes Yes   Sig: Take 20 mg by mouth daily      Facility-Administered Medications: None           Physical Exam   Vital Signs: Temp: 98.1  F (36.7  C) Temp src: Oral BP: 112/54 Pulse: 65   Resp: 14 SpO2: 97 % O2 Device: Oxymask Oxygen Delivery: 10 LPM  Weight: 150 lbs 0 oz    Constitutional: Awake, alert, cooperative, no apparent distress she appears moderately uncomfortable  Respiratory: Clear to auscultation bilaterally, no crackles or wheezing  Cardiovascular: Regular rate and rhythm, normal S1 and S2, and no murmur noted  GI: Normal bowel sounds, soft, non-distended, non-tender  Skin/Integumen: No rashes, no cyanosis, no edema  Other: Left leg is externally rotated and shortened.  She is neurovascularly intact in both feet.  Dorsalis pedis is palpable bilaterally      Medical Decision Making       75 MINUTES SPENT  BY ME on the date of service doing chart review, history, exam, documentation & further activities per the note.      Data   ------------------------- PAST 24 HR DATA REVIEWED -----------------------------------------------    I have personally reviewed the following data over the past 24 hrs:    7.8  \   12.4   / 278     135 (L) 100 13.7 /  155 (H)   4.6 26 0.68 \       ALT: N/A AST: N/A AP: N/A TBILI: N/A   ALB: N/A TOT PROTEIN: N/A LIPASE: N/A       TSH: N/A T4: N/A A1C: N/A       INR:  1.50 (H) PTT:  N/A   D-dimer:  N/A Fibrinogen:  N/A       Imaging results reviewed over the past 24 hrs:   Recent Results (from the past 24 hour(s))   XR Pelvis and Hip Left 2 Views    Narrative    EXAM: XR PELVIS AND HIP LEFT 2 VIEWS  LOCATION: [facility]  DATE/TIME: 3/11/2023 11:02 AM    INDICATION: Check for fx left hip  pain left hip with deformity after fall  COMPARISON: None.      Impression    IMPRESSION: Comminuted intertrochanteric fracture of the left hip with significant displacement and foreshortening. No dislocation. Right hip negative for fracture. Pelvis negative for fracture.

## 2023-03-11 NOTE — ANESTHESIA PROCEDURE NOTES
"Fascia iliaca Procedure Note    Pre-Procedure   Staff -        Anesthesiologist:  Kiara Doe       Performed By: anesthesiologist       Location: pre-op       Pre-Anesthestic Checklist: patient identified, IV checked, site marked, risks and benefits discussed, informed consent, monitors and equipment checked, pre-op evaluation, at physician/surgeon's request and post-op pain management  Timeout:       Correct Patient: Yes        Correct Procedure: Yes        Correct Site: Yes        Correct Position: Yes        Correct Laterality: Yes        Site Marked: Yes  Procedure Documentation  Procedure: Fascia iliaca       Laterality: left       Patient Position: supine       Patient Prep/Sterile Barriers: sterile gloves, mask, \"No-touch\" technique       Skin prep: Chloraprep       Local skin infiltrated with 3 mL of 1% lidocaine.        Needle Type: insulated (B Moore)       Needle Gauge: 21.        Needle Length (millimeters): 100        Ultrasound guided       1. Ultrasound was used to identify targeted nerve, plexus, vascular marker, or fascial plane and place a needle adjacent to it in real-time.       2. Ultrasound was used to visualize the spread of anesthetic in close proximity to the above referenced structure.       3. A permanent image is entered into the patient's record.       4. The visualized anatomic structures appeared normal.       5. There were no apparent abnormal pathologic findings.    Assessment/Narrative         The placement was negative for: blood aspirated, painful injection and site bleeding       Paresthesias: No.       Test dose of mL at.         Test dose negative, 3 minutes after injection, for signs of intravascular, subdural, or intrathecal injection.       Bolus given via needle..        Secured via.        Insertion/Infusion Method: Single Shot       Complications: none       Injection made incrementally with aspirations every 3 mL.    Medication(s) Administered   Bupivacaine " "0.25% w/ 1:400K Epi (Injection) - Injection   30 mL - 3/11/2023 1:56:00 PM   Comments:  0.5% Bupivacaine with epinephrine (1:400K) injected on the anterior side of the femoral artery, in close proximity to the femoral nerve branches via the adductor canal approach.      Pt tolerated well.    No complications.      The surgeon has given a verbal order transferring care of this patient to me for the performance of a regional analgesia block for post-op pain control. It is requested of me because I am uniquely trained and qualified to perform this block and the surgeon is neither trained nor qualified to perform this procedure.      FOR [facility] ([facility]) ONLY:   Pain Team Contact information: please page the Pain Team Via [employer]. Search \"Pain\". During daytime hours, please page the attending first. At night please page the resident first.    "

## 2023-03-11 NOTE — ED TRIAGE NOTES
Pt slipped and fell on the ice, c/o left hip pain, is on xarelto, no loc, given 4mg zofran and 100mcg fentanyl

## 2023-03-11 NOTE — OR NURSING
Pt stable. Okay to transfer to floor per Dr. Rodgers   [FreeTextEntry1] : Documented by Greg Jacques acting as scribe for Dr. Darling on 09/28/2021. \par \par All Medical record entries made by the Scribe were at my, Dr. Darling's, direction and personally dictated by me on 09/28/2021. I have reviewed the chart and agree that the record accurately reflects my personal performance of the history, physical exam, assessment and plan. I have also personally directed, reviewed, and agreed with the discharge instructions.

## 2023-03-11 NOTE — ED PROVIDER NOTES
History     Chief Complaint:  Hip Pain       The history is provided by the patient.      Tiffany Stovall is a 72 year old female on Xarelto with a history of atrial fibrillation who presents with hip pain. The patient reports that today she was walking through a parking lot when she slipped on ice and fell onto her left side. She complains that she is unable to straighten her left leg. She notes that immediately after the fall she experienced dizziness and visual disturbance. She states that she did not hit her head. She denies experiencing loss of consciousness, neck pain, shoulder pain, ribcage pain, fever, chills, or cough. She denies any history of heart valve replacement. He last dose of Xarelto was taken yesterday night.    Independent Historian:   None - Patient Only    Review of External Notes: None     ROS:  Review of Systems   Constitutional: Negative for chills and fever.   Eyes: Positive for visual disturbance.   Respiratory: Negative for cough.    Musculoskeletal: Negative for back pain and neck pain.   Neurological: Positive for dizziness.   All other systems reviewed and are negative.    Allergies:  Ace Inhibitors  Aspirin  Bees  Excedrin Extra Strength  Hornet Venom  Lisinopril  NSAIDs  Seasonal Allergies  Poultry Meal  Mary  Wasp Venom    Medications:    Xarelto  Albuterol inhaler  Albuterol nebulization  Amoxicillin  Atorvastatin  Diltiazem-ER  Epinephrine injection  Esomeprazole  Fluticasone-salmeterol  Hydrochlorothiazide  Levothyroxine  Loratadine  Losartan  Potassium chloride-ER    Past Medical History:    Cancer  Complex tear of medial meniscus of left knee  Compression fracture of T12 vertebra  GERD  H/O basal cell carcinoma  H/O squamous cell carcinoma in situ of skin  Hyperlipidemia  Hypertension  Hypothyroidism  Mild persistent asthma  Paroxysmal atrial fibrillation  Primary osteoarthritis of left knee  Varicose veins of legs    Past Surgical History:    Arthroplasty knee,  "left  Arthroscopy knee, bilateral  Biopsy breast, needle core  Carpal tunnel release, bilateral  Cataract IOL, bilateral  Cholecystectomy, laparoscopic  Colonoscopy through stoma, diagnostic  Colonoscopy with CO2 insufflation  Endoscopic vein stripping  MOHS, basal cell carcinoma, lip  MOHS, squamous cell carcinoma, neck, dorsal right foot & 3rd toe  CAMILLE BSO  Tonsillectomy & adenoidectomy    Family History:    Father: Asthma, melanoma, prostate cancer, thyroid disease  Mother: Basal cell carcinoma, diabetes, hyperlipidemia, thyroid disease  Brother: Prostate cancer    Social History:  The patient presents to the ED alone.  She arrived via EMS.  She works in the [employer] as a nurse.  PCP: Darby Williamson     Physical Exam     Patient Vitals for the past 24 hrs:   BP Temp Temp src Pulse Resp SpO2 Height Weight   03/11/23 1115 130/72 -- -- 55 12 -- -- --   03/11/23 1045 127/68 -- -- -- -- 95 % -- --   03/11/23 1029 112/65 -- -- (!) 48 -- 100 % -- --   03/11/23 1022 135/67 98.1  F (36.7  C) Oral 55 16 97 % 1.651 m (5' 5\") 68 kg (150 lb)        Physical Exam  Nursing note and vitals reviewed.  Constitutional:  Appears well-developed and well-nourished.   HENT:   Head:    Atraumatic.   Mouth/Throat:   Oropharynx is clear and moist. No oropharyngeal exudate.   Eyes:    Pupils are equal, round, and reactive to light.   Neck:    Normal range of motion. Neck supple. Neck nontender.     No tracheal deviation present. No thyromegaly present.   Cardiovascular:  Normal rate, regular rhythm, no murmur   Pulmonary/Chest: Breath sounds are clear and equal without wheezes or crackles.  Abdominal:   Soft. Bowel sounds are normal. Exhibits no distension and      no mass. There is no tenderness.      There is no rebound and no guarding.   Musculoskeletal:  Left leg exam: Left leg is externally rotated and shortened. Pain with any range of motion of the left hip. Tenderness over left great trochanter. Good dorsalis pedis " pulse. Sensation intact distally.  Lymphadenopathy:  No cervical adenopathy.   Neurological:    Alert and oriented to person, place, and time. GCS 15.  CN 2-12 intact.  and proximal upper extremity strength strong and equal.  Bilateral lower extremity strength strong and equal, including strong dorsiflexion and plantarflexion strength.  Sensation intact and equal to the face, arms. Unable to assess strength of tessie due to hip fracture t right leg strength is normal.  No facial droop or weakness. Normal speech.  Follows commands and answers questions normally.   Skin:    Skin is warm and dry. No rash noted. No pallor.     Emergency Department Course   ECG  ECG taken at 1121, ECG read at 1126  Normal sinus rhythm.  Low voltage QRS. .  Rate 60 bpm. VA interval 152 ms. QRS duration 74 ms. QT/QTc 458/458 ms. P-R-T axes 16 -11 66.     Imaging:  XR Pelvis and Hip Left 2 Views   Final Result   IMPRESSION: Comminuted intertrochanteric fracture of the left hip with significant displacement and foreshortening. No dislocation. Right hip negative for fracture. Pelvis negative for fracture.         Report per radiology    Laboratory:  Labs Ordered and Resulted from Time of ED Arrival to Time of ED Departure   BASIC METABOLIC PANEL - Abnormal       Result Value    Sodium 135 (*)     Potassium 4.6      Chloride 100      Carbon Dioxide (CO2) 26      Anion Gap 9      Urea Nitrogen 13.7      Creatinine 0.68      Calcium 9.4      Glucose 155 (*)     GFR Estimate >90     INR - Abnormal    INR 1.50 (*)    CBC WITH PLATELETS AND DIFFERENTIAL    WBC Count 7.8      RBC Count 3.99      Hemoglobin 12.4      Hematocrit 36.4      MCV 91      MCH 31.1      MCHC 34.1      RDW 11.7      Platelet Count 278      % Neutrophils 73      % Lymphocytes 19      % Monocytes 7      % Eosinophils 1      % Basophils 0      % Immature Granulocytes 0      NRBCs per 100 WBC 0      Absolute Neutrophils 5.6      Absolute Lymphocytes 1.5      Absolute  Monocytes 0.6      Absolute Eosinophils 0.1      Absolute Basophils 0.0      Absolute Immature Granulocytes 0.0      Absolute NRBCs 0.0     TYPE AND SCREEN, ADULT   ABO/RH TYPE AND SCREEN      Emergency Department Course & Assessments:       Interventions:  Medications   HYDROmorphone (PF) (DILAUDID) injection 0.5 mg (0.5 mg Intravenous $Given 3/11/23 1227)   diazepam (VALIUM) injection 5 mg (has no administration in time range)   HYDROmorphone (PF) (DILAUDID) 0.5 MG/0.5 ML injection (0.5 mg  $Given 3/11/23 1033)   sodium chloride 0.9% infusion ( Intravenous $New Bag 3/11/23 1120)   tranexamic acid 1 g in 100 mL NS IV bag (premix) (1 g Intravenous $Given 3/11/23 1223)   ondansetron (ZOFRAN) injection 4 mg (4 mg Intravenous $Given 3/11/23 1227)        Assessments:  1030 I obtained history and examined the patient.  1149 I rechecked the patient, explained findings, and the plans for further care.  1156 I spoke again with the patient.    Independent Interpretation (X-rays, CTs, rhythm strip):  I reviewed the patient's X-ray.    Consultations/Discussion of Management or Tests:  1145 I spoke on the phone with Dr. Andres Campos MD, orthopedic surgery.   1238 I spoke in person with Dr. Doyle, hospitalist. We discussed the course of care thus far in the ED and he accepted care of the patient.       Social Determinants of Health affecting care:   None    Disposition:  The patient was admitted to the hospital under the care of Dr. Doyle.     Impression & Plan      Medical Decision Making:  I found this patient to have a closed subtrochanteric hip fracture of the left hip due to mechanical fall.  There was no head injury or neck pain/injury.  She is neurologically intact without signs of active bleeding.  She is anticoag with Xarelto so orthopedic surgery was consulted to perform surgical fixation of her hip and I discussed that her last Xarelto dose was last night.  There are no other injuries.  She was  admitted to the care of the hospitalist service for further evaluation and treatment.  Diagnosis:    ICD-10-CM    1. Closed subtrochanteric fracture of hip, left, initial encounter (H)  S72.22XA              Scribe Disclosure:  Matt RAGSDALE, am serving as a scribe at 11:22 AM on 3/11/2023 to document services personally performed by Nancy Chávez MD based on my observations and the provider's statements to me.        Nancy Chávez MD  03/11/23 8521

## 2023-03-11 NOTE — ANESTHESIA CARE TRANSFER NOTE
Patient: Tiffany Stovall    Procedure: Procedure(s):  INTERTROCHANTERIC HIP NAILING       Diagnosis: Intertrochanteric fracture (H) [S72.143A]  Diagnosis Additional Information: No value filed.    Anesthesia Type:   General     Note:    Oropharynx: oropharynx clear of all foreign objects  Level of Consciousness: awake  Oxygen Supplementation: face mask    Independent Airway: airway patency satisfactory and stable  Dentition: dentition unchanged  Vital Signs Stable: post-procedure vital signs reviewed and stable    Patient transferred to: PACU    Handoff Report: Identifed the Patient, Identified the Reponsible Provider, Reviewed the pertinent medical history, Discussed the surgical course, Reviewed Intra-OP anesthesia mangement and issues during anesthesia, Set expectations for post-procedure period and Allowed opportunity for questions and acknowledgement of understanding      Vitals:  Vitals Value Taken Time   BP 93/51    Temp 97.2    Pulse 64    Resp 29 03/11/23 1603   SpO2 100 % 03/11/23 1603   Vitals shown include unvalidated device data.    Electronically Signed By: DIEGO Cordova CRNA  March 11, 2023  4:05 PM

## 2023-03-12 ENCOUNTER — APPOINTMENT (OUTPATIENT)
Dept: PHYSICAL THERAPY | Facility: CLINIC | Age: 73
DRG: 481 | End: 2023-03-12
Attending: PHYSICIAN ASSISTANT
Payer: COMMERCIAL

## 2023-03-12 LAB
ANION GAP SERPL CALCULATED.3IONS-SCNC: 7 MMOL/L (ref 7–15)
BLD PROD TYP BPU: NORMAL
BLOOD COMPONENT TYPE: NORMAL
BUN SERPL-MCNC: 16.9 MG/DL (ref 8–23)
CALCIUM SERPL-MCNC: 8.5 MG/DL (ref 8.8–10.2)
CHLORIDE SERPL-SCNC: 98 MMOL/L (ref 98–107)
CODING SYSTEM: NORMAL
CREAT SERPL-MCNC: 0.8 MG/DL (ref 0.51–0.95)
CROSSMATCH: NORMAL
DEPRECATED HCO3 PLAS-SCNC: 26 MMOL/L (ref 22–29)
ERYTHROCYTE [DISTWIDTH] IN BLOOD BY AUTOMATED COUNT: 11.9 % (ref 10–15)
GFR SERPL CREATININE-BSD FRML MDRD: 79 ML/MIN/1.73M2
GLUCOSE SERPL-MCNC: 157 MG/DL (ref 70–99)
GLUCOSE SERPL-MCNC: 157 MG/DL (ref 70–99)
HCT VFR BLD AUTO: 22 % (ref 35–47)
HGB BLD-MCNC: 7 G/DL (ref 11.7–15.7)
HGB BLD-MCNC: 7.4 G/DL (ref 11.7–15.7)
ISSUE DATE AND TIME: NORMAL
MCH RBC QN AUTO: 31.4 PG (ref 26.5–33)
MCHC RBC AUTO-ENTMCNC: 33.6 G/DL (ref 31.5–36.5)
MCV RBC AUTO: 93 FL (ref 78–100)
PLATELET # BLD AUTO: 193 10E3/UL (ref 150–450)
POTASSIUM SERPL-SCNC: 4.2 MMOL/L (ref 3.4–5.3)
RBC # BLD AUTO: 2.36 10E6/UL (ref 3.8–5.2)
SODIUM SERPL-SCNC: 131 MMOL/L (ref 136–145)
SODIUM SERPL-SCNC: 131 MMOL/L (ref 136–145)
UNIT ABO/RH: NORMAL
UNIT NUMBER: NORMAL
UNIT STATUS: NORMAL
UNIT TYPE ISBT: 6200
WBC # BLD AUTO: 7.8 10E3/UL (ref 4–11)

## 2023-03-12 PROCEDURE — 36415 COLL VENOUS BLD VENIPUNCTURE: CPT | Performed by: STUDENT IN AN ORGANIZED HEALTH CARE EDUCATION/TRAINING PROGRAM

## 2023-03-12 PROCEDURE — 258N000003 HC RX IP 258 OP 636: Performed by: PHYSICIAN ASSISTANT

## 2023-03-12 PROCEDURE — 84295 ASSAY OF SERUM SODIUM: CPT | Performed by: PHYSICIAN ASSISTANT

## 2023-03-12 PROCEDURE — 80048 BASIC METABOLIC PNL TOTAL CA: CPT | Performed by: STUDENT IN AN ORGANIZED HEALTH CARE EDUCATION/TRAINING PROGRAM

## 2023-03-12 PROCEDURE — P9016 RBC LEUKOCYTES REDUCED: HCPCS | Performed by: PHYSICIAN ASSISTANT

## 2023-03-12 PROCEDURE — 97116 GAIT TRAINING THERAPY: CPT | Mod: GP | Performed by: PHYSICAL THERAPIST

## 2023-03-12 PROCEDURE — 97530 THERAPEUTIC ACTIVITIES: CPT | Mod: GP | Performed by: PHYSICAL THERAPIST

## 2023-03-12 PROCEDURE — 85018 HEMOGLOBIN: CPT | Performed by: PHYSICIAN ASSISTANT

## 2023-03-12 PROCEDURE — 250N000013 HC RX MED GY IP 250 OP 250 PS 637: Performed by: STUDENT IN AN ORGANIZED HEALTH CARE EDUCATION/TRAINING PROGRAM

## 2023-03-12 PROCEDURE — 97161 PT EVAL LOW COMPLEX 20 MIN: CPT | Mod: GP | Performed by: PHYSICAL THERAPIST

## 2023-03-12 PROCEDURE — 250N000011 HC RX IP 250 OP 636: Performed by: STUDENT IN AN ORGANIZED HEALTH CARE EDUCATION/TRAINING PROGRAM

## 2023-03-12 PROCEDURE — 85014 HEMATOCRIT: CPT | Performed by: STUDENT IN AN ORGANIZED HEALTH CARE EDUCATION/TRAINING PROGRAM

## 2023-03-12 PROCEDURE — 250N000011 HC RX IP 250 OP 636: Performed by: PHYSICIAN ASSISTANT

## 2023-03-12 PROCEDURE — 99233 SBSQ HOSP IP/OBS HIGH 50: CPT | Performed by: PHYSICIAN ASSISTANT

## 2023-03-12 PROCEDURE — 120N000001 HC R&B MED SURG/OB

## 2023-03-12 PROCEDURE — 36415 COLL VENOUS BLD VENIPUNCTURE: CPT | Performed by: PHYSICIAN ASSISTANT

## 2023-03-12 RX ORDER — SODIUM CHLORIDE 9 MG/ML
INJECTION, SOLUTION INTRAVENOUS CONTINUOUS
Status: DISCONTINUED | OUTPATIENT
Start: 2023-03-12 | End: 2023-03-13

## 2023-03-12 RX ORDER — CALCIUM CARBONATE 500 MG/1
500 TABLET, CHEWABLE ORAL 3 TIMES DAILY PRN
Status: DISCONTINUED | OUTPATIENT
Start: 2023-03-12 | End: 2023-03-17 | Stop reason: HOSPADM

## 2023-03-12 RX ADMIN — DILTIAZEM HYDROCHLORIDE 120 MG: 120 CAPSULE, COATED, EXTENDED RELEASE ORAL at 20:58

## 2023-03-12 RX ADMIN — ONDANSETRON 4 MG: 2 INJECTION INTRAMUSCULAR; INTRAVENOUS at 01:43

## 2023-03-12 RX ADMIN — ACETAMINOPHEN 975 MG: 325 TABLET ORAL at 17:28

## 2023-03-12 RX ADMIN — HYDROMORPHONE HYDROCHLORIDE 0.2 MG: 0.2 INJECTION, SOLUTION INTRAMUSCULAR; INTRAVENOUS; SUBCUTANEOUS at 03:22

## 2023-03-12 RX ADMIN — ACETAMINOPHEN 975 MG: 325 TABLET ORAL at 10:30

## 2023-03-12 RX ADMIN — CEFAZOLIN 1 G: 1 INJECTION, POWDER, FOR SOLUTION INTRAMUSCULAR; INTRAVENOUS at 07:03

## 2023-03-12 RX ADMIN — OXYCODONE HYDROCHLORIDE 5 MG: 5 TABLET ORAL at 13:11

## 2023-03-12 RX ADMIN — Medication 50 MCG: at 08:21

## 2023-03-12 RX ADMIN — Medication 20 MG: at 13:11

## 2023-03-12 RX ADMIN — ENOXAPARIN SODIUM 40 MG: 40 INJECTION SUBCUTANEOUS at 10:30

## 2023-03-12 RX ADMIN — SODIUM CHLORIDE: 9 INJECTION, SOLUTION INTRAVENOUS at 12:11

## 2023-03-12 RX ADMIN — ATORVASTATIN CALCIUM 40 MG: 40 TABLET, FILM COATED ORAL at 20:58

## 2023-03-12 RX ADMIN — SENNOSIDES AND DOCUSATE SODIUM 1 TABLET: 50; 8.6 TABLET ORAL at 08:21

## 2023-03-12 RX ADMIN — SENNOSIDES AND DOCUSATE SODIUM 1 TABLET: 50; 8.6 TABLET ORAL at 20:58

## 2023-03-12 RX ADMIN — LEVOTHYROXINE SODIUM 100 MCG: 100 TABLET ORAL at 08:21

## 2023-03-12 RX ADMIN — OXYCODONE HYDROCHLORIDE 5 MG: 5 TABLET ORAL at 20:57

## 2023-03-12 ASSESSMENT — ACTIVITIES OF DAILY LIVING (ADL)
ADLS_ACUITY_SCORE: 37
ADLS_ACUITY_SCORE: 35
ADLS_ACUITY_SCORE: 35
ADLS_ACUITY_SCORE: 37
ADLS_ACUITY_SCORE: 35
ADLS_ACUITY_SCORE: 37

## 2023-03-12 NOTE — PROGRESS NOTES
03/12/23 1521   Appointment Info   Signing Clinician's Name / Credentials (PT) Sonia Still, ASYA   Living Environment   People in Home spouse   Current Living Arrangements house   Home Accessibility stairs to enter home;stairs within home   Number of Stairs, Main Entrance 2   Stair Railings, Main Entrance none   Number of Stairs, Within Home, Primary nine   Stair Railings, Within Home, Primary railings safe and in good condition   Transportation Anticipated family or friend will provide   Living Environment Comments Two story home with master bedroom/bath on second level   Self-Care   Usual Activity Tolerance excellent   Current Activity Tolerance good   Regular Exercise Yes   Activity/Exercise Type swimming   Equipment Currently Used at Home none  (has WW from previous knee surgery)   Fall history within last six months yes   Number of times patient has fallen within last six months 1  (on ice)   Activity/Exercise/Self-Care Comment Very active; goes to health club to work out including swimming. I with all mobility without AD. I with all ADL's and IADL's. Drives.   General Information   Onset of Illness/Injury or Date of Surgery 03/11/23   Referring Physician Magda Doyle MD   Patient/Family Therapy Goals Statement (PT) Plans to discharge home with retired  at D/C.   Pertinent History of Current Problem (include personal factors and/or comorbidities that impact the POC) Tiffany Stovall is a 72 year old female admitted on 3/11/2023. She presents after mechanical fall resulting in left comminuted intertrochanteric hip fracture.   Existing Precautions/Restrictions fall   Weight-Bearing Status - LLE weight-bearing as tolerated   General Observations Lying in bed. Eager to participate and at least get up to commode. Aware of low hemoglobin and concerned for dizziness.   Cognition   Affect/Mental Status (Cognition) WNL   Orientation Status (Cognition) oriented x 4   Follows Commands (Cognition) WNL    Pain Assessment   Patient Currently in Pain Yes, see Vital Sign flowsheet  (minimally)   Integumentary/Edema   Integumentary/Edema Comments milde edema noted in L leg   Posture    Posture Not impaired   Range of Motion (ROM)   ROM Comment minimal hip flexion with A on left   Strength (Manual Muscle Testing)   Strength Comments Not formally assessed. Arms and R L/E appears WNL's   Bed Mobility   Comment, (Bed Mobility) Supine to sit with pt. using rail and Min A for L/E's   Transfers   Comment, (Transfers) Sit to and from stand with CGA   Gait/Stairs (Locomotion)   Distance in Feet (Gait) 25   Comment, (Gait/Stairs) Pt. ambulated 2' for eval with WW and CGA; managing lines with 's assist.   Balance   Balance Comments No LOB noted with WW   Sensory Examination   Sensory Perception patient reports no sensory changes   Coordination   Coordination no deficits were identified   Muscle Tone   Muscle Tone no deficits were identified   Clinical Impression   Criteria for Skilled Therapeutic Intervention Yes, treatment indicated   PT Diagnosis (PT) Impaired functional mobility   Influenced by the following impairments dizziness (low hemoglobin), low BP, mild L hip pain   Functional limitations due to impairments decreased I and endurance in functional mobility   Clinical Presentation (PT Evaluation Complexity) Evolving/Changing   Clinical Presentation Rationale per clinical judgement   Clinical Decision Making (Complexity) low complexity   Planned Therapy Interventions (PT) bed mobility training;gait training;home exercise program;patient/family education;ROM (range of motion);stair training;strengthening;transfer training;progressive activity/exercise;home program guidelines   Anticipated Equipment Needs at Discharge (PT)   (has WW)   Risk & Benefits of therapy have been explained evaluation/treatment results reviewed;care plan/treatment goals reviewed;risks/benefits reviewed;current/potential barriers  reviewed;participants voiced agreement with care plan;participants included;patient   PT Total Evaluation Time   PT Eval, Low Complexity Minutes (76768) 10   Plan of Care Review   Plan of Care Reviewed With patient;spouse   Physical Therapy Goals   PT Frequency Daily   PT Predicted Duration/Target Date for Goal Attainment 03/15/23   PT: Bed Mobility Supervision/stand-by assist;Supine to/from sit   PT: Transfers Supervision/stand-by assist;Sit to/from stand;Assistive device   PT: Gait Supervision/stand-by assist;Rolling walker;150 feet   PT: Stairs Supervision/stand-by assist;4 stairs;Assistive device;Rail on both sides   Therapeutic Procedure/Exercise   Ther. Procedure: strength, endurance, ROM, flexibillity Minutes (48432) 4   Symptoms Noted During/After Treatment increased pain   Treatment Detail/Skilled Intervention Performed and instructed in quad/glut sets, heel slides with A, SAQ with A, and ankle pumps. Tolerated well. WIll review.   Therapeutic Activity   Therapeutic Activities: dynamic activities to improve functional performance Minutes (81268) 20   Symptoms Noted During/After Treatment Dizziness;Increased pain   Treatment Detail/Skilled Intervention Increased time to manage lines including wound vac. Supine to sit with Min A for L/E's and cueing. Pt. using bedrail with U/E's.  Cueing to scoot forward. Patient noted dizziness. /55, HR 88, O2 sats 98%.  Patient sat at EOB. Sit to stand into WW with close CGA. Pivot transfer with CGA to commode. CGA and cueing to sit on commode. Able to urinate. Stood with cueing and CGA. Min A for balance while pt. performed pericares. At end of session sat with CGA and returned to supine with Min A. Pt. had noted increased dizziness again. Once supine, BP taken and 113/43. Dizziness resolved in supine. Nsg. notified. Bed alarm on. At end of session, nsg. notified patient will be receiving one unit of blood.   Gait Training   Gait Training Minutes (02930) 8   Symptoms  Noted During/After Treatment (Gait Training) increased pain;dizziness   Treatment Detail/Skilled Intervention Patient ambulated in room approx. 25' with WW and close CGA plus A for lines.  Patient ambulating with reciprocal gait pattern in WW, decreased yasmin and PLB.  Became dizzy toward end of walk; returned to bed. Good understanding of gait pattern with WW.   PT Discharge Planning   PT Plan PT Plan: Review hip exercises (bring written exs. sheet), transfers, progress ambulation with WW, monitor BP, stairs   PT Discharge Recommendation (DC Rec) home with assist   PT Rationale for DC Rec Patinet is currently below baseline and affected by low hemoglobin and low BP. Still, mobilizing with CGA to Min A for sit to supine.  Anticipate with medical management and continued PT, patient will be able to manage with CGA which  can provide. May benefit from PT at a later date in OP setting pending progress of L L/E.   PT Brief overview of current status Min A for sit to and from supine, CGA for sit to and from stand and ambulation with WW. Limited by dizziness and decreased hemoglobin/BP.   Total Session Time   Timed Code Treatment Minutes 32   Total Session Time (sum of timed and untimed services) 42

## 2023-03-12 NOTE — PLAN OF CARE
Patient vital signs are at baseline: Yes  Patient able to ambulate as they were prior to admission or with assist devices provided by therapies during their stay:  Yes  Patient MUST void prior to discharge:  Yes  Patient able to tolerate oral intake:  Yes  Pain has adequate pain control using Oral analgesics:  Yes  Does patient have an identified :  Yes  Has goal D/C date and time been discussed with patient:  Yes   A/OX4, cms intact. Pan control with oxycodone and tylenol. Left hip dreg CDI, wound vac in place and running. Unit of blood infusing. Up with 1 assist/walker to BSC. Not much appetite today.    1 unit of red blood infused with no reaction.

## 2023-03-12 NOTE — PROGRESS NOTES
[facility]  Hospitalist Progress Note    Assessment & Plan   Tiffany Stovall is a 72 year old female who was admitted on 3/11/2023.     Past medical history significant for HTN, HLP, Paroxysmal atrial fib, Chronic anticoagulation therapy, Hypothyroidism, GERD, Mild persistent asthma with bronchial spasm, History of T12 compression fracture, History of BCC/SCC who was admitted following a mechanical fall that resulted in left comminuted intertrochanteric femur fracture.      Patient presented to the ED due to left hip pain after a fall.  Earlier on the day of admission she had been walking in a parking lot parking lot when she slipped on ice and fell onto her left side.  After falling she was unable to straighten her left leg and also developed dizziness and visual changes after the fall.  She denied striking her head or LOC.      Work-up in the ED included a BMP that revealed a sodium of 135, glucose of 155 and creatinine of 0.68 with GFR of >90.  CBC with diff was unremarkable.  INR elevated at 1.50.  EKG with normal sinus rhythm.  2 view left hip and pelvis Xray revealed comminuted intertrochanteric fracture of the left hip with significant displacement and foreshortening (no dislocation, right hip without fracture and pelvis negative for fracture).  Ortho was consulted in the ED.  Patient received TXA in the ED.      Reviewed ED note, Admission H&P as well as Ortho notes (Consult and progress).      Mechanical fall  Left intertrochanteric femur fracture  S/p left hip cephalomedullary nailing  POD #1.    - Ortho consult appreciated:   --Reviewed both the consult note and Op note.     --Surgery was performed under general anesthesia with an EBL documented at 100 ml.     --Weightbearing as tolerated with walker for 6 weeks.     --DVT prophylaxis: Resume PTA Xarelto on POD #3 and start subcutaneous Lovenox 40 mg POD #1   --Fabiana-op Ancef for 24 hours ordered.     --PT consult requested.      --Pain management: Minimize narcotics.     --Vitamin D started and follow-up with PCP regarding osteoporosis work-up.    - PT/OT consults requested.    - SW/CM consult requested.    - Pain control with as needed oxycodone and hydromorphone.  Will have Tylenol scheduled  - Scheduled senna while on opiates.  - POD #1 check CBC with PLTs and BMP.    - Encourage use of IS.      Acute blood loss anemia  Suspect multifactorial in the setting of femur fracture, surgical blood loss and dilutional effect from IV fluids.  HGB pre-op of 12.7-12.4 and on POD #1 at 7.4.    - Repeat HGB this afternoon.     --Patient and her spouse had questions as to when transfusion would be warranted.     --Discussed with charge nurse with plan for repeat HGB this afternoon and likely send message to Ortho with results and possibly proceed with transfusion.      Hyponatremia  Suspect this is secondary to dehydration.  Noted to be 135 on day of admission but low the previous day at 131 and could be secondary to PTA hydrochlorothiazide.    - IV fluids changed to NS at 100 ml/hr.    - Repeat sodium this afternoon.    - BMP repeated on POD #2.      Hyperglycemia  Patient brought up concern regarding recent glucose levels.  Last reading was 157.  Discussed causes such as stress from fracture, stress from surgery as well as effects of steroid.  Patient received 4 mg of IV decadron intra-op.    - Monitor PRN.      Paroxysmal atrial fibrillation  - Monitor on telemetry.    - Resumed on PTA diltiazem 120 mg every evening.    - Hold PTA Xarelto with plans to resume POD #3.      Mild persistent asthma  - Continued on PTA inhalers with therapeutic substitution based on hospital formulary.  - Monitor O2 saturations.      Hypotension in setting on known hypertension  - Hold PTA hydrochlorothiazide 25 mg/d and losartan 50 mg/d in perioperative period.    - POD #1 BP consistently low and will continue to hold PTA medications as above but will continue PTA  diltiazem.      Hypothyroidism  - Resumed on PTA levothyroxine 100 mcg/d.      GERD  *Patient indicated the only medication that works for management of her GERD is Nexium.    - Stop previously ordered Protonix.    - Resumed on PTA Nexium 20 mg every morning; patient can use home medication as spouse brought it in for her.     --Informed charge nurse and requested that pharmacy be alerted to allow use in the hospital.    - PRN TUMS available.      HLP  - Resumed on PTA atorvastatin 40 mg/d.      Iatrogenic hypokalemia  Suspect patient is on potassium replacement due to hydrochlorothiazide.    - Hold PTA supplement.    - Monitor and replace per protocol.      Clinically Significant Risk Factors Present on Admission               # Drug Induced Coagulation Defect: home medication list includes an anticoagulant medication    # Hypertension: home medication list includes antihypertensive(s)                Diet: Advance Diet as Tolerated: Regular Diet Adult     DVT Prophylaxis: Enoxaparin (Lovenox) SQ   Kirby Catheter: Not present  Lines: None     Cardiac Monitoring: None  Code Status: Full Code      Disposition Plan    Anticipate patient will likely discharge home in 1-2 days; pending PT/OT assessments and recommendations.      The patient's care was discussed with the Patient, Patient's Family and Charge nurse.      The patient has been discussed with Dr. Diaz, who agrees with the assessment and plan at this time.    Jr Willingham PA-C  [facility]  Securely message with the Vocera Web Console (learn more here)  Text page via Particle Code Paging/Directory      Interval History   Patient was resting in bed upon arrival with her  present in the room.  She denied fever, chills, chest pain, shortness of breath or abdominal pain.  She had significant heart burn last night.  We discussed treatment and they requested that she be restarted on Nexium and that they brought in the medication  "as well.  We reviewed her low HGB and answered questions regarding potential transfusion.      Patient brought up concern regarding recent glucose levels.  Last reading was 157.  Discussed causes such as stress from fracture, stress from surgery as well as effects of steroid.  Patient received 4 mg of IV decadron intra-op.      We spoke about holding PTA losartan and hydrochlorothiazide due to low BP.      -Data reviewed today: I reviewed all new labs and imaging results over the last 24 hours. I personally reviewed no images or EKG's today.    Physical Exam   /54 (BP Location: Right arm)   Pulse 81   Temp 99.2  F (37.3  C) (Oral)   Resp 16   Ht 1.651 m (5' 5\")   Wt 68 kg (150 lb)   LMP  (LMP Unknown)   SpO2 97%   BMI 24.96 kg/m      Constitutional: Awake, alert, cooperative, no apparent distress.  ENT: Normocephalic, without obvious abnormality, atraumatic, oral pharynx with dry mucus membranes, tonsils without erythema or exudates.  Neck: Supple, symmetrical, trachea midline, no adenopathy.  Pulmonary: No increased work of breathing, fair air exchange, clear to auscultation bilaterally, no crackles or wheezing.  Cardiovascular: Regular rate and rhythm, normal S1 and S2, no S3 or S4, and no murmur noted.  GI: Active bowel sounds, soft, non-distended, non-tender.  Skin/Integumen: Visualized skin appeared clear.  Neuro: CN II-XII grossly intact.  Psych:  Alert and oriented x 3. Normal affect.  Extremities: No lower extremity edema noted, and calves are non-TTP bilaterally.       Medical Decision Making       Please see A&P for additional details of medical decision making.  Greater than 50 MINUTES SPENT BY ME on the date of service doing chart review, history, exam, documentation & further activities per the note.         Medications     lactated ringers 75 mL/hr at 03/11/23 2120       acetaminophen  975 mg Oral Q8H     atorvastatin  40 mg Oral Daily     diltiazem ER COATED BEADS  120 mg Oral QPM     " enoxaparin ANTICOAGULANT  40 mg Subcutaneous Q24H     esomeprazole  20 mg Oral QAM     fluticasone-vilanterol  1 puff Inhalation Daily     levothyroxine  100 mcg Oral Daily     [Held by provider] potassium chloride ER  20 mEq Oral Daily     senna-docusate  1 tablet Oral BID    Or     senna-docusate  2 tablet Oral BID     sodium chloride (PF)  3 mL Intracatheter Q8H     vitamin D3  50 mcg Oral Daily       Data   Recent Labs   Lab 03/12/23  0656 03/11/23  1912 03/11/23  1047 03/10/23  1127   WBC 7.8  --  7.8 5.4   HGB 7.4*  --  12.4 12.7   MCV 93  --  91 94     --  278 251   INR  --   --  1.50*  --    *  --  135* 131*   POTASSIUM 4.2  --  4.6 3.9   CHLORIDE 98  --  100 99   CO2 26  --  26 28   BUN 16.9  --  13.7 17   CR 0.80 0.91 0.68 0.76   ANIONGAP 7  --  9 4   ROGER 8.5*  --  9.4 9.9   *  157*  --  155* 102*   ALBUMIN  --   --   --  4.2   PROTTOTAL  --   --   --  7.1   BILITOTAL  --   --   --  0.7   ALKPHOS  --   --   --  60   ALT  --   --   --  30   AST  --   --   --  23       No results found for this or any previous visit (from the past 24 hour(s)).

## 2023-03-12 NOTE — PROGRESS NOTES
Orthopedic Surgery  Tiffany Nancy Stovall  3/12/2023  Admit Date:  3/11/2023  POD: 1 Day Post-Op  Procedure(s):  INTERTROCHANTERIC HIP NAILING    Patient resting comfortably in bed.    Pain controlled currently. She reports increased pain during the night.   Patient complains of GERD. She states she has had this for 30+ years and the Protonix administered will not help. She is having her  bring in her Tums, Nexium, and her walker from home.   Patient is eager to go home. She lives in a two story house (with the master upstairs) with her  and states she was able to ascend stairs one day after her total knee arthroplasty in 2019.   Tolerating oral intake.    Denies nausea or vomiting.  Denies chest pain or shortness of breath.  No events overnight.      Alert and orient to person, place, and time.  Vital Sign Ranges  Temperature Temp  Av.4  F (36.3  C)  Min: 97.2  F (36.2  C)  Max: 98.5  F (36.9  C)   Blood pressure Systolic (24hrs), Av , Min:87 , Max:143        Diastolic (24hrs), Av, Min:51, Max:90      Pulse Pulse  Av.8  Min: 48  Max: 100   Respirations Resp  Av  Min: 10  Max: 29   Pulse oximetry SpO2  Av.7 %  Min: 95 %  Max: 100 %       Prevena intact and functioning properly to proximal incision.   Tagaderm C/D/I to distal incision.   Minimal erythema of the surrounding skin.   Bilateral calves are soft, non-tender.   Bilateral lower extremity is NVI.  Sensation intact bilateral lower extremities.  5/5 motor with resisted dorsi and plantar flexion bilaterally.  +DP pulse.     Labs:  Recent Labs   Lab Test 23  0656 23  1047 03/10/23  1127   POTASSIUM 4.2 4.6 3.9     Recent Labs   Lab Test 23  0656 23  1047 03/10/23  1127   HGB 7.4* 12.4 12.7     Recent Labs   Lab Test 23  1047   INR 1.50*     Recent Labs   Lab Test 23  0656 23  1047 03/10/23  1127    278 251       A/P  1. Plan   Continue Lovenox for DVT prophylaxis and on  POD#3 patient may switch back to her PTA Xarelto 20 mg daily.      Mobilize with PT/OT    WBAT w/ walker   Keep dressings C/D/I    Continue current pain regiment.   Follow up with Dr. Campos in clinic in 2 weeks.      2. Disposition   Anticipate d/c to home pending progress with physical therapy and medical clearance, likely 1-2 days.      Camille Wolfe PA-C

## 2023-03-12 NOTE — PROVIDER NOTIFICATION
MD Notification    Notified Person: MD    Notified Person Name: Jr Willingham    Notification Date/Time: 3/12/2023 8:31am    Notification Interaction: voicera messaging    Purpose of Notification:  losartan 50mg and hydrochlorothiazide 25mg, can you please order it for morning.     Orders Received: waiting to hear back    Comments: received order at 8:33am stating Hold on medication due to low BP.

## 2023-03-12 NOTE — RESULT ENCOUNTER NOTE
"Your kidney function and liver testing are normal.  Your blood sugar is borderline elevated.  This is in the \"prediabetic\" range.  Exercise and limiting carbohydrate and sugars in diet can be helpful at preventing progression to diabetes.   Your urine testing is normal.  There is no elevated protein present.  Your thyroid testing is normal indicating you are on the correct dosage of thyroid medication.  Your cholesterol is under very good control with current treatment.   Your blood cell counts are normal.   I have noted the day after these labs were completed you had a fall with break to your left hip for which you underwent surgery.  I hope you are recovering well.   I will update your prescriptions after discharge from the hospital so you will have refills available.   Please call or [employer] message me if you have any questions.      PSK"

## 2023-03-12 NOTE — PLAN OF CARE
[id number]: Arrived from PACU at 1750. Oriented x 4. Rates pain at 5 on (L) hip. Ice applied. VSS except BP on the softer side. Is on 2L NC sating mid to high 90's. Dressing on (L) hip CDI with wound vac attached. Tolerating full liq diet. Spouse at bedside.

## 2023-03-12 NOTE — PROVIDER NOTIFICATION
MD Notification    Notified Person: MD     Notified Person Name: Camille braga PA     Notification Date/Time:3/12/23, 1438    Notification Interaction: called PA    Purpose of Notification:Hgb 7.0. Got order to tranfuse 1 unit of blood.     Orders Received: transfuse 1 unit of blood.    Comments:

## 2023-03-12 NOTE — PROGRESS NOTES
Date/Time 3/12/2023  1900 - 0766    Trauma/Ortho/Medical (Choose one) Ortho    Diagnosis: Fall, Left hip Fracture  POD#:1 for left hip  Mental Status: A&O x4  Activity/dangle Refused x2  Diet: Regular   Pain: Schedule Tylenol and PRN IV Dilaudid   Kirby/Voiding: Refused pure wick. Urine incontinent x1   Tele/Restraints/Iso:NA  02/LDA: LR at 75 ml/hr  D/C Date:Pending  Other Info: Left hip dressing CDI. Wound vac in place. Report Nausea, PRN Zofran given.

## 2023-03-12 NOTE — OP NOTE
[facility]  Operative Note  Cephalomedullary Nailing      Tiffany Stovall MRN# [id number]   YOB: 1950  Procedure Date:  3/11/2023  Age: 72 year old     PREOPERATIVE DIAGNOSIS:     1.  Displaced left subtrochanteric femur fracture.         POSTOPERATIVE DIAGNOSES:     1.  Displaced left subtrochanteric femur fracture.       PROCEDURE PERFORMED:  Left hip cephalomedullary nailing, femur fracture.      SURGEON:  Andres Campos MD     ASSISTANT: GIOVANNY Britt who was critical for positioning patient, helping obtain reduction, retraction during exposure and implant placement, as well as closure.     ANESTHESIA:  General.      ESTIMATED BLOOD LOSS:  100 mL      IMPLANTS USED:  Synthes TFN advanced nail 380 mm x 11 mm x 125 degrees, cephalomedullary screw with distal interlock.      FINDINGS:  Tiffany Stovall is a 72 year old-year-old female who did do some ambulation prior to this with above-named injury.  A long discussion had regarding the nature of hip fractures, risks related to that and surgery discussed, included but not limited to bleeding, infection, damage to surrounding neurovascular structures, malunion, delayed union, nonunion, need for additional surgeries, blood clots that go to the heart, lung or brain, anesthetic complications or even death.  Discussed hip fracture mortality rates.  The patient and the family wished to proceed and consent signed.      DESCRIPTION OF PROCEDURE:  The patient identified in the preoperative holding area per hospital policy and the correct op site marked, to the OR table, Ancef given.  General anesthesia induced, placed onto the [place] fracture table legs positioned in a scissor position.  All bony prominences well padded.  Slight traction and internal rotation improved the fracture; however became clear this would require open reduction.  Chlorhexidine pre-scrub, ChloraPrep, standard drape.  A timeout was performed.  All in the room  agreed.         Larger lateral incision was made from fracture site to greater troch after infiltrating 1% lidocaine with epinephrine.  Used direct manipulation and then passed cerclage cable around the fracture taking care not to entrap any surrounding structure.  Now dissected down the greater trochanter, placed a guide pin confirming position AP and lateral views.  Opening reamer.  Placed a guide cristopher distal center-center position and measured for a 380 mm nail. TKA present distal.   Upon placing cristopher displaced fracture.  Removed cristopher.  Repeated reduction, now anatomic and final tightened cable.  Repeat reamed up to 13 mm.   Placed the guide wire into the center-center position in the femoral head and reamed, measured and placed the screw.  Checked AP, lateral and 30-degree arcs in between to ensure safe position, as well as appropriate reduction.  Compressed.  Locked the interference screw proximally.  Outrigger removed.  Distal interlock placed.  Final x-rays showed appropriate reduction, safe position of the implants. Thoroughly irrigated wounds, closed deep layers, 0 Vicryl, 2-0 Vicryl in subcutaneous, staples and incisional vac dressings applied.      POSTOPERATIVE PLAN:   1.  Weightbearing as tolerated with a walker x6 weeks.   2.  Deep venous thrombosis prophylaxis resume Xeralto POD # 3, could start 40 mg Lovenox daily POD #1  3.  Ancef x 24 hours  4.  Osteoporosis workup including calcium, vitamin D supplementation and followup with primary care doctor for additional treatment.   5.  Physical therapy.   6.  Pain control.  Minimize narcotics.      POSTOPERATIVE PLAN:  At two weeks, the dressing could be removed.  Staples sutures to be removed.  If all is going well, first followup could be 6 weeks with Dr. Campos, [facility], with 2 views of the right femur, sooner if there is any difficulty.      [facility]  [facility]    Monday 1-5 PM  and Thursday 7:30-12:00 AM  [address]  [place] [address]  [phone]    [phone]    Or    Tuesday 7:30-5 PM  [address]    [address]  [place], [place]     VANDANA LITTLE MD

## 2023-03-13 ENCOUNTER — APPOINTMENT (OUTPATIENT)
Dept: PHYSICAL THERAPY | Facility: CLINIC | Age: 73
DRG: 481 | End: 2023-03-13
Payer: COMMERCIAL

## 2023-03-13 ENCOUNTER — APPOINTMENT (OUTPATIENT)
Dept: OCCUPATIONAL THERAPY | Facility: CLINIC | Age: 73
DRG: 481 | End: 2023-03-13
Attending: PHYSICIAN ASSISTANT
Payer: COMMERCIAL

## 2023-03-13 LAB
ALBUMIN SERPL BCG-MCNC: 3.2 G/DL (ref 3.5–5.2)
ALP SERPL-CCNC: 82 U/L (ref 35–104)
ALT SERPL W P-5'-P-CCNC: 283 U/L (ref 10–35)
ANION GAP SERPL CALCULATED.3IONS-SCNC: 6 MMOL/L (ref 7–15)
AST SERPL W P-5'-P-CCNC: 182 U/L (ref 10–35)
BILIRUB DIRECT SERPL-MCNC: <0.2 MG/DL (ref 0–0.3)
BILIRUB SERPL-MCNC: 0.4 MG/DL
BLD PROD TYP BPU: NORMAL
BLOOD COMPONENT TYPE: NORMAL
BUN SERPL-MCNC: 8.9 MG/DL (ref 8–23)
CALCIUM SERPL-MCNC: 8.6 MG/DL (ref 8.8–10.2)
CHLORIDE SERPL-SCNC: 104 MMOL/L (ref 98–107)
CODING SYSTEM: NORMAL
CREAT SERPL-MCNC: 0.6 MG/DL (ref 0.51–0.95)
CROSSMATCH: NORMAL
DEPRECATED HCO3 PLAS-SCNC: 25 MMOL/L (ref 22–29)
GFR SERPL CREATININE-BSD FRML MDRD: >90 ML/MIN/1.73M2
GLUCOSE SERPL-MCNC: 115 MG/DL (ref 70–99)
HBA1C MFR BLD: 5.3 %
HGB BLD-MCNC: 7.4 G/DL (ref 11.7–15.7)
HGB BLD-MCNC: 8.5 G/DL (ref 11.7–15.7)
IRON BINDING CAPACITY (ROCHE): 214 UG/DL (ref 240–430)
IRON SATN MFR SERPL: 10 % (ref 15–46)
IRON SERPL-MCNC: 21 UG/DL (ref 37–145)
ISSUE DATE AND TIME: NORMAL
POTASSIUM SERPL-SCNC: 3.7 MMOL/L (ref 3.4–5.3)
PROT SERPL-MCNC: 5.1 G/DL (ref 6.4–8.3)
SODIUM SERPL-SCNC: 135 MMOL/L (ref 136–145)
UNIT ABO/RH: NORMAL
UNIT NUMBER: NORMAL
UNIT STATUS: NORMAL
UNIT TYPE ISBT: 6200

## 2023-03-13 PROCEDURE — 82310 ASSAY OF CALCIUM: CPT | Performed by: PHYSICIAN ASSISTANT

## 2023-03-13 PROCEDURE — 97535 SELF CARE MNGMENT TRAINING: CPT | Mod: GO | Performed by: OCCUPATIONAL THERAPIST

## 2023-03-13 PROCEDURE — 250N000013 HC RX MED GY IP 250 OP 250 PS 637: Performed by: STUDENT IN AN ORGANIZED HEALTH CARE EDUCATION/TRAINING PROGRAM

## 2023-03-13 PROCEDURE — 83550 IRON BINDING TEST: CPT | Performed by: HOSPITALIST

## 2023-03-13 PROCEDURE — 85018 HEMOGLOBIN: CPT | Performed by: PHYSICIAN ASSISTANT

## 2023-03-13 PROCEDURE — P9016 RBC LEUKOCYTES REDUCED: HCPCS | Performed by: HOSPITALIST

## 2023-03-13 PROCEDURE — 258N000003 HC RX IP 258 OP 636: Performed by: PHYSICIAN ASSISTANT

## 2023-03-13 PROCEDURE — 85018 HEMOGLOBIN: CPT | Performed by: HOSPITALIST

## 2023-03-13 PROCEDURE — 80053 COMPREHEN METABOLIC PANEL: CPT | Performed by: PHYSICIAN ASSISTANT

## 2023-03-13 PROCEDURE — 82248 BILIRUBIN DIRECT: CPT | Performed by: HOSPITALIST

## 2023-03-13 PROCEDURE — 97530 THERAPEUTIC ACTIVITIES: CPT | Mod: GP | Performed by: PHYSICAL THERAPY ASSISTANT

## 2023-03-13 PROCEDURE — 36415 COLL VENOUS BLD VENIPUNCTURE: CPT | Performed by: PHYSICIAN ASSISTANT

## 2023-03-13 PROCEDURE — 99233 SBSQ HOSP IP/OBS HIGH 50: CPT | Performed by: HOSPITALIST

## 2023-03-13 PROCEDURE — 120N000001 HC R&B MED SURG/OB

## 2023-03-13 PROCEDURE — 97110 THERAPEUTIC EXERCISES: CPT | Mod: GP | Performed by: PHYSICAL THERAPY ASSISTANT

## 2023-03-13 PROCEDURE — 250N000011 HC RX IP 250 OP 636: Performed by: PHYSICIAN ASSISTANT

## 2023-03-13 PROCEDURE — 36415 COLL VENOUS BLD VENIPUNCTURE: CPT | Performed by: HOSPITALIST

## 2023-03-13 PROCEDURE — 83036 HEMOGLOBIN GLYCOSYLATED A1C: CPT | Performed by: HOSPITALIST

## 2023-03-13 PROCEDURE — 97165 OT EVAL LOW COMPLEX 30 MIN: CPT | Mod: GO | Performed by: OCCUPATIONAL THERAPIST

## 2023-03-13 RX ORDER — OXYCODONE HYDROCHLORIDE 5 MG/1
2.5-5 TABLET ORAL EVERY 4 HOURS PRN
Qty: 15 TABLET | Refills: 0 | Status: SHIPPED | OUTPATIENT
Start: 2023-03-13 | End: 2023-08-21

## 2023-03-13 RX ORDER — ENOXAPARIN SODIUM 100 MG/ML
40 INJECTION SUBCUTANEOUS EVERY 24 HOURS
Status: COMPLETED | OUTPATIENT
Start: 2023-03-13 | End: 2023-03-13

## 2023-03-13 RX ORDER — ACETAMINOPHEN 325 MG/1
650 TABLET ORAL EVERY 4 HOURS PRN
Qty: 100 TABLET | Refills: 0 | Status: SHIPPED | OUTPATIENT
Start: 2023-03-13

## 2023-03-13 RX ORDER — AMOXICILLIN 250 MG
1-2 CAPSULE ORAL 2 TIMES DAILY
Qty: 30 TABLET | Refills: 0 | Status: SHIPPED | OUTPATIENT
Start: 2023-03-13

## 2023-03-13 RX ADMIN — OXYCODONE HYDROCHLORIDE 5 MG: 5 TABLET ORAL at 17:52

## 2023-03-13 RX ADMIN — ACETAMINOPHEN 975 MG: 325 TABLET ORAL at 17:52

## 2023-03-13 RX ADMIN — SODIUM CHLORIDE: 9 INJECTION, SOLUTION INTRAVENOUS at 01:04

## 2023-03-13 RX ADMIN — OXYCODONE HYDROCHLORIDE 5 MG: 5 TABLET ORAL at 22:17

## 2023-03-13 RX ADMIN — ENOXAPARIN SODIUM 40 MG: 40 INJECTION SUBCUTANEOUS at 10:03

## 2023-03-13 RX ADMIN — OXYCODONE HYDROCHLORIDE 2.5 MG: 5 TABLET ORAL at 08:15

## 2023-03-13 RX ADMIN — Medication 20 MG: at 08:20

## 2023-03-13 RX ADMIN — ACETAMINOPHEN 975 MG: 325 TABLET ORAL at 02:55

## 2023-03-13 RX ADMIN — Medication 50 MCG: at 08:16

## 2023-03-13 RX ADMIN — ACETAMINOPHEN 975 MG: 325 TABLET ORAL at 10:03

## 2023-03-13 RX ADMIN — LEVOTHYROXINE SODIUM 100 MCG: 100 TABLET ORAL at 08:16

## 2023-03-13 RX ADMIN — OXYCODONE HYDROCHLORIDE 5 MG: 5 TABLET ORAL at 12:45

## 2023-03-13 RX ADMIN — SENNOSIDES AND DOCUSATE SODIUM 1 TABLET: 50; 8.6 TABLET ORAL at 08:16

## 2023-03-13 RX ADMIN — DILTIAZEM HYDROCHLORIDE 120 MG: 120 CAPSULE, COATED, EXTENDED RELEASE ORAL at 20:28

## 2023-03-13 RX ADMIN — OXYCODONE HYDROCHLORIDE 5 MG: 5 TABLET ORAL at 02:55

## 2023-03-13 RX ADMIN — ATORVASTATIN CALCIUM 40 MG: 40 TABLET, FILM COATED ORAL at 20:28

## 2023-03-13 RX ADMIN — SENNOSIDES AND DOCUSATE SODIUM 1 TABLET: 50; 8.6 TABLET ORAL at 20:28

## 2023-03-13 ASSESSMENT — ACTIVITIES OF DAILY LIVING (ADL)
ADLS_ACUITY_SCORE: 37
PREVIOUS_RESPONSIBILITIES: MEAL PREP;HOUSEKEEPING;LAUNDRY;SHOPPING;MEDICATION MANAGEMENT;FINANCES;DRIVING
ADLS_ACUITY_SCORE: 37
ADLS_ACUITY_SCORE: 36
ADLS_ACUITY_SCORE: 37

## 2023-03-13 NOTE — PROGRESS NOTES
[facility]  Hospitalist Progress Note   03/13/2023          Assessment and Plan:       Tiffany Stovall is a 72 year old female with HTN, HLP, Paroxysmal atrial fib, Chronic anticoagulation therapy, Hypothyroidism, GERD, Mild persistent asthma with bronchial spasm, History of T12 compression fracture, History of BCC/SCC admitted on 3/11/2023 following a mechanical fall that resulted in left comminuted intertrochanteric femur fracture.       Patient presented to the ED due to left hip pain after a fall.  Earlier on the day of admission she had been walking in a parking lot parking lot when she slipped on ice and fell onto her left side. After falling she was unable to straighten her left leg and also developed dizziness and visual changes after the fall.  She denied striking her head or LOC.    Work-up showed sodium of 135, glucose of 155 and creatinine of 0.68 with GFR of >90.  CBC with diff was unremarkable.  INR elevated at 1.50.    EKG with normal sinus rhythm.    Left hip and pelvis Xray revealed comminuted intertrochanteric fracture of the left hip with significant displacement and foreshortening (no dislocation, right hip without fracture and pelvis negative for fracture).  Ortho was consulted in the ED.  Patient received TXA in the ED.       Status post mechanical fall  Left intertrochanteric femur fracture  S/p left hip cephalomedullary nailing POD #2.    - Ortho consult appreciated:               --Surgery was performed under general anesthesia with an EBL documented at 100 ml.                 --Weightbearing as tolerated with walker for 6 weeks.                 --DVT prophylaxis: Resume PTA Xarelto on POD #3 and start subcutaneous Lovenox 40 mg POD #1 per orthopedic team.  Rehab team following.  Plan for discharge home with home care versus TCU.  Vitamin D started and follow-up with PCP regarding osteoporosis work-up.    Pain control with as needed oxycodone and hydromorphone.  Will  have Tylenol scheduled  Scheduled senna while on opiates.  Encourage use of IS.    Encourage ambulation.     Acute blood loss anemia  Suspect multifactorial in the setting of femur fracture, surgical blood loss and dilutional effect from IV fluids.  HGB pre-op of 12.7-12.4 and on POD #1 at 7.4.    Received 1 unit blood transfusion on 3/12, again this morning hemoglobin at 7.4.  Will transfuse another unit of PRBC today and recheck hemoglobin later this evening.  Monitor Hemoglobin again in a.m. or earlier if symptomatic.  Goal to keep hemoglobin greater than 8.  Conditional order to transfuse platelets.    Telemetry monitoring ordered given dizziness.  Follow iron studies.     Hyponatremia  Suspect this is secondary to dehydration.  Noted to be 135 on day of admission but low the previous day at 131 and could be secondary to PTA hydrochlorothiazide.    Discontinue IV fluids.  Hold PTA hydrochlorothiazide.  Monitor in AM.  Follow liver enzymes.     Hyperglycemia  Patient brought up concern regarding recent glucose levels.  Last reading was 157.  Discussed causes such as stress from fracture, stress from surgery as well as effects of steroid.  Patient received 4 mg of IV decadron intra-op.    - Monitor PRN.    Hemoglobin A1c 5.3 [anemia]    Monitor blood sugars periodically.     Paroxysmal atrial fibrillation  - Monitor on telemetry given intermittent dizziness..    - Resumed on PTA diltiazem 120 mg every evening.    - Hold PTA Xarelto with plans to resume POD #3 if cleared by orthopedic team.       Mild persistent asthma  - Continued on PTA inhalers with therapeutic substitution based on hospital formulary.  - Monitor O2 saturations.       Hypotension in setting on known hypertension  - Hold PTA hydrochlorothiazide 25 mg/d and losartan 50 mg/d  given soft blood pressures.  Continue PTA Cardizem.       Hypothyroidism  - Continue on PTA levothyroxine 100 mcg/d.       GERD  *Patient indicated the only medication that  works for management of her GERD is Nexium.    - Stop previously ordered Protonix.    Continue on PTA Nexium 20 mg every morning; patient can use home medication as spouse brought it in for her.    - PRN TUMS available.       HLP  Continue PTA atorvastatin 40 mg/d.       Orders Placed This Encounter      Advance Diet as Tolerated: Regular Diet Adult      DVT Prophylaxis: SCDs, ambulate.  On subcutaneous Lovenox  Code Status: Full Code  Disposition: Expected discharge in 2 days pending clinical improvement.    Discussed with patient, bedside RN  >50 minutes spent by me on the date of service doing chart review, history, exam, documentation & further activities per the note.      Say Huggins MD        Interval History:      Lying in bed.  Complains of dizziness.  Noted systolic blood pressures in 100.  No active bleeding.  Complaining of pain in her legs with some relief of pain from oxycodone.  Complaining of swelling in her leg at surgical site.  No loss of consciousness.  No headache.  Complaining of generalized weakness  No chest pain or palpitations.  No new shortness of breath.         Physical Exam:        Physical Exam   Temp:  [97.7  F (36.5  C)-99  F (37.2  C)] 98.5  F (36.9  C)  Pulse:  [73-85] 75  Resp:  [16] 16  BP: (105-121)/(49-60) 108/53  SpO2:  [94 %-97 %] 97 %    Intake/Output Summary (Last 24 hours) at 3/13/2023 1510  Last data filed at 3/13/2023 0815  Gross per 24 hour   Intake 450 ml   Output 0 ml   Net 450 ml       Admission Weight: 68 kg (150 lb)  Current Weight: 68 kg (150 lb)    PHYSICAL EXAM  GENERAL: Patient is in no distress. Alert and oriented.  HEART: Regular rate and rhythm. S1S2. No murmurs  LUNGS: Clear to auscultation bilaterally. No expiratory wheeze.  Respirations unlabored  ABDOMEN: Soft, no abdominal tenderness, bowel sounds heard   NEURO: Moving all extremities.  EXTREMITIES: Left lower extremity swelling noted.  Surgical site dressing intact.  SKIN: Warm, dry.   PSYCHIATRY  Cooperative       Medications:          acetaminophen  975 mg Oral Q8H     atorvastatin  40 mg Oral Daily     diltiazem ER COATED BEADS  120 mg Oral QPM     esomeprazole  20 mg Oral QAM     fluticasone-vilanterol  1 puff Inhalation Daily     levothyroxine  100 mcg Oral Daily     [Held by provider] potassium chloride ER  20 mEq Oral Daily     senna-docusate  1 tablet Oral BID    Or     senna-docusate  2 tablet Oral BID     sodium chloride (PF)  3 mL Intracatheter Q8H     vitamin D3  50 mcg Oral Daily     albuterol, albuterol, bisacodyl **OR** bisacodyl, calcium carbonate, HYDROmorphone, HYDROmorphone, lidocaine 4%, lidocaine (buffered or not buffered), melatonin, naloxone **OR** naloxone **OR** naloxone **OR** naloxone, ondansetron **OR** ondansetron, oxyCODONE, oxyCODONE IR, polyethylene glycol, prochlorperazine **OR** prochlorperazine **OR** prochlorperazine, sodium chloride (PF)         Data:      All new lab and imaging data was reviewed.

## 2023-03-13 NOTE — PROGRESS NOTES
Patient is A&O x4, Up with assist of 1 GB and walker. VSS on RA. CMS intact. PRN Oxycodone and Tylenol given for pain management. Left hip dreg CDI, Wound vac in place and running.

## 2023-03-13 NOTE — PLAN OF CARE
Goal Outcome Evaluation:    Pt POD 2 of L hip nailing. A&Ox4. VSS on RA. Up 1A GB&W. Voiding in BR. R hip dressing CDI, attached to wound vac. Regular diet. L PIV SL. 1 unit of blood transfused, hgb recheck is at 8.5. Pain controlled w/ PRN oxycodone and scheduled tylenol. Continue plan of care.

## 2023-03-13 NOTE — PROGRESS NOTES
"Orthopedic Surgery  Tiffany Stovall  3/13/2023  Admit Date:  3/11/2023  POD: 2 Days Post-Op  Procedure(s):  INTERTROCHANTERIC HIP NAILING    Patient resting comfortably in bed.    Patient complains of GERD.   Lives in a two story house (with the master upstairs) with her  and states she was able to ascend stairs one day after her total knee arthroplasty in 2019.   Tolerating oral intake.    Denies nausea or vomiting.  Denies chest pain or shortness of breath.  No events overnight.      Alert and orient to person, place, and time.  Vital Sign Ranges  /52 (BP Location: Right arm)   Pulse 74   Temp 97.7  F (36.5  C) (Oral)   Resp 16   Ht 1.651 m (5' 5\")   Wt 68 kg (150 lb)   LMP  (LMP Unknown)   SpO2 96%   BMI 24.96 kg/m      Prevena intact and functioning properly to proximal incision. Canister empty.  Tagaderm C/D/I to distal incision.   Minimal erythema of the surrounding skin.   Bilateral calves are soft, non-tender.   Bilateral lower extremity is NVI.  Sensation intact bilateral lower extremities.  5/5 motor with resisted dorsi and plantar flexion bilaterally.  +DP pulse.     Labs:  Hemoglobin   Date Value Ref Range Status   03/13/2023 7.4 (L) 11.7 - 15.7 g/dL Final   03/05/2021 11.8 11.7 - 15.7 g/dL Final       A/P  1. Plan   Continue Lovenox for DVT prophylaxis and patient may switch back to her PTA Xarelto 20 mg daily tomorrow.      Mobilize with PT/OT    WBAT w/ walker   Keep dressings C/D/I    Continue current pain regiment.   Follow up with Dr. Campos in clinic in 2 weeks.      2. Disposition   Anticipate d/c to home pending progress with physical therapy and medical clearance, likely 1-2 days.      Kjerstin Foss PA-C [facility]    "

## 2023-03-13 NOTE — PROGRESS NOTES
03/13/23 0920   Appointment Info   Signing Clinician's Name / Credentials (OT) Caryn Galaviz OTR/L   Living Environment   People in Home spouse   Current Living Arrangements house   Home Accessibility stairs to enter home;stairs within home   Number of Stairs, Main Entrance 2   Stair Railings, Main Entrance none   Number of Stairs, Within Home, Primary nine   Stair Railings, Within Home, Primary railings safe and in good condition   Transportation Anticipated family or friend will provide   Living Environment Comments 2 story home with bathroom upstairs   Self-Care   Usual Activity Tolerance excellent   Current Activity Tolerance good   Regular Exercise Yes   Activity/Exercise Type swimming   Equipment Currently Used at Home none   Fall history within last six months yes   Number of times patient has fallen within last six months 1   Activity/Exercise/Self-Care Comment Very active; goes to health club to work out including swimming. I with all mobility without AD. I with all ADL's and IADL's. Drives.   Instrumental Activities of Daily Living (IADL)   Previous Responsibilities meal prep;housekeeping;laundry;shopping;medication management;finances;driving   General Information   Onset of Illness/Injury or Date of Surgery 03/11/23   Referring Physician Vivek   Patient/Family Therapy Goal Statement (OT) I hope I can go home   Additional Occupational Profile Info/Pertinent History of Current Problem Tiffany Stovall is a 72 year old female admitted on 3/11/2023. She presents after mechanical fall resulting in left comminuted intertrochanteric hip fracture.   Performance Patterns (Routines, Roles, Habits) pt was very active prior to fall, works as a substitute nurse in schools   Existing Precautions/Restrictions fall   Left Lower Extremity (Weight-bearing Status) weight-bearing as tolerated (WBAT)   General Observations and Info pt supine in bed, agreeable to OT   Cognitive Status Examination    Orientation Status orientation to person, place and time   Visual Perception   Visual Impairment/Limitations WNL   Sensory   Sensory Quick Adds sensation intact   Pain Assessment   Patient Currently in Pain Yes, see Vital Sign flowsheet   Posture   Posture not impaired   Range of Motion Comprehensive   General Range of Motion no range of motion deficits identified   Strength Comprehensive (MMT)   General Manual Muscle Testing (MMT) Assessment no strength deficits identified   Muscle Tone Assessment   Muscle Tone Quick Adds No deficits were identified   Coordination   Upper Extremity Coordination No deficits were identified   Bed Mobility   Bed Mobility supine-sit   Comment (Bed Mobility) min A with bed mobility   Transfers   Transfers toilet transfer   Toilet Transfer   [place] Level (Toilet Transfer) minimum assist (75% patient effort)   Activities of Daily Living   BADL Assessment/Intervention lower body dressing   Lower Body Dressing Assessment/Training   [place] Level (Lower Body Dressing) maximum assist (25% patient effort)   Clinical Impression   Criteria for Skilled Therapeutic Interventions Met (OT) Yes, treatment indicated   OT Diagnosis decreased I with ADLs and functional mobility   OT Problem List-Impairments impacting ADL problems related to;activity tolerance impaired;flexibility;pain   Assessment of Occupational Performance 1-3 Performance Deficits   Identified Performance Deficits decreased dressing, BR transfers, shower transfer, home mgmt   Planned Therapy Interventions (OT) ADL retraining;strengthening;transfer training;progressive activity/exercise   Clinical Decision Making Complexity (OT) low complexity   Anticipated Equipment Needs Upon Discharge (OT) raised toilet seat   Risk & Benefits of therapy have been explained evaluation/treatment results reviewed;care plan/treatment goals reviewed;risks/benefits reviewed;current/potential barriers reviewed;participants voiced agreement  with care plan;participants included;patient   Clinical Impression Comments recommend safety frame for therapy   OT Total Evaluation Time   OT Eval, Low Complexity Minutes (14264) 10   OT Goals   Therapy Frequency (OT) Daily   OT Predicted Duration/Target Date for Goal Attainment 03/17/23   OT Goals Hygiene/Grooming;Lower Body Dressing;Toilet Transfer/Toileting   OT: Hygiene/Grooming modified independent;while standing   OT: Lower Body Dressing Modified independent;using adaptive equipment  (except socks and shoes)   OT: Toilet Transfer/Toileting Modified independent;toilet transfer;cleaning and garment management   Interventions   Interventions Quick Adds Self-Care/Home Management   Self-Care/Home Management   Self-Care/Home Mgmt/ADL, Compensatory, Meal Prep Minutes (90259) 30   Symptoms Noted During/After Treatment (Meal Preparation/Planning Training) fatigue;increased pain   Treatment Detail/Skilled Intervention seen for OT eval and initaition of treatment. pt with increased pain and some dizziness. needing min A and verbal cues for bed mobility. attempted LE dressing but pt with too much pain for bending. unable to bend her knee much due to swelling. pt is familiar with reacher and sock aid from knee surgery. she states her  will help with LE dressing. amb 30' in room with FWW and CGA. toilet transfer with min A. pt would benefit from safety frame for B UE support with transfer to increase ind. pt educated on where to purchase. returned to bed after session. all needs in reach and alarm on.   OT Discharge Planning   OT Plan LE dressing with reacher for underware and pants,  can help with socks. grooming at sink, toilet transfer   OT Discharge Recommendation (DC Rec) home with assist;home with home care occupational therapy;Transitional Care Facility   OT Rationale for DC Rec pt seen for eval and treat, has supportive  at home and potentially if she can get up 2 steps can remain on the main  level. she prefers to go home if she can, but understands she may need rehab. if d/c home would need help with IADLs (cooking, cleaning, laundry) and help with bed mobility and LE dressing, would also benefit from home OT. If unable to have this level of assist, would benefit from TCU for a short stay prior to returning home   OT Brief overview of current status min A with bed mobility, max A for LE dressing   Total Session Time   Timed Code Treatment Minutes 30   Total Session Time (sum of timed and untimed services) 40

## 2023-03-13 NOTE — PROVIDER NOTIFICATION
MD Notification    Notified Person: MD    Notified Person Name:   Joseluis    Notification Date/Time: 03/13 10:17 AM    Notification Interaction: Vocvicki    Purpose of Notification: Pt's Hgb is 7.4 and sodium is 135. Pt is still on IVF, should be discontinue fluids?    Orders Received: IVF discontinued.    Comments:

## 2023-03-14 ENCOUNTER — APPOINTMENT (OUTPATIENT)
Dept: PHYSICAL THERAPY | Facility: CLINIC | Age: 73
DRG: 481 | End: 2023-03-14
Payer: COMMERCIAL

## 2023-03-14 ENCOUNTER — APPOINTMENT (OUTPATIENT)
Dept: OCCUPATIONAL THERAPY | Facility: CLINIC | Age: 73
DRG: 481 | End: 2023-03-14
Payer: COMMERCIAL

## 2023-03-14 LAB
ALBUMIN SERPL BCG-MCNC: 3.2 G/DL (ref 3.5–5.2)
ALP SERPL-CCNC: 78 U/L (ref 35–104)
ALT SERPL W P-5'-P-CCNC: 168 U/L (ref 10–35)
ANION GAP SERPL CALCULATED.3IONS-SCNC: 8 MMOL/L (ref 7–15)
AST SERPL W P-5'-P-CCNC: 89 U/L (ref 10–35)
BILIRUB SERPL-MCNC: 0.4 MG/DL
BUN SERPL-MCNC: 7.9 MG/DL (ref 8–23)
CALCIUM SERPL-MCNC: 8.4 MG/DL (ref 8.8–10.2)
CHLORIDE SERPL-SCNC: 104 MMOL/L (ref 98–107)
CREAT SERPL-MCNC: 0.62 MG/DL (ref 0.51–0.95)
DEPRECATED HCO3 PLAS-SCNC: 24 MMOL/L (ref 22–29)
ERYTHROCYTE [DISTWIDTH] IN BLOOD BY AUTOMATED COUNT: 13 % (ref 10–15)
GFR SERPL CREATININE-BSD FRML MDRD: >90 ML/MIN/1.73M2
GLUCOSE SERPL-MCNC: 109 MG/DL (ref 70–99)
HCT VFR BLD AUTO: 25.2 % (ref 35–47)
HGB BLD-MCNC: 8.4 G/DL (ref 11.7–15.7)
MCH RBC QN AUTO: 31.1 PG (ref 26.5–33)
MCHC RBC AUTO-ENTMCNC: 33.3 G/DL (ref 31.5–36.5)
MCV RBC AUTO: 93 FL (ref 78–100)
PLATELET # BLD AUTO: 177 10E3/UL (ref 150–450)
POTASSIUM SERPL-SCNC: 3.6 MMOL/L (ref 3.4–5.3)
PROT SERPL-MCNC: 4.9 G/DL (ref 6.4–8.3)
RBC # BLD AUTO: 2.7 10E6/UL (ref 3.8–5.2)
SODIUM SERPL-SCNC: 136 MMOL/L (ref 136–145)
WBC # BLD AUTO: 7.4 10E3/UL (ref 4–11)

## 2023-03-14 PROCEDURE — 80053 COMPREHEN METABOLIC PANEL: CPT | Performed by: HOSPITALIST

## 2023-03-14 PROCEDURE — 250N000011 HC RX IP 250 OP 636: Performed by: STUDENT IN AN ORGANIZED HEALTH CARE EDUCATION/TRAINING PROGRAM

## 2023-03-14 PROCEDURE — 258N000003 HC RX IP 258 OP 636: Performed by: HOSPITALIST

## 2023-03-14 PROCEDURE — 97535 SELF CARE MNGMENT TRAINING: CPT | Mod: GO | Performed by: OCCUPATIONAL THERAPIST

## 2023-03-14 PROCEDURE — 250N000011 HC RX IP 250 OP 636: Performed by: HOSPITALIST

## 2023-03-14 PROCEDURE — 250N000013 HC RX MED GY IP 250 OP 250 PS 637: Performed by: STUDENT IN AN ORGANIZED HEALTH CARE EDUCATION/TRAINING PROGRAM

## 2023-03-14 PROCEDURE — 120N000001 HC R&B MED SURG/OB

## 2023-03-14 PROCEDURE — 97116 GAIT TRAINING THERAPY: CPT | Mod: GP | Performed by: PHYSICAL THERAPY ASSISTANT

## 2023-03-14 PROCEDURE — 36415 COLL VENOUS BLD VENIPUNCTURE: CPT | Performed by: HOSPITALIST

## 2023-03-14 PROCEDURE — 97530 THERAPEUTIC ACTIVITIES: CPT | Mod: GP | Performed by: PHYSICAL THERAPY ASSISTANT

## 2023-03-14 PROCEDURE — 250N000013 HC RX MED GY IP 250 OP 250 PS 637: Performed by: PHYSICIAN ASSISTANT

## 2023-03-14 PROCEDURE — 85014 HEMATOCRIT: CPT | Performed by: HOSPITALIST

## 2023-03-14 PROCEDURE — 99232 SBSQ HOSP IP/OBS MODERATE 35: CPT | Performed by: HOSPITALIST

## 2023-03-14 RX ORDER — FERROUS GLUCONATE 324(38)MG
324 TABLET ORAL
Status: DISCONTINUED | OUTPATIENT
Start: 2023-03-15 | End: 2023-03-17 | Stop reason: HOSPADM

## 2023-03-14 RX ADMIN — DILTIAZEM HYDROCHLORIDE 120 MG: 120 CAPSULE, COATED, EXTENDED RELEASE ORAL at 20:54

## 2023-03-14 RX ADMIN — POLYETHYLENE GLYCOL 3350 17 G: 17 POWDER, FOR SOLUTION ORAL at 08:22

## 2023-03-14 RX ADMIN — OXYCODONE HYDROCHLORIDE 5 MG: 5 TABLET ORAL at 18:06

## 2023-03-14 RX ADMIN — SENNOSIDES AND DOCUSATE SODIUM 2 TABLET: 50; 8.6 TABLET ORAL at 08:40

## 2023-03-14 RX ADMIN — Medication 20 MG: at 08:11

## 2023-03-14 RX ADMIN — RIVAROXABAN 20 MG: 10 TABLET, FILM COATED ORAL at 13:36

## 2023-03-14 RX ADMIN — OXYCODONE HYDROCHLORIDE 5 MG: 5 TABLET ORAL at 22:31

## 2023-03-14 RX ADMIN — OXYCODONE HYDROCHLORIDE 5 MG: 5 TABLET ORAL at 12:39

## 2023-03-14 RX ADMIN — Medication 50 MCG: at 08:40

## 2023-03-14 RX ADMIN — IRON SUCROSE 300 MG: 20 INJECTION, SOLUTION INTRAVENOUS at 17:59

## 2023-03-14 RX ADMIN — LEVOTHYROXINE SODIUM 100 MCG: 100 TABLET ORAL at 08:40

## 2023-03-14 RX ADMIN — FLUTICASONE FUROATE AND VILANTEROL TRIFENATATE 1 PUFF: 100; 25 POWDER RESPIRATORY (INHALATION) at 08:42

## 2023-03-14 RX ADMIN — BISACODYL 5 MG: 5 TABLET, COATED ORAL at 16:46

## 2023-03-14 RX ADMIN — ONDANSETRON 4 MG: 4 TABLET, ORALLY DISINTEGRATING ORAL at 08:11

## 2023-03-14 RX ADMIN — SENNOSIDES AND DOCUSATE SODIUM 1 TABLET: 50; 8.6 TABLET ORAL at 20:51

## 2023-03-14 RX ADMIN — ACETAMINOPHEN 975 MG: 325 TABLET ORAL at 17:58

## 2023-03-14 RX ADMIN — ACETAMINOPHEN 975 MG: 325 TABLET ORAL at 10:25

## 2023-03-14 RX ADMIN — ACETAMINOPHEN 975 MG: 325 TABLET ORAL at 03:03

## 2023-03-14 RX ADMIN — OXYCODONE HYDROCHLORIDE 5 MG: 5 TABLET ORAL at 08:10

## 2023-03-14 RX ADMIN — OXYCODONE HYDROCHLORIDE 5 MG: 5 TABLET ORAL at 03:11

## 2023-03-14 ASSESSMENT — ACTIVITIES OF DAILY LIVING (ADL)
ADLS_ACUITY_SCORE: 36
ADLS_ACUITY_SCORE: 36
HEARING_DIFFICULTY_OR_DEAF: NO
ADLS_ACUITY_SCORE: 36
ADLS_ACUITY_SCORE: 36
ADLS_ACUITY_SCORE: 32
DIFFICULTY_EATING/SWALLOWING: NO
ADLS_ACUITY_SCORE: 36
DIFFICULTY_COMMUNICATING: NO

## 2023-03-14 NOTE — PLAN OF CARE
Goal Outcome Evaluation:    .Date/Time 3/14/23 [id number]    Trauma/Ortho/Medical (Choose one) Ortho    Diagnosis:Left Hip Nailing   POD#:3  Mental Status:A&OX4  Activity/dangle: A1& walker  Diet:Regular  Pain:TYienol & oxy  [place]/Voiding:BR  Tele/Restraints/Iso: NSR  02/LDA:RA/ COLE  D/C Date:Home pending   Other Info:Hgb recheck AM, Wound vac in place no out put.

## 2023-03-14 NOTE — PROGRESS NOTES
[facility]  Hospitalist Progress Note   03/14/2023          Assessment and Plan:       Tiffany Stovall is a 72 year old female with HTN, HLP, Paroxysmal atrial fib, Chronic anticoagulation therapy, Hypothyroidism, GERD, Mild persistent asthma with bronchial spasm, History of T12 compression fracture, History of BCC/SCC admitted on 3/11/2023 following a mechanical fall that resulted in left comminuted intertrochanteric femur fracture.       Patient presented to the ED due to left hip pain after a fall.  Earlier on the day of admission she had been walking in a parking lot parking lot when she slipped on ice and fell onto her left side. After falling she was unable to straighten her left leg and also developed dizziness and visual changes after the fall.  She denied striking her head or LOC.    Sodium of 135, glucose of 155 and creatinine of 0.68 with GFR of >90.    CBC with diff was unremarkable.  INR elevated at 1.50.    EKG with normal sinus rhythm.    Left hip and pelvis Xray revealed comminuted intertrochanteric fracture of the left hip with significant displacement and foreshortening (no dislocation, right hip without fracture and pelvis negative for fracture). Ortho was consulted in the ED.  Patient received TXA in the ED.       Status post mechanical fall  Left intertrochanteric femur fracture  S/p left hip cephalomedullary nailing POD #3  Documented  mL.  Defer postoperative care including pharmacological DVT prophylaxis, rehabilitation, pain control to orthopedic clinic.   --Weightbearing as tolerated with walker for 6 weeks.     -DVT prophylaxis: On subcutaneous Lovenox 3/12, 3/13.  Plan to resume PTA Xarelto on 3/14  Rehab team following.  Plan for discharge home with home care versus TCU.  Vitamin D started and follow-up with PCP regarding osteoporosis work-up.    Pain control with as needed oxycodone and hydromorphone.  Will have Tylenol scheduled  Scheduled senna while on  opiates.  Encourage use of IS.    Encourage ambulation.     Acute blood loss anemia  Status post 2 PRBC transfusion.  Suspect multifactorial in the setting of femur fracture, surgical blood loss and dilutional effect from IV fluids.  -- Postprocedure complaint of dizziness.  Telemetry no acute events.    HGB pre-op of 12.7-12.4 postoperative hemoglobin dropped to 7.0.  Received 1 unit PRBC transfusion on 3/12, 3/13.  This morning hemoglobin 8.4.  Iron saturation index 10  Monitor Hemoglobin in a.m. or earlier if symptomatic.  Goal to keep hemoglobin greater than 8.   IV Venofer x1 dose.  Oral iron supplements on discharge    Hyponatremia improved.  Suspect this is secondary to dehydration.  Noted to be 135 on day of admission but low the previous day at 131 and could be secondary to PTA hydrochlorothiazide.    Discontinue IV fluids.  Hold PTA hydrochlorothiazide, will likely restart 3/15.    Transaminitis postoperative -likely due to hypotension.  Postprocedure , .  Liver enzymes trending down.  Supportive care.    Hypotension in setting on known hypertension  Noted postoperative hypotension, drop in hemoglobin.  Hold PTA hydrochlorothiazide 25 mg/d and losartan 50 mg/d  given soft blood pressures.  Continue PTA Cardizem.       Hyperglycemia likely stress response, steroid use.  Blood glucose 157. Received 4 mg of IV decadron intra-op.    - Monitor PRN.    Hemoglobin A1c 5.3 [anemia]    Monitor blood sugars periodically.     Paroxysmal atrial fibrillation  Telemetry no acute events.   Continue PTA diltiazem 120 mg every evening.    Resumed PTA Xarelto 3/14, ok by Ortho team.     Mild persistent asthma  Continued on PTA inhalers with therapeutic substitution based on hospital formulary.  Monitor O2 saturations.       Hypothyroidism  Continue on PTA levothyroxine 100 mcg/d.       GERD  Patient indicated the only medication that works for management of her GERD is Nexium.    Stop previously ordered  Protonix.    Continue on PTA Nexium 20 mg every morning; patient can use home medication as spouse brought it in for her.    PRN TUMS available.       HLP  Hold Lipitor in the setting of transaminitis     Orders Placed This Encounter      Advance Diet as Tolerated: Regular Diet Adult      DVT Prophylaxis: SCDs, ambulate.  On anticoagulation.  Code Status: Full Code  Disposition: Expected discharge 3/15.     Discussed with patient, bedside RN, care team including .  Patient's  by the bedside updated.  >35 minutes spent by me on the date of service doing chart review, history, exam, documentation & further activities per the note.      Say Huggins MD        Interval History:      Lying in bed.  Admits to some improvement in dizziness telemetry no acute events.  Blood pressure improving..   No active bleeding.  Complaining of pain in her legs with some relief of pain from oxycodone  No headache.  Complaining of generalized weakness  No chest pain or palpitations.  No new shortness of breath.  Patient's  at the bedside.  Patient and  expressing concerns with plan for transition home         Physical Exam:        Physical Exam   Temp:  [97.8  F (36.6  C)-99.5  F (37.5  C)] 97.8  F (36.6  C)  Pulse:  [72-83] 82  Resp:  [16-18] 18  BP: (108-145)/(49-75) 145/75  SpO2:  [95 %-98 %] 98 %    Intake/Output Summary (Last 24 hours) at 3/13/2023 1510  Last data filed at 3/13/2023 0815  Gross per 24 hour   Intake 450 ml   Output 0 ml   Net 450 ml       Admission Weight: 68 kg (150 lb)  Current Weight: 68 kg (150 lb)    PHYSICAL EXAM  GENERAL: Patient is in no distress. Alert and oriented.  HEART: Regular rate and rhythm. S1S2. No murmurs  LUNGS: Clear to auscultation bilaterally. No expiratory wheeze.  Respirations unlabored  ABDOMEN: Soft, no abdominal tenderness, bowel sounds heard   NEURO: Moving all extremities.  EXTREMITIES: Left lower extremity swelling noted.  Surgical site dressing  intact.  SKIN: Warm, dry.   PSYCHIATRY Cooperative       Medications:          acetaminophen  975 mg Oral Q8H     atorvastatin  40 mg Oral Daily     diltiazem ER COATED BEADS  120 mg Oral QPM     esomeprazole  20 mg Oral QAM     fluticasone-vilanterol  1 puff Inhalation Daily     levothyroxine  100 mcg Oral Daily     [Held by provider] potassium chloride ER  20 mEq Oral Daily     senna-docusate  1 tablet Oral BID    Or     senna-docusate  2 tablet Oral BID     sodium chloride (PF)  3 mL Intracatheter Q8H     vitamin D3  50 mcg Oral Daily     albuterol, albuterol, bisacodyl **OR** bisacodyl, calcium carbonate, HYDROmorphone, HYDROmorphone, lidocaine 4%, lidocaine (buffered or not buffered), melatonin, naloxone **OR** naloxone **OR** naloxone **OR** naloxone, ondansetron **OR** ondansetron, oxyCODONE, oxyCODONE IR, polyethylene glycol, prochlorperazine **OR** prochlorperazine **OR** prochlorperazine, sodium chloride (PF)         Data:      All new lab and imaging data was reviewed.

## 2023-03-14 NOTE — PROGRESS NOTES
Pt POD 3 of L hip nailing. A&Ox4. VSS on RA. Tolerating regular diet. L hip dressing CDI, attached to wound vac. Pain controlled w/ PRN oxycodone and scheduled tylenol. PO Zofran given for nausea with relief. Pt c/o bloating, scheduled and PRN laxatives given, however passing flatus. Denies discomfort. Up 1A GB&W, voiding adequately in BR. L PIV SL. Discharge pending, home vs TCU, pending. Continue plan of care.

## 2023-03-14 NOTE — PLAN OF CARE
Occupational Therapy Discharge Summary    Reason for therapy discharge:    All goals and outcomes met, no further needs identified.    Progress towards therapy goal(s). See goals on Care Plan in [facility] electronic health record for goal details.  Goals met    Therapy recommendation(s):    No further therapy is recommended. Pt has met all inpatient OT goals. Has supportive  to assist with ADL/IADLs at discharge. Defer further mobility to nursing and PT

## 2023-03-14 NOTE — PROGRESS NOTES
"Orthopedic Surgery  Tiffany Stovall  3/13/2023  Admit Date:  3/11/2023  POD: 3 Days Post-Op  Procedure(s):  INTERTROCHANTERIC HIP NAILING    Patient resting comfortably in chair.    Lives in a two story house (with the master upstairs) with her  and states she was able to ascend stairs one day after her total knee arthroplasty in 2019. Long discussion regarding going home if able.   Tolerating oral intake.    Denies nausea or vomiting.  Denies chest pain or shortness of breath.  No events overnight.      Alert and orient to person, place, and time.  Vital Sign Ranges  BP (!) 145/75 (BP Location: Right arm)   Pulse 82   Temp 97.8  F (36.6  C) (Oral)   Resp 18   Ht 1.651 m (5' 5\")   Wt 68 kg (150 lb)   LMP  (LMP Unknown)   SpO2 98%   BMI 24.96 kg/m      Prevena intact and functioning properly to proximal incision. Canister with scant sliding scale drainage.  Tagaderm C/D/I to distal incision.   Minimal erythema of the surrounding skin.   Bilateral calves are soft, non-tender.   Bilateral lower extremity is NVI.  Sensation intact bilateral lower extremities.  5/5 motor with resisted dorsi and plantar flexion bilaterally.  +DP pulse.     Labs:  Hemoglobin   Date Value Ref Range Status   03/14/2023 8.4 (L) 11.7 - 15.7 g/dL Final   03/05/2021 11.8 11.7 - 15.7 g/dL Final       A/P  1. Plan   Continue Xarelto 20 mg daily as per PTA.      Mobilize with PT/OT    WBAT w/ walker   Keep dressings C/D/I    Continue current pain regiment.   Follow up with Dr. Campos in clinic in 2 weeks.      2. Disposition   Anticipate d/c to home pending progress with physical therapy and medical clearance, likely today if able with       Kjerstin Foss PA-C [facility]    "

## 2023-03-15 LAB
ALBUMIN SERPL BCG-MCNC: 3.4 G/DL (ref 3.5–5.2)
ALP SERPL-CCNC: 92 U/L (ref 35–104)
ALT SERPL W P-5'-P-CCNC: 128 U/L (ref 10–35)
ANION GAP SERPL CALCULATED.3IONS-SCNC: 7 MMOL/L (ref 7–15)
AST SERPL W P-5'-P-CCNC: 64 U/L (ref 10–35)
BILIRUB SERPL-MCNC: 0.6 MG/DL
BUN SERPL-MCNC: 6.2 MG/DL (ref 8–23)
CALCIUM SERPL-MCNC: 8.9 MG/DL (ref 8.8–10.2)
CHLORIDE SERPL-SCNC: 99 MMOL/L (ref 98–107)
CREAT SERPL-MCNC: 0.58 MG/DL (ref 0.51–0.95)
DEPRECATED HCO3 PLAS-SCNC: 28 MMOL/L (ref 22–29)
GFR SERPL CREATININE-BSD FRML MDRD: >90 ML/MIN/1.73M2
GLUCOSE SERPL-MCNC: 102 MG/DL (ref 70–99)
HGB BLD-MCNC: 9.1 G/DL (ref 11.7–15.7)
MAGNESIUM SERPL-MCNC: 1.6 MG/DL (ref 1.7–2.3)
POTASSIUM SERPL-SCNC: 3.4 MMOL/L (ref 3.4–5.3)
POTASSIUM SERPL-SCNC: 3.8 MMOL/L (ref 3.4–5.3)
PROT SERPL-MCNC: 5.4 G/DL (ref 6.4–8.3)
SODIUM SERPL-SCNC: 134 MMOL/L (ref 136–145)

## 2023-03-15 PROCEDURE — 250N000013 HC RX MED GY IP 250 OP 250 PS 637: Performed by: STUDENT IN AN ORGANIZED HEALTH CARE EDUCATION/TRAINING PROGRAM

## 2023-03-15 PROCEDURE — 83735 ASSAY OF MAGNESIUM: CPT | Performed by: STUDENT IN AN ORGANIZED HEALTH CARE EDUCATION/TRAINING PROGRAM

## 2023-03-15 PROCEDURE — 93005 ELECTROCARDIOGRAM TRACING: CPT

## 2023-03-15 PROCEDURE — 80053 COMPREHEN METABOLIC PANEL: CPT | Performed by: HOSPITALIST

## 2023-03-15 PROCEDURE — 36415 COLL VENOUS BLD VENIPUNCTURE: CPT | Performed by: HOSPITALIST

## 2023-03-15 PROCEDURE — 93010 ELECTROCARDIOGRAM REPORT: CPT | Performed by: INTERNAL MEDICINE

## 2023-03-15 PROCEDURE — 36415 COLL VENOUS BLD VENIPUNCTURE: CPT | Performed by: STUDENT IN AN ORGANIZED HEALTH CARE EDUCATION/TRAINING PROGRAM

## 2023-03-15 PROCEDURE — 250N000011 HC RX IP 250 OP 636: Performed by: HOSPITALIST

## 2023-03-15 PROCEDURE — 250N000009 HC RX 250: Performed by: HOSPITALIST

## 2023-03-15 PROCEDURE — 210N000002 HC R&B HEART CARE

## 2023-03-15 PROCEDURE — 85018 HEMOGLOBIN: CPT | Performed by: HOSPITALIST

## 2023-03-15 PROCEDURE — 250N000013 HC RX MED GY IP 250 OP 250 PS 637: Performed by: PHYSICIAN ASSISTANT

## 2023-03-15 PROCEDURE — 99233 SBSQ HOSP IP/OBS HIGH 50: CPT | Performed by: HOSPITALIST

## 2023-03-15 PROCEDURE — 84132 ASSAY OF SERUM POTASSIUM: CPT | Performed by: STUDENT IN AN ORGANIZED HEALTH CARE EDUCATION/TRAINING PROGRAM

## 2023-03-15 PROCEDURE — 250N000013 HC RX MED GY IP 250 OP 250 PS 637: Performed by: HOSPITALIST

## 2023-03-15 PROCEDURE — 258N000003 HC RX IP 258 OP 636: Performed by: HOSPITALIST

## 2023-03-15 RX ORDER — DILTIAZEM HYDROCHLORIDE 5 MG/ML
0.35 INJECTION INTRAVENOUS ONCE
Status: COMPLETED | OUTPATIENT
Start: 2023-03-15 | End: 2023-03-15

## 2023-03-15 RX ORDER — SUCRALFATE ORAL 1 G/10ML
1 SUSPENSION ORAL 4 TIMES DAILY PRN
Status: DISCONTINUED | OUTPATIENT
Start: 2023-03-15 | End: 2023-03-17 | Stop reason: HOSPADM

## 2023-03-15 RX ORDER — SUCRALFATE ORAL 1 G/10ML
1 SUSPENSION ORAL
Status: DISCONTINUED | OUTPATIENT
Start: 2023-03-15 | End: 2023-03-15

## 2023-03-15 RX ORDER — POTASSIUM CHLORIDE 1500 MG/1
40 TABLET, EXTENDED RELEASE ORAL ONCE
Status: COMPLETED | OUTPATIENT
Start: 2023-03-15 | End: 2023-03-15

## 2023-03-15 RX ORDER — DILTIAZEM HYDROCHLORIDE 5 MG/ML
0.25 INJECTION INTRAVENOUS ONCE
Status: COMPLETED | OUTPATIENT
Start: 2023-03-15 | End: 2023-03-15

## 2023-03-15 RX ORDER — DILTIAZEM HYDROCHLORIDE 30 MG/1
30 TABLET, FILM COATED ORAL ONCE
Status: COMPLETED | OUTPATIENT
Start: 2023-03-15 | End: 2023-03-15

## 2023-03-15 RX ORDER — MAGNESIUM OXIDE 400 MG/1
400 TABLET ORAL EVERY 4 HOURS
Status: COMPLETED | OUTPATIENT
Start: 2023-03-15 | End: 2023-03-15

## 2023-03-15 RX ORDER — POTASSIUM CHLORIDE 1500 MG/1
20 TABLET, EXTENDED RELEASE ORAL ONCE
Status: COMPLETED | OUTPATIENT
Start: 2023-03-15 | End: 2023-03-15

## 2023-03-15 RX ORDER — METOPROLOL TARTRATE 1 MG/ML
2.5 INJECTION, SOLUTION INTRAVENOUS EVERY 4 HOURS PRN
Status: DISCONTINUED | OUTPATIENT
Start: 2023-03-15 | End: 2023-03-17 | Stop reason: HOSPADM

## 2023-03-15 RX ADMIN — OXYCODONE HYDROCHLORIDE 5 MG: 5 TABLET ORAL at 16:02

## 2023-03-15 RX ADMIN — DILTIAZEM HYDROCHLORIDE 30 MG: 30 TABLET, FILM COATED ORAL at 12:28

## 2023-03-15 RX ADMIN — SENNOSIDES AND DOCUSATE SODIUM 2 TABLET: 50; 8.6 TABLET ORAL at 20:08

## 2023-03-15 RX ADMIN — Medication 400 MG: at 20:08

## 2023-03-15 RX ADMIN — OXYCODONE HYDROCHLORIDE 5 MG: 5 TABLET ORAL at 23:41

## 2023-03-15 RX ADMIN — Medication 20 MG: at 18:02

## 2023-03-15 RX ADMIN — RIVAROXABAN 20 MG: 10 TABLET, FILM COATED ORAL at 17:02

## 2023-03-15 RX ADMIN — DILTIAZEM HYDROCHLORIDE 120 MG: 120 CAPSULE, COATED, EXTENDED RELEASE ORAL at 20:08

## 2023-03-15 RX ADMIN — POLYETHYLENE GLYCOL 3350 17 G: 17 POWDER, FOR SOLUTION ORAL at 09:15

## 2023-03-15 RX ADMIN — CALCIUM CARBONATE (ANTACID) CHEW TAB 500 MG 750 MG: 500 CHEW TAB at 17:00

## 2023-03-15 RX ADMIN — Medication 400 MG: at 17:02

## 2023-03-15 RX ADMIN — DILTIAZEM HYDROCHLORIDE 17 MG: 5 INJECTION, SOLUTION INTRAVENOUS at 11:22

## 2023-03-15 RX ADMIN — FERROUS GLUCONATE 324 MG: 324 TABLET ORAL at 09:15

## 2023-03-15 RX ADMIN — ACETAMINOPHEN 975 MG: 325 TABLET ORAL at 01:47

## 2023-03-15 RX ADMIN — OXYCODONE HYDROCHLORIDE 5 MG: 5 TABLET ORAL at 04:48

## 2023-03-15 RX ADMIN — SENNOSIDES AND DOCUSATE SODIUM 2 TABLET: 50; 8.6 TABLET ORAL at 09:15

## 2023-03-15 RX ADMIN — OXYCODONE HYDROCHLORIDE 5 MG: 5 TABLET ORAL at 20:09

## 2023-03-15 RX ADMIN — Medication 50 MCG: at 09:15

## 2023-03-15 RX ADMIN — CALCIUM CARBONATE (ANTACID) CHEW TAB 500 MG 500 MG: 500 CHEW TAB at 23:26

## 2023-03-15 RX ADMIN — DILTIAZEM HYDROCHLORIDE 23.8 MG: 5 INJECTION, SOLUTION INTRAVENOUS at 11:41

## 2023-03-15 RX ADMIN — ACETAMINOPHEN 975 MG: 325 TABLET ORAL at 10:21

## 2023-03-15 RX ADMIN — POTASSIUM CHLORIDE 40 MEQ: 1500 TABLET, EXTENDED RELEASE ORAL at 16:13

## 2023-03-15 RX ADMIN — POTASSIUM CHLORIDE 20 MEQ: 1500 TABLET, EXTENDED RELEASE ORAL at 23:25

## 2023-03-15 RX ADMIN — LEVOTHYROXINE SODIUM 100 MCG: 100 TABLET ORAL at 09:15

## 2023-03-15 RX ADMIN — DILTIAZEM HYDROCHLORIDE 5 MG/HR: 5 INJECTION, SOLUTION INTRAVENOUS at 16:14

## 2023-03-15 RX ADMIN — Medication 20 MG: at 09:15

## 2023-03-15 RX ADMIN — OXYCODONE HYDROCHLORIDE 5 MG: 5 TABLET ORAL at 10:23

## 2023-03-15 RX ADMIN — ACETAMINOPHEN 975 MG: 325 TABLET ORAL at 23:24

## 2023-03-15 RX ADMIN — BISACODYL 10 MG: 5 TABLET, COATED ORAL at 16:03

## 2023-03-15 ASSESSMENT — ACTIVITIES OF DAILY LIVING (ADL)
ADLS_ACUITY_SCORE: 32
ADLS_ACUITY_SCORE: 33
ADLS_ACUITY_SCORE: 33
ADLS_ACUITY_SCORE: 34
ADLS_ACUITY_SCORE: 34
ADLS_ACUITY_SCORE: 32
ADLS_ACUITY_SCORE: 33
ADLS_ACUITY_SCORE: 32
ADLS_ACUITY_SCORE: 34
ADLS_ACUITY_SCORE: 34

## 2023-03-15 NOTE — PROGRESS NOTES
[facility]  Hospitalist Progress Note   03/15/2023          Assessment and Plan:       Tiffany Stovall is a 72 year old female with HTN, HLP, Paroxysmal atrial fib, Chronic anticoagulation therapy, Hypothyroidism, GERD, Mild persistent asthma with bronchial spasm, History of T12 compression fracture, History of BCC/SCC admitted on 3/11/2023 following a mechanical fall that resulted in left comminuted intertrochanteric femur fracture.       Patient presented to the ED due to left hip pain after a fall.  Earlier on the day of admission she had been walking in a parking lot parking lot when she slipped on ice and fell onto her left side. After falling she was unable to straighten her left leg and also developed dizziness and visual changes after the fall.  She denied striking her head or LOC.    Sodium of 135, glucose of 155 and creatinine of 0.68 with GFR of >90.    CBC with diff was unremarkable.  INR elevated at 1.50.    EKG with normal sinus rhythm.    Left hip and pelvis Xray revealed comminuted intertrochanteric fracture of the left hip with significant displacement and foreshortening (no dislocation, right hip without fracture and pelvis negative for fracture). Ortho was consulted in the ED.  Patient received TXA in the ED.      Postoperatively patient had blood loss anemia, received 2 units of blood transfusion. This morning complaining of palpitations, in A-fib with RVR.     Status post mechanical fall  Left intertrochanteric femur fracture  S/p left hip cephalomedullary nailing 3/11/2023  Documented  mL.  Defer postoperative care including pharmacological DVT prophylaxis, rehabilitation, pain control to orthopedic clinic.   --Weightbearing as tolerated with walker for 6 weeks.     -DVT prophylaxis: On subcutaneous Lovenox 3/12, 3/13.  Plan to resume PTA Xarelto on 3/14  Rehab team following.  Plan for discharge home with home care versus TCU.  Vitamin D started and follow-up with  PCP regarding osteoporosis work-up.    Pain control with as needed oxycodone.  Tylenol scheduled.  Scheduled senna while on opiates.  Encourage use of IS.  Encourage ambulation.    Symptomatic A-fib with RVR.  Paroxysmal atrial fibrillation on anticoagulation.  Prior to admission Xarelto was held for surgery.  On postop day 1 was started on subcutaneous Lovenox.  Lovenox discontinued and PTA Xarelto restarted on 3/14.  This morning complaining of palpitations.  Telemetry A-fib with RVR, heart rate fluctuating between 100s to 150s.  Received 2 doses of IV Cardizem, continues to have elevated heart rate.  Oral Cardizem x1.  If continues to have elevated heart rate will start on IV Cardizem drip and transfer to heart center  Continue PTA diltiazem 120 mg every evening.    Continue PTA Xarelto (3/14)  Telemetry monitoring.  Echocardiogram ordered.  Keep potassium around 4, magnesium around 2.    Acute blood loss anemia  Status post 2 PRBC transfusion.  Suspect multifactorial in the setting of femur fracture, surgical blood loss and dilutional effect from IV fluids.  -- Postprocedure complaint of dizziness.  Telemetry no acute events.    HGB pre-op of 12.7-12.4 postoperative hemoglobin dropped to 7.0.  Received 1 unit PRBC transfusion on 3/12, 3/13.   Iron saturation index 10. Received IV iron on 3/14.   This morning hemoglobin 9.1.  Monitor hemoglobin levels in a.m. or earlier if symptomatic.    Goal to keep hemoglobin greater than 8.    Hyponatremia improved.  Suspect this is secondary to dehydration.  Noted to be 135 on day of admission but low the previous day at 131 and could be secondary to PTA hydrochlorothiazide.    Discontinue IV fluids, continue to hold PTA hydrochlorothiazide    Transaminitis postoperative -likely due to hypotension.  Postprocedure , .  Liver enzymes trending down.  Supportive care.    Hypotension in setting on known hypertension  Noted postoperative hypotension, drop in  hemoglobin.  Hold PTA hydrochlorothiazide 25 mg/d and losartan 50 mg/d  given patient receiving Cardizem, systolic blood pressures in 100s to 120s.  Continue PTA Cardizem.       Hyperglycemia likely stress response, steroid use.  Blood glucose 157. Received 4 mg of IV decadron intra-op.    Hemoglobin A1c 5.3 [anemia]    Monitor blood sugars periodically     Mild persistent asthma  Continued on PTA inhalers with therapeutic substitution based on hospital formulary.  Monitor O2 saturations.       Hypothyroidism  Continue on PTA levothyroxine 100 mcg/d.       GERD  Patient indicated the only medication that works for management of her GERD is Nexium.    Stop previously ordered Protonix.    Continue on PTA Nexium 20 mg every morning; patient can use home medication as spouse brought it in for her.    PRN TUMS available.       HLP  Hold Lipitor in the setting of transaminitis     Orders Placed This Encounter      Advance Diet as Tolerated: Regular Diet Adult      DVT Prophylaxis: SCDs, ambulate.  On anticoagulation.  Code Status: Full Code  Disposition: Expected discharge pending clinical improvement likely 3/16    Discussed with patient, bedside RN  Patient's  by the bedside updated.  >50 minutes spent by me on the date of service doing chart review, history, exam, documentation & further activities per the note.      Say Huggins MD        Interval History:        Lying in bed.  Admits to some improvement in dizziness.  Reporting some headaches.  This morning reporting palpitations.  In A-fib with RVR.  Heart rate between 100-1 50s  No active bleeding.  Surgical site pain improving with Tylenol.  Reports no bowel movement yet.    No new shortness of breath.  No chest pain  Patient's  at the bedside.          Physical Exam:        Physical Exam   Temp:  [98.1  F (36.7  C)-99.3  F (37.4  C)] 98.2  F (36.8  C)  Pulse:  [] 150  Resp:  [16-18] 18  BP: (112-143)/(51-79) 143/79  SpO2:  [94 %-100 %] 96  %    Intake/Output Summary (Last 24 hours) at 3/13/2023 1510  Last data filed at 3/13/2023 0815  Gross per 24 hour   Intake 450 ml   Output 0 ml   Net 450 ml       Admission Weight: 68 kg (150 lb)  Current Weight: 68 kg (150 lb)    PHYSICAL EXAM  GENERAL: Patient is in no distress. Alert and oriented.  HEART: irregular rate and rhythm. S1S2.  LUNGS: Clear to auscultation bilaterally. No expiratory wheeze.  Respirations unlabored  ABDOMEN: Soft, no abdominal tenderness, bowel sounds heard   NEURO: Moving all extremities.  EXTREMITIES: Left lower extremity swelling noted.  Surgical site dressing intact.  SKIN: Warm, dry.   PSYCHIATRY Cooperative       Medications:          acetaminophen  975 mg Oral Q8H     [Held by provider] atorvastatin  40 mg Oral Daily     diltiazem ER COATED BEADS  120 mg Oral QPM     esomeprazole  20 mg Oral QAM     ferrous gluconate  324 mg Oral Daily with breakfast     fluticasone-vilanterol  1 puff Inhalation Daily     levothyroxine  100 mcg Oral Daily     [Held by provider] potassium chloride ER  20 mEq Oral Daily     rivaroxaban ANTICOAGULANT  20 mg Oral Daily with supper     senna-docusate  1 tablet Oral BID    Or     senna-docusate  2 tablet Oral BID     sodium chloride (PF)  3 mL Intracatheter Q8H     vitamin D3  50 mcg Oral Daily     albuterol, albuterol, bisacodyl **OR** bisacodyl, calcium carbonate, HYDROmorphone, HYDROmorphone, lidocaine 4%, lidocaine (buffered or not buffered), melatonin, metoprolol, naloxone **OR** naloxone **OR** naloxone **OR** naloxone, ondansetron **OR** ondansetron, oxyCODONE, oxyCODONE IR, polyethylene glycol, prochlorperazine **OR** prochlorperazine **OR** prochlorperazine, sodium chloride (PF)         Data:      All new lab and imaging data was reviewed.

## 2023-03-15 NOTE — PLAN OF CARE
Goal Outcome Evaluation:  Pt POD 4 of L hip nailing. A&Ox4. VSS on RA. Tolerating regular diet. L hip dressing CDI, attached to wound vac. Pain controlled w/ PRN oxycodone and scheduled tylenol. Denies nausea and vomiting. Offered fleet enema but pt refused. Wants suppository. Paged hospitalist for suppository.  Denies discomfort. Up 1A GB&W, voiding adequately in BR. Discharge pending, home vs TCU, pending. Continue plan of care.

## 2023-03-15 NOTE — PROGRESS NOTES
IV Diltiazem can be administered by skilled RN, paged flying squad, requested medication from pharmacy.

## 2023-03-15 NOTE — CONSULTS
Care Management    No indication for consult at this time. No discharge needs identified. Therapies are recommending home with spouse assist.    Please reconsult CM team should additional discharge needs arise.      Princess Perez RN, BSN, PHN, CMSRN  Care Coordination  [facility]

## 2023-03-15 NOTE — PLAN OF CARE
Transfer from [facility] 2 floor 3 this afternoon     Vitals: Stable  Lungs: WNL  CV: Afib RVR with rates in 120s at rest and 150-180 with activity. Started on diltiazem gtt. Converted to NSR around 18:00.   GI: Increased GERD Symptoms. MD nixon. Pt. Took 750mg x3 tabs of home TUMS. Removed from room and education provided on hospital documentation of medications.   Constipation. Bowel sounds active. Soft no distention. Bisacodyl given. On scheduled Senna and received Miralax this AM.   Uro: WNL  CMS: Pulses WNL. Left hip edema +3.   Neuro: No deficits noted. Weakness to LLE.   Skin: Ecchymotic and edematous to left hip to knee. Elevating leg. Wound vac in place. Clean dry intact.   Psych: WNL. Mildly anxious.   MSK: Ax-1 with belt and walker. WBAT. PT following.   Pain: Left hip pain. Scheduled tylenol. PRN Oxycodone and Ice.    Labs: K and Mg replaced per protocol.   Tests/Procedures: Plan for TTE.   Other: na

## 2023-03-15 NOTE — PROVIDER NOTIFICATION
Hospitalist paged with the following message: Read - 12:00 pm  Pt did get 2 doses of dilt IV but is still afib with HR between . Please advise. thank-you!  Addendum: Order received for po diltiazem to be given stat and to recheck in 30 mins.

## 2023-03-15 NOTE — PROGRESS NOTES
"Orthopedic Surgery  Tiffany Stovall  3/13/2023  Admit Date:  3/11/2023  POD: 4 Days Post-Op  Procedure(s):  INTERTROCHANTERIC HIP NAILING    Patient resting comfortably in bed,  present today.    Went into a fib this am so disappointed about this.   Tolerating oral intake.    Denies nausea or vomiting.  Denies chest pain or shortness of breath.  No events overnight.      Alert and orient to person, place, and time.  Vital Sign Ranges  /63   Pulse 117   Temp 98.2  F (36.8  C) (Oral)   Resp 14   Ht 1.651 m (5' 5\")   Wt 68 kg (150 lb)   LMP  (LMP Unknown)   SpO2 95%   BMI 24.96 kg/m      Prevena intact and functioning properly to proximal incision. Canister with scant sliding scale drainage.  Tagaderm C/D/I to distal incision.   Minimal erythema of the surrounding skin.   Bilateral calves are soft, non-tender.   Bilateral lower extremity is NVI.  Sensation intact bilateral lower extremities.  5/5 motor with resisted dorsi and plantar flexion bilaterally.  +DP pulse.     Labs:  Hemoglobin   Date Value Ref Range Status   03/15/2023 9.1 (L) 11.7 - 15.7 g/dL Final   03/05/2021 11.8 11.7 - 15.7 g/dL Final       A/P  1. Plan   Continue Xarelto 20 mg daily as per PTA.      Mobilize with PT/OT    WBAT w/ walker   Keep dressings C/D/I    Continue current pain regiment.   Follow up with Dr. Campos in clinic in 2 weeks.      2. Disposition   Anticipate d/c to home pending progress with physical therapy and medical clearance    Kjerstin Foss PA-C [facility]    "

## 2023-03-15 NOTE — PROVIDER NOTIFICATION
Paged hospitalist: patient stated, she is having Afib, on tele , Requesting another dose Diltiazem, not a new diagnosis. complains of heart racing, denies SOB or chest pain.   No IV access.     Received an order for IV metoprolol.    @1017: Paged hospitalist:  patient cannot take betablocker, it causes broncospasm, requesting Diltiazem.     @ 1356: paged hospitalist: HR still 110-140's @ times. Complains of dull headache.

## 2023-03-15 NOTE — PLAN OF CARE
Goal Outcome Evaluation:    Patient is A&O x4. Up with assist of 1/gb/walker to bathroom, voiding frequently. Denies chest pain or SOB. Complained of  uncontrolled Afib this morning, paged hospitalist, given IV x2 and PO x1 diltiazem. On tele Afib with RVR. Complains of dull headache. Poor appetite. Wound vac patent with small amount drainage. Complains of constipation, given PRN miralax, considering enema once Afib is controlled.         Given handoff report to [facility] RN. Updated patient. Transferring with belonging.

## 2023-03-16 ENCOUNTER — APPOINTMENT (OUTPATIENT)
Dept: PHYSICAL THERAPY | Facility: CLINIC | Age: 73
DRG: 481 | End: 2023-03-16
Payer: COMMERCIAL

## 2023-03-16 ENCOUNTER — APPOINTMENT (OUTPATIENT)
Dept: CARDIOLOGY | Facility: CLINIC | Age: 73
DRG: 481 | End: 2023-03-16
Attending: HOSPITALIST
Payer: COMMERCIAL

## 2023-03-16 LAB
ALBUMIN SERPL BCG-MCNC: 2.9 G/DL (ref 3.5–5.2)
ALP SERPL-CCNC: 86 U/L (ref 35–104)
ALT SERPL W P-5'-P-CCNC: 97 U/L (ref 10–35)
AST SERPL W P-5'-P-CCNC: 43 U/L (ref 10–35)
BILIRUB DIRECT SERPL-MCNC: 0.2 MG/DL (ref 0–0.3)
BILIRUB SERPL-MCNC: 0.7 MG/DL
HGB BLD-MCNC: 8.4 G/DL (ref 11.7–15.7)
LVEF ECHO: NORMAL
MAGNESIUM SERPL-MCNC: 1.7 MG/DL (ref 1.7–2.3)
NT-PROBNP SERPL-MCNC: 1101 PG/ML (ref 0–900)
POTASSIUM SERPL-SCNC: 4.3 MMOL/L (ref 3.4–5.3)
PROT SERPL-MCNC: 5.1 G/DL (ref 6.4–8.3)
SODIUM SERPL-SCNC: 139 MMOL/L (ref 136–145)

## 2023-03-16 PROCEDURE — 80076 HEPATIC FUNCTION PANEL: CPT | Performed by: HOSPITALIST

## 2023-03-16 PROCEDURE — 250N000013 HC RX MED GY IP 250 OP 250 PS 637: Performed by: PHYSICIAN ASSISTANT

## 2023-03-16 PROCEDURE — 97530 THERAPEUTIC ACTIVITIES: CPT | Mod: GP

## 2023-03-16 PROCEDURE — 97116 GAIT TRAINING THERAPY: CPT | Mod: GP

## 2023-03-16 PROCEDURE — 85018 HEMOGLOBIN: CPT | Performed by: HOSPITALIST

## 2023-03-16 PROCEDURE — 210N000002 HC R&B HEART CARE

## 2023-03-16 PROCEDURE — 250N000011 HC RX IP 250 OP 636: Performed by: STUDENT IN AN ORGANIZED HEALTH CARE EDUCATION/TRAINING PROGRAM

## 2023-03-16 PROCEDURE — 83735 ASSAY OF MAGNESIUM: CPT | Performed by: HOSPITALIST

## 2023-03-16 PROCEDURE — 93306 TTE W/DOPPLER COMPLETE: CPT | Mod: 26 | Performed by: INTERNAL MEDICINE

## 2023-03-16 PROCEDURE — 250N000011 HC RX IP 250 OP 636: Performed by: HOSPITALIST

## 2023-03-16 PROCEDURE — 36415 COLL VENOUS BLD VENIPUNCTURE: CPT | Performed by: HOSPITALIST

## 2023-03-16 PROCEDURE — 84295 ASSAY OF SERUM SODIUM: CPT | Performed by: HOSPITALIST

## 2023-03-16 PROCEDURE — 250N000013 HC RX MED GY IP 250 OP 250 PS 637: Performed by: HOSPITALIST

## 2023-03-16 PROCEDURE — 83880 ASSAY OF NATRIURETIC PEPTIDE: CPT | Performed by: HOSPITALIST

## 2023-03-16 PROCEDURE — 255N000002 HC RX 255 OP 636: Performed by: STUDENT IN AN ORGANIZED HEALTH CARE EDUCATION/TRAINING PROGRAM

## 2023-03-16 PROCEDURE — 99232 SBSQ HOSP IP/OBS MODERATE 35: CPT | Performed by: HOSPITALIST

## 2023-03-16 PROCEDURE — 84132 ASSAY OF SERUM POTASSIUM: CPT | Performed by: STUDENT IN AN ORGANIZED HEALTH CARE EDUCATION/TRAINING PROGRAM

## 2023-03-16 PROCEDURE — 999N000208 ECHOCARDIOGRAM COMPLETE

## 2023-03-16 PROCEDURE — 250N000013 HC RX MED GY IP 250 OP 250 PS 637: Performed by: STUDENT IN AN ORGANIZED HEALTH CARE EDUCATION/TRAINING PROGRAM

## 2023-03-16 RX ORDER — FUROSEMIDE 10 MG/ML
20 INJECTION INTRAMUSCULAR; INTRAVENOUS ONCE
Status: COMPLETED | OUTPATIENT
Start: 2023-03-16 | End: 2023-03-16

## 2023-03-16 RX ORDER — BISACODYL 10 MG
10 SUPPOSITORY, RECTAL RECTAL DAILY PRN
Status: DISCONTINUED | OUTPATIENT
Start: 2023-03-16 | End: 2023-03-17 | Stop reason: HOSPADM

## 2023-03-16 RX ORDER — FLUTICASONE PROPIONATE AND SALMETEROL 250; 50 UG/1; UG/1
1 POWDER RESPIRATORY (INHALATION) EVERY 12 HOURS
Status: DISCONTINUED | OUTPATIENT
Start: 2023-03-16 | End: 2023-03-17 | Stop reason: HOSPADM

## 2023-03-16 RX ORDER — MAGNESIUM OXIDE 400 MG/1
400 TABLET ORAL EVERY 4 HOURS
Status: COMPLETED | OUTPATIENT
Start: 2023-03-16 | End: 2023-03-16

## 2023-03-16 RX ADMIN — OXYCODONE HYDROCHLORIDE 5 MG: 5 TABLET ORAL at 12:59

## 2023-03-16 RX ADMIN — Medication 20 MG: at 17:05

## 2023-03-16 RX ADMIN — ACETAMINOPHEN 975 MG: 325 TABLET ORAL at 06:33

## 2023-03-16 RX ADMIN — OXYCODONE HYDROCHLORIDE 5 MG: 5 TABLET ORAL at 18:32

## 2023-03-16 RX ADMIN — ACETAMINOPHEN 975 MG: 325 TABLET ORAL at 14:30

## 2023-03-16 RX ADMIN — FERROUS GLUCONATE 324 MG: 324 TABLET ORAL at 08:41

## 2023-03-16 RX ADMIN — BISACODYL 10 MG: 10 SUPPOSITORY RECTAL at 08:42

## 2023-03-16 RX ADMIN — Medication 400 MG: at 20:16

## 2023-03-16 RX ADMIN — HUMAN ALBUMIN MICROSPHERES AND PERFLUTREN 3 ML: 10; .22 INJECTION, SOLUTION INTRAVENOUS at 12:00

## 2023-03-16 RX ADMIN — OXYCODONE HYDROCHLORIDE 5 MG: 5 TABLET ORAL at 08:53

## 2023-03-16 RX ADMIN — CALCIUM CARBONATE (ANTACID) CHEW TAB 500 MG 500 MG: 500 CHEW TAB at 22:28

## 2023-03-16 RX ADMIN — Medication 400 MG: at 23:10

## 2023-03-16 RX ADMIN — FLUTICASONE PROPIONATE AND SALMETEROL 1 PUFF: 250; 50 POWDER RESPIRATORY (INHALATION) at 09:02

## 2023-03-16 RX ADMIN — LEVOTHYROXINE SODIUM 100 MCG: 100 TABLET ORAL at 06:34

## 2023-03-16 RX ADMIN — FUROSEMIDE 20 MG: 10 INJECTION, SOLUTION INTRAMUSCULAR; INTRAVENOUS at 14:29

## 2023-03-16 RX ADMIN — Medication 20 MG: at 06:34

## 2023-03-16 RX ADMIN — SENNOSIDES AND DOCUSATE SODIUM 2 TABLET: 50; 8.6 TABLET ORAL at 20:10

## 2023-03-16 RX ADMIN — Medication 50 MCG: at 08:41

## 2023-03-16 RX ADMIN — ONDANSETRON 4 MG: 2 INJECTION INTRAMUSCULAR; INTRAVENOUS at 07:47

## 2023-03-16 RX ADMIN — SENNOSIDES AND DOCUSATE SODIUM 2 TABLET: 50; 8.6 TABLET ORAL at 08:41

## 2023-03-16 RX ADMIN — OXYCODONE HYDROCHLORIDE 5 MG: 5 TABLET ORAL at 22:27

## 2023-03-16 RX ADMIN — CALCIUM CARBONATE (ANTACID) CHEW TAB 500 MG 500 MG: 500 CHEW TAB at 13:00

## 2023-03-16 RX ADMIN — ACETAMINOPHEN 975 MG: 325 TABLET ORAL at 22:27

## 2023-03-16 RX ADMIN — DILTIAZEM HYDROCHLORIDE 120 MG: 120 CAPSULE, COATED, EXTENDED RELEASE ORAL at 20:10

## 2023-03-16 RX ADMIN — RIVAROXABAN 20 MG: 10 TABLET, FILM COATED ORAL at 17:05

## 2023-03-16 RX ADMIN — POLYETHYLENE GLYCOL 3350 17 G: 17 POWDER, FOR SOLUTION ORAL at 08:53

## 2023-03-16 RX ADMIN — FLUTICASONE PROPIONATE AND SALMETEROL 1 PUFF: 250; 50 POWDER RESPIRATORY (INHALATION) at 20:10

## 2023-03-16 ASSESSMENT — ACTIVITIES OF DAILY LIVING (ADL)
DOING_ERRANDS_INDEPENDENTLY_DIFFICULTY: NO
WEAR_GLASSES_OR_BLIND: NO
CONCENTRATING,_REMEMBERING_OR_MAKING_DECISIONS_DIFFICULTY: NO
ADLS_ACUITY_SCORE: 25
ADLS_ACUITY_SCORE: 36
ADLS_ACUITY_SCORE: 25
ADLS_ACUITY_SCORE: 23
DIFFICULTY_COMMUNICATING: NO
ADLS_ACUITY_SCORE: 26
COMMUNICATION: OTHER (SEE COMMENTS)
ADLS_ACUITY_SCORE: 26
ADLS_ACUITY_SCORE: 26
DRESSING/BATHING_DIFFICULTY: NO
HEARING_DIFFICULTY_OR_DEAF: NO
ADLS_ACUITY_SCORE: 26
ADLS_ACUITY_SCORE: 26
FALL_HISTORY_WITHIN_LAST_SIX_MONTHS: YES
ADLS_ACUITY_SCORE: 26
WALKING_OR_CLIMBING_STAIRS_DIFFICULTY: NO
ADLS_ACUITY_SCORE: 25
ADLS_ACUITY_SCORE: 34
DIFFICULTY_EATING/SWALLOWING: NO
NUMBER_OF_TIMES_PATIENT_HAS_FALLEN_WITHIN_LAST_SIX_MONTHS: 1
TOILETING_ISSUES: NO
CHANGE_IN_FUNCTIONAL_STATUS_SINCE_ONSET_OF_CURRENT_ILLNESS/INJURY: YES

## 2023-03-16 NOTE — PROGRESS NOTES
[facility]  Hospitalist Progress Note   03/16/2023          Assessment and Plan:       Tiffany Stovall is a 72 year old female with HTN, HLP, Paroxysmal atrial fib, Chronic anticoagulation therapy, Hypothyroidism, GERD, Mild persistent asthma with bronchial spasm, History of T12 compression fracture, History of BCC/SCC admitted on 3/11/2023 following a mechanical fall that resulted in left comminuted intertrochanteric femur fracture.       Patient presented to the ED due to left hip pain after a fall.  Earlier on the day of admission she had been walking in a parking lot parking lot when she slipped on ice and fell onto her left side. After falling she was unable to straighten her left leg and also developed dizziness and visual changes after the fall.  She denied striking her head or LOC.    Sodium of 135, glucose of 155 and creatinine of 0.68 with GFR of >90.    CBC with diff was unremarkable.  INR elevated at 1.50.    EKG with normal sinus rhythm.    Left hip and pelvis Xray revealed comminuted intertrochanteric fracture of the left hip with significant displacement and foreshortening (no dislocation, right hip without fracture and pelvis negative for fracture). Ortho was consulted in the ED.  Patient received TXA in the ED.      Postoperatively patient had blood loss anemia, received 2 units of blood transfusion.  On 3/15 patient in A-fib with RVR, was on IV Cardizem drip and converted.  On 3/17 volume overload.    Status post mechanical fall  Left intertrochanteric femur fracture  S/p left hip cephalomedullary nailing 3/11/2023  Documented  mL.  Defer postoperative care including pharmacological DVT prophylaxis, rehabilitation, pain control to orthopedic clinic.   --Weightbearing as tolerated with walker for 6 weeks.     -DVT prophylaxis: On subcutaneous Lovenox 3/12, 3/13.  Plan to resume PTA Xarelto on 3/14  Rehab team following.  Plan for discharge home with home care versus  TCU.  Vitamin D started and follow-up with PCP regarding osteoporosis work-up.    Pain control with as needed oxycodone.  Tylenol scheduled.  Scheduled senna while on opiates.  Encourage use of IS.  Encourage ambulation.    Status post A-fib with RVR 3/15  Paroxysmal atrial fibrillation on anticoagulation.  Prior to admission Xarelto was held for surgery.  On postop day 1 was started on subcutaneous Lovenox.  Lovenox discontinued and PTA Xarelto restarted on 3/14.  Postoperatively in A-fib with RVR.  Was on IV Cardizem drip, converted 3/15.  Continue PTA diltiazem 120 mg every evening.    Continue PTA Xarelto (3/14)  Telemetry monitoring.  Sinus rhythm with PACs.  Echocardiogram pending  Keep potassium around 4, magnesium around 2.    Volume overload likely iatrogenic.  Patient's received fluid resuscitation during surgery, blood transfusion as above.  PTA hydrochlorothiazide on hold since admission given soft blood pressures.  Patient reporting 15 pound weight gain since admission.  Follow proBNP. 20 mg IV Lasix x1 today.  Will likely restart PTA diuretic in AM.  Follow echocardiogram.  Intake output monitoring, daily weights.    Acute blood loss anemia  Status post 2 PRBC transfusion.  Suspect multifactorial in the setting of femur fracture, surgical blood loss and dilutional effect from IV fluids.  -- Postprocedure complaint of dizziness.  Telemetry no acute events.    HGB pre-op of 12.7-12.4 postoperative hemoglobin dropped to 7.0.  Received 1 unit PRBC transfusion on 3/12, 3/13.   Iron saturation index 10. Received IV iron on 3/14.   Globin dropped from 9.1-8.4 this morning. Monitor hemoglobin levels in a.m. or earlier if symptomatic.    Goal to keep hemoglobin greater than 8.    Hyponatremia improved.  Suspect this is secondary to dehydration, diuretic use.    Transaminitis postoperative -likely due to hypotension.  Postprocedure , .  Liver enzymes trending down.  Supportive care.    Hypotension  in setting on known hypertension  Noted postoperative hypotension, drop in hemoglobin.  Hold PTA hydrochlorothiazide 25 mg/d and losartan 50 mg/d.   IV Lasix as above.  Continue PTA Cardizem.       Hyperglycemia likely stress response, steroid use.  Blood glucose 157. Received 4 mg of IV decadron intra-op.    Hemoglobin A1c 5.3 [anemia]    Monitor blood sugars periodically     Mild persistent asthma  Continued on PTA inhalers with therapeutic substitution based on hospital formulary.  Monitor O2 saturations.       Hypothyroidism  Continue on PTA levothyroxine 100 mcg/d.       GERD  Patient indicated the only medication that works for management of her GERD is Nexium.    Continue on PTA Nexium 20 mg every morning; patient can use home medication as spouse brought it in for her.    PRN TUMS available.       HLP  Hold Lipitor in the setting of transaminitis     Orders Placed This Encounter      Advance Diet as Tolerated: Regular Diet Adult      DVT Prophylaxis: SCDs, ambulate.  On anticoagulation.  Code Status: Full Code  Disposition: Expected discharge likely 3/17    Discussed with patient, bedside RN  Patient's  by the bedside updated.  > 35 minutes spent by me on the date of service doing chart review, history, exam, documentation & further activities per the note.      Say Huggins MD        Interval History:        Lying in bed.  Admits to improvement in dizziness, headache.  Was on IV Cardizem drip converted to sinus rhythm last evening.  This morning heart rate in 70s.  Denies any chest pain or palpitations.  Reporting fatigue, shortness of breath.  Reports 15 pound weight gain since admission, swelling of legs.  Surgical site pain improving with Tylenol, oxycodone. Having wound VAC in place.  Reports no bowel movement since surgery, received Dulcolax suppository this morning..   Patient's  at the bedside.          Physical Exam:        Physical Exam   Temp:  [97.9  F (36.6  C)-98.4  F (36.9   C)] 97.9  F (36.6  C)  Pulse:  [] 67  Resp:  [10-18] 18  BP: ()/(47-84) 123/61  SpO2:  [95 %-99 %] 97 %    Intake/Output Summary (Last 24 hours) at 3/13/2023 1510  Last data filed at 3/13/2023 0815  Gross per 24 hour   Intake 450 ml   Output 0 ml   Net 450 ml       Admission Weight: 68 kg (150 lb)  Current Weight: 68 kg (150 lb)    PHYSICAL EXAM  GENERAL: Patient is in no distress. Alert and oriented.  HEART: regular rate and rhythm. S1S2.  LUNGS: Bilateral decreased breath sounds.  No wheezing.  No crackles.  Respirations unlabored  ABDOMEN: Soft, no abdominal tenderness, bowel sounds heard   NEURO: Moving all extremities.  EXTREMITIES: Left lower extremity swelling noted.  Surgical site dressing intact  Right lower extremity pitting edema  SKIN: Warm, dry.   PSYCHIATRY Cooperative       Medications:          acetaminophen  975 mg Oral Q8H     [Held by provider] atorvastatin  40 mg Oral Daily     diltiazem ER COATED BEADS  120 mg Oral QPM     esomeprazole  20 mg Oral BID AC     ferrous gluconate  324 mg Oral Daily with breakfast     fluticasone-salmeterol  1 puff Inhalation Q12H     levothyroxine  100 mcg Oral Daily     [Held by provider] potassium chloride ER  20 mEq Oral Daily     rivaroxaban ANTICOAGULANT  20 mg Oral Daily with supper     senna-docusate  1 tablet Oral BID    Or     senna-docusate  2 tablet Oral BID     sodium chloride (PF)  3 mL Intracatheter Q8H     vitamin D3  50 mcg Oral Daily     albuterol, albuterol, bisacodyl **OR** bisacodyl, bisacodyl, calcium carbonate, HYDROmorphone, HYDROmorphone, lidocaine 4%, lidocaine (buffered or not buffered), melatonin, metoprolol, naloxone **OR** naloxone **OR** naloxone **OR** naloxone, Reason anticoagulant not prescribed for atrial fibrillation, ondansetron **OR** ondansetron, oxyCODONE, oxyCODONE IR, polyethylene glycol, prochlorperazine **OR** prochlorperazine **OR** prochlorperazine, sodium chloride (PF), sucralfate         Data:      All new  lab and imaging data was reviewed.

## 2023-03-16 NOTE — PROGRESS NOTES
Patient wanted to know what she needs to do when she goes home from Ortho stand point, writer [facility] floor, spoke to Kiara the PA the concerns, Kiara said all instructions will be on the paper work when she goes home.

## 2023-03-16 NOTE — PLAN OF CARE
Goal Outcome Evaluation:  Patient a/o x4 calls for assist, A1 with walker to BR. Wound vac to left hip surgical wound wnl, hip/thigh +2 edematous ecchymotic. Pt req 5mg oxy for hip pain. Ice and scheduled tylenol also for pain, VSS, tele SR some PAC's. Monitoring, Working with PT, pt plans to go home at discharge with husbands assist.       Plan of Care Reviewed With: patient

## 2023-03-16 NOTE — PLAN OF CARE
"Goal Outcome Evaluation:  0300-0700  Neuro- x4  Most Recent Vitals- /53 (BP Location: Left arm)   Pulse 65   Temp 98.4  F (36.9  C) (Oral)   Resp 18   Ht 1.651 m (5' 5\")   Wt 68 kg (150 lb)   LMP  (LMP Unknown)   SpO2 96%   BMI 24.96 kg/m    Tele/Cardiac- SR  Resp- RA  Activity- sba -1A walker   Pain- Received scheduled tylenol for L hip/lthigh pain.  Skin- L thigh, hip and perineum bruising- wound vac in place.   GI/- constipation- received senna and miralax earlier tonight   Labs hgb 8.4  Diet: Advance Diet as Tolerated: Regular Diet Adult    Plan- Echo today. Home w/ family assist at discharge           "

## 2023-03-16 NOTE — PROGRESS NOTES
Patient is A/O x 4, vss, a-febrile, c/o left hip pain, S/P Left hip nailing pod# 5, left hip is swollen, lasix 20 mg iv given x 1 with good ouput, up with A1 GB and walker to bathroom, tele SR, taking oxycodone and tylenol for pain, patient has wound vac to left hip incision, dressing CDI, plan is to discharge to home tomorrow with wound Vac.

## 2023-03-17 ENCOUNTER — APPOINTMENT (OUTPATIENT)
Dept: PHYSICAL THERAPY | Facility: CLINIC | Age: 73
DRG: 481 | End: 2023-03-17
Payer: COMMERCIAL

## 2023-03-17 VITALS
WEIGHT: 160.7 LBS | TEMPERATURE: 98.2 F | RESPIRATION RATE: 20 BRPM | HEIGHT: 65 IN | SYSTOLIC BLOOD PRESSURE: 133 MMHG | BODY MASS INDEX: 26.77 KG/M2 | DIASTOLIC BLOOD PRESSURE: 64 MMHG | HEART RATE: 84 BPM | OXYGEN SATURATION: 96 %

## 2023-03-17 LAB
ALBUMIN SERPL BCG-MCNC: 3.7 G/DL (ref 3.5–5.2)
ALP SERPL-CCNC: 100 U/L (ref 35–104)
ALT SERPL W P-5'-P-CCNC: 90 U/L (ref 10–35)
ANION GAP SERPL CALCULATED.3IONS-SCNC: 12 MMOL/L (ref 7–15)
AST SERPL W P-5'-P-CCNC: 51 U/L (ref 10–35)
ATRIAL RATE - MUSE: 170 BPM
ATRIAL RATE - MUSE: 79 BPM
BILIRUB SERPL-MCNC: 0.9 MG/DL
BUN SERPL-MCNC: 9.8 MG/DL (ref 8–23)
CALCIUM SERPL-MCNC: 9.4 MG/DL (ref 8.8–10.2)
CHLORIDE SERPL-SCNC: 95 MMOL/L (ref 98–107)
CREAT SERPL-MCNC: 0.65 MG/DL (ref 0.51–0.95)
DEPRECATED HCO3 PLAS-SCNC: 25 MMOL/L (ref 22–29)
DIASTOLIC BLOOD PRESSURE - MUSE: NORMAL MMHG
DIASTOLIC BLOOD PRESSURE - MUSE: NORMAL MMHG
GFR SERPL CREATININE-BSD FRML MDRD: >90 ML/MIN/1.73M2
GLUCOSE SERPL-MCNC: 107 MG/DL (ref 70–99)
HGB BLD-MCNC: 10 G/DL (ref 11.7–15.7)
INTERPRETATION ECG - MUSE: NORMAL
INTERPRETATION ECG - MUSE: NORMAL
MAGNESIUM SERPL-MCNC: 1.8 MG/DL (ref 1.7–2.3)
P AXIS - MUSE: 11 DEGREES
P AXIS - MUSE: NORMAL DEGREES
PLATELET # BLD AUTO: 341 10E3/UL (ref 150–450)
POTASSIUM SERPL-SCNC: 3.8 MMOL/L (ref 3.4–5.3)
PR INTERVAL - MUSE: 142 MS
PR INTERVAL - MUSE: NORMAL MS
PROT SERPL-MCNC: 6.1 G/DL (ref 6.4–8.3)
QRS DURATION - MUSE: 80 MS
QRS DURATION - MUSE: 82 MS
QT - MUSE: 288 MS
QT - MUSE: 384 MS
QTC - MUSE: 440 MS
QTC - MUSE: 464 MS
R AXIS - MUSE: -18 DEGREES
R AXIS - MUSE: 8 DEGREES
SODIUM SERPL-SCNC: 132 MMOL/L (ref 136–145)
SYSTOLIC BLOOD PRESSURE - MUSE: NORMAL MMHG
SYSTOLIC BLOOD PRESSURE - MUSE: NORMAL MMHG
T AXIS - MUSE: 103 DEGREES
T AXIS - MUSE: 92 DEGREES
VENTRICULAR RATE- MUSE: 156 BPM
VENTRICULAR RATE- MUSE: 79 BPM

## 2023-03-17 PROCEDURE — 99239 HOSP IP/OBS DSCHRG MGMT >30: CPT | Performed by: HOSPITALIST

## 2023-03-17 PROCEDURE — 97116 GAIT TRAINING THERAPY: CPT | Mod: GP

## 2023-03-17 PROCEDURE — 97530 THERAPEUTIC ACTIVITIES: CPT | Mod: GP

## 2023-03-17 PROCEDURE — 83735 ASSAY OF MAGNESIUM: CPT | Performed by: HOSPITALIST

## 2023-03-17 PROCEDURE — 36415 COLL VENOUS BLD VENIPUNCTURE: CPT | Performed by: HOSPITALIST

## 2023-03-17 PROCEDURE — 250N000013 HC RX MED GY IP 250 OP 250 PS 637: Performed by: HOSPITALIST

## 2023-03-17 PROCEDURE — 250N000013 HC RX MED GY IP 250 OP 250 PS 637: Performed by: STUDENT IN AN ORGANIZED HEALTH CARE EDUCATION/TRAINING PROGRAM

## 2023-03-17 PROCEDURE — 85018 HEMOGLOBIN: CPT | Performed by: HOSPITALIST

## 2023-03-17 PROCEDURE — 80053 COMPREHEN METABOLIC PANEL: CPT | Performed by: HOSPITALIST

## 2023-03-17 PROCEDURE — 85049 AUTOMATED PLATELET COUNT: CPT | Performed by: PHYSICIAN ASSISTANT

## 2023-03-17 RX ORDER — ATORVASTATIN CALCIUM 40 MG/1
TABLET, FILM COATED ORAL
Qty: 90 TABLET | Refills: 0 | COMMUNITY
Start: 2023-03-17 | End: 2023-03-31

## 2023-03-17 RX ORDER — LOSARTAN POTASSIUM 50 MG/1
50 TABLET ORAL DAILY
Qty: 90 TABLET | Refills: 0 | COMMUNITY
Start: 2023-03-17 | End: 2023-03-31

## 2023-03-17 RX ORDER — HYDROCHLOROTHIAZIDE 25 MG/1
25 TABLET ORAL DAILY
Status: DISCONTINUED | OUTPATIENT
Start: 2023-03-17 | End: 2023-03-17 | Stop reason: HOSPADM

## 2023-03-17 RX ORDER — FERROUS GLUCONATE 324(38)MG
324 TABLET ORAL
Qty: 30 TABLET | Refills: 0 | Status: SHIPPED | OUTPATIENT
Start: 2023-03-18 | End: 2023-06-27

## 2023-03-17 RX ADMIN — FERROUS GLUCONATE 324 MG: 324 TABLET ORAL at 09:14

## 2023-03-17 RX ADMIN — LEVOTHYROXINE SODIUM 100 MCG: 100 TABLET ORAL at 05:52

## 2023-03-17 RX ADMIN — ACETAMINOPHEN 975 MG: 325 TABLET ORAL at 14:24

## 2023-03-17 RX ADMIN — FLUTICASONE PROPIONATE AND SALMETEROL 1 PUFF: 250; 50 POWDER RESPIRATORY (INHALATION) at 09:14

## 2023-03-17 RX ADMIN — OXYCODONE HYDROCHLORIDE 5 MG: 5 TABLET ORAL at 12:48

## 2023-03-17 RX ADMIN — HYDROCHLOROTHIAZIDE 25 MG: 25 TABLET ORAL at 09:14

## 2023-03-17 RX ADMIN — Medication 20 MG: at 05:51

## 2023-03-17 RX ADMIN — OXYCODONE HYDROCHLORIDE 5 MG: 5 TABLET ORAL at 07:00

## 2023-03-17 RX ADMIN — Medication 50 MCG: at 09:14

## 2023-03-17 RX ADMIN — SENNOSIDES AND DOCUSATE SODIUM 2 TABLET: 50; 8.6 TABLET ORAL at 09:14

## 2023-03-17 RX ADMIN — ACETAMINOPHEN 975 MG: 325 TABLET ORAL at 05:52

## 2023-03-17 ASSESSMENT — ACTIVITIES OF DAILY LIVING (ADL)
ADLS_ACUITY_SCORE: 23
ADLS_ACUITY_SCORE: 26
ADLS_ACUITY_SCORE: 26
ADLS_ACUITY_SCORE: 23
ADLS_ACUITY_SCORE: 26
ADLS_ACUITY_SCORE: 23

## 2023-03-17 NOTE — PLAN OF CARE
Goal Outcome Evaluation:        [id number]    A&O x 4. Pt denied pain, but has PRNs oxycodone and scheduled tylenol. VSS, on RA. Wound vac to L hip, CDI. Hip/thigh edematous, ecchymotic. Up w/ A1 + walker to BR. Tele SR. Alarms on. Plan for discharge home 3/17 with wound vac.

## 2023-03-17 NOTE — DISCHARGE SUMMARY
Discharge Summary  Hospitalist    Date of Admission:  3/11/2023  Date of Discharge:  3/17/2023  Discharging Provider: Say Huggins MD    Primary Care Physician   Darby Williamson  Primary Care Provider Phone Number: [phone]  Primary Care Provider Fax Number: [phone]    PRINCIPAL DIAGNOSIS  Status post mechanical fall  Left intertrochanteric femur fracture  S/p left hip cephalomedullary nailing 3/11/2023  Status post A-fib with RVR 3/15  Volume overload likely iatrogenic.  Acute blood loss anemia  Status post 2 PRBC transfusion.  Hyponatremia  Transaminitis postoperative -likely due to hypotension - improving.   Post operative Hypotension improved    Past Medical History:   Diagnosis Date     Bronchial spasm      Cancer (H)     basal cell, squamous cell skin ca     Compression fracture of T12 vertebra (H) 03/17/2014     GERD (gastroesophageal reflux disease)      HTN - hypertension      Hyperlipaemia      Postmenopausal 01/01/1999     Uncomplicated asthma      Unspecified hypothyroidism     Hypothyroidism       History of Present Illness   Tiffany Stovall is an 72 year old female who presented with fall.    Hospital Course   Tiffany Stovall is a 72 year old female with HTN, HLP, Paroxysmal atrial fib, Chronic anticoagulation therapy, Hypothyroidism, GERD, Mild persistent asthma with bronchial spasm, History of T12 compression fracture, History of BCC/SCC admitted on 3/11/2023 following a mechanical fall that resulted in left comminuted intertrochanteric femur fracture.       Presented to the ED due to left hip pain after a fall. On day of admission walking in a parking lot parking lot when she slipped on ice and fell onto her left side. After falling she was unable to straighten her left leg.  Sodium of 135, glucose of 155 and creatinine of 0.68 with GFR of >90.    CBC with diff was unremarkable.  INR elevated at 1.50.    EKG with normal sinus rhythm.    Left hip and pelvis Xray revealed  comminuted intertrochanteric fracture of the left hip with significant displacement and foreshortening (no dislocation, right hip without fracture and pelvis negative for fracture).   Postoperatively patient had blood loss anemia, received 2 units of blood transfusion.  On 3/15 patient in A-fib with RVR, was on IV Cardizem drip and converted.  On 3/17 volume overload and received IV diuresis     Status post mechanical fall  Left intertrochanteric femur fracture  S/p left hip cephalomedullary nailing 3/11/2023  Documented  mL.  Postoperatively followed by orthopedic surgery.  DeferDefer postoperative care including pharmacological DVT prophylaxis, rehabilitation, pain control to ortho.  --Weightbearing as tolerated with walker for 6 weeks.     -DVT prophylaxis: On subcutaneous Lovenox 3/12, 3/13.  Resumed PTA Xarelto on 3/14  Rehab team followed - home with family assist.  Vitamin D supplements continued, follow-up with PCP regarding osteoporosis work-up.    Minimize narcotic use as able to..  Bowel meds as needed  Encourage use of IS.  Encourage ambulation.     Status post A-fib with RVR 3/15  Paroxysmal atrial fibrillation on anticoagulation.  Prior to admission Xarelto was held for surgery.  On postop day 1 was started on subcutaneous Lovenox.  Lovenox discontinued and PTA Xarelto restarted on 3/14.  Postoperatively in A-fib with RVR.  Was on IV Cardizem drip, converted 3/15.  Continue PTA diltiazem 120 mg every evening.    Continue PTA Xarelto (3/14)  Telemetry monitoring 3/17 - Sinus rhythm with PACs. Echo as below.     Volume overload likely iatrogenic.  Patient's received fluid resuscitation during surgery, blood transfusion as above.    PTA hydrochlorothiazide on hold since admission given soft blood pressures.  Patient reporting 15 pound weight gain since admission.  proBNP 1101.   Echocardiogram with estimated EF of 55 to 60%.  Right ventricle normal in size.  Severe left atrial enlargement.  Trace  to mild mitral and tricuspid regurgitation.  No prior cardial effusion   20 mg IV Lasix x1 on 3/16.  Restarted prior to admission hydrochlorothiazide 25 mg oral daily on 3/17.  Monitor BMP, PCP follow-up in 1 week.    Acute blood loss anemia  Status post 2 PRBC transfusion.  Suspect multifactorial in the setting of femur fracture, surgical blood loss and dilutional effect from IV fluids.  -- Postprocedure hypotension noted, complained of dizziness.  Telemetry no acute events.    HGB pre-op of 12.7-12.4 postoperative hemoglobin dropped to 7.0.    Received 1 unit PRBC transfusion on 3/12, 3/13.   Iron saturation index 10. Received IV iron on 3/14.   Hemoglobin stable in the last 48 hours > 8.  Monitor hemoglobin level in 1 week.     Hyponatremia  Suspect this is secondary to dehydration, diuretic use.  Monitor in 1 week.     Transaminitis postoperative -likely due to hypotension.  Postprocedure , .  Liver enzymes trending down.  Hold statin until PCP visit, recheck liver enzymes in 1 week  Supportive care.     Hypotension in setting on known hypertension -improved  Noted postoperative hypotension, drop in hemoglobin.  Continue PTA Cardizem.    Hold PTA hydrochlorothiazide, losartan during hospital stay.  Received IV Lasix on 3/16, hydrochlorothiazide restarted on 3/17.  Continue to hold prior to admission losartan as systolic blood pressures in 120s.  Monitor home blood pressure readings, review on provider visit and optimize therapy.    Hyperlipidemia  Holding Lipitor until PCP evaluation, recheck liver enzymes in 1 week     Hyperglycemia likely stress response, steroid use.  Blood glucose 157. Received 4 mg of IV decadron intra-op.    Hemoglobin A1c 5.3 [anemia]       Mild persistent asthma  Continued on PTA inhalers     Hypothyroidism  Continue on PTA levothyroxine 100 mcg/d.       GERD  Continue PTA Nexium.     Say Huggins MD.    Pending Results   Unresulted Labs Ordered in the Past 30 Days of  "this Admission     No orders found from 2/9/2023 to 3/12/2023.             Physical Exam   Vitals:    03/11/23 1022 03/16/23 0701 03/17/23 0533   Weight: 68 kg (150 lb) 74.8 kg (165 lb) 72.9 kg (160 lb 11.2 oz)     Vital Signs with Ranges  Temp:  [97.4  F (36.3  C)-98.6  F (37  C)] 98.2  F (36.8  C)  Pulse:  [79-90] 84  Resp:  [16-20] 20  BP: (104-167)/(48-75) 133/64  SpO2:  [96 %-99 %] 96 %  I/O last 3 completed shifts:  In: 50 [P.O.:50]  Out: 2250 [Urine:2250]  PHYSICAL EXAM  GENERAL: Patient is in no distress. Alert and oriented.  HEART: regular rate and rhythm. S1S2.  LUNGS: Bilateral decreased breath sounds.  No wheezing.  No crackles.  Respirations unlabored  NEURO: Moving all extremities.  EXTREMITIES: Left lower extremity swelling noted.  Surgical site dressing intact  Right lower extremity pitting edema improving   SKIN: Warm, dry.   PSYCHIATRY Cooperative  )Consultations This Hospital Stay   PHYSICAL THERAPY ADULT IP CONSULT  OCCUPATIONAL THERAPY ADULT IP CONSULT  CARE MANAGEMENT / SOCIAL WORK IP CONSULT  PHYSICAL THERAPY ADULT IP CONSULT  OCCUPATIONAL THERAPY ADULT IP CONSULT  CARE MANAGEMENT / SOCIAL WORK IP CONSULT    Time Spent on this Encounter   Say RAGSDALE MD, personally saw the patient today and spent greater than 30 minutes discharging this patient. Discussed with patient, bedside RN.    Discharge Disposition   Discharged to home  Condition at discharge: Stable    Discharge Orders      When to call - Contact Surgeon Team    You may experience symptoms that require follow-up before your scheduled appointment. Refer to the \"[employer] Tool\" for instructions on when to contact your Surgeon Team if you are concerned about pain control, blood clots, constipation, or if you are unable to urinate.     Symptoms - Fever Management    A low grade fever can be expected after surgery.  Use acetaminophen (TYLENOL) as needed for fever management.  Contact your Surgeon Team if you have a fever greater " than 101.5 F, chills, and/or night sweats.     Symptoms - Constipation management    Constipation (hard, dry bowel movements) is expected after surgery due to the combination of being less active, the anesthetic, and the opioid pain medication.  You can do the following to help reduce constipation:  ~  FLUIDS:  Drink clear liquids (water or Gatorade), or juice (apple/prune).  ~  DIET:  Eat a fiber rich diet.    ~  ACTIVITY:  Get up and move around several times a day.  Increase your activity as you are able.  MEDICATIONS:  Reduce the risk of constipation by starting medications before you are constipated.  You can take Miralax   (1 packet as directed) and/or a stool softener (Senokot 1-2 tablets 1-2 times a day).  If you already have constipation and these medications are not working, you can get magnesium citrate and use as directed.  If you continue to have constipation you can try an over the counter suppository or enema.  Call your Surgeon Team if it has been greater than 3 days since your last bowel movement.     Comfort and Pain Management - Pain after Surgery    Pain after surgery is normal and expected.  You will have some amount of pain for several weeks after surgery.  Your pain will improve with time.  There are several things you can do to help reduce your pain including: rest, ice, elevation, and using pain medications as needed. Contact your Surgeon Team if you have pain that persists or worsens after surgery despite rest, ice, elevation, and taking your medication(s) as prescribed. Contact your Surgeon Team if you have new numbness, tingling, or weakness in your operative extremity.     Comfort and Pain Management - Cold therapy    Ice can be used to control swelling and discomfort after surgery. Place a thin towel over your operative site and apply the ice pack overtop. Leave ice pack in place for 20 minutes, then remove for 20 minutes. Repeat this 20 minutes on/20 minutes off routine as often as  tolerated.     Medication Instructions - Opioids - Tapering Instructions    In the first three days following surgery, your symptoms may warrant use of the narcotic pain medication every four to six hours as prescribed. This is normal. As your pain symptoms improve, focus your efforts on decreasing (tapering) use of narcotic medications. The most successful tapering strategy is to first, decrease the number of tablets you take every 4-6 hours to the minimum prescribed. Then, increase the amount of time between doses.  For example:  First, taper to   or 1 tablet every 4-6 hours.  Then, taper to   or 1 tablet every 6-8 hours.  Then, taper to   or 1 tablet every 8-10 hours.  Then, taper to   or 1 tablet every 10-12 hours.  Then, taper to   or 1 tablet at bedtime.  The bedtime dose can help with comfort during sleep and is typically the last dose to be discontinued after surgery.     Follow Up Care    Please follow-up with Dr Campos and Farida BALTAZAR, [facility], in clinic for a 2 week post-op appointment.  At this visit, your post-op dressing will be removed and any staples or sutures taken out.  Please call Dr Campos's patient coordinator, Laura, to schedule this appointment and also with any questions or concerns.  She can be reached during normal business hours (M-F, 8am-5pm) at [phone].   If you have any questions or concerns after-hours, please contact the on call surgeon at [phone].                          If you are unable to make the 2 week post-op appointment because you are at a care facility, the rehab nursing team can assist with removal of your post-op dressing and staples (see below) however, please make sure you still contact Dr Campos's patient coordinator to set up a 6 week post-op appointment.  At the 6-week post-op visit, Dr Campos's team will assess your incision and also take x-rays.     Discharge Instruction - Breathing exercises     "Perform breathing exercises using your Incentive Spirometer 10 times per hour while awake for 2 weeks.     Comfort and Pain Management - LOWER Extremity Elevation    Swelling is expected for several months after surgery. This type of swelling is usually associated with gravity and activity, and can be improved with elevation.   The best way to do this is to get your \"toes above your nose\" by laying down and placing several pillows lengthwise under your calf and heel. When elevating your leg keep your knee completely straight. Perform this elevation as often as possible especially for the first two weeks after surgery.     Medication Instructions - Opioid Instructions (0.5 - 1 tablet Q 4-6 hours, MAX 4 tablets)    You were discharged with an opioid medication (hydromorphone, oxycodone, hydrocodone, or tramadol). This medication should only be taken for breakthrough pain that is not controlled with acetaminophen (TYLENOL). If you rate your pain less than 3 you do not need this medication.  Pain rating 0-3:  You do not need this medication  Pain rating 4-6:  Take 1/2 tablet every 4-6 hours as needed  Pain rating 7-10:  Take 1 tablet every 4-6 hours as needed  Do not exceed 4 tablets per day     Activity - Total Hip Arthroplasty    Refer to the [facility] \"Your Guide to Total Joint Replacement\" for recommendations on activities and Exercises.     Return to Driving    Return to driving - Driving is NOT permitted until directed by your provider. Under no circumstance are you permitted to drive while using narcotic pain medications.     Dressing / Wound Care - Wound    You have 2 clean dressings on your surgical wounds.    Prevena wound vac: Leave dressing in place until your follow up appointment with the surgeon in 2 weekw. The battery will last 14 days.  Keep your tubing and vacuum unit clean and dry.     Dressing / Wound Care - NO Tub Bathing    Tub bathing, swimming, or any other activities that will cause your " incision to be submerged in water should be avoided until allowed by your Surgeon.     Weight bearing as tolerated    Weight bearing as tolerated on your operative extremity.     Dressing Wound Care - Shower with wound/dressing NOT covered    You do not need to cover your dressing or incision in the shower, you may allow water and soap to run over top of the surgical dressing or incision. You may shower 5 days after surgery.  You are strictly prohibited from soaking or submerging the surgical wound underwater.     Reason for your hospital stay    You were admitted to the hospital after a fall.  Noted to have left femur fracture, underwent surgery by orthopedic team.  Postprocedure noted to have blood loss anemia requiring blood transfusion and atrial fibrillation with rapid ventricular response.  Symptoms improving and plan for discharge with close follow-up.     Follow-up and recommended labs and tests     Follow up with primary care provider, Darby Williamson, within 7 days for hospital follow- up.  The following labs/tests are recommended: CBC, CMP.     Monitor and record    Monitor daily blood pressure, heart rate review on provider visit and optimize therapy.     Discharge Instructions    Aggressive incentive spirometry.  Encourage ambulation as able to.  Minimize narcotic use as able to.  Bowel meds as needed to prevent constipation.     Crutches DME    DME Documentation: Describe the reason for need to support medical necessity: Impaired gait status post hip surgery. I, the undersigned, certify that the above prescribed supplies are medically necessary for this patient and is both reasonable and necessary in reference to accepted standards of medical practice in the treatment of this patient's condition and is not prescribed as a convenience.     [facility] D[facility]    DME Documentation: Describe the reason for need to support medical necessity: Impaired gait status post hip surgery. I, the undersigned, certify that the  above prescribed supplies are medically necessary for this patient and is both reasonable and necessary in reference to accepted standards of medical practice in the treatment of this patient's condition and is not prescribed as a convenience.     [facility]    : DME Documentation: Describe the reason for need to support medical necessity: Impaired gait status post hip surgery. I, the undersigned, certify that the above prescribed supplies are medically necessary for this patient and is both reasonable and necessary in reference to accepted standards of medical practice in the treatment of this patient's condition and is not prescribed as a convenience.     Diet    Low-salt, low-fat diet.       Discharge Medications   Current Discharge Medication List      START taking these medications    Details   acetaminophen (TYLENOL) 325 MG tablet Take 2 tablets (650 mg) by mouth every 4 hours as needed for other (mild pain)  Qty: 100 tablet, Refills: 0    Associated Diagnoses: Closed subtrochanteric fracture of hip, left, initial encounter (H)      ferrous gluconate (FERGON) 324 (38 Fe) MG tablet Take 1 tablet (324 mg) by mouth daily (with breakfast)  Qty: 30 tablet, Refills: 0    Associated Diagnoses: Iron deficiency anemia, unspecified iron deficiency anemia type      oxyCODONE (ROXICODONE) 5 MG tablet Take 0.5-1 tablets (2.5-5 mg) by mouth every 4 hours as needed  Qty: 15 tablet, Refills: 0    Comments: Take 1/2 tablet for pain rated 1-5 and 1 tablet for pain rated 6-10  Associated Diagnoses: Closed subtrochanteric fracture of hip, left, initial encounter (H)      senna-docusate (SENOKOT-S/PERICOLACE) 8.6-50 MG tablet Take 1-2 tablets by mouth 2 times daily Take while on oral narcotics to prevent or treat constipation.  Qty: 30 tablet, Refills: 0    Comments: While taking narcotics  Associated Diagnoses: Closed subtrochanteric fracture of hip, left, initial encounter (H)         CONTINUE these medications which have  CHANGED    Details   atorvastatin (LIPITOR) 40 MG tablet Hold until PCP visit and recheck labs.  Qty: 90 tablet, Refills: 0    Associated Diagnoses: Hyperlipidemia LDL goal <130      losartan (COZAAR) 50 MG tablet Take 1 tablet (50 mg) by mouth daily .Hold until PCP visit, recheck labs and review blood pressures.  Qty: 90 tablet, Refills: 0    Associated Diagnoses: Hypertension goal BP (blood pressure) < 140/90         CONTINUE these medications which have NOT CHANGED    Details   albuterol (PROAIR HFA) 108 (90 Base) MCG/ACT inhaler Inhale 1-2 puffs into the lungs every 4 hours as needed for shortness of breath  Qty: 18 g, Refills: 1    Comments: Pharmacy may dispense brand covered by insurance (Proair, or proventil or ventolin or generic albuterol inhaler)  Associated Diagnoses: Mild persistent asthma without complication      albuterol (PROVENTIL) (2.5 MG/3ML) 0.083% neb solution TAKE 1 VIAL (2.5 MG) BY NEBULIZATION EVERY 6 HOURS AS NEEDED FOR SHORTNESS OF BREATH / DYSPNEA  Qty: 75 mL, Refills: 1    Comments: DX Code Needed  RX REFILL REQUEST.  Associated Diagnoses: Mild persistent asthma without complication; Mild persistent asthma with exacerbation      amoxicillin (AMOXIL) 500 MG capsule TAKE 4 CAPSULES (2,000 MG) BY MOUTH ONCE FOR 1 DOSE ONE HOUR BEFORE DENTAL CLEANING APPOINTMENT  Qty: 4 capsule, Refills: 4    Comments: Profile Rx: patient will contact pharmacy when needed  Associated Diagnoses: Status post total left knee replacement      cholecalciferol (VITAMIN D3) 25 mcg (1000 units) capsule Take 2 capsules by mouth daily      diltiazem ER COATED BEADS (CARDIZEM CD/CARTIA XT) 120 MG 24 hr capsule Take 1 capsule by mouth every evening      EPINEPHrine (ANY BX GENERIC EQUIV) 0.3 MG/0.3ML injection 2-pack Inject 0.3 mLs (0.3 mg) into the muscle once as needed  Qty: 0.6 mL, Refills: 1    Associated Diagnoses: Allergic reaction, initial encounter      esomeprazole (NEXIUM) 20 MG DR capsule Take 20 mg by mouth  every morning      fluticasone-salmeterol (ADVAIR) 250-50 MCG/ACT inhaler Inhale 1 puff into the lungs 2 times daily  Qty: 180 each, Refills: 1    Associated Diagnoses: Mild persistent asthma without complication      hydrochlorothiazide (HYDRODIURIL) 25 MG tablet TAKE 1 TABLET DAILY  Qty: 90 tablet, Refills: 0    Comments: Please advise pt they are due for a fasting lab appointment and appointment with their provider for further refills.  Associated Diagnoses: Hypertension goal BP (blood pressure) < 140/90      ketoconazole (NIZORAL) 2 % external shampoo WASH TRUNK 3X PER WEEK. LATHER AND LEAVE ON FOR 3-5 MINUTES PRIOR TO RINSING.      levothyroxine (SYNTHROID/LEVOTHROID) 100 MCG tablet Take 1 tablet (100 mcg) by mouth daily  Qty: 90 tablet, Refills: 1    Associated Diagnoses: Hypothyroidism due to acquired atrophy of thyroid      loratadine (CLARITIN) 10 MG tablet Take 10 mg by mouth daily as needed       multivitamin (CENTRUM SILVER) tablet Take 1 tablet by mouth daily      potassium chloride ER (KLOR-CON) 20 MEQ CR tablet Take 1 tablet (20 mEq) by mouth daily  Qty: 90 tablet, Refills: 1    Associated Diagnoses: Hypertension goal BP (blood pressure) < 140/90      rivaroxaban ANTICOAGULANT (XARELTO) 20 MG TABS tablet Take 20 mg by mouth daily           Allergies   Allergies   Allergen Reactions     Bees Anaphylaxis     Beta Adrenergic Blockers Shortness Of Breath     Broncospasm, Heart rate decrease in 40's.     Mary Shortness Of Breath     Asa [Aspirin]      bronchospasm     Ace Inhibitors Cough     Bee Difficulty breathing     Excedrin Extra Strength      Hornet Venom Anaphylaxis     Lisinopril Cough     New Medication Allergy      roses     Nsaids      bronchospasm     Other [Seasonal Allergies]      Poultry Meal      Wasp Venom Anaphylaxis       DATA  Most Recent 3 CBC's:Recent Labs   Lab Test 03/17/23  0546 03/16/23  0623 03/15/23  0812 03/14/23  0700 03/12/23  1407 03/12/23  0656 03/11/23  1047   WBC  --    --   --  7.4  --  7.8 7.8   HGB 10.0* 8.4* 9.1* 8.4*   < > 7.4* 12.4   MCV  --   --   --  93  --  93 91     --   --  177  --  193 278    < > = values in this interval not displayed.      Most Recent 3 BMP's:  Recent Labs   Lab Test 03/17/23  0546 03/16/23  0623 03/15/23  2121 03/15/23  0812 03/14/23  0700   * 139  --  134* 136   POTASSIUM 3.8 4.3 3.8 3.4 3.6   CHLORIDE 95*  --   --  99 104   CO2 25  --   --  28 24   BUN 9.8  --   --  6.2* 7.9*   CR 0.65  --   --  0.58 0.62   ANIONGAP 12  --   --  7 8   ROGER 9.4  --   --  8.9 8.4*   *  --   --  102* 109*     Most Recent 2 LFT's:  Recent Labs   Lab Test 03/17/23 0546 03/16/23 0623   AST 51* 43*   ALT 90* 97*   ALKPHOS 100 86   BILITOTAL 0.9 0.7     Most Recent Cholesterol Panel:  Recent Labs   Lab Test 03/10/23  1127   CHOL 138   LDL 59   HDL 68   TRIG 55   Most Recent TSH, T4 and A1c Labs:  Recent Labs   Lab Test 03/13/23  0928 03/10/23  1127 05/06/21  1102 03/05/21  1102   TSH  --  0.48   < > 0.17*   T4  --   --   --  1.44   A1C 5.3  --   --   --     < > = values in this interval not displayed.     Results for orders placed or performed during the hospital encounter of 03/11/23   XR Pelvis and Hip Left 2 Views    Narrative    EXAM: XR PELVIS AND HIP LEFT 2 VIEWS  LOCATION: [facility]  DATE/TIME: 3/11/2023 11:02 AM    INDICATION: Check for fx left hip  pain left hip with deformity after fall  COMPARISON: None.      Impression    IMPRESSION: Comminuted intertrochanteric fracture of the left hip with significant displacement and foreshortening. No dislocation. Right hip negative for fracture. Pelvis negative for fracture.   XR Surgery FRANCISCO J L/T 5 Min Fluoro w Stills    Narrative    XR SURGERY FRANCISCO J FLUORO LESS THAN 5 MIN W STILLS 3/11/2023 3:52 PM     HISTORY: Left hip nailing  COMPARISON: 3/11/2023    NUMBER OF IMAGES ACQUIRED: 6    VIEWS: 2    FLUOROSCOPY TIME: 1.3 minutes      Impression    IMPRESSION: Intraprocedural  spot films demonstrate intramedullary nail  and screw fixation of the intertrochanteric left femur fracture with  improved alignment. Please reference the surgical report for further  details.    ROBERT MERAZ MD         SYSTEM ID:  [id number]   Echocardiogram Complete     Value    LVEF  55-60%    Narrative    [id number]  [id number]  [id number]  [id number]^JUDY^HANNAH     [facility]  Echocardiography Laboratory  [address] [place] [address]     Name: ANTONIO DUFF  MRN: [id number]  : 1950  Study Date: 2023 11:41 AM  Age: 72 yrs  Gender: Female  Patient Location: [facility]  Reason For Study: Atrial Fibrillation  Ordering Physician: HANNAH KIM  Referring Physician: HANNAH KIM  Performed By: ASHLYN Hua     BSA: 1.8 m2  Height: 65 in  Weight: 165 lb  HR: 71  BP: 123/61 mmHg  ______________________________________________________________________________  Procedure  Complete Portable Echo Adult. Optison (NDC #[id number]) given intravenously.  ______________________________________________________________________________  Interpretation Summary     1. The left ventricle is normal in size. There is normal left ventricular wall  thickness. Left ventricular systolic function is normal. The visual ejection  fraction is 55-60%. Diastolic Doppler findings (E/E' ratio and/or other  parameters) suggest left ventricular filling pressures are normal. No regional  wall motion abnormalities noted. There is no thrombus seen in the left  ventricle.  2. The right ventricle is normal size. The right ventricular systolic function  is normal.  3. Severe left atrial enlargement.  4. Trace to mild mitral and tricuspid regurgitation.  5. No pericardial effusion.  6. No previous study for comparison.  ______________________________________________________________________________  Left Ventricle  The left ventricle is normal in size. There is normal left ventricular  wall  thickness. Left ventricular systolic function is normal. The visual ejection  fraction is 55-60%. Diastolic Doppler findings (E/E' ratio and/or other  parameters) suggest left ventricular filling pressures are normal. No regional  wall motion abnormalities noted. There is no thrombus seen in the left  ventricle.     Right Ventricle  The right ventricle is normal size. The right ventricular systolic function is  normal.     Atria  The left atrium is severely dilated. Right atrial size is normal. There is no  color Doppler evidence of an atrial shunt.     Mitral Valve  There is mild (1+) mitral regurgitation.     Tricuspid Valve  There is trace tricuspid regurgitation. Right ventricular systolic pressure  could not be approximated due to inadequate tricuspid regurgitation.     Aortic Valve  There is mild trileaflet aortic sclerosis. No aortic regurgitation is present.     Pulmonic Valve  There is mild (1+) pulmonic valvular regurgitation. There is no pulmonic  valvular stenosis.     Vessels  The aortic root is normal size. Normal size ascending aorta. Descending aortic  velocity normal. The inferior vena cava is normal.     Pericardium  There is no pericardial effusion.     Rhythm  Sinus rhythm was noted.  ______________________________________________________________________________  MMode/2D Measurements & Calculations  IVSd: 0.74 cm     LVIDd: 4.7 cm  LVIDs: 3.1 cm  LVPWd: 0.90 cm  FS: 33.2 %  LV mass(C)d: 127.3 grams  LV mass(C)dI: 69.8 grams/m2  Ao root diam: 3.1 cm  LA dimension: 4.0 cm  asc Aorta Diam: 3.4 cm  LA/Ao: 1.3  LVOT diam: 2.0 cm  LVOT area: 3.1 cm2  LA Volume (BP): 93.0 ml  LA Volume Index (BP): 51.1 ml/m2  RV Base: 3.1 cm  RWT: 0.38     TAPSE: 2.5 cm     Doppler Measurements & Calculations  MV E max roman: 83.6 cm/sec  MV A max roman: 122.7 cm/sec  MV E/A: 0.68  MV max P.9 mmHg  MV mean PG: 3.1 mmHg  MV V2 VTI: 31.0 cm  MVA(VTI): 2.7 cm2  MV dec time: 0.23 sec  LV V1 max P.5 mmHg  LV V1  max: 106.4 cm/sec  LV V1 VTI: 27.4 cm  SV(LVOT): 84.3 ml  SI(LVOT): 46.3 ml/m2  PA acc time: 0.18 sec  Pulm Sys Faheem: 47.0 cm/sec  Pulm Craig Faheem: 47.5 cm/sec  Pulm A Revs Faheem: 44.3 cm/sec  Pulm S/D: 0.99  E/E' av.6  Lateral E/e': 7.0  Medial E/e': 8.2  RV S Faheem: 21.0 cm/sec     ______________________________________________________________________________  Report approved by: Kashif Vela 2023 01:45 PM

## 2023-03-17 NOTE — CARE PLAN
Physical Therapy Discharge Summary    Reason for therapy discharge:    Discharged to home with support of spouse. Pt plans to discharge home later today.     Progress towards therapy goal(s). See goals on Care Plan in [facility] electronic health record for goal details.  Goals partially met.  Barriers to achieving goals:   discharge from facility. continues to require CGA for stairs-spouse able to assist pt and present during sessions.     Therapy recommendation(s):    Continue home exercise program and walking program at home.

## 2023-03-17 NOTE — PLAN OF CARE
Goal Outcome Evaluation:  Patient a/o x4 SBA with walker. Medicated for pain with oxy 5mg. Wound vac in place, probable discharge tomorrow to home      Plan of Care Reviewed With: patient

## 2023-03-17 NOTE — PROGRESS NOTES
Patient discharged to home accompanied by spouse, vss, a-febrile, pain medications ( tylenol) given prior to discharge, Prevena wound Vac hooked up prior to discharge.

## 2023-03-19 ENCOUNTER — PATIENT OUTREACH (OUTPATIENT)
Dept: CARE COORDINATION | Facility: CLINIC | Age: 73
End: 2023-03-19
Payer: COMMERCIAL

## 2023-03-23 NOTE — ANESTHESIA POSTPROCEDURE EVALUATION
Patient: Tiffany Stovall    Procedure: Procedure(s):  INTERTROCHANTERIC HIP NAILING       Anesthesia Type:  General    Note:  Disposition: Inpatient   Postop Pain Control: Uneventful            Sign Out: Well controlled pain   PONV: No   Neuro/Psych: Uneventful            Sign Out: Acceptable/Baseline neuro status   Airway/Respiratory: Uneventful            Sign Out: Acceptable/Baseline resp. status   CV/Hemodynamics: Uneventful            Sign Out: Acceptable CV status   Other NRE:    DID A NON-ROUTINE EVENT OCCUR? No           Last vitals:  Vitals Value Taken Time   /64 03/11/23 1730   Temp 36.3  C (97.4  F) 03/11/23 1730   Pulse 80 03/11/23 1730   Resp 11 03/11/23 1730   SpO2 98 % 03/11/23 1732       Electronically Signed By: Kiara Doe  March 23, 2023  8:18 AM

## 2023-03-27 ENCOUNTER — TRANSFERRED RECORDS (OUTPATIENT)
Dept: HEALTH INFORMATION MANAGEMENT | Facility: CLINIC | Age: 73
End: 2023-03-27

## 2023-03-29 ASSESSMENT — ASTHMA QUESTIONNAIRES
ACT_TOTALSCORE: 25
ACT_TOTALSCORE: 25
QUESTION_1 LAST FOUR WEEKS HOW MUCH OF THE TIME DID YOUR ASTHMA KEEP YOU FROM GETTING AS MUCH DONE AT WORK, SCHOOL OR AT HOME: NONE OF THE TIME
QUESTION_5 LAST FOUR WEEKS HOW WOULD YOU RATE YOUR ASTHMA CONTROL: COMPLETELY CONTROLLED
QUESTION_2 LAST FOUR WEEKS HOW OFTEN HAVE YOU HAD SHORTNESS OF BREATH: NOT AT ALL
QUESTION_4 LAST FOUR WEEKS HOW OFTEN HAVE YOU USED YOUR RESCUE INHALER OR NEBULIZER MEDICATION (SUCH AS ALBUTEROL): NOT AT ALL
QUESTION_3 LAST FOUR WEEKS HOW OFTEN DID YOUR ASTHMA SYMPTOMS (WHEEZING, COUGHING, SHORTNESS OF BREATH, CHEST TIGHTNESS OR PAIN) WAKE YOU UP AT NIGHT OR EARLIER THAN USUAL IN THE MORNING: NOT AT ALL

## 2023-03-30 ENCOUNTER — OFFICE VISIT (OUTPATIENT)
Dept: FAMILY MEDICINE | Facility: CLINIC | Age: 73
End: 2023-03-30
Payer: COMMERCIAL

## 2023-03-30 ENCOUNTER — MYC MEDICAL ADVICE (OUTPATIENT)
Dept: FAMILY MEDICINE | Facility: CLINIC | Age: 73
End: 2023-03-30

## 2023-03-30 VITALS
WEIGHT: 150.4 LBS | HEART RATE: 76 BPM | RESPIRATION RATE: 18 BRPM | TEMPERATURE: 97.8 F | DIASTOLIC BLOOD PRESSURE: 68 MMHG | BODY MASS INDEX: 24.17 KG/M2 | HEIGHT: 66 IN | OXYGEN SATURATION: 100 % | SYSTOLIC BLOOD PRESSURE: 148 MMHG

## 2023-03-30 DIAGNOSIS — I10 HYPERTENSION GOAL BP (BLOOD PRESSURE) < 140/90: Primary | ICD-10-CM

## 2023-03-30 DIAGNOSIS — D03.52 MELANOMA IN SITU OF RIGHT BREAST (H): ICD-10-CM

## 2023-03-30 DIAGNOSIS — D62 ANEMIA DUE TO BLOOD LOSS, ACUTE: ICD-10-CM

## 2023-03-30 DIAGNOSIS — E03.4 HYPOTHYROIDISM DUE TO ACQUIRED ATROPHY OF THYROID: ICD-10-CM

## 2023-03-30 DIAGNOSIS — R79.0 LOW MAGNESIUM LEVEL: ICD-10-CM

## 2023-03-30 DIAGNOSIS — D75.839 THROMBOCYTOSIS: ICD-10-CM

## 2023-03-30 DIAGNOSIS — R74.8 ELEVATED LIVER ENZYMES: ICD-10-CM

## 2023-03-30 DIAGNOSIS — E83.52 HYPERCALCEMIA: ICD-10-CM

## 2023-03-30 DIAGNOSIS — Z12.31 ENCOUNTER FOR SCREENING MAMMOGRAM FOR BREAST CANCER: ICD-10-CM

## 2023-03-30 DIAGNOSIS — M85.89 OSTEOPENIA OF MULTIPLE SITES: ICD-10-CM

## 2023-03-30 DIAGNOSIS — I48.0 PAF (PAROXYSMAL ATRIAL FIBRILLATION) (H): ICD-10-CM

## 2023-03-30 DIAGNOSIS — E78.5 HYPERLIPIDEMIA LDL GOAL <130: ICD-10-CM

## 2023-03-30 LAB
ALBUMIN SERPL-MCNC: 4.2 G/DL (ref 3.4–5)
ALP SERPL-CCNC: 131 U/L (ref 40–150)
ALT SERPL W P-5'-P-CCNC: 31 U/L (ref 0–50)
ANION GAP SERPL CALCULATED.3IONS-SCNC: 6 MMOL/L (ref 3–14)
AST SERPL W P-5'-P-CCNC: 19 U/L (ref 0–45)
BILIRUB SERPL-MCNC: 0.7 MG/DL (ref 0.2–1.3)
BUN SERPL-MCNC: 13 MG/DL (ref 7–30)
CALCIUM SERPL-MCNC: 10.4 MG/DL (ref 8.5–10.1)
CHLORIDE BLD-SCNC: 102 MMOL/L (ref 94–109)
CO2 SERPL-SCNC: 26 MMOL/L (ref 20–32)
CREAT SERPL-MCNC: 0.63 MG/DL (ref 0.52–1.04)
ERYTHROCYTE [DISTWIDTH] IN BLOOD BY AUTOMATED COUNT: 13.2 % (ref 10–15)
GFR SERPL CREATININE-BSD FRML MDRD: >90 ML/MIN/1.73M2
GLUCOSE BLD-MCNC: 107 MG/DL (ref 70–99)
HCT VFR BLD AUTO: 36.3 % (ref 35–47)
HGB BLD-MCNC: 12 G/DL (ref 11.7–15.7)
MAGNESIUM SERPL-MCNC: 1.9 MG/DL (ref 1.6–2.3)
MCH RBC QN AUTO: 31.6 PG (ref 26.5–33)
MCHC RBC AUTO-ENTMCNC: 33.1 G/DL (ref 31.5–36.5)
MCV RBC AUTO: 96 FL (ref 78–100)
PLATELET # BLD AUTO: 585 10E3/UL (ref 150–450)
POTASSIUM BLD-SCNC: 4 MMOL/L (ref 3.4–5.3)
PROT SERPL-MCNC: 7.6 G/DL (ref 6.8–8.8)
RBC # BLD AUTO: 3.8 10E6/UL (ref 3.8–5.2)
SODIUM SERPL-SCNC: 134 MMOL/L (ref 133–144)
WBC # BLD AUTO: 4.8 10E3/UL (ref 4–11)

## 2023-03-30 PROCEDURE — 80053 COMPREHEN METABOLIC PANEL: CPT | Performed by: FAMILY MEDICINE

## 2023-03-30 PROCEDURE — 83735 ASSAY OF MAGNESIUM: CPT | Performed by: FAMILY MEDICINE

## 2023-03-30 PROCEDURE — 36415 COLL VENOUS BLD VENIPUNCTURE: CPT | Performed by: FAMILY MEDICINE

## 2023-03-30 PROCEDURE — 85027 COMPLETE CBC AUTOMATED: CPT | Performed by: FAMILY MEDICINE

## 2023-03-30 PROCEDURE — 99495 TRANSJ CARE MGMT MOD F2F 14D: CPT | Performed by: FAMILY MEDICINE

## 2023-03-30 ASSESSMENT — PAIN SCALES - GENERAL: PAINLEVEL: MODERATE PAIN (5)

## 2023-03-30 NOTE — PROGRESS NOTES
Assessment & Plan     Hypertension goal BP (blood pressure) < 140/90  Blood pressure is somewhat elevated today after walking in from parking lot.  She has noted a blood elevated blood pressure at home today as well.  She has been off the losartan since prior to her discharge due to hypotension at the hospital.  She did take a losartan this morning but within the last few hours.  She continues on the diltiazem and hydrochlorothiazide as previous.  Laboratory testing today to reassess sodium and kidney function.  Resuming losartan on a regular basis and continue to monitor blood pressure at home is recommended.  - Comprehensive metabolic panel (BMP + Alb, Alk Phos, ALT, AST, Total. Bili, TP); Future  - Comprehensive metabolic panel (BMP + Alb, Alk Phos, ALT, AST, Total. Bili, TP)  - losartan (COZAAR) 50 MG tablet; Take 1 tablet (50 mg) by mouth daily  - hydrochlorothiazide (HYDRODIURIL) 25 MG tablet; Take 1 tablet (25 mg) by mouth daily  - potassium chloride ER (KLOR-CON) 20 MEQ CR tablet; Take 1 tablet (20 mEq) by mouth daily    Anemia due to blood loss, acute  Improvement noted in her hemoglobin.  Not quite back to her prefall level but is in the normal range currently.  Platelet count is elevated which is likely inflammatory/reactive in process.  Follow-up will be planned for this.  - CBC with platelets; Future  - CBC with platelets  - CBC with platelets; Future    Osteopenia of multiple sites  Previous DEXA indicated osteopenia.  She has now had a fracture related to a significant fall which technically pushes her into the osteoporosis range.  Follow-up bone density is planned with the anticipation of ongoing discussion regarding treatment.  She is unlikely to tolerate oral bisphosphonates due to the ongoing struggle she has with heartburn.  Reclast would potentially be a good option for her but we will obtain the bone density to have a baseline prior to initiating that or any other therapy.  - DX  Hip/Pelvis/Spine; Future    Low magnesium level  Magnesium level was low in the hospital -recheck today normal.  - Magnesium; Future  - Magnesium    Elevated liver enzymes  Elevated LFTs were noted during her hospitalization.  She has been off Lipitor and repeat testing today is normal.  - Comprehensive metabolic panel (BMP + Alb, Alk Phos, ALT, AST, Total. Bili, TP); Future  - Comprehensive metabolic panel (BMP + Alb, Alk Phos, ALT, AST, Total. Bili, TP)    Hypercalcemia  Elevated calcium noted on her testing today.  It has previously been normal.  We will plan follow-up lab in 2 to 3 weeks to verify resolution  - Basic metabolic panel  (Ca, Cl, CO2, Creat, Gluc, K, Na, BUN); Future    Thrombocytosis  Likely reactive in nature as her previous testing has been normal.  Repeat testing in 2 to 3 weeks recommended.  - CBC with platelets; Future    Melanoma in situ of right breast (H)  Underwent surgical excision in November 2022.  Ongoing care with dermatology.    PAF (paroxysmal atrial fibrillation) (H)  Recurrent episode of atrial fib associated with her hospitalization-likely related to fluid overload at the time.  Rate and rhythm controlled currently and she is anticoagulated.  She will follow-up with her cardiologist once she is more mobile in the next few weeks.    Hyperlipidemia LDL goal <130  LFTs have returned to normal and she will resume her cholesterol medication refills are sent  - atorvastatin (LIPITOR) 40 MG tablet; Take 1 tablet (40 mg) by mouth daily Hold until PCP visit and recheck labs.    Hypothyroidism due to acquired atrophy of thyroid  Lab testing in early March indicated euthyroid state.  Refills are given  - levothyroxine (SYNTHROID/LEVOTHROID) 100 MCG tablet; Take 1 tablet (100 mcg) by mouth daily    Encounter for screening mammogram for breast cancer  Mammogram is due in April, order available  - MA Screen Bilateral w/Td; Future    Review of prior external note(s) from - CareEverywhere  information from [facility] cardiology reviewed  Review of the result(s) of each unique test - Serial hemoglobin, LFTs, hip x-ray  Ordering of each unique test  Prescription drug management       MED REC REQUIRED  Post Medication Reconciliation Status: discharge medications reconciled and changed, per note/orders  Patient Instructions   Follow up labs today.     Form for parking certificate today.    Mammogram and bone density when able - due in late April.       Refills will be updated when results return.              Darby Williamson MD  [facility] LISANDRA Levi is a 72 year old, presenting for the following health issues:  Hospital F/U    Additional Questions 3/30/2023   Roomed by Hilary RPAKASH   Accompanied by -     Patient Reported Additional Medications 3/30/2023   Patient reports taking the following new medications none     HPI     Post Discharge Outreach 3/19/2023   Admission Date 3/11/2023   Reason for Admission Status post mechanical fall  Left intertrochanteric femur fracture  S/p left hip cephalomedullary nailing 3/11/2023  Status post A-fib with RVR 3/15  Volume overload likely iatrogenic.  Acute blood loss anemia  Status post 2 PRBC transfusion.  Hyponatremia  Transaminitis postoperative -likely due to hypotension - improving.   Post operative Hypotension improved   Discharge Date 3/17/2023   Discharge Diagnosis Status post mechanical fall  Left intertrochanteric femur fracture  S/p left hip cephalomedullary nailing 3/11/2023  Status post A-fib with RVR 3/15  Volume overload likely iatrogenic.  Acute blood loss anemia  Status post 2 PRBC transfusion.  Hyponatremia  Transaminitis postoperative -likely due to hypotension - improving.   Post operative Hypotension improved   How are you doing now that you are home? doing well   How are your symptoms? (Red Flag symptoms escalate to triage hotline per guidelines) Improved   Do you feel your condition is stable enough  to be safe at home until your provider visit? Yes   Does the patient have their discharge instructions?  Yes   Does the patient have questions regarding their discharge instructions?  No   Were you started on any new medications or were there changes to any of your previous medications?  Yes   Does the patient have all of their medications? Yes   Do you have questions regarding any of your medications?  No   Do you have all of your needed medical supplies or equipment (DME)?  (i.e. oxygen tank, CPAP, cane, etc.) Yes   Discharge follow-up appointment scheduled within 14 calendar days?  No     Hospital Follow-up Visit:    Hospital/Nursing Home/IP Rehab Facility: [facility]  Date of Admission: 03/11/2023  Date of Discharge: 03/17/2023  Reason(s) for Admission: Fractured hip    Was your hospitalization related to COVID-19? No   Problems taking medications regularly:  None  Medication changes since discharge: Has resumed her losartan due to elevated blood pressure at home in the last few days.  Otherwise continues medication unchanged.  Problems adhering to non-medication therapy:  None    Summary of hospitalization:  [facility] discharge summary reviewed  Diagnostic Tests/Treatments reviewed.  Follow up needed: Labs to include CBC, LFTs.  BMP, osteoporosis evaluation  Other Healthcare Providers Involved in Patient s Care:         Surgical follow-up appointment - Orthopedics and Physical Therapy  Update since discharge: improved.   Plan of care communicated with patient and family     Hyperlipidemia Follow-Up      Are you regularly taking any medication or supplement to lower your cholesterol?   Yes- Lipitor-has been off of it since hospitalization due to elevated liver function testing    Are you having muscle aches or other side effects that you think could be caused by your cholesterol lowering medication?  No    Hypertension Follow-up      Do you check your blood pressure  "regularly outside of the clinic? Yes     Are you following a low salt diet? Yes    Are your blood pressures ever more than 140 on the top number (systolic) OR more   than 90 on the bottom number (diastolic), for example 140/90?  Low blood pressure following her surgery and was sent home on the hydrochlorothiazide and diltiazem.  She was instructed to not take the losartan until follow-up was performed in the office today.  She did resume it today due to elevated blood pressure at home.    Atrial Fibrillation Follow-up      Symptoms: Did have an episode of atrial fibrillation following her hip surgery that prompted a transfer to the coronary care unit an additional night of hospitalization.  No current symptoms.    Stroke prevention: DOAC (Eliquis, Xarelto, Pradaxa)    Date 3/16/2023 3/16/2023 3/17/2023 3/17/2023 3/30/2023   Pulse 81 80 80 84 76     Current [id number] Score: 3 points, which represents a 3.2% annual risk of major embolic event, without anti-coagulation or an LAAO device.     Hypothyroidism Follow-up      Since last visit, patient describes the following symptoms: Weight stable, no hair loss, no skin changes, no constipation, no loose stools          Review of Systems   Constitutional, HEENT, cardiovascular, pulmonary, GI, , musculoskeletal, neuro, skin, endocrine and psych systems are negative, except as otherwise noted.      Objective    BP (!) 148/68 (BP Location: Right arm, Patient Position: Sitting, Cuff Size: Adult Regular)   Pulse 76   Temp 97.8  F (36.6  C) (Tympanic)   Resp 18   Ht 1.664 m (5' 5.5\")   Wt 68.2 kg (150 lb 6.4 oz)   LMP  (LMP Unknown)   SpO2 100%   BMI 24.65 kg/m    Body mass index is 24.65 kg/m .  Physical Exam   GENERAL: alert and no distress  NECK: no adenopathy, no asymmetry, masses, or scars and thyroid normal to palpation  RESP: lungs clear to auscultation - no rales, rhonchi or wheezes  CV: regular rate and rhythm, normal S1 S2, no S3 or S4, no murmur, click or " rub, no peripheral edema and peripheral pulses strong  MS: Antalgic gait while using walker due to recent hip surgery.  Limited ability to lift her left leg against gravity.  SKIN: Resolving ecchymosis in the left upper thigh region.  Mild tenderness to palpation medial thigh noted.  No warmth to touch.  NEURO: Normal strength and tone, mentation intact and speech normal  BACK: no CVA tenderness, no paralumbar tenderness  PSYCH: mentation appears normal, affect normal/bright    Results for orders placed or performed in visit on 03/30/23   Comprehensive metabolic panel (BMP + Alb, Alk Phos, ALT, AST, Total. Bili, TP)     Status: Abnormal   Result Value Ref Range    Sodium 134 133 - 144 mmol/L    Potassium 4.0 3.4 - 5.3 mmol/L    Chloride 102 94 - 109 mmol/L    Carbon Dioxide (CO2) 26 20 - 32 mmol/L    Anion Gap 6 3 - 14 mmol/L    Urea Nitrogen 13 7 - 30 mg/dL    Creatinine 0.63 0.52 - 1.04 mg/dL    Calcium 10.4 (H) 8.5 - 10.1 mg/dL    Glucose 107 (H) 70 - 99 mg/dL    Alkaline Phosphatase 131 40 - 150 U/L    AST 19 0 - 45 U/L    ALT 31 0 - 50 U/L    Protein Total 7.6 6.8 - 8.8 g/dL    Albumin 4.2 3.4 - 5.0 g/dL    Bilirubin Total 0.7 0.2 - 1.3 mg/dL    GFR Estimate >90 >60 mL/min/1.73m2   Magnesium     Status: Normal   Result Value Ref Range    Magnesium 1.9 1.6 - 2.3 mg/dL   CBC with platelets     Status: Abnormal   Result Value Ref Range    WBC Count 4.8 4.0 - 11.0 10e3/uL    RBC Count 3.80 3.80 - 5.20 10e6/uL    Hemoglobin 12.0 11.7 - 15.7 g/dL    Hematocrit 36.3 35.0 - 47.0 %    MCV 96 78 - 100 fL    MCH 31.6 26.5 - 33.0 pg    MCHC 33.1 31.5 - 36.5 g/dL    RDW 13.2 10.0 - 15.0 %    Platelet Count 585 (H) 150 - 450 10e3/uL             This chart was documented by provider using a voice activated software called Dragon in addition to manual typing. There may be vocabulary errors or other grammatical errors due to this.

## 2023-03-30 NOTE — PATIENT INSTRUCTIONS
Follow up labs today.     Form for parking certificate today.    Mammogram and bone density when able - due in late April.       Refills will be updated when results return.

## 2023-03-31 RX ORDER — POTASSIUM CHLORIDE 1500 MG/1
20 TABLET, EXTENDED RELEASE ORAL DAILY
Qty: 90 TABLET | Refills: 1 | Status: SHIPPED | OUTPATIENT
Start: 2023-03-31 | End: 2023-08-22

## 2023-03-31 RX ORDER — LOSARTAN POTASSIUM 50 MG/1
50 TABLET ORAL DAILY
Qty: 90 TABLET | Refills: 1 | Status: SHIPPED | OUTPATIENT
Start: 2023-03-31 | End: 2023-08-22

## 2023-03-31 RX ORDER — LEVOTHYROXINE SODIUM 100 UG/1
100 TABLET ORAL DAILY
Qty: 90 TABLET | Refills: 3 | Status: SHIPPED | OUTPATIENT
Start: 2023-03-31 | End: 2023-12-07

## 2023-03-31 RX ORDER — HYDROCHLOROTHIAZIDE 25 MG/1
25 TABLET ORAL DAILY
Qty: 90 TABLET | Refills: 1 | Status: SHIPPED | OUTPATIENT
Start: 2023-03-31 | End: 2023-08-22

## 2023-03-31 RX ORDER — ATORVASTATIN CALCIUM 40 MG/1
40 TABLET, FILM COATED ORAL DAILY
Qty: 90 TABLET | Refills: 3 | Status: SHIPPED | OUTPATIENT
Start: 2023-03-31

## 2023-04-19 ENCOUNTER — LAB (OUTPATIENT)
Dept: LAB | Facility: CLINIC | Age: 73
End: 2023-04-19
Payer: COMMERCIAL

## 2023-04-19 DIAGNOSIS — E83.52 HYPERCALCEMIA: ICD-10-CM

## 2023-04-19 DIAGNOSIS — D62 ANEMIA DUE TO BLOOD LOSS, ACUTE: ICD-10-CM

## 2023-04-19 DIAGNOSIS — D75.839 THROMBOCYTOSIS: ICD-10-CM

## 2023-04-19 LAB
ANION GAP SERPL CALCULATED.3IONS-SCNC: 5 MMOL/L (ref 3–14)
BUN SERPL-MCNC: 10 MG/DL (ref 7–30)
CALCIUM SERPL-MCNC: 9.8 MG/DL (ref 8.5–10.1)
CHLORIDE BLD-SCNC: 103 MMOL/L (ref 94–109)
CO2 SERPL-SCNC: 27 MMOL/L (ref 20–32)
CREAT SERPL-MCNC: 0.66 MG/DL (ref 0.52–1.04)
ERYTHROCYTE [DISTWIDTH] IN BLOOD BY AUTOMATED COUNT: 12.8 % (ref 10–15)
GFR SERPL CREATININE-BSD FRML MDRD: >90 ML/MIN/1.73M2
GLUCOSE BLD-MCNC: 102 MG/DL (ref 70–99)
HCT VFR BLD AUTO: 37.3 % (ref 35–47)
HGB BLD-MCNC: 12.2 G/DL (ref 11.7–15.7)
MCH RBC QN AUTO: 31 PG (ref 26.5–33)
MCHC RBC AUTO-ENTMCNC: 32.7 G/DL (ref 31.5–36.5)
MCV RBC AUTO: 95 FL (ref 78–100)
PLATELET # BLD AUTO: 296 10E3/UL (ref 150–450)
POTASSIUM BLD-SCNC: 3.9 MMOL/L (ref 3.4–5.3)
RBC # BLD AUTO: 3.94 10E6/UL (ref 3.8–5.2)
SODIUM SERPL-SCNC: 135 MMOL/L (ref 133–144)
WBC # BLD AUTO: 4.4 10E3/UL (ref 4–11)

## 2023-04-19 PROCEDURE — 36415 COLL VENOUS BLD VENIPUNCTURE: CPT

## 2023-04-19 PROCEDURE — 85027 COMPLETE CBC AUTOMATED: CPT

## 2023-04-19 PROCEDURE — 80048 BASIC METABOLIC PNL TOTAL CA: CPT

## 2023-04-19 NOTE — RESULT ENCOUNTER NOTE
Your sodium, potassium, calcium and kidney function are all normal.  Your blood cell counts are normal.  Your blood sugar is above the listed range but I assume you were not fasting for these tests since the blood was collected at 11:30 in the morning.  If this is accurate the blood sugar of 102 would be normal.  Please call or MyChart message me if you have any questions.    PSK

## 2023-05-03 ENCOUNTER — IMMUNIZATION (OUTPATIENT)
Dept: FAMILY MEDICINE | Facility: CLINIC | Age: 73
End: 2023-05-03
Payer: COMMERCIAL

## 2023-05-03 DIAGNOSIS — Z23 HIGH PRIORITY FOR 2019-NCOV VACCINE: ICD-10-CM

## 2023-05-03 DIAGNOSIS — Z23 ENCOUNTER FOR IMMUNIZATION: Primary | ICD-10-CM

## 2023-05-03 PROCEDURE — 0124A COVID-19 BIVALENT 12+ (PFIZER): CPT

## 2023-05-03 PROCEDURE — 91312 COVID-19 BIVALENT 12+ (PFIZER): CPT

## 2023-05-03 PROCEDURE — 99207 PR NO CHARGE NURSE ONLY: CPT

## 2023-05-03 NOTE — PROGRESS NOTES
Prior to immunization administration, verified patients identity using patient s name and date of birth. Please see Immunization Activity for additional information.     Screening Questionnaire for Adult Immunization    Are you sick today?   No   Do you have allergies to medications, food, a vaccine component or latex?   Yes   Have you ever had a serious reaction after receiving a vaccination?   No   Do you have a long-term health problem with heart, lung, kidney, or metabolic disease (e.g., diabetes), asthma, a blood disorder, no spleen, complement component deficiency, a cochlear implant, or a spinal fluid leak?  Are you on long-term aspirin therapy?   No   Do you have cancer, leukemia, HIV/AIDS, or any other immune system problem?   No   Do you have a parent, brother, or sister with an immune system problem?   No   In the past 3 months, have you taken medications that affect  your immune system, such as prednisone, other steroids, or anticancer drugs; drugs for the treatment of rheumatoid arthritis, Crohn s disease, or psoriasis; or have you had radiation treatments?   No   Have you had a seizure, or a brain or other nervous system problem?   No   During the past year, have you received a transfusion of blood or blood    products, or been given immune (gamma) globulin or antiviral drug?   No   For women: Are you pregnant or is there a chance you could become       pregnant during the next month?   No   Have you received any vaccinations in the past 4 weeks?   No     Immunization questionnaire was positive for at least one answer.  Notified provider.    I have reviewed the following standing orders:   This patient is due and qualifies for the Covid-19 vaccine.     Click here for COVID-19 Standing Order    I have reviewed the vaccines inclusion and exclusion criteria; No concerns regarding eligibility.     Injection of pfizer bivalent given by Laura Connors CMA. Patient instructed to remain in clinic for 15  minutes afterwards, and to report any adverse reactions.     Screening performed by Laura Connors CMA on 5/3/2023 at 1:49 PM.

## 2023-05-15 ENCOUNTER — TRANSFERRED RECORDS (OUTPATIENT)
Dept: HEALTH INFORMATION MANAGEMENT | Facility: CLINIC | Age: 73
End: 2023-05-15
Payer: COMMERCIAL

## 2023-06-21 ENCOUNTER — HOSPITAL ENCOUNTER (OUTPATIENT)
Dept: MAMMOGRAPHY | Facility: CLINIC | Age: 73
Discharge: HOME | End: 2023-06-21
Attending: FAMILY MEDICINE
Payer: COMMERCIAL

## 2023-06-21 ENCOUNTER — HOSPITAL ENCOUNTER (OUTPATIENT)
Dept: BONE DENSITY | Facility: CLINIC | Age: 73
Discharge: HOME | End: 2023-06-21
Attending: FAMILY MEDICINE
Payer: COMMERCIAL

## 2023-06-21 DIAGNOSIS — M85.89 OSTEOPENIA OF MULTIPLE SITES: ICD-10-CM

## 2023-06-21 DIAGNOSIS — Z12.31 ENCOUNTER FOR SCREENING MAMMOGRAM FOR BREAST CANCER: ICD-10-CM

## 2023-06-21 PROCEDURE — 77080 DXA BONE DENSITY AXIAL: CPT

## 2023-06-21 PROCEDURE — 77067 SCR MAMMO BI INCL CAD: CPT

## 2023-06-26 ENCOUNTER — TRANSFERRED RECORDS (OUTPATIENT)
Dept: HEALTH INFORMATION MANAGEMENT | Facility: CLINIC | Age: 73
End: 2023-06-26
Payer: COMMERCIAL

## 2023-06-27 ENCOUNTER — OFFICE VISIT (OUTPATIENT)
Dept: OPTOMETRY | Facility: CLINIC | Age: 73
End: 2023-06-27
Payer: COMMERCIAL

## 2023-06-27 DIAGNOSIS — H04.123 DRY EYE SYNDROME OF BOTH EYES: ICD-10-CM

## 2023-06-27 DIAGNOSIS — H52.01 HYPEROPIA OF RIGHT EYE: ICD-10-CM

## 2023-06-27 DIAGNOSIS — Z96.1 PSEUDOPHAKIA OF BOTH EYES: ICD-10-CM

## 2023-06-27 DIAGNOSIS — Z01.00 EXAMINATION OF EYES AND VISION: Primary | ICD-10-CM

## 2023-06-27 DIAGNOSIS — H10.13 ALLERGIC CONJUNCTIVITIS OF BOTH EYES: ICD-10-CM

## 2023-06-27 DIAGNOSIS — H52.223 REGULAR ASTIGMATISM OF BOTH EYES: ICD-10-CM

## 2023-06-27 DIAGNOSIS — H52.4 PRESBYOPIA: ICD-10-CM

## 2023-06-27 DIAGNOSIS — H17.9 CORNEAL SCAR: ICD-10-CM

## 2023-06-27 PROCEDURE — 92015 DETERMINE REFRACTIVE STATE: CPT | Performed by: OPTOMETRIST

## 2023-06-27 PROCEDURE — 92014 COMPRE OPH EXAM EST PT 1/>: CPT | Performed by: OPTOMETRIST

## 2023-06-27 ASSESSMENT — REFRACTION_WEARINGRX
OS_CYLINDER: +1.25
OS_SPHERE: -0.75
OS_AXIS: 120
OD_AXIS: 045
SPECS_TYPE: PAL
OS_ADD: +2.50
OD_ADD: +2.50
OD_SPHERE: -1.50
OD_CYLINDER: +1.25

## 2023-06-27 ASSESSMENT — VISUAL ACUITY
METHOD: SNELLEN - LINEAR
OD_SC: 20/100
OS_CC: 20/20-1
OS_CC: 20/20
OD_CC: 20/20
OD_CC: 20/20-1
CORRECTION_TYPE: GLASSES
OS_SC: 20/20

## 2023-06-27 ASSESSMENT — SLIT LAMP EXAM - LIDS
COMMENTS: MEIBOMIAN GLAND DYSFUNCTION
COMMENTS: MEIBOMIAN GLAND DYSFUNCTION

## 2023-06-27 ASSESSMENT — KERATOMETRY
OS_AXISANGLE_DEGREES: 098
OD_K2POWER_DIOPTERS: 45.25
OD_AXISANGLE_DEGREES: 090
OS_K1POWER_DIOPTERS: 43.00
OD_K1POWER_DIOPTERS: 43.00
OD_AXISANGLE2_DEGREES: 180
OS_K2POWER_DIOPTERS: 45.50
OS_AXISANGLE2_DEGREES: 008

## 2023-06-27 ASSESSMENT — CUP TO DISC RATIO
OD_RATIO: 0.2
OS_RATIO: 0.3

## 2023-06-27 ASSESSMENT — TONOMETRY
OS_IOP_MMHG: 13
IOP_METHOD: TONOPEN
OD_IOP_MMHG: 13

## 2023-06-27 ASSESSMENT — CONF VISUAL FIELD
OS_SUPERIOR_TEMPORAL_RESTRICTION: 0
OD_INFERIOR_NASAL_RESTRICTION: 0
OS_NORMAL: 1
OD_NORMAL: 1
OD_SUPERIOR_NASAL_RESTRICTION: 0
OD_SUPERIOR_TEMPORAL_RESTRICTION: 0
OS_INFERIOR_NASAL_RESTRICTION: 0
OS_INFERIOR_TEMPORAL_RESTRICTION: 0
OS_SUPERIOR_NASAL_RESTRICTION: 0
OD_INFERIOR_TEMPORAL_RESTRICTION: 0

## 2023-06-27 ASSESSMENT — REFRACTION_MANIFEST
OD_AXIS: 045
OS_CYLINDER: +1.25
OD_ADD: +2.50
OD_CYLINDER: +1.25
OS_AXIS: 120
OS_SPHERE: -0.75
OS_ADD: +2.50
OD_SPHERE: -1.50
METHOD_AUTOREFRACTION: 1

## 2023-06-27 ASSESSMENT — EXTERNAL EXAM - LEFT EYE: OS_EXAM: NORMAL

## 2023-06-27 ASSESSMENT — EXTERNAL EXAM - RIGHT EYE: OD_EXAM: NORMAL

## 2023-06-27 NOTE — LETTER
6/27/2023         RE: Tiffany Stovall  [address]  [place] [place] [facility] [place] [facility] [id number]        Dear Colleague,    Thank you for referring your patient, Tiffany Stovall, to the [facility]. Please see a copy of my visit note below.    Chief Complaint   Patient presents with     Annual Eye Exam         Last Eye Exam: 5-  Dilated Previously: Yes    What are you currently using to see?  glasses       Distance Vision Acuity: Satisfied with vision,pt still has haziness     Near Vision Acuity: Satisfied with vision while reading  with otc readers    Eye Comfort: good,little itchy with cottonwood season  Do you use eye drops? 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 Chip Whitlock at [facility] with Yag right eye     Concerns for retinal detachment last year- was referred to retina and normal exam.    Patricia Arreguin Optometric Assistant, A.B.O.C.          Medical, surgical and family histories reviewed and updated 6/27/2023.       OBJECTIVE: See Ophthalmology exam    ASSESSMENT:    ICD-10-CM    1. Examination of eyes and vision  Z01.00 EYE EXAM (SIMPLE-NONBILLABLE)      2. Presbyopia  H52.4 REFRACTION      3. Hyperopia of right eye  H52.01 REFRACTION      4. Regular astigmatism of both eyes  H52.223 REFRACTION      5. Pseudophakia of both eyes  Z96.1 EYE EXAM (SIMPLE-NONBILLABLE)      6. Dry eye syndrome of both eyes  H04.123 EYE EXAM (SIMPLE-NONBILLABLE)      7. Corneal scar  H17.9 EYE EXAM (SIMPLE-NONBILLABLE)      8. Allergic conjunctivitis of both eyes  H10.13 EYE EXAM (SIMPLE-NONBILLABLE)          PLAN:     Patient Instructions   Eyeglass prescription given.  No change in eyeglass prescription.    Artificial tears- 1 drop both eyes 2-4 x daily.    OTC Pataday or Lastacaft to be used once daily for itchy eyes.  Use as  needed.    Return in 1 year for a complete eye exam or sooner if needed.    Jose Luis Foss, OD           Again, thank you for allowing me to participate in the care of your patient.        Sincerely,        Jose Luis Foss, OD

## 2023-06-27 NOTE — PATIENT INSTRUCTIONS
Eyeglass prescription given.  No change in eyeglass prescription.    Artificial tears- 1 drop both eyes 2-4 x daily.    OTC Pataday or Lastacaft to be used once daily for itchy eyes.  Use as needed.    Return in 1 year for a complete eye exam or sooner if needed.    Jose Luis Foss, OD    The affects of the dilating drops last for 4- 6 hours.  You will be more sensitive to light and vision will be blurry up close.  Do not drive if you do not feel comfortable.  Mydriatic sunglasses were given if needed.    There is a combination of three treatments which can greatly improve symptoms of dry eyes.     Artificial tears  Heat (eyes closed)  Eyelid and eyelash cleansing (eyes closed)     Use one drop of artificial tears both eyes 4 x daily.  Once in the morning, lunch, dinner and bedtime. Continue to use the drops regardless if your eyes are comfortable or not.  Artificial tears work best as a preventative and not as well after your eyes are starting to bother you.  It may take 4- 6 weeks of using the drops before you notice improvement.  If after that time you are still having problems schedule an appointment for an evaluation and discussion of different treatments such as Restasis or Xiidra.  Dry eyes are a chronic condition and you may have more symptoms at certain times of the year.    Excess tearing can be due to the right tears not working properly or a blockage in the tear drainage system.  You can try using artificial tears 1 drop both eyes 4 x day.  If the excess tearing is bothersome after 4-6 weeks of treatment then we can send you for further testing.  This would entail a referral to our oculoplastic specialist Dr. Ambar Mendez at the [facility]-[phone].    Recommended brands are:    Systane Complete  Systane Ultra  Systane Balance  Refresh Advanced Optive  Refresh Relieva  Blink    Recommended brands for contact lens wearers are:    Systane contacts  Refresh contacts  Blink contacts    If you are  using drops more than 4 x day or have sensitivities to preservatives I recommend non preserved artificial tears.  These come in 1 use vials.  They can be used every 1-2 hours.  Do not reuse the vials.    Recommended brands are:    Refresh Optive Nirmal-3  Systane- preservative free  Refresh-  preservative free  Blink- preservative free    Gels or ointment can be used at night.    Recommended brands are:    Systane Gel  Refresh Gel  Blink Gel  Genteal Gel    Systane night time (ointment)  Refresh Celluvisc  Refresh PM (ointment)      Visine, Clear Eyes or Murine (drops that get the red out) can irritate the eyes and cause a rebound effect where the eyes become more red and you end up using more drops.  Avoid drops containing tetrahydrozoline, naphazoline, phenylephrine, oxymetazoline.      OTC Lumify is a newer product that gives immediate redness relief without the rebound effect.  Use as needed to take the redness out.    Artificial tears may be used with other drops (such as allergy, glaucoma, antibiotics) around the same time.  Be sure to wait 5 minutes in between drops.    Heat to the eyelids can also improve your symptoms of dry eyes.  Preet heat masks can be purchased at Amazon to be used nightly for 10-15 minutes.  Other options are gel masks that can be put in the microwave and purchased at most pharmacies.      Tea Tree Oil eyelid cleansers recommended are Ocusoft Oust foam cleanser to cleanse eyelids/lashes at night and in the am. Other options are Blephadex or Cliradex eyelid wipes.  KEEP EYES CLOSED when using these products.  These can be purchased on amazon.com   A good product for make up remover with tea tree oil is WeLoveEyes.  This can be found at www.[employer] or [employer].    Other good eyelid cleansers have hypochlorous which removes excess bacteria and is safe around the eyes. Products are Avenova, Ocusoft Hypochlor or Heyedrate. Spray solution onto cotton pad, close eyes and gently  apply to eyelids and eyelashes using side to side motion.  You can also KEEP EYES CLOSED spray and rub into eyelashes.  You do not need to rinse it off. Use morning and evening. These products can be found on Amazon.  You can check with your local pharmacy and see if they can order if for you if they don't have it.    Other brands of eyelid cleansing wipes are:    Ocusoft wipes  Systane wipes    A great eye make up line is [email or web address]/.                          Optometry Providers       Clinic Locations                                 Telephone Number   Dr. Kendra Ramesh    [place]   [facility] Hilda Armendariz [phone]     [facility] Hours:                [facility] Hours:       [facility] Hours:   [address]   [address] [facility]     [address] [facility]  [place] [address]   [place] [address]    [place] [address]  Phone: [phone]                    Phone: [phone]     Phone: [phone]                      Monday 8:00-6:00                          Monday 8:00-6:00                          Monday 8:00-6:00              Tuesday 8:00-6:00                          Tuesday 8:00-6:00                          Tuesday 8:00-6:00              Wednesday 8:00-6:00                  Wednesday 8:00-6:00                   Wednesday 8:00-6:00      Thursday 8:00-6:00                        Thursday 8:00-6:00                         Thursday 8:00-6:00            Friday 8:00-5:00                              Friday 8:00-5:00                              Friday 8:00-5:00    [facility] Hours:   [address] [facility] [place] [address] [phone]    Monday 9:00-6:00  Tuesday 9:00-6:00  Wednesday 9:00-6:00  Thursday 9:00-6:00  Friday 9:00-5:00  As always, Thank you for trusting us with your health care needs!

## 2023-06-27 NOTE — PROGRESS NOTES
Chief Complaint   Patient presents with     Annual Eye Exam         Last Eye Exam: 5-  Dilated Previously: Yes    What are you currently using to see?  glasses       Distance Vision Acuity: Satisfied with vision,pt still has haziness     Near Vision Acuity: Satisfied with vision while reading  with otc readers    Eye Comfort: good,little itchy with cottonwood season  Do you use eye drops? 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 Chip Whitlock at [facility] with Yag right eye     Concerns for retinal detachment last year- was referred to retina and normal exam.    Patricia Arreguin Optometric Assistant, A.B.O.C.          Medical, surgical and family histories reviewed and updated 6/27/2023.       OBJECTIVE: See Ophthalmology exam    ASSESSMENT:    ICD-10-CM    1. Examination of eyes and vision  Z01.00 EYE EXAM (SIMPLE-NONBILLABLE)      2. Presbyopia  H52.4 REFRACTION      3. Hyperopia of right eye  H52.01 REFRACTION      4. Regular astigmatism of both eyes  H52.223 REFRACTION      5. Pseudophakia of both eyes  Z96.1 EYE EXAM (SIMPLE-NONBILLABLE)      6. Dry eye syndrome of both eyes  H04.123 EYE EXAM (SIMPLE-NONBILLABLE)      7. Corneal scar  H17.9 EYE EXAM (SIMPLE-NONBILLABLE)      8. Allergic conjunctivitis of both eyes  H10.13 EYE EXAM (SIMPLE-NONBILLABLE)          PLAN:     Patient Instructions   Eyeglass prescription given.  No change in eyeglass prescription.    Artificial tears- 1 drop both eyes 2-4 x daily.    OTC Pataday or Lastacaft to be used once daily for itchy eyes.  Use as needed.    Return in 1 year for a complete eye exam or sooner if needed.    Jose Luis Foss, OD

## 2023-06-29 ENCOUNTER — MYC MEDICAL ADVICE (OUTPATIENT)
Dept: FAMILY MEDICINE | Facility: CLINIC | Age: 73
End: 2023-06-29
Payer: COMMERCIAL

## 2023-07-14 DIAGNOSIS — T78.40XA ALLERGIC REACTION, INITIAL ENCOUNTER: ICD-10-CM

## 2023-07-14 RX ORDER — EPINEPHRINE 0.3 MG/.3ML
0.3 INJECTION SUBCUTANEOUS
Qty: 2 EACH | Refills: 1 | Status: SHIPPED | OUTPATIENT
Start: 2023-07-14

## 2023-07-15 ENCOUNTER — TELEPHONE (OUTPATIENT)
Dept: FAMILY MEDICINE | Facility: CLINIC | Age: 73
End: 2023-07-15
Payer: COMMERCIAL

## 2023-07-15 DIAGNOSIS — T78.40XA ALLERGIC REACTION, INITIAL ENCOUNTER: ICD-10-CM

## 2023-07-17 RX ORDER — EPINEPHRINE 0.3 MG/.3ML
0.3 INJECTION SUBCUTANEOUS
Qty: 2 EACH | Refills: 1 | OUTPATIENT
Start: 2023-07-17

## 2023-07-19 NOTE — TELEPHONE ENCOUNTER
Prior Authorization Approval    Authorization Effective Date: 6/19/2023  Authorization Expiration Date: 7/18/2024  Medication:   Approved Dose/Quantity:   Reference #:     Insurance Company: [employer] - Phone [phone] Fax [phone]  Expected CoPay:       CoPay Card Available:      Foundation Assistance Needed:    Which Pharmacy is filling the prescription (Not needed for infusion/clinic administered): [facility] [address] [address] [address]  Pharmacy Notified: Yes  Patient Notified: Yes

## 2023-07-19 NOTE — TELEPHONE ENCOUNTER
Central Prior Authorization Team   Phone: [phone]    PA Initiation    Medication: EPINEPHrine 0.3MG/0.3ML auto-injectors    Insurance Company: [employer] - Phone [phone] Fax [phone]  Pharmacy Filling the Rx: [facility] [id number] [place] [facility] [place] [place] - [facility]  Filling Pharmacy Phone: [phone]  Filling Pharmacy Fax:    Start Date: 7/19/2023

## 2023-08-15 ASSESSMENT — ENCOUNTER SYMPTOMS
ARTHRALGIAS: 0
NAUSEA: 0
WEAKNESS: 0
HEARTBURN: 0
PALPITATIONS: 0
BREAST MASS: 0
CHILLS: 0
CONSTIPATION: 0
HEMATOCHEZIA: 0
JOINT SWELLING: 0
DYSURIA: 0
PARESTHESIAS: 0
COUGH: 0
MYALGIAS: 0
SHORTNESS OF BREATH: 0
HEMATURIA: 0
SORE THROAT: 0
FREQUENCY: 0
EYE PAIN: 0
HEADACHES: 0
DIZZINESS: 0
NERVOUS/ANXIOUS: 0
DIARRHEA: 0
ABDOMINAL PAIN: 0
FEVER: 0

## 2023-08-15 ASSESSMENT — ACTIVITIES OF DAILY LIVING (ADL): CURRENT_FUNCTION: NO ASSISTANCE NEEDED

## 2023-08-21 ENCOUNTER — OFFICE VISIT (OUTPATIENT)
Dept: FAMILY MEDICINE | Facility: CLINIC | Age: 73
End: 2023-08-21
Payer: COMMERCIAL

## 2023-08-21 VITALS
HEIGHT: 65 IN | OXYGEN SATURATION: 99 % | HEART RATE: 77 BPM | WEIGHT: 141.9 LBS | DIASTOLIC BLOOD PRESSURE: 76 MMHG | TEMPERATURE: 98.2 F | RESPIRATION RATE: 14 BRPM | BODY MASS INDEX: 23.64 KG/M2 | SYSTOLIC BLOOD PRESSURE: 131 MMHG

## 2023-08-21 DIAGNOSIS — M85.89 OSTEOPENIA OF MULTIPLE SITES: ICD-10-CM

## 2023-08-21 DIAGNOSIS — I48.0 PAF (PAROXYSMAL ATRIAL FIBRILLATION) (H): ICD-10-CM

## 2023-08-21 DIAGNOSIS — Z00.00 ENCOUNTER FOR MEDICARE ANNUAL WELLNESS EXAM: Primary | ICD-10-CM

## 2023-08-21 DIAGNOSIS — E78.5 HYPERLIPIDEMIA LDL GOAL <130: ICD-10-CM

## 2023-08-21 DIAGNOSIS — J45.30 MILD PERSISTENT ASTHMA WITHOUT COMPLICATION: ICD-10-CM

## 2023-08-21 DIAGNOSIS — K21.9 GASTROESOPHAGEAL REFLUX DISEASE WITHOUT ESOPHAGITIS: ICD-10-CM

## 2023-08-21 DIAGNOSIS — R73.9 HYPERGLYCEMIA: ICD-10-CM

## 2023-08-21 DIAGNOSIS — I10 HYPERTENSION GOAL BP (BLOOD PRESSURE) < 140/90: ICD-10-CM

## 2023-08-21 DIAGNOSIS — E03.4 HYPOTHYROIDISM DUE TO ACQUIRED ATROPHY OF THYROID: ICD-10-CM

## 2023-08-21 LAB — HBA1C MFR BLD: 5.4 % (ref 0–5.6)

## 2023-08-21 PROCEDURE — 83036 HEMOGLOBIN GLYCOSYLATED A1C: CPT | Performed by: FAMILY MEDICINE

## 2023-08-21 PROCEDURE — 36415 COLL VENOUS BLD VENIPUNCTURE: CPT | Performed by: FAMILY MEDICINE

## 2023-08-21 PROCEDURE — 99214 OFFICE O/P EST MOD 30 MIN: CPT | Mod: 25 | Performed by: FAMILY MEDICINE

## 2023-08-21 PROCEDURE — 82306 VITAMIN D 25 HYDROXY: CPT | Performed by: FAMILY MEDICINE

## 2023-08-21 PROCEDURE — G0439 PPPS, SUBSEQ VISIT: HCPCS | Performed by: FAMILY MEDICINE

## 2023-08-21 RX ORDER — ALBUTEROL SULFATE 90 UG/1
1-2 AEROSOL, METERED RESPIRATORY (INHALATION) EVERY 4 HOURS PRN
Qty: 18 G | Refills: 1 | Status: SHIPPED | OUTPATIENT
Start: 2023-08-21

## 2023-08-21 RX ORDER — FLUTICASONE PROPIONATE AND SALMETEROL 250; 50 UG/1; UG/1
1 POWDER RESPIRATORY (INHALATION) 2 TIMES DAILY
Qty: 180 EACH | Refills: 1 | Status: SHIPPED | OUTPATIENT
Start: 2023-08-21 | End: 2023-09-19

## 2023-08-21 ASSESSMENT — ENCOUNTER SYMPTOMS
HEARTBURN: 0
HEADACHES: 0
JOINT SWELLING: 0
DIARRHEA: 0
PARESTHESIAS: 0
CONSTIPATION: 0
DIZZINESS: 0
NAUSEA: 0
SORE THROAT: 0
COUGH: 0
SHORTNESS OF BREATH: 0
PALPITATIONS: 0
FREQUENCY: 0
CHILLS: 0
ARTHRALGIAS: 0
FEVER: 0
HEMATOCHEZIA: 0
ABDOMINAL PAIN: 0
MYALGIAS: 0
EYE PAIN: 0
NERVOUS/ANXIOUS: 0
DYSURIA: 0
BREAST MASS: 0
HEMATURIA: 0
WEAKNESS: 0

## 2023-08-21 ASSESSMENT — PAIN SCALES - GENERAL: PAINLEVEL: NO PAIN (0)

## 2023-08-21 ASSESSMENT — ACTIVITIES OF DAILY LIVING (ADL): CURRENT_FUNCTION: NO ASSISTANCE NEEDED

## 2023-08-21 NOTE — PROGRESS NOTES
"SUBJECTIVE:   Amita is a 72 year old who presents for Preventive Visit.      8/21/2023    10:58 AM   Additional Questions   Roomed by Aura       Are you in the first 12 months of your Medicare coverage?  No    Healthy Habits:     In general, how would you rate your overall health?  Good    Frequency of exercise:  6-7 days/week    Duration of exercise:  45-60 minutes    Do you usually eat at least 4 servings of fruit and vegetables a day, include whole grains    & fiber and avoid regularly eating high fat or \"junk\" foods?  Yes    Taking medications regularly:  Yes    Medication side effects:  None    Ability to successfully perform activities of daily living:  No assistance needed    Home Safety:  No safety concerns identified    Hearing Impairment:  No hearing concerns    In the past 6 months, have you been bothered by leaking of urine?  No    In general, how would you rate your overall mental or emotional health?  Good    Additional concerns today:  No        Have you ever done Advance Care Planning? (For example, a Health Directive, POLST, or a discussion with a medical provider or your loved ones about your wishes): Yes, advance care planning is on file.       Fall risk  Fallen 2 or more times in the past year?: Yes  Any fall with injury in the past year?: Yes    Cognitive Screening   1) Repeat 3 items (Leader, Season, Table)    2) Clock draw: NORMAL  3) 3 item recall: Recalls 3 objects  Results: 3 items recalled: COGNITIVE IMPAIRMENT LESS LIKELY    Mini-CogTM Copyright [employer] [place]. Licensed by the author for use in [facility]; reprinted with permission (magda@.[facility]). All rights reserved.      Do you have sleep apnea, excessive snoring or daytime drowsiness? : no    Reviewed and updated as needed this visit by clinical staff   Tobacco  Allergies    Med Hx  Surg Hx  Fam Hx          Reviewed and updated as needed this visit by Provider   Tobacco  Allergies    Med Hx  Surg Hx  Fam Hx       "   Social History     Tobacco Use    Smoking status: Never    Smokeless tobacco: Never   Substance Use Topics    Alcohol use: Yes     Comment: 1-4 drinks (wine, liquor) per month             8/15/2023     1:20 PM   Alcohol Use   Prescreen: >3 drinks/day or >7 drinks/week? No     Do you have a current opioid prescription? No  Do you use any other controlled substances or medications that are not prescribed by a provider? None          Hyperlipidemia Follow-Up    Are you regularly taking any medication or supplement to lower your cholesterol?   Yes- atorvastatin  Are you having muscle aches or other side effects that you think could be caused by your cholesterol lowering medication?  No    Hypertension Follow-up    Do you check your blood pressure regularly outside of the clinic? Yes   Are you following a low salt diet? Yes  Are your blood pressures ever more than 140 on the top number (systolic) OR more   than 90 on the bottom number (diastolic), for example 140/90? No    Atrial Fibrillation Follow-up    Symptoms: no recent chest pain, significant palpitations, dizziness/lightheadedness, dyspnea, or increased peripheral edema.  No recent episodes.  Stroke prevention: DOAC (Eliquis, Xarelto, Pradaxa)        3/16/2023    11:06 PM 3/17/2023     5:52 AM 3/17/2023     7:51 AM 3/30/2023    11:30 AM 8/21/2023    11:10 AM   Date   Pulse 80 80 84 76 77     Current [id number] Score: 3 points, which represents a 3.2% annual risk of major embolic event, without anti-coagulation or an LAAO device.       Asthma Follow-Up    Was ACT completed today?  Yes        3/29/2023    11:54 AM   ACT Total Scores   ACT TOTAL SCORE (Goal Greater than or Equal to 20) 25   In the past 12 months, how many times did you visit the emergency room for your asthma without being admitted to the hospital? 0   In the past 12 months, how many times were you hospitalized overnight because of your asthma? 0        How many days per week do you miss taking  your asthma controller medication?  0  Please describe any recent triggers for your asthma:  environmental allergens.  Have you had any Emergency Room Visits, Urgent Care Visits, or Hospital Admissions since your last office visit?  No  Hypothyroidism Follow-up    Since last visit, patient describes the following symptoms: Weight stable, no hair loss, no skin changes, no constipation, no loose stools      GERD:  symptoms controlled with nexium.    Osteopenia:  have discussed use of injectable - Reclast specifically.  She would not tolerate oral bisphosphonates due to GI concerns.      Current providers sharing in care for this patient include:   Patient Care Team:  Darby Williamson MD as PCP - General (Family Practice)  Darby Williamson MD as Assigned PCP  Michael Gould MD as MD (Dermatology)  Anders Aguilar AuD (Audiology)  Jose Luis Foss OD as Assigned Surgical Provider    The following health maintenance items are reviewed in Epic and correct as of today:  Health Maintenance   Topic Date Due    ANNUAL REVIEW OF HM ORDERS  08/11/2023    MEDICARE ANNUAL WELLNESS VISIT  08/11/2023    ASTHMA ACTION PLAN  08/12/2023    INFLUENZA VACCINE (1) 09/01/2023    ASTHMA CONTROL TEST  09/30/2023    TSH W/FREE T4 REFLEX  03/10/2024    FALL RISK ASSESSMENT  08/21/2024    MAMMO SCREENING  06/21/2025    COLORECTAL CANCER SCREENING  01/16/2027    LIPID  03/10/2028    ADVANCE CARE PLANNING  08/21/2028    DTAP/TDAP/TD IMMUNIZATION (3 - Td or Tdap) 06/04/2029    DEXA  06/21/2038    HEPATITIS C SCREENING  Completed    PHQ-2 (once per calendar year)  Completed    Pneumococcal Vaccine: 65+ Years  Completed    ZOSTER IMMUNIZATION  Completed    COVID-19 Vaccine  Completed    IPV IMMUNIZATION  Aged Out    MENINGITIS IMMUNIZATION  Aged Out     BP Readings from Last 3 Encounters:   08/21/23 131/76   03/30/23 (!) 148/68   03/17/23 133/64    Wt Readings from Last 3 Encounters:   08/21/23 64.4 kg (141 lb 14.4 oz)   03/30/23 68.2 kg (150  "lb 6.4 oz)   03/17/23 72.9 kg (160 lb 11.2 oz)                  Mammogram Screening: Mammogram Screening: Recommended mammography every 1-2 years with patient discussion and risk factor consideration        Review of Systems   Constitutional:  Negative for chills and fever.   HENT:  Negative for congestion, ear pain, hearing loss and sore throat.    Eyes:  Negative for pain and visual disturbance.   Respiratory:  Negative for cough and shortness of breath.    Cardiovascular:  Negative for chest pain, palpitations and peripheral edema.   Gastrointestinal:  Negative for abdominal pain, constipation, diarrhea, heartburn, hematochezia and nausea.   Breasts:  Negative for tenderness, breast mass and discharge.   Genitourinary:  Negative for dysuria, frequency, genital sores, hematuria, pelvic pain, urgency, vaginal bleeding and vaginal discharge.   Musculoskeletal:  Negative for arthralgias, joint swelling and myalgias.   Skin:  Negative for rash.   Neurological:  Negative for dizziness, weakness, headaches and paresthesias.   Psychiatric/Behavioral:  Negative for mood changes. The patient is not nervous/anxious.          OBJECTIVE:   /76 (BP Location: Right arm, Patient Position: Sitting, Cuff Size: Adult Regular)   Pulse 77   Temp 98.2  F (36.8  C) (Oral)   Resp 14   Ht 1.643 m (5' 4.69\")   Wt 64.4 kg (141 lb 14.4 oz)   LMP  (LMP Unknown)   SpO2 99%   BMI 23.84 kg/m   Estimated body mass index is 23.84 kg/m  as calculated from the following:    Height as of this encounter: 1.643 m (5' 4.69\").    Weight as of this encounter: 64.4 kg (141 lb 14.4 oz).  Physical Exam  GENERAL APPEARANCE: healthy, alert and no distress  EYES: Eyes grossly normal to inspection, PERRL and conjunctivae and sclerae normal  HENT: ear canals and TM's normal, nose and mouth without ulcers or lesions, oropharynx clear and oral mucous membranes moist  NECK: no adenopathy, no asymmetry, masses, or scars and thyroid normal to " palpation  RESP: lungs clear to auscultation - no rales, rhonchi or wheezes  BREAST: normal without masses, tenderness or nipple discharge and no palpable axillary masses or adenopathy  CV: regular rate and rhythm, normal S1 S2, no S3 or S4, no murmur, click or rub, no peripheral edema and peripheral pulses strong  ABDOMEN: soft, nontender, no hepatosplenomegaly, no masses and bowel sounds normal   (female): normal female external genitalia, normal urethral meatus, and vaginal mucosal atrophy noted  MS: no musculoskeletal defects are noted and gait is age appropriate without ataxia  SKIN: no suspicious lesions or rashes  NEURO: Normal strength and tone, sensory exam grossly normal, mentation intact and speech normal  PSYCH: mentation appears normal and affect normal/bright    Diagnostic Test Results:  Labs reviewed in Epic  Results for orders placed or performed in visit on 08/21/23   Hemoglobin A1c     Status: Normal   Result Value Ref Range    Hemoglobin A1C 5.4 0.0 - 5.6 %   Vitamin D Deficiency     Status: Normal   Result Value Ref Range    Vitamin D, Total (25-Hydroxy) 47 20 - 75 ug/L    Narrative    Season, race, dietary intake, and treatment affect the concentration of 25-hydroxy-Vitamin D. Values may decrease during winter months and increase during summer months. Values 20-29 ug/L may indicate Vitamin D insufficiency and values <20 ug/L may indicate Vitamin D deficiency.    Vitamin D determination is routinely performed by an immunoassay specific for 25 hydroxyvitamin D3.  If an individual is on vitamin D2(ergocalciferol) supplementation, please specify 25 OH vitamin D2 and D3 level determination by LCMSMS test VITD23.         ASSESSMENT / PLAN:   (Z00.00) Encounter for Medicare annual wellness exam  (primary encounter diagnosis)  Comment:   Plan: screening and preventative care discussed.      (J45.30) Mild persistent asthma without complication  Comment: controlled.   Plan: albuterol (PROAIR HFA) 108  (90 Base) MCG/ACT         inhaler, fluticasone-salmeterol (ADVAIR) 250-50        MCG/ACT inhaler        Continue current treatment.     (E78.5) Hyperlipidemia LDL goal <130  Comment: lab up to date.   Plan: continue Lipitor as previous.  Refill available till labs are due in March.      (I10) Hypertension goal BP (blood pressure) < 140/90  Comment: controlled.   Plan: hydrochlorothiazide (HYDRODIURIL) 25 MG tablet,        losartan (COZAAR) 50 MG tablet, potassium         chloride ER (KLOR-CON) 20 MEQ CR tablet        Refills updated.     (E03.4) Hypothyroidism due to acquired atrophy of thyroid  Comment: euthyroid on recent testing.  Plan: continue Levothyroxine 100 mcg daily.     (I48.0) PAF (paroxysmal atrial fibrillation) (H)  Comment: no recent symptoms   Plan: anticoagulated and rate control with Diltiazem.  Ongoing care with cardiology.     (K21.9) Gastroesophageal reflux disease without esophagitis  Comment: symptoms controlled.   Plan: continue nexium use regularly.     (R73.9) Hyperglycemia  Comment: on most recent testing   fasting blood sugar >100  Plan: Hemoglobin A1c        No sign of diabetes on testing.     (M85.89) Osteopenia of multiple sites  Comment: has declined treatment beside calcium and Vitamin D supplement.   Plan: Vitamin D Deficiency        Adequate vitamin D level noted.  Continue current supplements.     Patient has been advised of split billing requirements and indicates understanding: Yes      COUNSELING:  Reviewed preventive health counseling, as reflected in patient instructions       Regular exercise       Healthy diet/nutrition       Vision screening       Hearing screening       Dental care       Bladder control       Fall risk prevention       Osteoporosis prevention/bone health        She reports that she has never smoked. She has never used smokeless tobacco.      Appropriate preventive services were discussed with this patient, including applicable screening as appropriate for  cardiovascular disease, diabetes, osteopenia/osteoporosis, and glaucoma.  As appropriate for age/gender, discussed screening for colorectal cancer, prostate cancer, breast cancer, and cervical cancer. Checklist reviewing preventive services available has been given to the patient.    Reviewed patients plan of care and provided an AVS. The Basic Care Plan (routine screening as documented in Health Maintenance) for Tiffany meets the Care Plan requirement. This Care Plan has been established and reviewed with the Patient.          Darby Williamson MD  [facility]    Identified Health Risks:  I have reviewed Opioid Use Disorder and Substance Use Disorder risk factors and made any needed referrals.         Patient Instructions   Lab today for long term blood sugar testing and vitamin D level.    Continue current medications.  I will update medication for you so you will have refills available for your move to allow you to establish care there.          Patient Education  Personalized Prevention Plan  You are due for the preventive services outlined below.  Your care team is available to assist you in scheduling these services.  If you have already completed any of these items, please share that information with your care team to update in your medical record.  Health Maintenance Due   Topic Date Due    Annual Wellness Visit  08/11/2023

## 2023-08-21 NOTE — LETTER
My Asthma Action Plan    Name: Tiffany Stovall   YOB: 1950  Date: 8/21/2023   My doctor: Darby Williamson MD   My clinic: [facility]        My Control Medicine: Fluticasone propionate + salmeterol (Advair Diskus or Wixela Inhub) -  250/50 mcg 1-2 puffs  daily.  My Rescue Medicine: Albuterol nebulizer solution as needed  Albuterol (Proair/Ventolin/Proventil HFA) 2-4 puffs EVERY 4 HOURS as needed. Use a spacer if recommended by your provider.   My Asthma Severity:   Mild Persistent  Know your asthma triggers:   Environmental allergens, metoprolol            GREEN ZONE   Good Control  I feel good  No cough or wheeze  Can work, sleep and play without asthma symptoms       Take your asthma control medicine every day.     If exercise triggers your asthma, take your rescue medication  15 minutes before exercise or sports, and  During exercise if you have asthma symptoms  Spacer to use with inhaler: If you have a spacer, make sure to use it with your inhaler             YELLOW ZONE Getting Worse  I have ANY of these:  I do not feel good  Cough or wheeze  Chest feels tight  Wake up at night   Keep taking your Green Zone medications  Start taking your rescue medicine:  every 20 minutes for up to 1 hour. Then every 4 hours for 24-48 hours.  If you stay in the Yellow Zone for more than 12-24 hours, contact your doctor.  If you do not return to the Green Zone in 12-24 hours or you get worse, start taking your oral steroid medicine if prescribed by your provider.           RED ZONE Medical Alert - Get Help  I have ANY of these:  I feel awful  Medicine is not helping  Breathing getting harder  Trouble walking or talking  Nose opens wide to breathe       Take your rescue medicine NOW  If your provider has prescribed an oral steroid medicine, start taking it NOW  Call your doctor NOW  If you are still in the Red Zone after 20 minutes and you have not reached your doctor:  Take your  rescue medicine again and  Call 911 or go to the emergency room right away    See your regular doctor within 2 weeks of an Emergency Room or Urgent Care visit for follow-up treatment.          Annual Reminders:  Meet with Asthma Educator,  Flu Shot in the Fall, consider Pneumonia Vaccination for patients with asthma (aged 19 and older).    Pharmacy:    [facility] - [place], [place] - [address] [address] [address] - [place] [address] [address]    Electronically signed by Darby Williamson MD   Date: 08/21/23                      Asthma Triggers  How To Control Things That Make Your Asthma Worse    Triggers are things that make your asthma worse.  Look at the list below to help you find your triggers and what you can do about them.  You can help prevent asthma flare-ups by staying away from your triggers.      Trigger                                                          What you can do   Cigarette Smoke  Tobacco smoke can make asthma worse. Do not allow smoking in your home, car or around you.  Be sure no one smokes at a child s day care or school.  If you smoke, ask your health care provider for ways to help you quit.  Ask family members to quit too.  Ask your health care provider for a referral to Quit Plan to help you quit smoking, or call 0-558-714-PLAN.     Colds, Flu, Bronchitis  These are common triggers of asthma. Wash your hands often.  Don t touch your eyes, nose or mouth.  Get a flu shot every year.     Dust Mites  These are tiny bugs that live in cloth or carpet. They are too small to see. Wash sheets and blankets in hot water every week.   Encase pillows and mattress in dust mite proof covers.  Avoid having carpet if you can. If you have carpet, vacuum weekly.   Use a dust mask and HEPA vacuum.   Pollen and Outdoor Mold  Some people are allergic to trees, grass, or weed pollen, or molds. Try to keep your windows closed.  Limit time  out doors when pollen count is high.   Ask you health care provider about taking medicine during allergy season.     Animal Dander  Some people are allergic to skin flakes, urine or saliva from pets with fur or feathers. Keep pets with fur or feathers out of your home.    If you can t keep the pet outdoors, then keep the pet out of your bedroom.  Keep the bedroom door closed.  Keep pets off cloth furniture and away from stuffed toys.     Mice, Rats, and Cockroaches   Some people are allergic to the waste from these pests.   Cover food and garbage.  Clean up spills and food crumbs.  Store grease in the refrigerator.   Keep food out of the bedroom.   Indoor Mold  This can be a trigger if your home has high moisture. Fix leaking faucets, pipes, or other sources of water.   Clean moldy surfaces.  Dehumidify basement if it is damp and smelly.   Smoke, Strong Odors, and Sprays  These can reduce air quality. Stay away from strong odors and sprays, such as perfume, powder, hair spray, paints, smoke incense, paint, cleaning products, candles and new carpet.   Exercise or Sports  Some people with asthma have this trigger. Be active!  Ask your doctor about taking medicine before sports or exercise to prevent symptoms.    Warm up for 5-10 minutes before and after sports or exercise.     Other Triggers of Asthma  Cold air:  Cover your nose and mouth with a scarf.  Sometimes laughing or crying can be a trigger.  Some medicines and food can trigger asthma.

## 2023-08-21 NOTE — PATIENT INSTRUCTIONS
Lab today for long term blood sugar testing and vitamin D level.    Continue current medications.  I will update medication for you so you will have refills available for your move to allow you to establish care there.          Patient Education   Personalized Prevention Plan  You are due for the preventive services outlined below.  Your care team is available to assist you in scheduling these services.  If you have already completed any of these items, please share that information with your care team to update in your medical record.  Health Maintenance Due   Topic Date Due    Annual Wellness Visit  08/11/2023

## 2023-08-22 ENCOUNTER — MYC MEDICAL ADVICE (OUTPATIENT)
Dept: FAMILY MEDICINE | Facility: CLINIC | Age: 73
End: 2023-08-22
Payer: COMMERCIAL

## 2023-08-22 DIAGNOSIS — I10 HYPERTENSION GOAL BP (BLOOD PRESSURE) < 140/90: ICD-10-CM

## 2023-08-22 LAB — DEPRECATED CALCIDIOL+CALCIFEROL SERPL-MC: 47 UG/L (ref 20–75)

## 2023-08-22 RX ORDER — HYDROCHLOROTHIAZIDE 25 MG/1
25 TABLET ORAL DAILY
Qty: 90 TABLET | Refills: 1 | Status: SHIPPED | OUTPATIENT
Start: 2023-08-22 | End: 2023-08-23

## 2023-08-22 RX ORDER — LOSARTAN POTASSIUM 50 MG/1
50 TABLET ORAL DAILY
Qty: 90 TABLET | Refills: 1 | Status: SHIPPED | OUTPATIENT
Start: 2023-08-22 | End: 2023-08-23

## 2023-08-22 RX ORDER — POTASSIUM CHLORIDE 1500 MG/1
20 TABLET, EXTENDED RELEASE ORAL DAILY
Qty: 90 TABLET | Refills: 1 | Status: SHIPPED | OUTPATIENT
Start: 2023-08-22 | End: 2023-08-23

## 2023-08-22 NOTE — RESULT ENCOUNTER NOTE
Your long term blood sugar testing is normal.  No sign of diabetes or prediabetes is noted.  Your vitamin D level is normal.  Continue your current vitamin D intake in diet and supplements.  Please call or MyChart message me if you have any questions.    SORAYA

## 2023-08-23 RX ORDER — HYDROCHLOROTHIAZIDE 25 MG/1
25 TABLET ORAL DAILY
Qty: 90 TABLET | Refills: 1 | Status: SHIPPED | OUTPATIENT
Start: 2023-08-23

## 2023-08-23 RX ORDER — POTASSIUM CHLORIDE 1500 MG/1
20 TABLET, EXTENDED RELEASE ORAL DAILY
Qty: 90 TABLET | Refills: 1 | Status: SHIPPED | OUTPATIENT
Start: 2023-08-23

## 2023-08-23 RX ORDER — LOSARTAN POTASSIUM 50 MG/1
50 TABLET ORAL DAILY
Qty: 90 TABLET | Refills: 1 | Status: SHIPPED | OUTPATIENT
Start: 2023-08-23

## 2023-08-25 ENCOUNTER — TRANSFERRED RECORDS (OUTPATIENT)
Dept: HEALTH INFORMATION MANAGEMENT | Facility: CLINIC | Age: 73
End: 2023-08-25
Payer: COMMERCIAL

## 2023-09-19 ENCOUNTER — MYC MEDICAL ADVICE (OUTPATIENT)
Dept: FAMILY MEDICINE | Facility: CLINIC | Age: 73
End: 2023-09-19
Payer: COMMERCIAL

## 2023-09-19 DIAGNOSIS — J45.30 MILD PERSISTENT ASTHMA WITHOUT COMPLICATION: ICD-10-CM

## 2023-09-19 RX ORDER — FLUTICASONE PROPIONATE AND SALMETEROL 250; 50 UG/1; UG/1
POWDER RESPIRATORY (INHALATION)
Qty: 180 EACH | Refills: 1 | Status: SHIPPED | OUTPATIENT
Start: 2023-09-19

## 2023-09-19 NOTE — TELEPHONE ENCOUNTER
Medication already pended in refill encounter 9/19/2023.    Diane, RN  [facility]

## 2023-12-07 ENCOUNTER — MYC MEDICAL ADVICE (OUTPATIENT)
Dept: FAMILY MEDICINE | Facility: CLINIC | Age: 73
End: 2023-12-07
Payer: COMMERCIAL

## 2023-12-07 DIAGNOSIS — E03.4 HYPOTHYROIDISM DUE TO ACQUIRED ATROPHY OF THYROID: ICD-10-CM

## 2023-12-07 RX ORDER — LEVOTHYROXINE SODIUM 100 UG/1
100 TABLET ORAL DAILY
Qty: 90 TABLET | Refills: 0 | Status: SHIPPED | OUTPATIENT
Start: 2023-12-07

## 2023-12-07 NOTE — TELEPHONE ENCOUNTER
Routing to provider to review and advise if patient can get a levothyroxine refill to get her through to march as it was not transferred correctly to her pharmacy per patient.    Renzo Whaley RN  [facility]

## 2023-12-12 ENCOUNTER — TELEPHONE (OUTPATIENT)
Dept: FAMILY MEDICINE | Facility: CLINIC | Age: 73
End: 2023-12-12
Payer: COMMERCIAL

## 2023-12-12 NOTE — TELEPHONE ENCOUNTER
Pharmacy called to let provider know that they are out of the type of levothyroxine that they sent pt last time. They have received it from a different  this time so that is what will be dispensed to pt. They are also notifying pt of this as well If you needs to contact them for any reached at [phone] ref # [id number]  Tamica Aguilar CMA

## 2023-12-17 ENCOUNTER — MYC MEDICAL ADVICE (OUTPATIENT)
Dept: FAMILY MEDICINE | Facility: CLINIC | Age: 73
End: 2023-12-17
Payer: COMMERCIAL

## 2023-12-18 NOTE — TELEPHONE ENCOUNTER
Called and spoke with [facility] at [phone]. Reference number: [id number]. They reported that patient's address listed is in [place]. They reported that for this state, they need provider's approval to switch to generic . Per patient's chart, address is still in [place]. Will confirm with patient current address.     Patient responded to [employer] message and confirmed address for [place]. Will forward message to provider to confirm that pharmacy can switch to generic  for levothyroxine prescription.     Laura Burrell, ROSANNAN, RN   [facility]

## 2023-12-19 NOTE — TELEPHONE ENCOUNTER
Called [facility] at [phone] with reference number [id number] and spoke with Eliezer, Pharmacist. Writer relayed provider's message below and Eliezer verbalized understanding states will starting working on getting medication process for patient.     TYRELL Hodgson  [facility]

## 2024-03-13 ENCOUNTER — MYC MEDICAL ADVICE (OUTPATIENT)
Dept: FAMILY MEDICINE | Facility: CLINIC | Age: 74
End: 2024-03-13
Payer: COMMERCIAL

## 2024-07-22 ENCOUNTER — PATIENT OUTREACH (OUTPATIENT)
Dept: CARE COORDINATION | Facility: CLINIC | Age: 74
End: 2024-07-22
Payer: COMMERCIAL

## 2024-08-05 ENCOUNTER — PATIENT OUTREACH (OUTPATIENT)
Dept: CARE COORDINATION | Facility: CLINIC | Age: 74
End: 2024-08-05
Payer: COMMERCIAL

## 2024-10-12 ENCOUNTER — HEALTH MAINTENANCE LETTER (OUTPATIENT)
Age: 74
End: 2024-10-12

## 2024-12-30 ENCOUNTER — TELEPHONE (OUTPATIENT)
Dept: FAMILY MEDICINE | Facility: CLINIC | Age: 74
End: 2024-12-30
Payer: COMMERCIAL

## 2024-12-30 NOTE — TELEPHONE ENCOUNTER
[address] [address]  [phone]  Dept: [phone]    December 30, 2024    Tiffany Stovall  [address] [place] [id number]    Dear Amita,    At [facility] we care about your health and are committed to providing quality patient care.     Here is a list of Health Maintenance topics that are due now or due soon:  Health Maintenance Due   Topic Date Due    RSV VACCINE (1 - Risk 60-74 years 1-dose series) Never done    ASTHMA CONTROL TEST  09/30/2023    PHQ-2 (once per calendar year)  01/01/2024    LIPID  03/10/2024    TSH W/FREE T4 REFLEX  03/10/2024    BMP  04/19/2024    MEDICARE ANNUAL WELLNESS VISIT  08/21/2024    ANNUAL REVIEW OF HM ORDERS  08/21/2024    ASTHMA ACTION PLAN  08/21/2024    FALL RISK ASSESSMENT  08/21/2024    INFLUENZA VACCINE (1) 09/01/2024    COVID-19 Vaccine (8 - 2024-25 season) 09/01/2024        We are recommending that you:  Schedule a WELLNESS (Preventative/Physical) APPOINTMENT with your primary care provider. If you go elsewhere for your wellness appointments then please disregard this reminder    ,   Complete the attached ASTHMA CONTROL TEST.  If your total score is 19 or less or you have been to the ER or urgent care for your asthma, then please schedule an asthma followup appointment with your primary care provider.    ,   Complete the attached Questionnaire:  You are due to complete the attached PHQ questionnaire. Please complete as soon as you are able.    , and   Schedule a Nurse-Only appointment to update your immunizations: Your records indicate that you are not up to date with your immunizations, please schedule a nurse-only appointment to get these updated or update them at your next office visit. If this is incorrect, please disregard.    To schedule an appointment or discuss this further, you may contact us by phone at the [facility] at [phone] or online  through the [email or web address] @ [email or web address]    Thank you for trusting [facility] and we appreciate the opportunity to serve you.  We look forward to supporting your healthcare needs in the future.    Your partners in health,      Quality Committee at [facility]

## 2025-04-14 NOTE — TELEPHONE ENCOUNTER
patient called back and states that she would like to go ahead with Dr. Corley we can call work phone a from [phone] pm to schedule wide excision.  Appointment with Dr. Bailon scheduled for 03/21/18.    Kendra CARTER RN  [facility]  [phone]  [facility]   [phone]     AGE (acute gastroenteritis)

## (undated) DEVICE — PREP CHLORAPREP 26ML TINTED HI-LITE ORANGE 930815

## (undated) DEVICE — GLOVE BIOGEL PI SZ 8.0 40880

## (undated) DEVICE — WIRE GUIDE 3.2X400MM  357.399

## (undated) DEVICE — GLOVE PROTEXIS POWDER FREE 7.5 ORTHOPEDIC 2D73ET75

## (undated) DEVICE — LINEN TOWEL PACK X5 5464

## (undated) DEVICE — SOL WATER IRRIG 1000ML BOTTLE 2F7114

## (undated) DEVICE — STPL SKIN 35W ROTATING HEAD PRW35

## (undated) DEVICE — GLOVE PROTEXIS BLUE W/NEU-THERA 7.0  2D73EB70

## (undated) DEVICE — PACK TOTAL KNEE SOP15TKFSD

## (undated) DEVICE — SU ETHIBOND 0 CTX CR  8X18" CX31D

## (undated) DEVICE — SOL NACL 0.9% IRRIG 1000ML BOTTLE 2F7124

## (undated) DEVICE — IMM KNEE 20" 0814-2660

## (undated) DEVICE — PACK HIP NAILING SOP15HNSB

## (undated) DEVICE — IMPLANTABLE DEVICE: Type: IMPLANTABLE DEVICE | Site: HIP | Status: NON-FUNCTIONAL

## (undated) DEVICE — DRSG XEROFORM 5X9" 8884431605

## (undated) DEVICE — BLADE SAW SAGITTAL STRK 25X79.5X1.24MM 4/2000 2108-318-000

## (undated) DEVICE — ROD SYN REAMER BALL TIP 2.5X950MM  351.706S

## (undated) DEVICE — CAST PADDING 6" UNSTERILE 9046

## (undated) DEVICE — DRAPE SHEET REV FOLD 3/4 9349

## (undated) DEVICE — DRSG XEROFORM 1X8"

## (undated) DEVICE — DRSG PREVENA NEGATIVE PRESSURE 20CM WND PRE1001US

## (undated) DEVICE — WRAP EZY KNEE

## (undated) DEVICE — SUCTION IRR SYSTEM W/O TIP INTERPULSE HANDPIECE 0210-100-000

## (undated) DEVICE — PREP DURAPREP 26ML APL 8630

## (undated) DEVICE — KIT PATIENT CARE HANA TABLE PROFX SUPINE 6855

## (undated) DEVICE — SU MONOCRYL 4-0 PS-2 18" UND Y496G

## (undated) DEVICE — CAST PADDING 6" STERILE 9046S

## (undated) DEVICE — SU VICRYL 0 CP-1 27" J467H

## (undated) DEVICE — NDL SPINAL 18GA 3.5" 405184

## (undated) DEVICE — DRSG KERLIX FLUFFS X5

## (undated) DEVICE — MANIFOLD NEPTUNE 4 PORT 700-20

## (undated) DEVICE — BONE CLEANING TIP INTERPULSE  0210-010-000

## (undated) DEVICE — DRSG TEGADERM 6X8" 1628

## (undated) DEVICE — SUCTION MANIFOLD NEPTUNE SGL

## (undated) DEVICE — BONE CEMENT MIXEVAC III HI VAC KIT  0206-015-000

## (undated) DEVICE — GLOVE PROTEXIS POWDER FREE 7.0 ORTHOPEDIC 2D73ET70

## (undated) DEVICE — SOLUTION WOUND CLEANSING 3/4OZ 10% PVP EA-L3011FB-50

## (undated) DEVICE — DRILL BIT CANNULATED 16MM FLEX

## (undated) DEVICE — GOWN IMPERVIOUS SPECIALTY XL/XLONG 39049

## (undated) DEVICE — DRAPE C-ARM 60X42" 1013

## (undated) DEVICE — DRILL BIT QUICK COUPLING 3 FLUTE 4.2MMX145MM NDL  POINT

## (undated) DEVICE — GLOVE BIOGEL PI MICRO INDICATOR UNDERGLOVE SZ 8.0 48980

## (undated) DEVICE — ESU GROUND PAD UNIVERSAL W/O CORD

## (undated) DEVICE — SYR 30ML LL W/O NDL

## (undated) DEVICE — DRAIN ROUND W/RESERV KIT JACKSON PRATT 10FR 400ML SU130-402D

## (undated) DEVICE — DRAPE STERI U 1015

## (undated) DEVICE — BLADE SAW SAGITTAL STRK 21X90X1.27MM HD SYS 6 6221-127-090

## (undated) DEVICE — Device

## (undated) DEVICE — SU VICRYL 2-0 CP-1 27" UND J266H

## (undated) DEVICE — GLOVE PROTEXIS BLUE W/NEU-THERA 8.0  2D73EB80

## (undated) DEVICE — SU VICRYL 2-0 CT-1 27" J339H

## (undated) DEVICE — SUCTION TIP YANKAUER W/O VENT K86

## (undated) DEVICE — ESU PENCIL W/SMOKE EVAC NEPTUNE STRYKER 0703-046-000

## (undated) DEVICE — HOOD FLYTE W/PEELAWAY 408-800-100

## (undated) RX ORDER — LIDOCAINE HYDROCHLORIDE 20 MG/ML
INJECTION, SOLUTION EPIDURAL; INFILTRATION; INTRACAUDAL; PERINEURAL
Status: DISPENSED
Start: 2019-01-08

## (undated) RX ORDER — BUPIVACAINE HYDROCHLORIDE AND EPINEPHRINE 5; 5 MG/ML; UG/ML
INJECTION, SOLUTION EPIDURAL; INTRACAUDAL; PERINEURAL
Status: DISPENSED
Start: 2023-03-11

## (undated) RX ORDER — PROPOFOL 10 MG/ML
INJECTION, EMULSION INTRAVENOUS
Status: DISPENSED
Start: 2019-01-08

## (undated) RX ORDER — KETOROLAC TROMETHAMINE 30 MG/ML
INJECTION, SOLUTION INTRAMUSCULAR; INTRAVENOUS
Status: DISPENSED
Start: 2019-01-08

## (undated) RX ORDER — FENTANYL CITRATE 50 UG/ML
INJECTION, SOLUTION INTRAMUSCULAR; INTRAVENOUS
Status: DISPENSED
Start: 2019-01-08

## (undated) RX ORDER — FENTANYL CITRATE 0.05 MG/ML
INJECTION, SOLUTION INTRAMUSCULAR; INTRAVENOUS
Status: DISPENSED
Start: 2023-03-11

## (undated) RX ORDER — EPHEDRINE SULFATE 50 MG/ML
INJECTION, SOLUTION INTRAMUSCULAR; INTRAVENOUS; SUBCUTANEOUS
Status: DISPENSED
Start: 2023-03-11

## (undated) RX ORDER — HYDROMORPHONE HYDROCHLORIDE 1 MG/ML
INJECTION, SOLUTION INTRAMUSCULAR; INTRAVENOUS; SUBCUTANEOUS
Status: DISPENSED
Start: 2019-01-08

## (undated) RX ORDER — ACYCLOVIR 200 MG/1
CAPSULE ORAL
Status: DISPENSED
Start: 2019-01-08

## (undated) RX ORDER — DEXAMETHASONE SODIUM PHOSPHATE 4 MG/ML
INJECTION, SOLUTION INTRA-ARTICULAR; INTRALESIONAL; INTRAMUSCULAR; INTRAVENOUS; SOFT TISSUE
Status: DISPENSED
Start: 2019-01-08

## (undated) RX ORDER — ONDANSETRON 2 MG/ML
INJECTION INTRAMUSCULAR; INTRAVENOUS
Status: DISPENSED
Start: 2019-01-08

## (undated) RX ORDER — DEXAMETHASONE SODIUM PHOSPHATE 4 MG/ML
INJECTION, SOLUTION INTRA-ARTICULAR; INTRALESIONAL; INTRAMUSCULAR; INTRAVENOUS; SOFT TISSUE
Status: DISPENSED
Start: 2023-03-11

## (undated) RX ORDER — ACETAMINOPHEN 325 MG/1
TABLET ORAL
Status: DISPENSED
Start: 2019-01-08

## (undated) RX ORDER — ROPIVACAINE HYDROCHLORIDE 2 MG/ML
INJECTION, SOLUTION EPIDURAL; INFILTRATION; PERINEURAL
Status: DISPENSED
Start: 2019-01-08

## (undated) RX ORDER — CEFAZOLIN SODIUM/WATER 2 G/20 ML
SYRINGE (ML) INTRAVENOUS
Status: DISPENSED
Start: 2023-03-11

## (undated) RX ORDER — CEFAZOLIN SODIUM 2 G/100ML
INJECTION, SOLUTION INTRAVENOUS
Status: DISPENSED
Start: 2019-01-08

## (undated) RX ORDER — ONDANSETRON 2 MG/ML
INJECTION INTRAMUSCULAR; INTRAVENOUS
Status: DISPENSED
Start: 2023-03-11

## (undated) RX ORDER — PROPOFOL 10 MG/ML
INJECTION, EMULSION INTRAVENOUS
Status: DISPENSED
Start: 2023-03-11

## (undated) RX ORDER — FENTANYL CITRATE 50 UG/ML
INJECTION, SOLUTION INTRAMUSCULAR; INTRAVENOUS
Status: DISPENSED
Start: 2023-03-11

## (undated) RX ORDER — VANCOMYCIN HYDROCHLORIDE 1 G/20ML
INJECTION, POWDER, LYOPHILIZED, FOR SOLUTION INTRAVENOUS
Status: DISPENSED
Start: 2023-03-11